# Patient Record
Sex: FEMALE | Race: OTHER | HISPANIC OR LATINO | URBAN - METROPOLITAN AREA
[De-identification: names, ages, dates, MRNs, and addresses within clinical notes are randomized per-mention and may not be internally consistent; named-entity substitution may affect disease eponyms.]

---

## 2017-09-15 PROBLEM — Z00.00 ENCOUNTER FOR PREVENTIVE HEALTH EXAMINATION: Status: ACTIVE | Noted: 2017-09-15

## 2019-06-23 ENCOUNTER — EMERGENCY (EMERGENCY)
Facility: HOSPITAL | Age: 84
LOS: 1 days | Discharge: HOME | End: 2019-06-23
Attending: STUDENT IN AN ORGANIZED HEALTH CARE EDUCATION/TRAINING PROGRAM
Payer: COMMERCIAL

## 2019-06-23 VITALS
HEIGHT: 64 IN | TEMPERATURE: 98 F | DIASTOLIC BLOOD PRESSURE: 90 MMHG | WEIGHT: 169.98 LBS | RESPIRATION RATE: 17 BRPM | SYSTOLIC BLOOD PRESSURE: 190 MMHG | OXYGEN SATURATION: 95 % | HEART RATE: 85 BPM

## 2019-06-23 LAB
ALBUMIN SERPL ELPH-MCNC: 4.3 G/DL — SIGNIFICANT CHANGE UP (ref 3.5–5)
ALP SERPL-CCNC: 87 U/L — SIGNIFICANT CHANGE UP (ref 40–120)
ALT FLD-CCNC: 23 U/L DA — SIGNIFICANT CHANGE UP (ref 10–60)
ANION GAP SERPL CALC-SCNC: 10 MMOL/L — SIGNIFICANT CHANGE UP (ref 5–17)
APPEARANCE UR: CLEAR — SIGNIFICANT CHANGE UP
AST SERPL-CCNC: 18 U/L — SIGNIFICANT CHANGE UP (ref 10–40)
BASOPHILS # BLD AUTO: 0.08 K/UL — SIGNIFICANT CHANGE UP (ref 0–0.2)
BASOPHILS NFR BLD AUTO: 0.7 % — SIGNIFICANT CHANGE UP (ref 0–2)
BILIRUB SERPL-MCNC: 0.3 MG/DL — SIGNIFICANT CHANGE UP (ref 0.2–1.2)
BILIRUB UR-MCNC: NEGATIVE — SIGNIFICANT CHANGE UP
BUN SERPL-MCNC: 17 MG/DL — SIGNIFICANT CHANGE UP (ref 7–18)
CALCIUM SERPL-MCNC: 8.9 MG/DL — SIGNIFICANT CHANGE UP (ref 8.4–10.5)
CHLORIDE SERPL-SCNC: 96 MMOL/L — SIGNIFICANT CHANGE UP (ref 96–108)
CO2 SERPL-SCNC: 26 MMOL/L — SIGNIFICANT CHANGE UP (ref 22–31)
COLOR SPEC: YELLOW — SIGNIFICANT CHANGE UP
CREAT SERPL-MCNC: 0.89 MG/DL — SIGNIFICANT CHANGE UP (ref 0.5–1.3)
DIFF PNL FLD: ABNORMAL
EOSINOPHIL # BLD AUTO: 0.06 K/UL — SIGNIFICANT CHANGE UP (ref 0–0.5)
EOSINOPHIL NFR BLD AUTO: 0.5 % — SIGNIFICANT CHANGE UP (ref 0–6)
GLUCOSE SERPL-MCNC: 200 MG/DL — HIGH (ref 70–99)
GLUCOSE UR QL: NEGATIVE — SIGNIFICANT CHANGE UP
HCT VFR BLD CALC: 40.9 % — SIGNIFICANT CHANGE UP (ref 34.5–45)
HGB BLD-MCNC: 13.1 G/DL — SIGNIFICANT CHANGE UP (ref 11.5–15.5)
IMM GRANULOCYTES NFR BLD AUTO: 0.6 % — SIGNIFICANT CHANGE UP (ref 0–1.5)
KETONES UR-MCNC: NEGATIVE — SIGNIFICANT CHANGE UP
LEUKOCYTE ESTERASE UR-ACNC: NEGATIVE — SIGNIFICANT CHANGE UP
LIDOCAIN IGE QN: 51 U/L — LOW (ref 73–393)
LYMPHOCYTES # BLD AUTO: 1.54 K/UL — SIGNIFICANT CHANGE UP (ref 1–3.3)
LYMPHOCYTES # BLD AUTO: 13.8 % — SIGNIFICANT CHANGE UP (ref 13–44)
MCHC RBC-ENTMCNC: 26.2 PG — LOW (ref 27–34)
MCHC RBC-ENTMCNC: 32 GM/DL — SIGNIFICANT CHANGE UP (ref 32–36)
MCV RBC AUTO: 81.8 FL — SIGNIFICANT CHANGE UP (ref 80–100)
MONOCYTES # BLD AUTO: 0.44 K/UL — SIGNIFICANT CHANGE UP (ref 0–0.9)
MONOCYTES NFR BLD AUTO: 3.9 % — SIGNIFICANT CHANGE UP (ref 2–14)
NEUTROPHILS # BLD AUTO: 8.99 K/UL — HIGH (ref 1.8–7.4)
NEUTROPHILS NFR BLD AUTO: 80.5 % — HIGH (ref 43–77)
NITRITE UR-MCNC: NEGATIVE — SIGNIFICANT CHANGE UP
NRBC # BLD: 0 /100 WBCS — SIGNIFICANT CHANGE UP (ref 0–0)
PH UR: 8 — SIGNIFICANT CHANGE UP (ref 5–8)
PLATELET # BLD AUTO: 277 K/UL — SIGNIFICANT CHANGE UP (ref 150–400)
POTASSIUM SERPL-MCNC: 4.3 MMOL/L — SIGNIFICANT CHANGE UP (ref 3.5–5.3)
POTASSIUM SERPL-SCNC: 4.3 MMOL/L — SIGNIFICANT CHANGE UP (ref 3.5–5.3)
PROT SERPL-MCNC: 8.1 G/DL — SIGNIFICANT CHANGE UP (ref 6–8.3)
PROT UR-MCNC: 100
RBC # BLD: 5 M/UL — SIGNIFICANT CHANGE UP (ref 3.8–5.2)
RBC # FLD: 16.7 % — HIGH (ref 10.3–14.5)
SODIUM SERPL-SCNC: 132 MMOL/L — LOW (ref 135–145)
SP GR SPEC: 1.01 — SIGNIFICANT CHANGE UP (ref 1.01–1.02)
TROPONIN I SERPL-MCNC: <0.015 NG/ML — SIGNIFICANT CHANGE UP (ref 0–0.04)
UROBILINOGEN FLD QL: NEGATIVE — SIGNIFICANT CHANGE UP
WBC # BLD: 11.18 K/UL — HIGH (ref 3.8–10.5)
WBC # FLD AUTO: 11.18 K/UL — HIGH (ref 3.8–10.5)

## 2019-06-23 PROCEDURE — 99284 EMERGENCY DEPT VISIT MOD MDM: CPT | Mod: 25

## 2019-06-23 PROCEDURE — 70450 CT HEAD/BRAIN W/O DYE: CPT | Mod: 26

## 2019-06-23 PROCEDURE — 71045 X-RAY EXAM CHEST 1 VIEW: CPT

## 2019-06-23 PROCEDURE — 84484 ASSAY OF TROPONIN QUANT: CPT

## 2019-06-23 PROCEDURE — 85027 COMPLETE CBC AUTOMATED: CPT

## 2019-06-23 PROCEDURE — 99284 EMERGENCY DEPT VISIT MOD MDM: CPT

## 2019-06-23 PROCEDURE — 80053 COMPREHEN METABOLIC PANEL: CPT

## 2019-06-23 PROCEDURE — 81001 URINALYSIS AUTO W/SCOPE: CPT

## 2019-06-23 PROCEDURE — 70450 CT HEAD/BRAIN W/O DYE: CPT

## 2019-06-23 PROCEDURE — 96365 THER/PROPH/DIAG IV INF INIT: CPT

## 2019-06-23 PROCEDURE — 36415 COLL VENOUS BLD VENIPUNCTURE: CPT

## 2019-06-23 PROCEDURE — 87186 SC STD MICRODIL/AGAR DIL: CPT

## 2019-06-23 PROCEDURE — 93005 ELECTROCARDIOGRAM TRACING: CPT

## 2019-06-23 PROCEDURE — 87086 URINE CULTURE/COLONY COUNT: CPT

## 2019-06-23 PROCEDURE — 71045 X-RAY EXAM CHEST 1 VIEW: CPT | Mod: 26

## 2019-06-23 PROCEDURE — 83690 ASSAY OF LIPASE: CPT

## 2019-06-23 RX ORDER — METOCLOPRAMIDE HCL 10 MG
10 TABLET ORAL ONCE
Refills: 0 | Status: COMPLETED | OUTPATIENT
Start: 2019-06-23 | End: 2019-06-23

## 2019-06-23 RX ADMIN — Medication 10 MILLIGRAM(S): at 19:08

## 2019-06-23 RX ADMIN — Medication 104 MILLIGRAM(S): at 18:52

## 2019-06-23 NOTE — ED PROVIDER NOTE - PROGRESS NOTE DETAILS
patient vital stable. ct show hypodensity in real, neuro intact, tolerating po. offered admission, patient states she wants to go home. ordered for mri in follow up book. given return precaution

## 2019-06-23 NOTE — ED PROVIDER NOTE - CLINICAL SUMMARY MEDICAL DECISION MAKING FREE TEXT BOX
88 y/o F presents with headache, nausea and vomiting; otherwise neurologically intact. Will obtain blood work, assess for intracranial cause, EKG, serial abdominal exam, provide Zofran, fluids and reassess.

## 2019-06-23 NOTE — ED PROVIDER NOTE - OBJECTIVE STATEMENT
89 y/o F with a significant PMHx of DM and HTN presents to the ED with nausea and vomiting associated with headache x today. Denies focal weakness, numbness, chest pain, diarrhea, dysuria, abdominal pain or any other acute complaints.

## 2019-06-27 RX ORDER — CIPROFLOXACIN LACTATE 400MG/40ML
1 VIAL (ML) INTRAVENOUS
Qty: 14 | Refills: 0
Start: 2019-06-27 | End: 2019-07-03

## 2019-06-27 NOTE — ED POST DISCHARGE NOTE - RESULT SUMMARY
+ urine culture, pt called and informed of results.  abx prescribed.  pt advised to return to ED for any worsening or concerning symptoms.

## 2019-07-13 ENCOUNTER — APPOINTMENT (OUTPATIENT)
Dept: MRI IMAGING | Facility: HOSPITAL | Age: 84
End: 2019-07-13

## 2019-07-13 ENCOUNTER — OUTPATIENT (OUTPATIENT)
Dept: OUTPATIENT SERVICES | Facility: HOSPITAL | Age: 84
LOS: 1 days | End: 2019-07-13
Payer: COMMERCIAL

## 2019-07-13 DIAGNOSIS — R93.0 ABNORMAL FINDINGS ON DIAGNOSTIC IMAGING OF SKULL AND HEAD, NOT ELSEWHERE CLASSIFIED: ICD-10-CM

## 2019-07-13 PROBLEM — I10 ESSENTIAL (PRIMARY) HYPERTENSION: Chronic | Status: ACTIVE | Noted: 2019-06-23

## 2019-07-13 PROBLEM — E11.9 TYPE 2 DIABETES MELLITUS WITHOUT COMPLICATIONS: Chronic | Status: ACTIVE | Noted: 2019-06-23

## 2019-07-13 PROCEDURE — 70553 MRI BRAIN STEM W/O & W/DYE: CPT

## 2020-11-19 PROBLEM — Z00.00 ENCOUNTER FOR PREVENTIVE HEALTH EXAMINATION: Status: ACTIVE | Noted: 2020-11-19

## 2021-09-12 ENCOUNTER — INPATIENT (INPATIENT)
Facility: HOSPITAL | Age: 86
LOS: 7 days | Discharge: HOME | DRG: 982 | End: 2021-09-20
Attending: INTERNAL MEDICINE | Admitting: INTERNAL MEDICINE
Payer: COMMERCIAL

## 2021-09-12 VITALS
RESPIRATION RATE: 16 BRPM | SYSTOLIC BLOOD PRESSURE: 143 MMHG | HEIGHT: 64 IN | HEART RATE: 67 BPM | OXYGEN SATURATION: 100 % | DIASTOLIC BLOOD PRESSURE: 79 MMHG

## 2021-09-12 DIAGNOSIS — F03.90 UNSPECIFIED DEMENTIA WITHOUT BEHAVIORAL DISTURBANCE: ICD-10-CM

## 2021-09-12 DIAGNOSIS — E16.2 HYPOGLYCEMIA, UNSPECIFIED: ICD-10-CM

## 2021-09-12 DIAGNOSIS — Z29.9 ENCOUNTER FOR PROPHYLACTIC MEASURES, UNSPECIFIED: ICD-10-CM

## 2021-09-12 DIAGNOSIS — G30.9 ALZHEIMER'S DISEASE, UNSPECIFIED: ICD-10-CM

## 2021-09-12 DIAGNOSIS — R55 SYNCOPE AND COLLAPSE: ICD-10-CM

## 2021-09-12 LAB
ALBUMIN SERPL ELPH-MCNC: 3.4 G/DL — LOW (ref 3.5–5)
ALP SERPL-CCNC: 79 U/L — SIGNIFICANT CHANGE UP (ref 40–120)
ALT FLD-CCNC: 36 U/L DA — SIGNIFICANT CHANGE UP (ref 10–60)
ANION GAP SERPL CALC-SCNC: 7 MMOL/L — SIGNIFICANT CHANGE UP (ref 5–17)
APPEARANCE UR: CLEAR — SIGNIFICANT CHANGE UP
AST SERPL-CCNC: 20 U/L — SIGNIFICANT CHANGE UP (ref 10–40)
BASOPHILS # BLD AUTO: 0.09 K/UL — SIGNIFICANT CHANGE UP (ref 0–0.2)
BASOPHILS NFR BLD AUTO: 0.8 % — SIGNIFICANT CHANGE UP (ref 0–2)
BILIRUB SERPL-MCNC: 0.3 MG/DL — SIGNIFICANT CHANGE UP (ref 0.2–1.2)
BILIRUB UR-MCNC: NEGATIVE — SIGNIFICANT CHANGE UP
BUN SERPL-MCNC: 24 MG/DL — HIGH (ref 7–18)
CALCIUM SERPL-MCNC: 8.3 MG/DL — LOW (ref 8.4–10.5)
CHLORIDE SERPL-SCNC: 100 MMOL/L — SIGNIFICANT CHANGE UP (ref 96–108)
CO2 SERPL-SCNC: 25 MMOL/L — SIGNIFICANT CHANGE UP (ref 22–31)
COLOR SPEC: YELLOW — SIGNIFICANT CHANGE UP
CREAT SERPL-MCNC: 1.24 MG/DL — SIGNIFICANT CHANGE UP (ref 0.5–1.3)
DIFF PNL FLD: NEGATIVE — SIGNIFICANT CHANGE UP
EOSINOPHIL # BLD AUTO: 0.16 K/UL — SIGNIFICANT CHANGE UP (ref 0–0.5)
EOSINOPHIL NFR BLD AUTO: 1.4 % — SIGNIFICANT CHANGE UP (ref 0–6)
GLUCOSE BLDC GLUCOMTR-MCNC: 172 MG/DL — HIGH (ref 70–99)
GLUCOSE BLDC GLUCOMTR-MCNC: 181 MG/DL — HIGH (ref 70–99)
GLUCOSE SERPL-MCNC: 190 MG/DL — HIGH (ref 70–99)
GLUCOSE UR QL: 100 MG/DL
HCT VFR BLD CALC: 35.4 % — SIGNIFICANT CHANGE UP (ref 34.5–45)
HGB BLD-MCNC: 11.8 G/DL — SIGNIFICANT CHANGE UP (ref 11.5–15.5)
IMM GRANULOCYTES NFR BLD AUTO: 0.5 % — SIGNIFICANT CHANGE UP (ref 0–1.5)
KETONES UR-MCNC: NEGATIVE — SIGNIFICANT CHANGE UP
LEUKOCYTE ESTERASE UR-ACNC: NEGATIVE — SIGNIFICANT CHANGE UP
LIDOCAIN IGE QN: 32 U/L — LOW (ref 73–393)
LYMPHOCYTES # BLD AUTO: 1.56 K/UL — SIGNIFICANT CHANGE UP (ref 1–3.3)
LYMPHOCYTES # BLD AUTO: 13.6 % — SIGNIFICANT CHANGE UP (ref 13–44)
MCHC RBC-ENTMCNC: 30.7 PG — SIGNIFICANT CHANGE UP (ref 27–34)
MCHC RBC-ENTMCNC: 33.3 GM/DL — SIGNIFICANT CHANGE UP (ref 32–36)
MCV RBC AUTO: 92.2 FL — SIGNIFICANT CHANGE UP (ref 80–100)
MONOCYTES # BLD AUTO: 1.06 K/UL — HIGH (ref 0–0.9)
MONOCYTES NFR BLD AUTO: 9.2 % — SIGNIFICANT CHANGE UP (ref 2–14)
NEUTROPHILS # BLD AUTO: 8.56 K/UL — HIGH (ref 1.8–7.4)
NEUTROPHILS NFR BLD AUTO: 74.5 % — SIGNIFICANT CHANGE UP (ref 43–77)
NITRITE UR-MCNC: NEGATIVE — SIGNIFICANT CHANGE UP
NRBC # BLD: 0 /100 WBCS — SIGNIFICANT CHANGE UP (ref 0–0)
PH UR: 6 — SIGNIFICANT CHANGE UP (ref 5–8)
PLATELET # BLD AUTO: 227 K/UL — SIGNIFICANT CHANGE UP (ref 150–400)
POTASSIUM SERPL-MCNC: 4.1 MMOL/L — SIGNIFICANT CHANGE UP (ref 3.5–5.3)
POTASSIUM SERPL-SCNC: 4.1 MMOL/L — SIGNIFICANT CHANGE UP (ref 3.5–5.3)
PROT SERPL-MCNC: 6.7 G/DL — SIGNIFICANT CHANGE UP (ref 6–8.3)
PROT UR-MCNC: 30 MG/DL
RBC # BLD: 3.84 M/UL — SIGNIFICANT CHANGE UP (ref 3.8–5.2)
RBC # FLD: 13.2 % — SIGNIFICANT CHANGE UP (ref 10.3–14.5)
SARS-COV-2 RNA SPEC QL NAA+PROBE: SIGNIFICANT CHANGE UP
SODIUM SERPL-SCNC: 132 MMOL/L — LOW (ref 135–145)
SP GR SPEC: 1.01 — SIGNIFICANT CHANGE UP (ref 1.01–1.02)
TROPONIN I SERPL-MCNC: <0.015 NG/ML — SIGNIFICANT CHANGE UP (ref 0–0.04)
UROBILINOGEN FLD QL: NEGATIVE — SIGNIFICANT CHANGE UP
WBC # BLD: 11.49 K/UL — HIGH (ref 3.8–10.5)
WBC # FLD AUTO: 11.49 K/UL — HIGH (ref 3.8–10.5)

## 2021-09-12 PROCEDURE — 71045 X-RAY EXAM CHEST 1 VIEW: CPT | Mod: 26

## 2021-09-12 PROCEDURE — 93010 ELECTROCARDIOGRAM REPORT: CPT

## 2021-09-12 PROCEDURE — 99291 CRITICAL CARE FIRST HOUR: CPT

## 2021-09-12 RX ORDER — SODIUM CHLORIDE 9 MG/ML
1000 INJECTION, SOLUTION INTRAVENOUS
Refills: 0 | Status: DISCONTINUED | OUTPATIENT
Start: 2021-09-12 | End: 2021-09-12

## 2021-09-12 RX ORDER — SERTRALINE 25 MG/1
50 TABLET, FILM COATED ORAL AT BEDTIME
Refills: 0 | Status: DISCONTINUED | OUTPATIENT
Start: 2021-09-12 | End: 2021-09-20

## 2021-09-12 RX ORDER — PANTOPRAZOLE SODIUM 20 MG/1
40 TABLET, DELAYED RELEASE ORAL
Refills: 0 | Status: DISCONTINUED | OUTPATIENT
Start: 2021-09-12 | End: 2021-09-20

## 2021-09-12 RX ORDER — FLUOXETINE HCL 10 MG
20 CAPSULE ORAL DAILY
Refills: 0 | Status: DISCONTINUED | OUTPATIENT
Start: 2021-09-12 | End: 2021-09-20

## 2021-09-12 RX ORDER — QUETIAPINE FUMARATE 200 MG/1
100 TABLET, FILM COATED ORAL AT BEDTIME
Refills: 0 | Status: DISCONTINUED | OUTPATIENT
Start: 2021-09-12 | End: 2021-09-20

## 2021-09-12 RX ORDER — DEXTROSE 50 % IN WATER 50 %
25 SYRINGE (ML) INTRAVENOUS ONCE
Refills: 0 | Status: COMPLETED | OUTPATIENT
Start: 2021-09-12 | End: 2021-09-12

## 2021-09-12 RX ORDER — ASCORBIC ACID 60 MG
500 TABLET,CHEWABLE ORAL DAILY
Refills: 0 | Status: DISCONTINUED | OUTPATIENT
Start: 2021-09-12 | End: 2021-09-20

## 2021-09-12 RX ORDER — SODIUM CHLORIDE 9 MG/ML
1000 INJECTION, SOLUTION INTRAVENOUS
Refills: 0 | Status: DISCONTINUED | OUTPATIENT
Start: 2021-09-12 | End: 2021-09-14

## 2021-09-12 RX ORDER — ENOXAPARIN SODIUM 100 MG/ML
40 INJECTION SUBCUTANEOUS DAILY
Refills: 0 | Status: DISCONTINUED | OUTPATIENT
Start: 2021-09-12 | End: 2021-09-17

## 2021-09-12 RX ORDER — LOSARTAN POTASSIUM 100 MG/1
50 TABLET, FILM COATED ORAL DAILY
Refills: 0 | Status: DISCONTINUED | OUTPATIENT
Start: 2021-09-12 | End: 2021-09-15

## 2021-09-12 RX ORDER — RISPERIDONE 4 MG/1
1 TABLET ORAL
Refills: 0 | Status: DISCONTINUED | OUTPATIENT
Start: 2021-09-12 | End: 2021-09-20

## 2021-09-12 RX ORDER — FERROUS SULFATE 325(65) MG
325 TABLET ORAL DAILY
Refills: 0 | Status: DISCONTINUED | OUTPATIENT
Start: 2021-09-12 | End: 2021-09-20

## 2021-09-12 RX ORDER — SODIUM CHLORIDE 9 MG/ML
1000 INJECTION INTRAMUSCULAR; INTRAVENOUS; SUBCUTANEOUS ONCE
Refills: 0 | Status: COMPLETED | OUTPATIENT
Start: 2021-09-12 | End: 2021-09-12

## 2021-09-12 RX ORDER — ASPIRIN/CALCIUM CARB/MAGNESIUM 324 MG
81 TABLET ORAL DAILY
Refills: 0 | Status: DISCONTINUED | OUTPATIENT
Start: 2021-09-12 | End: 2021-09-20

## 2021-09-12 RX ORDER — FOLIC ACID 0.8 MG
1 TABLET ORAL DAILY
Refills: 0 | Status: DISCONTINUED | OUTPATIENT
Start: 2021-09-12 | End: 2021-09-20

## 2021-09-12 RX ADMIN — Medication 25 GRAM(S): at 15:09

## 2021-09-12 RX ADMIN — Medication 25 GRAM(S): at 13:25

## 2021-09-12 RX ADMIN — LOSARTAN POTASSIUM 50 MILLIGRAM(S): 100 TABLET, FILM COATED ORAL at 22:27

## 2021-09-12 RX ADMIN — SODIUM CHLORIDE 1000 MILLILITER(S): 9 INJECTION INTRAMUSCULAR; INTRAVENOUS; SUBCUTANEOUS at 14:22

## 2021-09-12 RX ADMIN — SODIUM CHLORIDE 1000 MILLILITER(S): 9 INJECTION INTRAMUSCULAR; INTRAVENOUS; SUBCUTANEOUS at 14:50

## 2021-09-12 RX ADMIN — SODIUM CHLORIDE 120 MILLILITER(S): 9 INJECTION, SOLUTION INTRAVENOUS at 15:28

## 2021-09-12 RX ADMIN — QUETIAPINE FUMARATE 100 MILLIGRAM(S): 200 TABLET, FILM COATED ORAL at 22:12

## 2021-09-12 RX ADMIN — SODIUM CHLORIDE 70 MILLILITER(S): 9 INJECTION, SOLUTION INTRAVENOUS at 21:30

## 2021-09-12 RX ADMIN — SERTRALINE 50 MILLIGRAM(S): 25 TABLET, FILM COATED ORAL at 22:12

## 2021-09-12 NOTE — ED PROVIDER NOTE - IV ALTEPLASE EXCL ABS HIDDEN
Detail Level: Simple Instructions: This plan will send the code FBSE to the PM system.  DO NOT or CHANGE the price. Price (Do Not Change): 0.00 show

## 2021-09-12 NOTE — ED ADULT TRIAGE NOTE - CHIEF COMPLAINT QUOTE
ams at home , as per EMS hypoglycemic  at home, patient is at baseline now as per daughter, has h/o dementia, dextrose 50% given

## 2021-09-12 NOTE — H&P ADULT - ATTENDING COMMENTS
89yo F from home minimal ambulation with walker, AAOx 1-2 with PMH of DM (on insulin basaglar and metformin), Alzheimer's dementia, Iron Deficiency Anemia presents to the ER after a syncopal episode. Pt is a poor historian due to Alzheimer's dementia. As per daughter at bedside, Pts AM blood sugars were high at 210 and then she received her usual 34U basaglar at 11AM without taking much to eat, then after that she was sweaty. She also reported some chest pain at that time. She was unresponsive for 10 mins. EMS called and her sugar level was 41 and returned to baseline mental status after 1 amp D50. Pt had a UTI 1 week ago and was prescribed 7 days of Bactrim. Denies fever, cough, diarrhea, vomiting, and all other symptoms though pt with dementia and daughter does not know exactly.        assessment   --- syncope likely 2nd to hypoglycemia, r/o cns patho, chest pain, r/o acs, , h/o DM (on insulin basaglar and metformin), Alzheimer's dementia, Iron Deficiency Anemia    plan  --  adm to tele, aspirin, statin, hold diabetic meds, ivf d5w, lispro ss, cont preadmit home meds, gi and dvt profilaxis  cbc, bmp, mg, phos, lipid, tsh, ce q8 x3    echo  carotid duplex    cardio cons  neuro cons.  endo cons

## 2021-09-12 NOTE — H&P ADULT - ASSESSMENT
Pt is a 89yo F from home minimal ambulation with walker, AAOx 1-2 with PMH of DM (on insulin basaglar and metformin), Alzheimer's dementia, MESERET presents to the ER after a syncopal episode. Admitted for hypoglycemia.

## 2021-09-12 NOTE — ED ADULT NURSE NOTE - OBJECTIVE STATEMENT
Patient BIB EMS from home for AMS, as per daughter she received 34u basaglar at 11AM without taking much to eat, BGL in the fiel 41 mg/dl, 1amp Dextr 50% given by EMS, upon arrival Patient sleepy, breathing in room air, F/S 65mg/dl, daughter at bed side, safety and comfort maintained.

## 2021-09-12 NOTE — ED PROVIDER NOTE - CLINICAL SUMMARY MEDICAL DECISION MAKING FREE TEXT BOX
No focal deficits to suggest CVA. No arrhythmia. Trop neg and no current CP, and character low suspicion for ACS. Electrolytes grossly unremarkable other than hypoglc and mild dehydration. No fever or specific infectious symptoms, UA pending. Given 1 amp D50 on arrival, needed a 2nd 2 hrs later. NAD, well appearing. Signed off care to Dr. PRISCILLA Mercer who will f/u rpt FS's and reassess.

## 2021-09-12 NOTE — H&P ADULT - PROBLEM SELECTOR PLAN 1
Pt p/w after a syncopal episode with aura of diaphoresis and had LOC for 10 mins  At home pts BG 41  Pt has h/o T2DM and at home on basaglar insulin 34U in the morning and metformin 500mg od  Vitals stable  PE unremarkable  Was given 2 amps of D 50, BG improved to 129  started on D5 @120cc/hr, switched to D5 NS at 70cc/hr  monitor BGs Q4h Pt p/w after a syncopal episode with aura of diaphoresis and had LOC for 10 mins  At home pts BG 41  Pt has h/o T2DM and at home on basaglar insulin 34U in the morning and metformin 500mg od  Vitals stable  PE unremarkable  Was given 2 amps of D 50, BG improved to 129  started on D5 @120cc/hr, switched to D5 NS at 70cc/hr  monitor BGs Q4h  Endo Dr. Huff consulted

## 2021-09-12 NOTE — H&P ADULT - NSICDXPASTMEDICALHX_GEN_ALL_CORE_FT
PAST MEDICAL HISTORY:  Alzheimer disease     DM (diabetes mellitus)     HTN (hypertension)     MESERET (iron deficiency anemia)

## 2021-09-12 NOTE — ED PROVIDER NOTE - OBJECTIVE STATEMENT
89yoF with h/o HTN, DM on insulin basaglar and metformin alone, Alzheimer's dementia, presents with AMS. Per daughter at bedside she received 34u basaglar at 11AM without taking much to eat, then after that she was sweaty, body looked red, and she was hypertensive, also reported some CP at that time. Per EMS, FS was 41 and returned to baseline mental status after 1 amp D50. Denies fever, cough, diarrhea, vomiting, and all other symptoms though pt with dementia and daughter does not know exactly.

## 2021-09-12 NOTE — H&P ADULT - HISTORY OF PRESENT ILLNESS
Pt is a 89yo F from home minimal ambulation with walker, AAOx 1-2 with PMH of DM (on insulin basaglar and metformin), Alzheimer's dementia, MESERET presents to the ER after a syncopal episode. 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 As per daughter at bedside, Pts AM blood sugars were high at 210 and then she received her usual 34U basaglar at 11AM without taking much to eat, then after that she was sweaty. She also reported some chest pain at that time. She was unresponsive for 10 mins. EMS called and her sugar level was 41 and returned to baseline mental status after 1 amp D50. Pt had a UTI 1 week ago and was prescribed 7 days of Bactrim. Denies fever, cough, diarrhea, vomiting, and all other symptoms though pt with dementia and daughter does not know exactly.

## 2021-09-12 NOTE — H&P ADULT - NSHPPHYSICALEXAM_GEN_ALL_CORE
Vital Signs Last 24 Hrs  T(C): 36.5 (12 Sep 2021 19:29), Max: 36.7 (12 Sep 2021 15:25)  T(F): 97.7 (12 Sep 2021 19:29), Max: 98 (12 Sep 2021 15:25)  HR: 64 (12 Sep 2021 19:29) (60 - 67)  BP: 195/98 (12 Sep 2021 19:29) (123/70 - 195/98)  RR: 16 (12 Sep 2021 19:29) (16 - 16)  SpO2: 97% (12 Sep 2021 19:29) (96% - 100%)    GENERAL: NAD, lying in bed comfortably  HEAD:  Atraumatic, Normocephalic  EYES: EOMI, PERRLA, conjunctiva and sclera clear  ENT: Moist mucous membranes  NECK: Supple, No JVD  CHEST/LUNG: Clear to auscultation bilaterally; No rales, rhonchi, wheezing, or rubs. Unlabored respirations  HEART: Regular rate and rhythm; No murmurs, rubs, or gallops  ABDOMEN: Bowel sounds present; Soft, Nontender, Nondistended. No hepatomegaly  EXTREMITIES:  2+ Peripheral Pulses, brisk capillary refill. No clubbing, cyanosis, or edema  NERVOUS SYSTEM:  Alert & Oriented X3, speech clear. No deficits   MSK: FROM all 4 extremities, full and equal strength  SKIN: No rashes or lesions

## 2021-09-12 NOTE — ED PROVIDER NOTE - PHYSICAL EXAMINATION
Afebrile, hemodynamically stable, saturating well  NAD, well appearing, laying comfortably in bed  Head NCAT  EOMI grossly, anicteric  MM dry  RRR, nml S1/S2, no m/r/g  Lungs CTAB, no w/r/r  Abd soft, NT, ND, nml BS, no rebound or guarding  Alert, follows commands, CN's 3-12 intact, motor 5/5 in all extrems  YOST spontaneously, no leg cyanosis or edema  Skin warm, well perfused, no rashes or hives

## 2021-09-12 NOTE — H&P ADULT - PROBLEM SELECTOR PLAN 2
continued home meds Pt p/w after a syncopal episode with aura of diaphoresis and had LOC for 10 mins  EKG showed NSR  on telemetry  Echocardiogram and carotid dopplers ordered  Cardiology Dr. Xie consulted  Neuro Dr. Mohr consulted

## 2021-09-13 DIAGNOSIS — E87.8 OTHER DISORDERS OF ELECTROLYTE AND FLUID BALANCE, NOT ELSEWHERE CLASSIFIED: ICD-10-CM

## 2021-09-13 DIAGNOSIS — Z02.9 ENCOUNTER FOR ADMINISTRATIVE EXAMINATIONS, UNSPECIFIED: ICD-10-CM

## 2021-09-13 LAB
A1C WITH ESTIMATED AVERAGE GLUCOSE RESULT: 7.1 % — HIGH (ref 4–5.6)
ALBUMIN SERPL ELPH-MCNC: 3.7 G/DL — SIGNIFICANT CHANGE UP (ref 3.5–5)
ALP SERPL-CCNC: 81 U/L — SIGNIFICANT CHANGE UP (ref 40–120)
ALT FLD-CCNC: 33 U/L DA — SIGNIFICANT CHANGE UP (ref 10–60)
ANION GAP SERPL CALC-SCNC: 6 MMOL/L — SIGNIFICANT CHANGE UP (ref 5–17)
AST SERPL-CCNC: 24 U/L — SIGNIFICANT CHANGE UP (ref 10–40)
BASOPHILS # BLD AUTO: 0.06 K/UL — SIGNIFICANT CHANGE UP (ref 0–0.2)
BASOPHILS NFR BLD AUTO: 0.6 % — SIGNIFICANT CHANGE UP (ref 0–2)
BILIRUB SERPL-MCNC: 0.4 MG/DL — SIGNIFICANT CHANGE UP (ref 0.2–1.2)
BUN SERPL-MCNC: 19 MG/DL — HIGH (ref 7–18)
CALCIUM SERPL-MCNC: 9.1 MG/DL — SIGNIFICANT CHANGE UP (ref 8.4–10.5)
CHLORIDE SERPL-SCNC: 103 MMOL/L — SIGNIFICANT CHANGE UP (ref 96–108)
CHOLEST SERPL-MCNC: 173 MG/DL — SIGNIFICANT CHANGE UP
CO2 SERPL-SCNC: 24 MMOL/L — SIGNIFICANT CHANGE UP (ref 22–31)
COVID-19 SPIKE DOMAIN AB INTERP: POSITIVE
COVID-19 SPIKE DOMAIN ANTIBODY RESULT: >250 U/ML — HIGH
CREAT SERPL-MCNC: 1.05 MG/DL — SIGNIFICANT CHANGE UP (ref 0.5–1.3)
EOSINOPHIL # BLD AUTO: 0.11 K/UL — SIGNIFICANT CHANGE UP (ref 0–0.5)
EOSINOPHIL NFR BLD AUTO: 1.1 % — SIGNIFICANT CHANGE UP (ref 0–6)
ESTIMATED AVERAGE GLUCOSE: 157 MG/DL — HIGH (ref 68–114)
GLUCOSE BLDC GLUCOMTR-MCNC: 137 MG/DL — HIGH (ref 70–99)
GLUCOSE BLDC GLUCOMTR-MCNC: 185 MG/DL — HIGH (ref 70–99)
GLUCOSE BLDC GLUCOMTR-MCNC: 194 MG/DL — HIGH (ref 70–99)
GLUCOSE BLDC GLUCOMTR-MCNC: 217 MG/DL — HIGH (ref 70–99)
GLUCOSE SERPL-MCNC: 138 MG/DL — HIGH (ref 70–99)
HCT VFR BLD CALC: 37.2 % — SIGNIFICANT CHANGE UP (ref 34.5–45)
HDLC SERPL-MCNC: 53 MG/DL — SIGNIFICANT CHANGE UP
HGB BLD-MCNC: 12.6 G/DL — SIGNIFICANT CHANGE UP (ref 11.5–15.5)
IMM GRANULOCYTES NFR BLD AUTO: 0.3 % — SIGNIFICANT CHANGE UP (ref 0–1.5)
LIPID PNL WITH DIRECT LDL SERPL: 84 MG/DL — SIGNIFICANT CHANGE UP
LYMPHOCYTES # BLD AUTO: 2.09 K/UL — SIGNIFICANT CHANGE UP (ref 1–3.3)
LYMPHOCYTES # BLD AUTO: 21.2 % — SIGNIFICANT CHANGE UP (ref 13–44)
MAGNESIUM SERPL-MCNC: 1.7 MG/DL — SIGNIFICANT CHANGE UP (ref 1.6–2.6)
MCHC RBC-ENTMCNC: 30.1 PG — SIGNIFICANT CHANGE UP (ref 27–34)
MCHC RBC-ENTMCNC: 33.9 GM/DL — SIGNIFICANT CHANGE UP (ref 32–36)
MCV RBC AUTO: 88.8 FL — SIGNIFICANT CHANGE UP (ref 80–100)
MONOCYTES # BLD AUTO: 1 K/UL — HIGH (ref 0–0.9)
MONOCYTES NFR BLD AUTO: 10.1 % — SIGNIFICANT CHANGE UP (ref 2–14)
NEUTROPHILS # BLD AUTO: 6.59 K/UL — SIGNIFICANT CHANGE UP (ref 1.8–7.4)
NEUTROPHILS NFR BLD AUTO: 66.7 % — SIGNIFICANT CHANGE UP (ref 43–77)
NON HDL CHOLESTEROL: 120 MG/DL — SIGNIFICANT CHANGE UP
NRBC # BLD: 0 /100 WBCS — SIGNIFICANT CHANGE UP (ref 0–0)
PHOSPHATE SERPL-MCNC: 2.1 MG/DL — LOW (ref 2.5–4.5)
PLATELET # BLD AUTO: 247 K/UL — SIGNIFICANT CHANGE UP (ref 150–400)
POTASSIUM SERPL-MCNC: 4.4 MMOL/L — SIGNIFICANT CHANGE UP (ref 3.5–5.3)
POTASSIUM SERPL-SCNC: 4.4 MMOL/L — SIGNIFICANT CHANGE UP (ref 3.5–5.3)
PROT SERPL-MCNC: 7 G/DL — SIGNIFICANT CHANGE UP (ref 6–8.3)
RBC # BLD: 4.19 M/UL — SIGNIFICANT CHANGE UP (ref 3.8–5.2)
RBC # FLD: 12.9 % — SIGNIFICANT CHANGE UP (ref 10.3–14.5)
SARS-COV-2 IGG+IGM SERPL QL IA: >250 U/ML — HIGH
SARS-COV-2 IGG+IGM SERPL QL IA: POSITIVE
SODIUM SERPL-SCNC: 133 MMOL/L — LOW (ref 135–145)
TRIGL SERPL-MCNC: 182 MG/DL — HIGH
TSH SERPL-MCNC: 4.95 UU/ML — HIGH (ref 0.34–4.82)
WBC # BLD: 9.88 K/UL — SIGNIFICANT CHANGE UP (ref 3.8–10.5)
WBC # FLD AUTO: 9.88 K/UL — SIGNIFICANT CHANGE UP (ref 3.8–10.5)

## 2021-09-13 PROCEDURE — 99221 1ST HOSP IP/OBS SF/LOW 40: CPT

## 2021-09-13 PROCEDURE — 93880 EXTRACRANIAL BILAT STUDY: CPT | Mod: 26

## 2021-09-13 RX ORDER — SODIUM,POTASSIUM PHOSPHATES 278-250MG
1 POWDER IN PACKET (EA) ORAL
Refills: 0 | Status: COMPLETED | OUTPATIENT
Start: 2021-09-13 | End: 2021-09-14

## 2021-09-13 RX ORDER — PETROLATUM,WHITE
1 JELLY (GRAM) TOPICAL
Refills: 0 | Status: COMPLETED | OUTPATIENT
Start: 2021-09-13 | End: 2021-09-16

## 2021-09-13 RX ADMIN — SERTRALINE 50 MILLIGRAM(S): 25 TABLET, FILM COATED ORAL at 21:46

## 2021-09-13 RX ADMIN — Medication 1 TABLET(S): at 12:32

## 2021-09-13 RX ADMIN — QUETIAPINE FUMARATE 100 MILLIGRAM(S): 200 TABLET, FILM COATED ORAL at 21:46

## 2021-09-13 RX ADMIN — ENOXAPARIN SODIUM 40 MILLIGRAM(S): 100 INJECTION SUBCUTANEOUS at 12:34

## 2021-09-13 RX ADMIN — Medication 1 PACKET(S): at 18:16

## 2021-09-13 RX ADMIN — PANTOPRAZOLE SODIUM 40 MILLIGRAM(S): 20 TABLET, DELAYED RELEASE ORAL at 09:01

## 2021-09-13 RX ADMIN — Medication 1 APPLICATION(S): at 18:17

## 2021-09-13 RX ADMIN — RISPERIDONE 1 MILLIGRAM(S): 4 TABLET ORAL at 06:30

## 2021-09-13 RX ADMIN — Medication 81 MILLIGRAM(S): at 12:32

## 2021-09-13 RX ADMIN — Medication 1 MILLIGRAM(S): at 12:32

## 2021-09-13 RX ADMIN — Medication 20 MILLIGRAM(S): at 12:31

## 2021-09-13 RX ADMIN — Medication 325 MILLIGRAM(S): at 12:32

## 2021-09-13 RX ADMIN — LOSARTAN POTASSIUM 50 MILLIGRAM(S): 100 TABLET, FILM COATED ORAL at 10:31

## 2021-09-13 RX ADMIN — RISPERIDONE 1 MILLIGRAM(S): 4 TABLET ORAL at 18:15

## 2021-09-13 RX ADMIN — Medication 500 MILLIGRAM(S): at 12:32

## 2021-09-13 NOTE — ED ADULT NURSE REASSESSMENT NOTE - NS ED NURSE REASSESS COMMENT FT1
0730: Pt received in bed, AOX2, no s/s of any distress noted. Cardiac monitor in place. IV line in place, IV fluids infusing as per orders, no signs of infiltration noted. Bed pan provided multiple times. VS WNL. Call bell in reach, bed in lowest position. Safety maintained, hourly rounding performed. RG to Beltran Ospina in holding

## 2021-09-13 NOTE — PROGRESS NOTE ADULT - PROBLEM SELECTOR PLAN 2
Pt p/w after a syncopal episode with aura of diaphoresis and had LOC for 10 mins  rule out ACS  EKG showed NSR  on telemetry  Trop negative #1  follow up Trop #2  follow up Echocardiogram and carotid dopplers    Cardiology Dr. Xie consulted  Neuro Dr. Mohr/Reynaldo (today) consulted

## 2021-09-13 NOTE — CONSULT NOTE ADULT - ASSESSMENT
89yo F from home minimal ambulation with walker, AAOx 1-2 with PMH of DM (on insulin basaglar and metformin), Alzheimer's dementia, MESERET presents to the ER after a syncopal episode,cp,abnl ekg.  1.Tele monitoring.  2.Echocardiogram.  3.Stress test-r/o ischemia.  4.DM-Endo eval,Insulin.  5.HTN-cozaar.  6.Cont asa,statin.  7.GI and DVT prophylaxis.

## 2021-09-13 NOTE — CONSULT NOTE ADULT - SUBJECTIVE AND OBJECTIVE BOX
CHIEF COMPLAINT:Patient is a 89y old  Female who presents with a chief complaint of Hypoglycemia.      HPI:  Pt is a 89yo F from home minimal ambulation with walker, AAOx 1-2 with PMH of DM (on insulin basaglar and metformin), Alzheimer's dementia, MESERET presents to the ER after a syncopal episode. 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 As per daughter at bedside, Pts AM blood sugars were high at 210 and then she received her usual 34U basaglar at 11AM without taking much to eat, then after that she was sweaty. She also reported some chest pain at that time. She was unresponsive for 10 mins. EMS called and her sugar level was 41 and returned to baseline mental status after 1 amp D50. Pt had a UTI 1 week ago and was prescribed 7 days of Bactrim. Denies fever, cough, diarrhea, vomiting, and all other symptoms though pt with dementia and daughter does not know exactly. (12 Sep 2021 19:39)      PAST MEDICAL & SURGICAL HISTORY:  HTN (hypertension)    DM (diabetes mellitus)    Alzheimer disease    MESERET (iron deficiency anemia)            MEDICATIONS  (STANDING):  ascorbic acid 500 milliGRAM(s) Oral daily  aspirin enteric coated 81 milliGRAM(s) Oral daily  dextrose 5% + sodium chloride 0.9%. 1000 milliLiter(s) (70 mL/Hr) IV Continuous <Continuous>  enoxaparin Injectable 40 milliGRAM(s) SubCutaneous daily  ferrous    sulfate 325 milliGRAM(s) Oral daily  FLUoxetine 20 milliGRAM(s) Oral daily  folic acid 1 milliGRAM(s) Oral daily  losartan 50 milliGRAM(s) Oral daily  multivitamin 1 Tablet(s) Oral daily  pantoprazole    Tablet 40 milliGRAM(s) Oral before breakfast  petrolatum white Ointment 1 Application(s) Topical two times a day  potassium phosphate / sodium phosphate Powder (PHOS-NaK) 1 Packet(s) Oral two times a day  QUEtiapine 100 milliGRAM(s) Oral at bedtime  risperiDONE   Tablet 1 milliGRAM(s) Oral two times a day  sertraline 50 milliGRAM(s) Oral at bedtime    MEDICATIONS  (PRN):      FAMILY HISTORY:No hx of CAD      SOCIAL HISTORY:    [x ] Non-smoker    [ x] Alcohol-denies    Allergies    penicillins (Unknown)    Intolerances    	    REVIEW OF SYSTEMS:  CONSTITUTIONAL: No fever, weight loss, or fatigue  EYES: No eye pain, visual disturbances, or discharge  ENT:  No difficulty hearing, tinnitus, vertigo; No sinus or throat pain  NECK: No pain or stiffness  RESPIRATORY: No cough, wheezing, chills or hemoptysis; No Shortness of Breath  CARDIOVASCULAR: + chest pain, No palpitations,+ passing out, No dizziness, or leg swelling  GASTROINTESTINAL: No abdominal or epigastric pain. No nausea, vomiting, or hematemesis; No diarrhea or constipation. No melena or hematochezia.  GENITOURINARY: No dysuria, frequency, hematuria, or incontinence  NEUROLOGICAL: No headaches, memory loss, loss of strength, numbness, or tremors  SKIN: No itching, burning, rashes, or lesions   LYMPH Nodes: No enlarged glands  ENDOCRINE: No heat or cold intolerance; No hair loss  MUSCULOSKELETAL: No joint pain or swelling; No muscle, back, or extremity pain  PSYCHIATRIC: No depression, anxiety, mood swings, or difficulty sleeping  HEME/LYMPH: No easy bruising, or bleeding gums  ALLERGY AND IMMUNOLOGIC: No hives or eczema	        PHYSICAL EXAM:  T(C): 36.7 (09-13-21 @ 07:35), Max: 37 (09-13-21 @ 05:40)  HR: 96 (09-13-21 @ 07:35) (60 - 96)  BP: 138/80 (09-13-21 @ 07:35) (123/70 - 195/98)  RR: 18 (09-13-21 @ 07:35) (16 - 18)  SpO2: 97% (09-13-21 @ 07:35) (96% - 100%)  Wt(kg): --  I&O's Summary      Appearance: Normal	  HEENT:   Normal oral mucosa, PERRL, EOMI	  Lymphatic: No lymphadenopathy  Cardiovascular: Normal S1 S2, No JVD, No murmurs, No edema  Respiratory: Lungs clear to auscultation	  Gastrointestinal:  Soft, Non-tender, + BS	  Skin: No rashes, No ecchymoses, No cyanosis	  Neurologic: Non-focal  Extremities: Normal range of motion, No clubbing, cyanosis or edema  Vascular: Peripheral pulses palpable 2+ bilaterally        ECG: nsr,rbbb,lafb    	  LABS:	 	      CARDIAC MARKERS ( 12 Sep 2021 13:37 )  <0.015 ng/mL / x     / x     / x     / x                                  12.6   9.88  )-----------( 247      ( 13 Sep 2021 05:53 )             37.2     09-13    133<L>  |  103  |  19<H>  ----------------------------<  138<H>  4.4   |  24  |  1.05    Ca    9.1      13 Sep 2021 05:53  Phos  2.1     09-13  Mg     1.7     09-13    TPro  7.0  /  Alb  3.7  /  TBili  0.4  /  DBili  x   /  AST  24  /  ALT  33  /  AlkPhos  81  09-13      Lipid Profile: Cholesterol 173  LDL --  HDL 53      HgA1c:   TSH: Thyroid Stimulating Hormone, Serum: 4.95 uU/mL (09-13 @ 05:53)      r< from: Xray Chest 1 View-PORTABLE IMMEDIATE (09.12.21 @ 14:55) >  EXAM:  XR CHEST PORTABLE IMMED 1V                            PROCEDURE DATE:  09/12/2021          INTERPRETATION:  History: Dyspnea weakness    Chest:  one view.    Comparison: 06/23/2019    AP radiograph of the chest demonstrates subsegmental atelectasis at the lung bases with tiny pleural effusions. The cardiac silhouette is normal in size. Osseous structures show degenerative changes of the shoulders.    Impression:subsegmental atelectasis at the lung bases with tiny pleural effusions

## 2021-09-13 NOTE — CONSULT NOTE ADULT - PROBLEM SELECTOR RECOMMENDATION 9
resolved  due to basaglar and poor oral intake   check a1c level  fsg ac and hs  adjust meds prior to d/c  d/w prim team

## 2021-09-13 NOTE — PROGRESS NOTE ADULT - SUBJECTIVE AND OBJECTIVE BOX
NP Note discussed with  Primary Attending    INTERVAL HPI/OVERNIGHT EVENTS: no new complaints, used  # [id number] Machelle--Pt denies any pain or discomfort. She concerned about her son would be mad at her because she is at the hospital.     MEDICATIONS  (STANDING):  ascorbic acid 500 milliGRAM(s) Oral daily  aspirin enteric coated 81 milliGRAM(s) Oral daily  dextrose 5% + sodium chloride 0.9%. 1000 milliLiter(s) (70 mL/Hr) IV Continuous <Continuous>  enoxaparin Injectable 40 milliGRAM(s) SubCutaneous daily  ferrous    sulfate 325 milliGRAM(s) Oral daily  FLUoxetine 20 milliGRAM(s) Oral daily  folic acid 1 milliGRAM(s) Oral daily  losartan 50 milliGRAM(s) Oral daily  multivitamin 1 Tablet(s) Oral daily  pantoprazole    Tablet 40 milliGRAM(s) Oral before breakfast  potassium phosphate / sodium phosphate Powder (PHOS-NaK) 1 Packet(s) Oral two times a day  QUEtiapine 100 milliGRAM(s) Oral at bedtime  risperiDONE   Tablet 1 milliGRAM(s) Oral two times a day  sertraline 50 milliGRAM(s) Oral at bedtime    MEDICATIONS  (PRN):      __________________________________________________  REVIEW OF SYSTEMS:    CONSTITUTIONAL: No fever,   EYES: no acute visual disturbances  NECK: No pain or stiffness  RESPIRATORY: No cough; No shortness of breath  CARDIOVASCULAR: No chest pain, no palpitations  GASTROINTESTINAL: No pain. No nausea or vomiting; No diarrhea   NEUROLOGICAL: No headache or numbness, no tremors  MUSCULOSKELETAL: No joint pain, no muscle pain  GENITOURINARY: no dysuria, no frequency, no hesitancy  PSYCHIATRY: no depression , no anxiety  ALL OTHER  ROS negative        Vital Signs Last 24 Hrs  T(C): 36.7 (13 Sep 2021 07:35), Max: 37 (13 Sep 2021 05:40)  T(F): 98.1 (13 Sep 2021 07:35), Max: 98.6 (13 Sep 2021 05:40)  HR: 96 (13 Sep 2021 07:35) (60 - 96)  BP: 138/80 (13 Sep 2021 07:35) (123/70 - 195/98)  BP(mean): --  RR: 18 (13 Sep 2021 07:35) (16 - 18)  SpO2: 97% (13 Sep 2021 07:35) (96% - 100%)    ________________________________________________  PHYSICAL EXAM:  GENERAL: NAD, speaking in Panamanian  HEENT: Normocephalic;  conjunctivae and sclerae clear; moist mucous membranes;   NECK : supple  CHEST/LUNG: Clear to auscultation bilaterally with good air entry   HEART: S1 S2  regular; no murmurs, gallops or rubs  ABDOMEN: Soft, Nontender, Nondistended; Bowel sounds present  EXTREMITIES: no cyanosis; no edema; no calf tenderness  SKIN: warm and dry; no rash  NERVOUS SYSTEM:  Awake and alert; Oriented  to self, place. ; no new deficits    _________________________________________________  LABS:                        12.6   9.88  )-----------( 247      ( 13 Sep 2021 05:53 )             37.2     09-13    133<L>  |  103  |  19<H>  ----------------------------<  138<H>  4.4   |  24  |  1.05    Ca    9.1      13 Sep 2021 05:53  Phos  2.1     -  Mg     1.7     -    TPro  7.0  /  Alb  3.7  /  TBili  0.4  /  DBili  x   /  AST  24  /  ALT  33  /  AlkPhos  81  09-13      Urinalysis Basic - ( 12 Sep 2021 16:53 )    Color: Yellow / Appearance: Clear / S.015 / pH: x  Gluc: x / Ketone: Negative  / Bili: Negative / Urobili: Negative   Blood: x / Protein: 30 mg/dL / Nitrite: Negative   Leuk Esterase: Negative / RBC: 0-2 /HPF / WBC 0-2 /HPF   Sq Epi: x / Non Sq Epi: Moderate /HPF / Bacteria: Few /HPF      CAPILLARY BLOOD GLUCOSE      POCT Blood Glucose.: 137 mg/dL (13 Sep 2021 07:22)  POCT Blood Glucose.: 185 mg/dL (13 Sep 2021 02:12)  POCT Blood Glucose.: 172 mg/dL (12 Sep 2021 19:40)  POCT Blood Glucose.: 181 mg/dL (12 Sep 2021 18:02)  POCT Blood Glucose.: 129 mg/dL (12 Sep 2021 15:52)  POCT Blood Glucose.: 57 mg/dL (12 Sep 2021 14:56)  POCT Blood Glucose.: 202 mg/dL (12 Sep 2021 13:31)  POCT Blood Glucose.: 65 mg/dL (12 Sep 2021 13:11)        RADIOLOGY & ADDITIONAL TESTS:    Imaging  Reviewed:  YES  < from: Xray Chest 1 View-PORTABLE IMMEDIATE (21 @ 14:55) >    EXAM:  XR CHEST PORTABLE IMMED 1V                            PROCEDURE DATE:  2021          INTERPRETATION:  History: Dyspnea weakness    Chest:  one view.    Comparison: 2019    AP radiograph of the chest demonstrates subsegmental atelectasis at the lung bases with tiny pleural effusions. The cardiac silhouette is normal in size. Osseous structures show degenerative changes of the shoulders.    Impression:subsegmental atelectasis at the lung bases with tiny pleural effusions    ---End of Report ---            ANTHONY ARNETT MD; Attending Radiologist  This document has been electronically signed. Sep 12 2021  3:08PM    < end of copied text >    Consultant(s) Notes Reviewed:   YES      Plan of care was discussed with patient and /or primary care giver; all questions and concerns were addressed

## 2021-09-13 NOTE — CONSULT NOTE ADULT - SUBJECTIVE AND OBJECTIVE BOX
+++++++++++++++NOTE NOT COMPLETED++++++++++++++++++++++++++++ +++++++++++++++NOTE NOT COMPLETED++++++++++++++++++++++++++++    Patient is a 89y old  Female who presents with a chief complaint of Hypoglycemia (13 Sep 2021 12:51)      HPI:  Pt is a 89yo F from home minimal ambulation with walker, AAOx 1-2 with PMH of DM (on insulin basaglar and metformin), Alzheimer's dementia, MESERET presents to the ER after a syncopal episode. 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 As per daughter at bedside, Pts AM blood sugars were high at 210 and then she received her usual 34U basaglar at 11AM without taking much to eat, then after that she was sweaty. She also reported some chest pain at that time. She was unresponsive for 10 mins. EMS called and her sugar level was 41 and returned to baseline mental status after 1 amp D50. Pt had a UTI 1 week ago and was prescribed 7 days of Bactrim. Denies fever, cough, diarrhea, vomiting, and all other symptoms though pt with dementia and daughter does not know exactly. (12 Sep 2021 19:39)    Pt unable to provide history.  However, reports ambulating w/ a cane or walker one year ago and reports using a wheelchair.  Denies HA or other complaints at this time.      Neurological Review of Systems:  No difficulty with language.  No vision loss or double vision.  No dizziness, vertigo or new hearing loss.  No difficulty with speech or swallowing.  No focal weakness.  No focal sensory changes.  No numbness or tingling in the bilateral lower extremities.  (+) Difficulty with balance.  (+) Difficulty with ambulation.        MEDICATIONS  (STANDING):  ascorbic acid 500 milliGRAM(s) Oral daily  aspirin enteric coated 81 milliGRAM(s) Oral daily  dextrose 5% + sodium chloride 0.9%. 1000 milliLiter(s) (70 mL/Hr) IV Continuous <Continuous>  enoxaparin Injectable 40 milliGRAM(s) SubCutaneous daily  ferrous    sulfate 325 milliGRAM(s) Oral daily  FLUoxetine 20 milliGRAM(s) Oral daily  folic acid 1 milliGRAM(s) Oral daily  losartan 50 milliGRAM(s) Oral daily  multivitamin 1 Tablet(s) Oral daily  pantoprazole    Tablet 40 milliGRAM(s) Oral before breakfast  petrolatum white Ointment 1 Application(s) Topical two times a day  potassium phosphate / sodium phosphate Powder (PHOS-NaK) 1 Packet(s) Oral two times a day  QUEtiapine 100 milliGRAM(s) Oral at bedtime  risperiDONE   Tablet 1 milliGRAM(s) Oral two times a day  sertraline 50 milliGRAM(s) Oral at bedtime    MEDICATIONS  (PRN):    Allergies    penicillins (Unknown)    Intolerances      PAST MEDICAL & SURGICAL HISTORY:  HTN (hypertension)    DM (diabetes mellitus)    Alzheimer disease    MESERET (iron deficiency anemia)    No significant past surgical history      FAMILY HISTORY:    SOCIAL HISTORY: non smoker    Review of Systems:  Limited in Demented pt   Constitutional: No generalized weakness. No fevers or chills.                    Eyes, Ears, Mouth, Throat: No vision loss   Respiratory: No shortness of breath or cough.                                Cardiovascular: No chest pain or palpitations  Gastrointestinal: No nausea or vomiting.                                         Genitourinary: No urinary incontinence or burning on urination.  Musculoskeletal: No joint pain.                                                           Dermatologic: No rash.  Neurological: as per HPI                                                                      Psychiatric: No behavioral problems.  Endocrine: No known hypoglycemia.               Hematologic/Lymphatic: No easy bleeding.    O:  Vital Signs Last 24 Hrs  T(C): 36.8 (13 Sep 2021 11:52), Max: 37 (13 Sep 2021 05:40)  T(F): 98.2 (13 Sep 2021 11:52), Max: 98.6 (13 Sep 2021 05:40)  HR: 76 (13 Sep 2021 11:52) (60 - 96)  BP: 149/70 (13 Sep 2021 11:52) (123/70 - 195/98)  RR: 17 (13 Sep 2021 11:52) (16 - 18)  SpO2: 98% (13 Sep 2021 11:52) (96% - 98%)    General Exam:   General appearance: No acute distress                 Cardiovascular: Pedal dorsalis pulses intact bilaterally    Mental Status: Oriented to self, not date and place.  Attention intact.  No dysarthria, aphasia or neglect.  Knowledge intact.  Registration, short and long term memory impaired.      Cranial Nerves: CN I - not tested.  PERRL, EOMI, VFF, no nystagmus or diplopia.  No APD.  Fundi not visualized.  CN V1-3 intact to light touch and pinprick.  No facial asymmetry.  Hearing intact to finger rub bilaterally.  Tongue, uvula and palate midline.  Sternocleidomastoid and Trapezius intact bilaterally.    Motor:   Tone: Normal                 Strength impaired in all extremities   No pronator drift bilaterally                      No dysmetria on finger-nose-finger or heel-shin-heel  No truncal ataxia.  No resting, postural or action tremor.  No myoclonus.    Sensation: Intact to light touch    Deep Tendon Reflexes: 1+ bilateral biceps, triceps, brachioradialis, knee and ankle  Toes mute bilaterally    Gait: Deferred at this time d/t mostly wheelchair bound     Other:     LABS:                        12.6   9.88  )-----------( 247      ( 13 Sep 2021 05:53 )             37.2         133<L>  |  103  |  19<H>  ----------------------------<  138<H>  4.4   |  24  |  1.05    Ca    9.1      13 Sep 2021 05:53  Phos  2.1       Mg     1.7         TPro  7.0  /  Alb  3.7  /  TBili  0.4  /  DBili  x   /  AST  24  /  ALT  33  /  AlkPhos  81        Urinalysis Basic - ( 12 Sep 2021 16:53 )    Color: Yellow / Appearance: Clear / S.015 / pH: x  Gluc: x / Ketone: Negative  / Bili: Negative / Urobili: Negative   Blood: x / Protein: 30 mg/dL / Nitrite: Negative   Leuk Esterase: Negative / RBC: 0-2 /HPF / WBC 0-2 /HPF   Sq Epi: x / Non Sq Epi: Moderate /HPF / Bacteria: Few /HPF          RADIOLOGY & ADDITIONAL STUDIES:  No neurological imaging  Patient is a 89y old  Female who presents with a chief complaint of Hypoglycemia (13 Sep 2021 12:51)      HPI:  Pt is a 89yo F from home minimal ambulation with walker, AAOx 1-2 with PMH of DM (on insulin basaglar and metformin), Alzheimer's dementia, MESERET presents to the ER after a syncopal episode. 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 As per daughter at bedside, Pts AM blood sugars were high at 210 and then she received her usual 34U basaglar at 11AM without taking much to eat, then after that she was sweaty. She also reported some chest pain at that time. She was unresponsive for 10 mins. EMS called and her sugar level was 41 and returned to baseline mental status after 1 amp D50. Pt had a UTI 1 week ago and was prescribed 7 days of Bactrim. Denies fever, cough, diarrhea, vomiting, and all other symptoms though pt with dementia and daughter does not know exactly. (12 Sep 2021 19:39)    Pt unable to provide history.  However, reports ambulating w/ a cane or walker one year ago and reports using a wheelchair.  Denies HA or other complaints at this time.      Neurological Review of Systems:  No difficulty with language.  No vision loss or double vision.  No dizziness, vertigo or new hearing loss.  No difficulty with speech or swallowing.  No focal weakness.  No focal sensory changes.  No numbness or tingling in the bilateral lower extremities.  (+) Difficulty with balance.  (+) Difficulty with ambulation.        MEDICATIONS  (STANDING):  ascorbic acid 500 milliGRAM(s) Oral daily  aspirin enteric coated 81 milliGRAM(s) Oral daily  dextrose 5% + sodium chloride 0.9%. 1000 milliLiter(s) (70 mL/Hr) IV Continuous <Continuous>  enoxaparin Injectable 40 milliGRAM(s) SubCutaneous daily  ferrous    sulfate 325 milliGRAM(s) Oral daily  FLUoxetine 20 milliGRAM(s) Oral daily  folic acid 1 milliGRAM(s) Oral daily  losartan 50 milliGRAM(s) Oral daily  multivitamin 1 Tablet(s) Oral daily  pantoprazole    Tablet 40 milliGRAM(s) Oral before breakfast  petrolatum white Ointment 1 Application(s) Topical two times a day  potassium phosphate / sodium phosphate Powder (PHOS-NaK) 1 Packet(s) Oral two times a day  QUEtiapine 100 milliGRAM(s) Oral at bedtime  risperiDONE   Tablet 1 milliGRAM(s) Oral two times a day  sertraline 50 milliGRAM(s) Oral at bedtime    MEDICATIONS  (PRN):    Allergies    penicillins (Unknown)    Intolerances      PAST MEDICAL & SURGICAL HISTORY:  HTN (hypertension)    DM (diabetes mellitus)    Alzheimer disease    MESERET (iron deficiency anemia)    No significant past surgical history      FAMILY HISTORY:    SOCIAL HISTORY: non smoker    Review of Systems:  Limited in Demented pt   Constitutional: No generalized weakness. No fevers or chills.                    Eyes, Ears, Mouth, Throat: No vision loss   Respiratory: No shortness of breath or cough.                                Cardiovascular: No chest pain or palpitations  Gastrointestinal: No nausea or vomiting.                                         Genitourinary: No urinary incontinence or burning on urination.  Musculoskeletal: No joint pain.                                                           Dermatologic: No rash.  Neurological: as per HPI                                                                      Psychiatric: No behavioral problems.  Endocrine: No known hypoglycemia.               Hematologic/Lymphatic: No easy bleeding.    O:  Vital Signs Last 24 Hrs  T(C): 36.8 (13 Sep 2021 11:52), Max: 37 (13 Sep 2021 05:40)  T(F): 98.2 (13 Sep 2021 11:52), Max: 98.6 (13 Sep 2021 05:40)  HR: 76 (13 Sep 2021 11:52) (60 - 96)  BP: 149/70 (13 Sep 2021 11:52) (123/70 - 195/98)  RR: 17 (13 Sep 2021 11:52) (16 - 18)  SpO2: 98% (13 Sep 2021 11:52) (96% - 98%)    General Exam:   General appearance: No acute distress                 Cardiovascular: Pedal dorsalis pulses intact bilaterally    Mental Status: Oriented to self, not date and place.  Attention intact.  No dysarthria, aphasia or neglect.  Knowledge intact.  Registration, short and long term memory impaired.      Cranial Nerves: CN I - not tested.  PERRL, EOMI, VFF, no nystagmus or diplopia.  No APD.  Fundi not visualized.  CN V1-3 intact to light touch and pinprick.  No facial asymmetry.  Hearing intact to finger rub bilaterally.  Tongue, uvula and palate midline.  Sternocleidomastoid and Trapezius intact bilaterally.    Motor:   Tone: Normal                 Strength impaired in all extremities   No pronator drift bilaterally                      No dysmetria on finger-nose-finger or heel-shin-heel  No truncal ataxia.  No resting, postural or action tremor.  No myoclonus.    Sensation: Intact to light touch    Deep Tendon Reflexes: 1+ bilateral biceps, triceps, brachioradialis, knee and ankle  Toes mute bilaterally    Gait: Deferred at this time d/t mostly wheelchair bound     Other:     LABS:                        12.6   9.88  )-----------( 247      ( 13 Sep 2021 05:53 )             37.2         133<L>  |  103  |  19<H>  ----------------------------<  138<H>  4.4   |  24  |  1.05    Ca    9.1      13 Sep 2021 05:53  Phos  2.1       Mg     1.7         TPro  7.0  /  Alb  3.7  /  TBili  0.4  /  DBili  x   /  AST  24  /  ALT  33  /  AlkPhos  81        Urinalysis Basic - ( 12 Sep 2021 16:53 )    Color: Yellow / Appearance: Clear / S.015 / pH: x  Gluc: x / Ketone: Negative  / Bili: Negative / Urobili: Negative   Blood: x / Protein: 30 mg/dL / Nitrite: Negative   Leuk Esterase: Negative / RBC: 0-2 /HPF / WBC 0-2 /HPF   Sq Epi: x / Non Sq Epi: Moderate /HPF / Bacteria: Few /HPF          RADIOLOGY & ADDITIONAL STUDIES:  No neurological imaging  Patient is a 89y old  Female who presents with a chief complaint of Hypoglycemia (13 Sep 2021 12:51)      HPI:  Pt is a 89yo F from home minimal ambulation with walker, AAOx 1-2 with PMH of DM (on insulin basaglar and metformin), Alzheimer's dementia, MESERET presents to the ER after a syncopal episode. 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 As per daughter at bedside, Pts AM blood sugars were high at 210 and then she received her usual 34U basaglar at 11AM without taking much to eat, then after that she was sweaty. She also reported some chest pain at that time. She was unresponsive for 10 mins. EMS called and her sugar level was 41 and returned to baseline mental status after 1 amp D50. Pt had a UTI 1 week ago and was prescribed 7 days of Bactrim. Denies fever, cough, diarrhea, vomiting, and all other symptoms though pt with dementia and daughter does not know exactly. (12 Sep 2021 19:39)    Pt unable to provide history.  However, reports ambulating w/ a cane or walker one year ago and reports using a wheelchair.  Denies HA or other complaints at this time.  -Aakash # [id number].    Neurological Review of Systems:  No difficulty with language.  No vision loss or double vision.  No dizziness, vertigo or new hearing loss.  No difficulty with speech or swallowing.  No focal weakness.  No focal sensory changes.  No numbness or tingling in the bilateral lower extremities.  (+) Difficulty with balance.  (+) Difficulty with ambulation.        MEDICATIONS  (STANDING):  ascorbic acid 500 milliGRAM(s) Oral daily  aspirin enteric coated 81 milliGRAM(s) Oral daily  dextrose 5% + sodium chloride 0.9%. 1000 milliLiter(s) (70 mL/Hr) IV Continuous <Continuous>  enoxaparin Injectable 40 milliGRAM(s) SubCutaneous daily  ferrous    sulfate 325 milliGRAM(s) Oral daily  FLUoxetine 20 milliGRAM(s) Oral daily  folic acid 1 milliGRAM(s) Oral daily  losartan 50 milliGRAM(s) Oral daily  multivitamin 1 Tablet(s) Oral daily  pantoprazole    Tablet 40 milliGRAM(s) Oral before breakfast  petrolatum white Ointment 1 Application(s) Topical two times a day  potassium phosphate / sodium phosphate Powder (PHOS-NaK) 1 Packet(s) Oral two times a day  QUEtiapine 100 milliGRAM(s) Oral at bedtime  risperiDONE   Tablet 1 milliGRAM(s) Oral two times a day  sertraline 50 milliGRAM(s) Oral at bedtime    MEDICATIONS  (PRN):    Allergies    penicillins (Unknown)    Intolerances      PAST MEDICAL & SURGICAL HISTORY:  HTN (hypertension)    DM (diabetes mellitus)    Alzheimer disease    MESERET (iron deficiency anemia)    No significant past surgical history      FAMILY HISTORY:    SOCIAL HISTORY: non smoker    Review of Systems:  Limited in Demented pt   Constitutional: No generalized weakness. No fevers or chills.                    Eyes, Ears, Mouth, Throat: No vision loss   Respiratory: No shortness of breath or cough.                                Cardiovascular: No chest pain or palpitations  Gastrointestinal: No nausea or vomiting.                                         Genitourinary: No urinary incontinence or burning on urination.  Musculoskeletal: No joint pain.                                                           Dermatologic: No rash.  Neurological: as per HPI                                                                      Psychiatric: No behavioral problems.  Endocrine: No known hypoglycemia.               Hematologic/Lymphatic: No easy bleeding.    O:  Vital Signs Last 24 Hrs  T(C): 36.8 (13 Sep 2021 11:52), Max: 37 (13 Sep 2021 05:40)  T(F): 98.2 (13 Sep 2021 11:52), Max: 98.6 (13 Sep 2021 05:40)  HR: 76 (13 Sep 2021 11:52) (60 - 96)  BP: 149/70 (13 Sep 2021 11:52) (123/70 - 195/98)  RR: 17 (13 Sep 2021 11:52) (16 - 18)  SpO2: 98% (13 Sep 2021 11:52) (96% - 98%)    General Exam:   General appearance: No acute distress                 Cardiovascular: Pedal dorsalis pulses intact bilaterally    Mental Status: Oriented to self, not date and place.  Attention intact.  No dysarthria, aphasia or neglect.  Knowledge intact.  Registration, short and long term memory impaired.      Cranial Nerves: CN I - not tested.  PERRL, EOMI, VFF, no nystagmus or diplopia.  No APD.  Fundi not visualized.  CN V1-3 intact to light touch and pinprick.  No facial asymmetry.  Hearing intact to finger rub bilaterally.  Tongue, uvula and palate midline.  Sternocleidomastoid and Trapezius intact bilaterally.    Motor:   Tone: Normal                 Strength impaired in all extremities   No pronator drift bilaterally                      No dysmetria on finger-nose-finger or heel-shin-heel  No truncal ataxia.  No resting, postural or action tremor.  No myoclonus.    Sensation: Intact to light touch    Deep Tendon Reflexes: 1+ bilateral biceps, triceps, brachioradialis, knee and ankle  Toes mute bilaterally    Gait: Deferred at this time d/t mostly wheelchair bound     Other:     LABS:                        12.6   9.88  )-----------( 247      ( 13 Sep 2021 05:53 )             37.2     -    133<L>  |  103  |  19<H>  ----------------------------<  138<H>  4.4   |  24  |  1.05    Ca    9.1      13 Sep 2021 05:53  Phos  2.1       Mg     1.7         TPro  7.0  /  Alb  3.7  /  TBili  0.4  /  DBili  x   /  AST  24  /  ALT  33  /  AlkPhos  81        Urinalysis Basic - ( 12 Sep 2021 16:53 )    Color: Yellow / Appearance: Clear / S.015 / pH: x  Gluc: x / Ketone: Negative  / Bili: Negative / Urobili: Negative   Blood: x / Protein: 30 mg/dL / Nitrite: Negative   Leuk Esterase: Negative / RBC: 0-2 /HPF / WBC 0-2 /HPF   Sq Epi: x / Non Sq Epi: Moderate /HPF / Bacteria: Few /HPF          RADIOLOGY & ADDITIONAL STUDIES:  No neurological imaging  Patient is a 89y old  Female who presents with a chief complaint of Hypoglycemia (13 Sep 2021 12:51)      HPI:  Pt is a 89yo F from home minimal ambulation with walker, AAOx 1-2 with PMH of DM (on insulin basaglar and metformin), Alzheimer's dementia, MESERET presents to the ER after a syncopal episode. 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 As per daughter at bedside, Pts AM blood sugars were high at 210 and then she received her usual 34U basaglar at 11AM without taking much to eat, then after that she was sweaty. She also reported some chest pain at that time. She was unresponsive for 10 mins. EMS called and her sugar level was 41 and returned to baseline mental status after 1 amp D50. Pt had a UTI 1 week ago and was prescribed 7 days of Bactrim. Denies fever, cough, diarrhea, vomiting, and all other symptoms though pt with dementia and daughter does not know exactly. (12 Sep 2021 19:39)    Pt unable to provide history.  However, reports ambulating w/ a cane or walker one year ago and reports using a wheelchair.  Denies HA or other complaints at this time.  -Aakash # [id number].    Neurological Review of Systems:  No difficulty with language.  No vision loss or double vision.  No dizziness, vertigo or new hearing loss.  No difficulty with speech or swallowing.  No focal weakness.  No focal sensory changes.  No numbness or tingling in the bilateral lower extremities.  (+) Difficulty with balance.  (+) Difficulty with ambulation.        MEDICATIONS  (STANDING):  ascorbic acid 500 milliGRAM(s) Oral daily  aspirin enteric coated 81 milliGRAM(s) Oral daily  dextrose 5% + sodium chloride 0.9%. 1000 milliLiter(s) (70 mL/Hr) IV Continuous <Continuous>  enoxaparin Injectable 40 milliGRAM(s) SubCutaneous daily  ferrous    sulfate 325 milliGRAM(s) Oral daily  FLUoxetine 20 milliGRAM(s) Oral daily  folic acid 1 milliGRAM(s) Oral daily  losartan 50 milliGRAM(s) Oral daily  multivitamin 1 Tablet(s) Oral daily  pantoprazole    Tablet 40 milliGRAM(s) Oral before breakfast  petrolatum white Ointment 1 Application(s) Topical two times a day  potassium phosphate / sodium phosphate Powder (PHOS-NaK) 1 Packet(s) Oral two times a day  QUEtiapine 100 milliGRAM(s) Oral at bedtime  risperiDONE   Tablet 1 milliGRAM(s) Oral two times a day  sertraline 50 milliGRAM(s) Oral at bedtime    MEDICATIONS  (PRN):    Allergies    penicillins (Unknown)    Intolerances      PAST MEDICAL & SURGICAL HISTORY:  HTN (hypertension)    DM (diabetes mellitus)    Alzheimer disease    MESERET (iron deficiency anemia)    No significant past surgical history      FAMILY HISTORY:    SOCIAL HISTORY: non smoker    Review of Systems:  Limited in Demented pt   Constitutional: No generalized weakness. No fevers or chills.                    Eyes, Ears, Mouth, Throat: No vision loss   Respiratory: No shortness of breath or cough.                                Cardiovascular: No chest pain or palpitations  Gastrointestinal: No nausea or vomiting.                                         Genitourinary: No urinary incontinence or burning on urination.  Musculoskeletal: No joint pain.                                                           Dermatologic: No rash.  Neurological: as per HPI                                                                      Psychiatric: No behavioral problems.  Endocrine: No known hypoglycemia.               Hematologic/Lymphatic: No easy bleeding.    O:  Vital Signs Last 24 Hrs  T(C): 36.8 (13 Sep 2021 11:52), Max: 37 (13 Sep 2021 05:40)  T(F): 98.2 (13 Sep 2021 11:52), Max: 98.6 (13 Sep 2021 05:40)  HR: 76 (13 Sep 2021 11:52) (60 - 96)  BP: 149/70 (13 Sep 2021 11:52) (123/70 - 195/98)  RR: 17 (13 Sep 2021 11:52) (16 - 18)  SpO2: 98% (13 Sep 2021 11:52) (96% - 98%)    General Exam:   General appearance: No acute distress                 Cardiovascular: Pedal dorsalis pulses intact bilaterally    Mental Status: Oriented to self, not date and place.  Attention intact.  No dysarthria, aphasia or neglect.  Knowledge, registration, short and long term memory impaired.      Cranial Nerves: CN I - not tested.  PERRL, EOMI, VFF, no nystagmus or diplopia.  No APD.  Fundi not visualized.  CN V1-3 intact to light touch.  No facial asymmetry.  Hearing intact to finger rub bilaterally.  Tongue, uvula and palate midline.  Sternocleidomastoid and Trapezius intact bilaterally.    Motor:   Tone: Normal                 Strength impaired in all extremities   No pronator drift bilaterally                      No dysmetria on finger-nose-finger or heel-shin-heel  No truncal ataxia.  No resting, postural or action tremor.  No myoclonus.    Sensation: Intact to light touch    Deep Tendon Reflexes: 1+ bilateral biceps, triceps, brachioradialis, knee and ankle  Toes mute bilaterally    Gait: Deferred at this time d/t mostly wheelchair bound     Other:     LABS:                        12.6   9.88  )-----------( 247      ( 13 Sep 2021 05:53 )             37.2         133<L>  |  103  |  19<H>  ----------------------------<  138<H>  4.4   |  24  |  1.05    Ca    9.1      13 Sep 2021 05:53  Phos  2.1       Mg     1.7         TPro  7.0  /  Alb  3.7  /  TBili  0.4  /  DBili  x   /  AST  24  /  ALT  33  /  AlkPhos  81        Urinalysis Basic - ( 12 Sep 2021 16:53 )    Color: Yellow / Appearance: Clear / S.015 / pH: x  Gluc: x / Ketone: Negative  / Bili: Negative / Urobili: Negative   Blood: x / Protein: 30 mg/dL / Nitrite: Negative   Leuk Esterase: Negative / RBC: 0-2 /HPF / WBC 0-2 /HPF   Sq Epi: x / Non Sq Epi: Moderate /HPF / Bacteria: Few /HPF          RADIOLOGY & ADDITIONAL STUDIES:  No neurological imaging

## 2021-09-13 NOTE — PROGRESS NOTE ADULT - PROBLEM SELECTOR PLAN 6
pending consults, Echo, Carotid US.  Left emergency contact daughter Tresa Hutton [phone] for updates and GOC

## 2021-09-13 NOTE — PROGRESS NOTE ADULT - SUBJECTIVE AND OBJECTIVE BOX
`Patient is a 89y old  Female who presents with a chief complaint of Hypoglycemia (13 Sep 2021 09:49)    pt seen in icu [  ], reg med floor [   ], bed [  ], chair at bedside [   ], a+o x3 [  ], lethargic [  ],  nad [  ]    izquierdo [  ], ngt [  ], peg [  ], et tube [  ], cent line [  ], picc line [  ]        Allergies    penicillins (Unknown)        Vitals    T(F): 98.1 (09-13-21 @ 07:35), Max: 98.6 (09-13-21 @ 05:40)  HR: 96 (09-13-21 @ 07:35) (60 - 96)  BP: 138/80 (09-13-21 @ 07:35) (123/70 - 195/98)  RR: 18 (09-13-21 @ 07:35) (16 - 18)  SpO2: 97% (09-13-21 @ 07:35) (96% - 100%)  Wt(kg): --  CAPILLARY BLOOD GLUCOSE      POCT Blood Glucose.: 137 mg/dL (13 Sep 2021 07:22)      Labs                          12.6   9.88  )-----------( 247      ( 13 Sep 2021 05:53 )             37.2       09-13    133<L>  |  103  |  19<H>  ----------------------------<  138<H>  4.4   |  24  |  1.05    Ca    9.1      13 Sep 2021 05:53  Phos  2.1     09-13  Mg     1.7     09-13    TPro  7.0  /  Alb  3.7  /  TBili  0.4  /  DBili  x   /  AST  24  /  ALT  33  /  AlkPhos  81  09-13      CARDIAC MARKERS ( 12 Sep 2021 13:37 )  <0.015 ng/mL / x     / x     / x     / x                Radiology Results      Meds    MEDICATIONS  (STANDING):  ascorbic acid 500 milliGRAM(s) Oral daily  aspirin enteric coated 81 milliGRAM(s) Oral daily  dextrose 5% + sodium chloride 0.9%. 1000 milliLiter(s) (70 mL/Hr) IV Continuous <Continuous>  enoxaparin Injectable 40 milliGRAM(s) SubCutaneous daily  ferrous    sulfate 325 milliGRAM(s) Oral daily  FLUoxetine 20 milliGRAM(s) Oral daily  folic acid 1 milliGRAM(s) Oral daily  losartan 50 milliGRAM(s) Oral daily  multivitamin 1 Tablet(s) Oral daily  pantoprazole    Tablet 40 milliGRAM(s) Oral before breakfast  potassium phosphate / sodium phosphate Powder (PHOS-NaK) 1 Packet(s) Oral two times a day  QUEtiapine 100 milliGRAM(s) Oral at bedtime  risperiDONE   Tablet 1 milliGRAM(s) Oral two times a day  sertraline 50 milliGRAM(s) Oral at bedtime      MEDICATIONS  (PRN):      Physical Exam    Neuro :  no focal deficits  Respiratory: CTA B/L  CV: RRR, S1S2, no murmurs,   Abdominal: Soft, NT, ND +BS,  Extremities: No edema, + peripheral pulses    ASSESSMENT    Hypoglycemia    HTN (hypertension)    DM (diabetes mellitus)    Alzheimer disease    MESERET (iron deficiency anemia)    No significant past surgical history        PLAN     `Patient is a 89y old  Female who presents with a chief complaint of Hypoglycemia (13 Sep 2021 09:49)    pt seen in ed [ x ], reg med floor [   ], bed [ x ], chair at bedside [   ], awake and responsive [x  ], lethargic [  ],  nad [ x ]    izquiredo [  ], ngt [  ], peg [  ], et tube [  ], cent line [  ], picc line [  ]        Allergies    penicillins (Unknown)        Vitals    T(F): 98.1 (09-13-21 @ 07:35), Max: 98.6 (09-13-21 @ 05:40)  HR: 96 (09-13-21 @ 07:35) (60 - 96)  BP: 138/80 (09-13-21 @ 07:35) (123/70 - 195/98)  RR: 18 (09-13-21 @ 07:35) (16 - 18)  SpO2: 97% (09-13-21 @ 07:35) (96% - 100%)  Wt(kg): --  CAPILLARY BLOOD GLUCOSE      POCT Blood Glucose.: 137 mg/dL (13 Sep 2021 07:22)      Labs                          12.6   9.88  )-----------( 247      ( 13 Sep 2021 05:53 )             37.2       09-13    133<L>  |  103  |  19<H>  ----------------------------<  138<H>  4.4   |  24  |  1.05    Ca    9.1      13 Sep 2021 05:53  Phos  2.1     09-13  Mg     1.7     09-13    TPro  7.0  /  Alb  3.7  /  TBili  0.4  /  DBili  x   /  AST  24  /  ALT  33  /  AlkPhos  81  09-13      CARDIAC MARKERS ( 12 Sep 2021 13:37 )  <0.015 ng/mL / x     / x     / x     / x                Radiology Results      Meds    MEDICATIONS  (STANDING):  ascorbic acid 500 milliGRAM(s) Oral daily  aspirin enteric coated 81 milliGRAM(s) Oral daily  dextrose 5% + sodium chloride 0.9%. 1000 milliLiter(s) (70 mL/Hr) IV Continuous <Continuous>  enoxaparin Injectable 40 milliGRAM(s) SubCutaneous daily  ferrous    sulfate 325 milliGRAM(s) Oral daily  FLUoxetine 20 milliGRAM(s) Oral daily  folic acid 1 milliGRAM(s) Oral daily  losartan 50 milliGRAM(s) Oral daily  multivitamin 1 Tablet(s) Oral daily  pantoprazole    Tablet 40 milliGRAM(s) Oral before breakfast  potassium phosphate / sodium phosphate Powder (PHOS-NaK) 1 Packet(s) Oral two times a day  QUEtiapine 100 milliGRAM(s) Oral at bedtime  risperiDONE   Tablet 1 milliGRAM(s) Oral two times a day  sertraline 50 milliGRAM(s) Oral at bedtime      MEDICATIONS  (PRN):      Physical Exam    Neuro :  no focal deficits  Respiratory: CTA B/L  CV: RRR, S1S2, no murmurs,   Abdominal: Soft, NT, ND +BS,  Extremities: No edema, + peripheral pulses    ASSESSMENT      syncope likely 2nd to hypoglycemia,   r/o cns patho,   chest pain,   r/o acs, ,   h/o DM (on insulin basaglar and metformin),   Alzheimer's dementia,   Iron Deficiency Anemia  HTN (hypertension)      PLAN    cont tele,   hold diabetic meds,   ivf d5w, lispro ss,   endo cons noted   hypoglycemia due to basaglar and poor oral intake   serum glucose now stable  check a1c level  fsg ac and hs  adjust meds prior to d/c  neuro cons  cont aspirin, statin,   ce x1 neg noted above   cardio cons   cont losartan   f/u echo  cont current meds

## 2021-09-13 NOTE — CONSULT NOTE ADULT - ASSESSMENT
Pt is a 89yo F from home minimal ambulation with walker, AAOx 1-2 with PMH of DM (on insulin basaglar and metformin), Alzheimer's dementia, MESERET presents to the ER after a syncopal episode.)  Found to have hypoglycemia. Pt denies any complaints . Admits to eating well. Does not recall meds at home.

## 2021-09-13 NOTE — PROGRESS NOTE ADULT - PROBLEM SELECTOR PLAN 3
Na 133, Phos 2.1 likely due to decreased intake  replaced with PO supplement  monitor serum lytes, urine Na, lytes. osmol.

## 2021-09-13 NOTE — CONSULT NOTE ADULT - SUBJECTIVE AND OBJECTIVE BOX
Patient is a 89y old  Female who presents with a chief complaint of Hypoglycemia (13 Sep 2021 09:22)      HPI:  Pt is a 89yo F from home minimal ambulation with walker, AAOx 1-2 with PMH of DM (on insulin basaglar and metformin), Alzheimer's dementia, MESERET presents to the ER after a syncopal episode. 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 As per daughter at bedside, Pts AM blood sugars were high at 210 and then she received her usual 34U basaglar at 11AM without taking much to eat, then after that she was sweaty. She also reported some chest pain at that time. She was unresponsive for 10 mins. EMS called and her sugar level was 41 and returned to baseline mental status after 1 amp D50. Pt had a UTI 1 week ago and was prescribed 7 days of Bactrim. Denies fever, cough, diarrhea, vomiting, and all other symptoms though pt with dementia and daughter does not know exactly. (12 Sep 2021 19:39)  Found to have hypoglycemia. Pt denies any complaints . Admits to eating well. Does not recall meds at home.     PAST MEDICAL & SURGICAL HISTORY:  HTN (hypertension)    DM (diabetes mellitus)    Alzheimer disease    MESERET (iron deficiency anemia)    No significant past surgical history           MEDICATIONS  (STANDING):  ascorbic acid 500 milliGRAM(s) Oral daily  aspirin enteric coated 81 milliGRAM(s) Oral daily  dextrose 5% + sodium chloride 0.9%. 1000 milliLiter(s) (70 mL/Hr) IV Continuous <Continuous>  enoxaparin Injectable 40 milliGRAM(s) SubCutaneous daily  ferrous    sulfate 325 milliGRAM(s) Oral daily  FLUoxetine 20 milliGRAM(s) Oral daily  folic acid 1 milliGRAM(s) Oral daily  losartan 50 milliGRAM(s) Oral daily  multivitamin 1 Tablet(s) Oral daily  pantoprazole    Tablet 40 milliGRAM(s) Oral before breakfast  potassium phosphate / sodium phosphate Powder (PHOS-NaK) 1 Packet(s) Oral two times a day  QUEtiapine 100 milliGRAM(s) Oral at bedtime  risperiDONE   Tablet 1 milliGRAM(s) Oral two times a day  sertraline 50 milliGRAM(s) Oral at bedtime    MEDICATIONS  (PRN):      FAMILY HISTORY:      SOCIAL HISTORY:      REVIEW OF SYSTEMS: NA      Vital Signs Last 24 Hrs  T(C): 36.7 (13 Sep 2021 07:35), Max: 37 (13 Sep 2021 05:40)  T(F): 98.1 (13 Sep 2021 07:35), Max: 98.6 (13 Sep 2021 05:40)  HR: 96 (13 Sep 2021 07:35) (60 - 96)  BP: 138/80 (13 Sep 2021 07:35) (123/70 - 195/98)  BP(mean): --  RR: 18 (13 Sep 2021 07:35) (16 - 18)  SpO2: 97% (13 Sep 2021 07:35) (96% - 100%)      Constitutional:    HEENT: nad    Neck:  No JVD, bruits or thyromegaly    Respiratory:  Clear without rales or rhonchi    Cardiovascular:  RR without murmur, rub or gallop.    Gastrointestinal: Soft without hepatosplenomegaly.    Extremities: without cyanosis, clubbing or edema.    Neurological:  Oriented   x 1     . No gross sensory or motor defects.        LABS:                        12.6   9.88  )-----------( 247      ( 13 Sep 2021 05:53 )             37.2     09-13    133<L>  |  103  |  19<H>  ----------------------------<  138<H>  4.4   |  24  |  1.05    Ca    9.1      13 Sep 2021 05:53  Phos  2.1     09-13  Mg     1.7     09-13    TPro  7.0  /  Alb  3.7  /  TBili  0.4  /  DBili  x   /  AST  24  /  ALT  33  /  AlkPhos  81  09-13    CARDIAC MARKERS ( 12 Sep 2021 13:37 )  <0.015 ng/mL / x     / x     / x     / x            Urinalysis Basic - ( 12 Sep 2021 16:53 )    Color: Yellow / Appearance: Clear / S.015 / pH: x  Gluc: x / Ketone: Negative  / Bili: Negative / Urobili: Negative   Blood: x / Protein: 30 mg/dL / Nitrite: Negative   Leuk Esterase: Negative / RBC: 0-2 /HPF / WBC 0-2 /HPF   Sq Epi: x / Non Sq Epi: Moderate /HPF / Bacteria: Few /HPF      CAPILLARY BLOOD GLUCOSE      POCT Blood Glucose.: 137 mg/dL (13 Sep 2021 07:22)  POCT Blood Glucose.: 185 mg/dL (13 Sep 2021 02:12)  POCT Blood Glucose.: 172 mg/dL (12 Sep 2021 19:40)  POCT Blood Glucose.: 181 mg/dL (12 Sep 2021 18:02)  POCT Blood Glucose.: 129 mg/dL (12 Sep 2021 15:52)  POCT Blood Glucose.: 57 mg/dL (12 Sep 2021 14:56)  POCT Blood Glucose.: 202 mg/dL (12 Sep 2021 13:31)  POCT Blood Glucose.: 65 mg/dL (12 Sep 2021 13:11)      RADIOLOGY & ADDITIONAL STUDIES:

## 2021-09-13 NOTE — PROGRESS NOTE ADULT - PROBLEM SELECTOR PROBLEM 5
DICTATING PHYSICIAN:  Eliza Bateman MD    HOSPITAL COURSE:  This is a 31-year-old female who was admitted for induction of labor by Dr. Molina on 06/05/2021 for days suspected macrosomia.  The patient has had a spontaneous vaginal delivery.  Baby boy was delivered on 06/07/2021.  The patient had chorio towards the end of labor, was treated with antibiotics.  For 24 hours postpartum, the patient did well.  The patient's baby had a renal cyst, which was diagnosed antepartum and has been monitored by the pediatrician for which the baby had ultrasound.  The patient desires to be discharged today on 06/08/2021.  Postpartum instructions were given.  The patient is to be seen by Dr. Molina in 6 weeks.  The patient and spouse verbalized understanding.        Dictated By:  Eliza Bateman MD        D:  06/08/2021 17:33:43  T:  06/08/2021 23:11:28  AK/gia  Job:  [id number]      cc:     Prophylactic measure

## 2021-09-13 NOTE — CONSULT NOTE ADULT - ASSESSMENT
89yo female w/ most likely     Recommendations:     89yo female w/ most likely syncope d/t hypotension & hypoglycemia     Recommendations:    - Consider CV consult to r/o arrhythmia     - Continued mgmt per primary team    - Maintain fall precautions

## 2021-09-14 DIAGNOSIS — E11.9 TYPE 2 DIABETES MELLITUS WITHOUT COMPLICATIONS: ICD-10-CM

## 2021-09-14 DIAGNOSIS — I10 ESSENTIAL (PRIMARY) HYPERTENSION: ICD-10-CM

## 2021-09-14 DIAGNOSIS — R79.89 OTHER SPECIFIED ABNORMAL FINDINGS OF BLOOD CHEMISTRY: ICD-10-CM

## 2021-09-14 LAB
ALBUMIN SERPL ELPH-MCNC: 3.5 G/DL — SIGNIFICANT CHANGE UP (ref 3.5–5)
ALP SERPL-CCNC: 78 U/L — SIGNIFICANT CHANGE UP (ref 40–120)
ALT FLD-CCNC: 37 U/L DA — SIGNIFICANT CHANGE UP (ref 10–60)
ANION GAP SERPL CALC-SCNC: 6 MMOL/L — SIGNIFICANT CHANGE UP (ref 5–17)
AST SERPL-CCNC: 28 U/L — SIGNIFICANT CHANGE UP (ref 10–40)
BILIRUB SERPL-MCNC: 0.5 MG/DL — SIGNIFICANT CHANGE UP (ref 0.2–1.2)
BUN SERPL-MCNC: 18 MG/DL — SIGNIFICANT CHANGE UP (ref 7–18)
CALCIUM SERPL-MCNC: 8.9 MG/DL — SIGNIFICANT CHANGE UP (ref 8.4–10.5)
CHLORIDE SERPL-SCNC: 101 MMOL/L — SIGNIFICANT CHANGE UP (ref 96–108)
CO2 SERPL-SCNC: 25 MMOL/L — SIGNIFICANT CHANGE UP (ref 22–31)
CREAT SERPL-MCNC: 1.17 MG/DL — SIGNIFICANT CHANGE UP (ref 0.5–1.3)
GLUCOSE BLDC GLUCOMTR-MCNC: 157 MG/DL — HIGH (ref 70–99)
GLUCOSE BLDC GLUCOMTR-MCNC: 161 MG/DL — HIGH (ref 70–99)
GLUCOSE BLDC GLUCOMTR-MCNC: 162 MG/DL — HIGH (ref 70–99)
GLUCOSE BLDC GLUCOMTR-MCNC: 171 MG/DL — HIGH (ref 70–99)
GLUCOSE BLDC GLUCOMTR-MCNC: 218 MG/DL — HIGH (ref 70–99)
GLUCOSE BLDC GLUCOMTR-MCNC: 353 MG/DL — HIGH (ref 70–99)
GLUCOSE BLDC GLUCOMTR-MCNC: 416 MG/DL — HIGH (ref 70–99)
GLUCOSE SERPL-MCNC: 155 MG/DL — HIGH (ref 70–99)
HCT VFR BLD CALC: 37.3 % — SIGNIFICANT CHANGE UP (ref 34.5–45)
HGB BLD-MCNC: 12.5 G/DL — SIGNIFICANT CHANGE UP (ref 11.5–15.5)
MCHC RBC-ENTMCNC: 30.3 PG — SIGNIFICANT CHANGE UP (ref 27–34)
MCHC RBC-ENTMCNC: 33.5 GM/DL — SIGNIFICANT CHANGE UP (ref 32–36)
MCV RBC AUTO: 90.5 FL — SIGNIFICANT CHANGE UP (ref 80–100)
NRBC # BLD: 0 /100 WBCS — SIGNIFICANT CHANGE UP (ref 0–0)
PLATELET # BLD AUTO: 239 K/UL — SIGNIFICANT CHANGE UP (ref 150–400)
POTASSIUM SERPL-MCNC: 4.4 MMOL/L — SIGNIFICANT CHANGE UP (ref 3.5–5.3)
POTASSIUM SERPL-SCNC: 4.4 MMOL/L — SIGNIFICANT CHANGE UP (ref 3.5–5.3)
PROT SERPL-MCNC: 6.9 G/DL — SIGNIFICANT CHANGE UP (ref 6–8.3)
RBC # BLD: 4.12 M/UL — SIGNIFICANT CHANGE UP (ref 3.8–5.2)
RBC # FLD: 13.2 % — SIGNIFICANT CHANGE UP (ref 10.3–14.5)
SODIUM SERPL-SCNC: 132 MMOL/L — LOW (ref 135–145)
T3FREE SERPL-MCNC: 2.57 PG/ML — SIGNIFICANT CHANGE UP (ref 1.8–4.6)
T4 FREE SERPL-MCNC: 1.1 NG/DL — SIGNIFICANT CHANGE UP (ref 0.9–1.8)
TSH SERPL-MCNC: 5.6 UU/ML — HIGH (ref 0.34–4.82)
WBC # BLD: 7.35 K/UL — SIGNIFICANT CHANGE UP (ref 3.8–10.5)
WBC # FLD AUTO: 7.35 K/UL — SIGNIFICANT CHANGE UP (ref 3.8–10.5)

## 2021-09-14 RX ORDER — INSULIN LISPRO 100/ML
VIAL (ML) SUBCUTANEOUS
Refills: 0 | Status: DISCONTINUED | OUTPATIENT
Start: 2021-09-14 | End: 2021-09-16

## 2021-09-14 RX ORDER — LANOLIN ALCOHOL/MO/W.PET/CERES
3 CREAM (GRAM) TOPICAL AT BEDTIME
Refills: 0 | Status: COMPLETED | OUTPATIENT
Start: 2021-09-14 | End: 2021-09-14

## 2021-09-14 RX ADMIN — PANTOPRAZOLE SODIUM 40 MILLIGRAM(S): 20 TABLET, DELAYED RELEASE ORAL at 06:03

## 2021-09-14 RX ADMIN — LOSARTAN POTASSIUM 50 MILLIGRAM(S): 100 TABLET, FILM COATED ORAL at 06:04

## 2021-09-14 RX ADMIN — ENOXAPARIN SODIUM 40 MILLIGRAM(S): 100 INJECTION SUBCUTANEOUS at 13:21

## 2021-09-14 RX ADMIN — Medication 1 MILLIGRAM(S): at 12:24

## 2021-09-14 RX ADMIN — Medication 3 MILLIGRAM(S): at 00:26

## 2021-09-14 RX ADMIN — Medication 20 MILLIGRAM(S): at 13:21

## 2021-09-14 RX ADMIN — RISPERIDONE 1 MILLIGRAM(S): 4 TABLET ORAL at 17:35

## 2021-09-14 RX ADMIN — Medication 1: at 17:30

## 2021-09-14 RX ADMIN — Medication 500 MILLIGRAM(S): at 12:24

## 2021-09-14 RX ADMIN — Medication 325 MILLIGRAM(S): at 12:24

## 2021-09-14 RX ADMIN — SERTRALINE 50 MILLIGRAM(S): 25 TABLET, FILM COATED ORAL at 21:55

## 2021-09-14 RX ADMIN — Medication 1 APPLICATION(S): at 06:04

## 2021-09-14 RX ADMIN — Medication 1 PACKET(S): at 06:03

## 2021-09-14 RX ADMIN — RISPERIDONE 1 MILLIGRAM(S): 4 TABLET ORAL at 06:04

## 2021-09-14 RX ADMIN — Medication 1 APPLICATION(S): at 17:35

## 2021-09-14 RX ADMIN — Medication 5: at 12:23

## 2021-09-14 RX ADMIN — QUETIAPINE FUMARATE 100 MILLIGRAM(S): 200 TABLET, FILM COATED ORAL at 21:55

## 2021-09-14 RX ADMIN — Medication 1 TABLET(S): at 12:24

## 2021-09-14 RX ADMIN — Medication 81 MILLIGRAM(S): at 12:24

## 2021-09-14 RX ADMIN — Medication 3 MILLIGRAM(S): at 21:55

## 2021-09-14 NOTE — PROGRESS NOTE ADULT - SUBJECTIVE AND OBJECTIVE BOX
CHIEF COMPLAINT:Patient is a 89y old  Female who presents with a chief complaint of Hypoglycemia.Pt agitated this am..    	  REVIEW OF SYSTEMS:  unable to obtain    PHYSICAL EXAM:  T(C): 36.7 (09-14-21 @ 07:50), Max: 37 (09-14-21 @ 00:00)  HR: 71 (09-14-21 @ 07:50) (71 - 80)  BP: 137/98 (09-14-21 @ 07:50) (115/61 - 149/70)  RR: 18 (09-14-21 @ 07:50) (17 - 20)  SpO2: 96% (09-14-21 @ 07:50) (96% - 100%)  Wt(kg): --  I&O's Summary      Appearance: Normal	  HEENT:   Normal oral mucosa, PERRL, EOMI	  Lymphatic: No lymphadenopathy  Cardiovascular: Normal S1 S2, No JVD, No murmurs, No edema  Respiratory: Lungs clear to auscultation	  Gastrointestinal:  Soft, Non-tender, + BS	  Skin: No rashes, No ecchymoses, No cyanosis	  Extremities: Normal range of motion, No clubbing, cyanosis or edema  Vascular: Peripheral pulses palpable 2+ bilaterally    MEDICATIONS  (STANDING):  ascorbic acid 500 milliGRAM(s) Oral daily  aspirin enteric coated 81 milliGRAM(s) Oral daily  dextrose 5% + sodium chloride 0.9%. 1000 milliLiter(s) (70 mL/Hr) IV Continuous <Continuous>  enoxaparin Injectable 40 milliGRAM(s) SubCutaneous daily  ferrous    sulfate 325 milliGRAM(s) Oral daily  FLUoxetine 20 milliGRAM(s) Oral daily  folic acid 1 milliGRAM(s) Oral daily  losartan 50 milliGRAM(s) Oral daily  melatonin 3 milliGRAM(s) Oral at bedtime  multivitamin 1 Tablet(s) Oral daily  pantoprazole    Tablet 40 milliGRAM(s) Oral before breakfast  petrolatum white Ointment 1 Application(s) Topical two times a day  QUEtiapine 100 milliGRAM(s) Oral at bedtime  risperiDONE   Tablet 1 milliGRAM(s) Oral two times a day  sertraline 50 milliGRAM(s) Oral at bedtime  	  	  LABS:	 	    CARDIAC MARKERS ( 12 Sep 2021 13:37 )  <0.015 ng/mL / x     / x     / x     / x                              12.5   7.35  )-----------( 239      ( 14 Sep 2021 06:57 )             37.3     09-14    132<L>  |  101  |  18  ----------------------------<  155<H>  4.4   |  25  |  1.17    Ca    8.9      14 Sep 2021 06:57  Phos  2.1     09-13  Mg     1.7     09-13    TPro  6.9  /  Alb  3.5  /  TBili  0.5  /  DBili  x   /  AST  28  /  ALT  37  /  AlkPhos  78  09-14      Lipid Profile: Cholesterol 173  LDL --  HDL 53      TSH: Thyroid Stimulating Hormone, Serum: 5.60 uU/mL (09-14 @ 06:57)  Thyroid Stimulating Hormone, Serum: 4.95 uU/mL (09-13 @ 05:53)

## 2021-09-14 NOTE — PROGRESS NOTE ADULT - PROBLEM SELECTOR PLAN 2
Pt p/w after a syncopal episode with aura of diaphoresis and had LOC for 10 mins  EKG showed NSR  on telemetry  Echocardiogram and carotid dopplers ordered  Cardiology Dr. Xie consulted:  -c/w tele monitoring.  -c/w asa,statin.  -f/u Echocardiogram-when not agitated  -f/u Stress test-r/o ischemia when not agitated  Neuro Dr. Mohr consulted Pt p/w after a syncopal episode with aura of diaphoresis and had LOC for 10 mins  EKG showed NSR  on telemetry  carotid dopplers neg  Cardiology Dr. Xie consulted:  -c/w tele monitoring.  -c/w asa,statin.  -f/u Echocardiogram-when not agitated  -f/u Stress test-r/o ischemia when not agitated  Neuro Dr. Mohr consulted

## 2021-09-14 NOTE — PROGRESS NOTE ADULT - SUBJECTIVE AND OBJECTIVE BOX
Patient is a 89y old  Female who presents with a chief complaint of Hypoglycemia (13 Sep 2021 12:51)    pt seen in icu [  ], reg med floor [   ], bed [  ], chair at bedside [   ], a+o x3 [  ], lethargic [  ],  nad [  ]    izquierdo [  ], ngt [  ], peg [  ], et tube [  ], cent line [  ], picc line [  ]        Allergies    penicillins (Unknown)        Vitals    T(F): 98 (09-14-21 @ 07:50), Max: 98.6 (09-14-21 @ 00:00)  HR: 71 (09-14-21 @ 07:50) (71 - 80)  BP: 137/98 (09-14-21 @ 07:50) (115/61 - 149/70)  RR: 18 (09-14-21 @ 07:50) (17 - 20)  SpO2: 96% (09-14-21 @ 07:50) (96% - 100%)  Wt(kg): --  CAPILLARY BLOOD GLUCOSE      POCT Blood Glucose.: 161 mg/dL (14 Sep 2021 08:04)      Labs                          12.5   7.35  )-----------( 239      ( 14 Sep 2021 06:57 )             37.3       09-14    132<L>  |  101  |  18  ----------------------------<  155<H>  4.4   |  25  |  1.17    Ca    8.9      14 Sep 2021 06:57  Phos  2.1     09-13  Mg     1.7     09-13    TPro  6.9  /  Alb  3.5  /  TBili  0.5  /  DBili  x   /  AST  28  /  ALT  37  /  AlkPhos  78  09-14      CARDIAC MARKERS ( 12 Sep 2021 13:37 )  <0.015 ng/mL / x     / x     / x     / x            Clean Catch Clean Catch (Midstream)  09-13 @ 02:09   >100,000 CFU/ml Gram Negative Rods  --  --          Radiology Results      Meds    MEDICATIONS  (STANDING):  ascorbic acid 500 milliGRAM(s) Oral daily  aspirin enteric coated 81 milliGRAM(s) Oral daily  dextrose 5% + sodium chloride 0.9%. 1000 milliLiter(s) (70 mL/Hr) IV Continuous <Continuous>  enoxaparin Injectable 40 milliGRAM(s) SubCutaneous daily  ferrous    sulfate 325 milliGRAM(s) Oral daily  FLUoxetine 20 milliGRAM(s) Oral daily  folic acid 1 milliGRAM(s) Oral daily  losartan 50 milliGRAM(s) Oral daily  melatonin 3 milliGRAM(s) Oral at bedtime  multivitamin 1 Tablet(s) Oral daily  pantoprazole    Tablet 40 milliGRAM(s) Oral before breakfast  petrolatum white Ointment 1 Application(s) Topical two times a day  QUEtiapine 100 milliGRAM(s) Oral at bedtime  risperiDONE   Tablet 1 milliGRAM(s) Oral two times a day  sertraline 50 milliGRAM(s) Oral at bedtime      MEDICATIONS  (PRN):      Physical Exam    Neuro :  no focal deficits  Respiratory: CTA B/L  CV: RRR, S1S2, no murmurs,   Abdominal: Soft, NT, ND +BS,  Extremities: No edema, + peripheral pulses    ASSESSMENT    Hypoglycemia    HTN (hypertension)    DM (diabetes mellitus)    Alzheimer disease    MESERET (iron deficiency anemia)    No significant past surgical history        PLAN     Patient is a 89y old  Female who presents with a chief complaint of Hypoglycemia (13 Sep 2021 12:51)    `pt seen in tele [ x ], reg med floor [   ], bed [ x ], chair at bedside [   ], awake and responsive [x  ], lethargic [  ],  nad [ x ]    Allergies    penicillins (Unknown)        Vitals    T(F): 98 (09-14-21 @ 07:50), Max: 98.6 (09-14-21 @ 00:00)  HR: 71 (09-14-21 @ 07:50) (71 - 80)  BP: 137/98 (09-14-21 @ 07:50) (115/61 - 149/70)  RR: 18 (09-14-21 @ 07:50) (17 - 20)  SpO2: 96% (09-14-21 @ 07:50) (96% - 100%)  Wt(kg): --  CAPILLARY BLOOD GLUCOSE      POCT Blood Glucose.: 161 mg/dL (14 Sep 2021 08:04)      Labs                          12.5   7.35  )-----------( 239      ( 14 Sep 2021 06:57 )             37.3       09-14    132<L>  |  101  |  18  ----------------------------<  155<H>  4.4   |  25  |  1.17    Ca    8.9      14 Sep 2021 06:57  Phos  2.1     09-13  Mg     1.7     09-13    TPro  6.9  /  Alb  3.5  /  TBili  0.5  /  DBili  x   /  AST  28  /  ALT  37  /  AlkPhos  78  09-14    A1C with Estimated Average Glucose (09.13.21 @ 10:36)   A1C with Estimated Average Glucose Result: 7.1:    CARDIAC MARKERS ( 12 Sep 2021 13:37 )  <0.015 ng/mL / x     / x     / x     / x            Clean Catch Clean Catch (Midstream)  09-13 @ 02:09   >100,000 CFU/ml Gram Negative Rods  --  --          Radiology Results      Meds    MEDICATIONS  (STANDING):  ascorbic acid 500 milliGRAM(s) Oral daily  aspirin enteric coated 81 milliGRAM(s) Oral daily  dextrose 5% + sodium chloride 0.9%. 1000 milliLiter(s) (70 mL/Hr) IV Continuous <Continuous>  enoxaparin Injectable 40 milliGRAM(s) SubCutaneous daily  ferrous    sulfate 325 milliGRAM(s) Oral daily  FLUoxetine 20 milliGRAM(s) Oral daily  folic acid 1 milliGRAM(s) Oral daily  losartan 50 milliGRAM(s) Oral daily  melatonin 3 milliGRAM(s) Oral at bedtime  multivitamin 1 Tablet(s) Oral daily  pantoprazole    Tablet 40 milliGRAM(s) Oral before breakfast  petrolatum white Ointment 1 Application(s) Topical two times a day  QUEtiapine 100 milliGRAM(s) Oral at bedtime  risperiDONE   Tablet 1 milliGRAM(s) Oral two times a day  sertraline 50 milliGRAM(s) Oral at bedtime      MEDICATIONS  (PRN):      Physical Exam    Neuro :  no focal deficits  Respiratory: CTA B/L  CV: RRR, S1S2, no murmurs,   Abdominal: Soft, NT, ND +BS,  Extremities: No edema, + peripheral pulses    ASSESSMENT      syncope likely 2nd to hypoglycemia,   cns patho r/o ,   chest pain,   r/o acs, ,   h/o DM (on insulin basaglar and metformin),   Alzheimer's dementia,   Iron Deficiency Anemia  HTN (hypertension)      PLAN    cont tele,   hold diabetic meds,   ivf d5w,   lispro ss,   endo f/u noted   hypoglycemia due to basaglar and poor oral intake   serum glucose now stable  hgba1c 7.1 noted above   fsg ac and hs  adjust meds prior to d/c  neuro cons noted   cont aspirin, statin,   ce x1 neg noted above   cardio f/u    cont losartan   bp improved   f/u echo   f/u stress test   cont current meds   Patient is a 89y old  Female who presents with a chief complaint of Hypoglycemia (13 Sep 2021 12:51)    `pt seen in tele [ x ], reg med floor [   ], bed [ x ], chair at bedside [   ], awake and responsive [x  ], lethargic [  ],  nad [ x ]    Allergies    penicillins (Unknown)        Vitals    T(F): 98 (09-14-21 @ 07:50), Max: 98.6 (09-14-21 @ 00:00)  HR: 71 (09-14-21 @ 07:50) (71 - 80)  BP: 137/98 (09-14-21 @ 07:50) (115/61 - 149/70)  RR: 18 (09-14-21 @ 07:50) (17 - 20)  SpO2: 96% (09-14-21 @ 07:50) (96% - 100%)  Wt(kg): --  CAPILLARY BLOOD GLUCOSE      POCT Blood Glucose.: 161 mg/dL (14 Sep 2021 08:04)      Labs                          12.5   7.35  )-----------( 239      ( 14 Sep 2021 06:57 )             37.3       09-14    132<L>  |  101  |  18  ----------------------------<  155<H>  4.4   |  25  |  1.17    Ca    8.9      14 Sep 2021 06:57  Phos  2.1     09-13  Mg     1.7     09-13    TPro  6.9  /  Alb  3.5  /  TBili  0.5  /  DBili  x   /  AST  28  /  ALT  37  /  AlkPhos  78  09-14    A1C with Estimated Average Glucose (09.13.21 @ 10:36)   A1C with Estimated Average Glucose Result: 7.1:    CARDIAC MARKERS ( 12 Sep 2021 13:37 )  <0.015 ng/mL / x     / x     / x     / x            Clean Catch Clean Catch (Midstream)  09-13 @ 02:09   >100,000 CFU/ml Gram Negative Rods  --  --          Radiology Results      Meds    MEDICATIONS  (STANDING):  ascorbic acid 500 milliGRAM(s) Oral daily  aspirin enteric coated 81 milliGRAM(s) Oral daily  dextrose 5% + sodium chloride 0.9%. 1000 milliLiter(s) (70 mL/Hr) IV Continuous <Continuous>  enoxaparin Injectable 40 milliGRAM(s) SubCutaneous daily  ferrous    sulfate 325 milliGRAM(s) Oral daily  FLUoxetine 20 milliGRAM(s) Oral daily  folic acid 1 milliGRAM(s) Oral daily  losartan 50 milliGRAM(s) Oral daily  melatonin 3 milliGRAM(s) Oral at bedtime  multivitamin 1 Tablet(s) Oral daily  pantoprazole    Tablet 40 milliGRAM(s) Oral before breakfast  petrolatum white Ointment 1 Application(s) Topical two times a day  QUEtiapine 100 milliGRAM(s) Oral at bedtime  risperiDONE   Tablet 1 milliGRAM(s) Oral two times a day  sertraline 50 milliGRAM(s) Oral at bedtime      MEDICATIONS  (PRN):      Physical Exam    Neuro :  no focal deficits  Respiratory: CTA B/L  CV: RRR, S1S2, no murmurs,   Abdominal: Soft, NT, ND +BS,  Extremities: No edema, + peripheral pulses    ASSESSMENT      syncope likely 2nd to hypoglycemia,   cns patho r/o ,   chest pain,   r/o acs, ,   h/o DM (on insulin basaglar and metformin),   Alzheimer's dementia,   Iron Deficiency Anemia  HTN (hypertension)      PLAN    cont tele,   hold diabetic meds,   ivf d5w,   lispro ss,   endo f/u noted   hypoglycemia due to basaglar and poor oral intake   serum glucose now stable  hgba1c 7.1 noted above   fsg ac and hs  adjust meds prior to d/c   tsh elevated  f/u free t4 to r/o hypothyroidism  neuro cons noted   cont aspirin, statin,   ce x1 neg noted above   cardio f/u    cont losartan   bp improved   f/u echo   f/u stress test   francisca neg but ucx with gnr noted above   will hold abx as pt is asymptomatic  cont current meds

## 2021-09-14 NOTE — PROGRESS NOTE ADULT - SUBJECTIVE AND OBJECTIVE BOX
PGY-1 Progress Note discussed with attending    PAGER #: [[phone]] TILL 5:00 PM  PLEASE CONTACT ON CALL TEAM:  - On Call Team (Please refer to Morena) FROM 5:00 PM - 8:30PM  - Nightfloat Team FROM 8:30 -7:30 AM    CHIEF COMPLAINT & BRIEF HOSPITAL COURSE:  Patient is a 89yo F from home minimal ambulation with walker, AAOx 1-2 with PMH of DM (on insulin basaglar and metformin), Alzheimer's dementia, MESERET presents to the ER after a syncopal episode. found BS 41 with EMS report. Per EMS, pt was treated for UTI with Bactrim for a week. Admitted for hypoglycemia and rule out ACS. on Tele monitor. Endo/Cardiology consulted. Carotid US resulted no significant findings. UA negative. Echo ordered. Neurology dr. Jacobs consulted.     INTERVAL HPI/OVERNIGHT EVENTS:   Patient seen and examined at bedside. No overnight events.  used ([id number]). No new complaints this AM. ROS and physical wnl. Patient's diet advanced to regular       REVIEW OF SYSTEMS:  CONSTITUTIONAL: No fever, chills, weight loss, or fatigue  RESPIRATORY: No cough, wheezing, or hemoptysis; No shortness of breath  CARDIOVASCULAR: No chest pain, palpitations, dizziness, or leg swelling  GASTROINTESTINAL: No abdominal pain. No nausea, vomiting, or hematemesis; No diarrhea or constipation. No melena or hematochezia.  GENITOURINARY: No dysuria or hematuria, urinary frequency  NEUROLOGICAL: No headaches, memory loss, loss of strength, numbness, or tremors  SKIN: No itching, burning, rashes, or lesions     Vital Signs Last 24 Hrs  T(C): 36.5 (14 Sep 2021 15:17), Max: 37 (14 Sep 2021 00:00)  T(F): 97.7 (14 Sep 2021 15:17), Max: 98.6 (14 Sep 2021 00:00)  HR: 72 (14 Sep 2021 15:17) (71 - 82)  BP: 116/77 (14 Sep 2021 15:17) (115/61 - 139/72)  BP(mean): --  RR: 17 (14 Sep 2021 15:17) (17 - 20)  SpO2: 100% (14 Sep 2021 15:17) (96% - 100%)    PHYSICAL EXAMINATION:  GENERAL: NAD, lying in bed  HEAD:  Atraumatic, Normocephalic  EYES:  conjunctiva and sclera clear  NECK: Supple, No JVD, thyroid not examined   CHEST/LUNG: Clear to auscultation. No wheezing, crackles, rales or rubs  HEART: Regular rate and rhythm; Clear S1 S2, No murmurs, rubs, or gallops  ABDOMEN: Soft, Nontender, Nondistended; Bowel sounds present  NERVOUS SYSTEM:  Alert & Oriented X3, CNII-XII intact, no focal neurological deficits  EXTREMITIES:  2+ Peripheral Pulses, No clubbing, cyanosis, or edema  SKIN: warm dry                          12.5   7.35  )-----------( 239      ( 14 Sep 2021 06:57 )             37.3     09-14    132<L>  |  101  |  18  ----------------------------<  155<H>  4.4   |  25  |  1.17    Ca    8.9      14 Sep 2021 06:57  Phos  2.1     09-13  Mg     1.7     09-13    TPro  6.9  /  Alb  3.5  /  TBili  0.5  /  DBili  x   /  AST  28  /  ALT  37  /  AlkPhos  78  09-14    LIVER FUNCTIONS - ( 14 Sep 2021 06:57 )  Alb: 3.5 g/dL / Pro: 6.9 g/dL / ALK PHOS: 78 U/L / ALT: 37 U/L DA / AST: 28 U/L / GGT: x             RADIOLOGY & ADDITIONAL TESTS:

## 2021-09-14 NOTE — PROGRESS NOTE ADULT - SUBJECTIVE AND OBJECTIVE BOX
Interval Events:  pt in nad    Allergies    penicillins (Unknown)    Intolerances      Endocrine/Metabolic Medications:      Vital Signs Last 24 Hrs  T(C): 36.7 (14 Sep 2021 07:50), Max: 37 (14 Sep 2021 00:00)  T(F): 98 (14 Sep 2021 07:50), Max: 98.6 (14 Sep 2021 00:00)  HR: 71 (14 Sep 2021 07:50) (71 - 80)  BP: 137/98 (14 Sep 2021 07:50) (115/61 - 149/70)  BP(mean): 93 (13 Sep 2021 15:30) (93 - 93)  RR: 18 (14 Sep 2021 07:50) (17 - 20)  SpO2: 96% (14 Sep 2021 07:50) (96% - 100%)      PHYSICAL EXAM  All physical exam findings normal, except those marked:  General:	Alert, active, cooperative, NAD, well hydrated  .		[] Abnormal:  Neck		Normal: supple, no cervical adenopathy, no palpable thyroid  .		[] Abnormal:  Cardiovascular	Normal: regular rate, normal S1, S2, no murmurs  .		[] Abnormal:  Respiratory	Normal: no chest wall deformity, normal respiratory pattern, CTA B/L  .		[] Abnormal:  Abdominal	Normal: soft, ND, NT, bowel sounds present, no masses, no organomegaly  .		[] Abnormal:  		Normal normal genitalia, testes descended, circumcised/uncircumcised  .		Jude stage:			Breast jude:  .		Menstrual history:  .		[] Abnormal:  Extremities	Normal: FROM x4  .		[] Abnormal:  Skin		Normal: intact and not indurated, no rash, no acanthosis nigricans  .		[] Abnormal:  Neurologic	Normal: grossly intact  .		[] Abnormal:    LABS                        12.5   7.35  )-----------( 239      ( 14 Sep 2021 06:57 )             37.3                               132    |  101    |  18                  Calcium: 8.9   / iCa: x      (09-14 @ 06:57)    ----------------------------<  155       Magnesium: x                                4.4     |  25     |  1.17             Phosphorous: x        TPro  6.9    /  Alb  3.5    /  TBili  0.5    /  DBili  x      /  AST  28     /  ALT  37     /  AlkPhos  78     14 Sep 2021 06:57    CAPILLARY BLOOD GLUCOSE      POCT Blood Glucose.: 161 mg/dL (14 Sep 2021 08:04)  POCT Blood Glucose.: 162 mg/dL (14 Sep 2021 05:31)  POCT Blood Glucose.: 171 mg/dL (14 Sep 2021 00:57)  POCT Blood Glucose.: 217 mg/dL (13 Sep 2021 21:01)  POCT Blood Glucose.: 194 mg/dL (13 Sep 2021 17:18)        Assesment/plan    Pt is a 89yo F from home minimal ambulation with walker, AAOx 1-2 with PMH of DM (on insulin basaglar and metformin), Alzheimer's dementia, MESERET presents to the ER after a syncopal episode.)  Found to have hypoglycemia. Pt denies any complaints . Admits to eating well. Does not recall meds at home.          Problem/Recommendation - 1:  ·  Problem: Hypoglycemia.   ·  Recommendation: resolved  due to basaglar and poor oral intake   a1c level- 7.1  fsg ac and hs  start januvia 100mg upon d/c  d/w prim team.     Problem/Recommendation - 2:  ·  Problem: Syncope.   ·  Recommendation: likely due to hypoglycemia  f/u cardio recs for ? cardiac etiology.

## 2021-09-15 DIAGNOSIS — N39.0 URINARY TRACT INFECTION, SITE NOT SPECIFIED: ICD-10-CM

## 2021-09-15 LAB
-  AMIKACIN: SIGNIFICANT CHANGE UP
-  AMOXICILLIN/CLAVULANIC ACID: SIGNIFICANT CHANGE UP
-  AMPICILLIN/SULBACTAM: SIGNIFICANT CHANGE UP
-  AMPICILLIN: SIGNIFICANT CHANGE UP
-  AZTREONAM: SIGNIFICANT CHANGE UP
-  CEFAZOLIN: SIGNIFICANT CHANGE UP
-  CEFEPIME: SIGNIFICANT CHANGE UP
-  CEFOXITIN: SIGNIFICANT CHANGE UP
-  CEFTRIAXONE: SIGNIFICANT CHANGE UP
-  CIPROFLOXACIN: SIGNIFICANT CHANGE UP
-  ERTAPENEM: SIGNIFICANT CHANGE UP
-  GENTAMICIN: SIGNIFICANT CHANGE UP
-  IMIPENEM: SIGNIFICANT CHANGE UP
-  LEVOFLOXACIN: SIGNIFICANT CHANGE UP
-  MEROPENEM: SIGNIFICANT CHANGE UP
-  NITROFURANTOIN: SIGNIFICANT CHANGE UP
-  PIPERACILLIN/TAZOBACTAM: SIGNIFICANT CHANGE UP
-  TIGECYCLINE: SIGNIFICANT CHANGE UP
-  TOBRAMYCIN: SIGNIFICANT CHANGE UP
-  TRIMETHOPRIM/SULFAMETHOXAZOLE: SIGNIFICANT CHANGE UP
ANION GAP SERPL CALC-SCNC: 10 MMOL/L — SIGNIFICANT CHANGE UP (ref 5–17)
BUN SERPL-MCNC: 27 MG/DL — HIGH (ref 7–18)
CALCIUM SERPL-MCNC: 9.2 MG/DL — SIGNIFICANT CHANGE UP (ref 8.4–10.5)
CHLORIDE SERPL-SCNC: 101 MMOL/L — SIGNIFICANT CHANGE UP (ref 96–108)
CO2 SERPL-SCNC: 21 MMOL/L — LOW (ref 22–31)
CREAT SERPL-MCNC: 1.43 MG/DL — HIGH (ref 0.5–1.3)
GLUCOSE BLDC GLUCOMTR-MCNC: 179 MG/DL — HIGH (ref 70–99)
GLUCOSE BLDC GLUCOMTR-MCNC: 199 MG/DL — HIGH (ref 70–99)
GLUCOSE BLDC GLUCOMTR-MCNC: 246 MG/DL — HIGH (ref 70–99)
GLUCOSE BLDC GLUCOMTR-MCNC: 314 MG/DL — HIGH (ref 70–99)
GLUCOSE SERPL-MCNC: 206 MG/DL — HIGH (ref 70–99)
HCT VFR BLD CALC: 36.7 % — SIGNIFICANT CHANGE UP (ref 34.5–45)
HGB BLD-MCNC: 12.2 G/DL — SIGNIFICANT CHANGE UP (ref 11.5–15.5)
MAGNESIUM SERPL-MCNC: 2 MG/DL — SIGNIFICANT CHANGE UP (ref 1.6–2.6)
MCHC RBC-ENTMCNC: 30.2 PG — SIGNIFICANT CHANGE UP (ref 27–34)
MCHC RBC-ENTMCNC: 33.2 GM/DL — SIGNIFICANT CHANGE UP (ref 32–36)
MCV RBC AUTO: 90.8 FL — SIGNIFICANT CHANGE UP (ref 80–100)
METHOD TYPE: SIGNIFICANT CHANGE UP
NRBC # BLD: 0 /100 WBCS — SIGNIFICANT CHANGE UP (ref 0–0)
PHOSPHATE SERPL-MCNC: 4.1 MG/DL — SIGNIFICANT CHANGE UP (ref 2.5–4.5)
PLATELET # BLD AUTO: 222 K/UL — SIGNIFICANT CHANGE UP (ref 150–400)
POTASSIUM SERPL-MCNC: 4.6 MMOL/L — SIGNIFICANT CHANGE UP (ref 3.5–5.3)
POTASSIUM SERPL-SCNC: 4.6 MMOL/L — SIGNIFICANT CHANGE UP (ref 3.5–5.3)
RBC # BLD: 4.04 M/UL — SIGNIFICANT CHANGE UP (ref 3.8–5.2)
RBC # FLD: 13.3 % — SIGNIFICANT CHANGE UP (ref 10.3–14.5)
SODIUM SERPL-SCNC: 132 MMOL/L — LOW (ref 135–145)
T3FREE SERPL-MCNC: 2.4 PG/ML — SIGNIFICANT CHANGE UP (ref 1.8–4.6)
T4 FREE SERPL-MCNC: 1.1 NG/DL — SIGNIFICANT CHANGE UP (ref 0.9–1.8)
WBC # BLD: 6.46 K/UL — SIGNIFICANT CHANGE UP (ref 3.8–10.5)
WBC # FLD AUTO: 6.46 K/UL — SIGNIFICANT CHANGE UP (ref 3.8–10.5)

## 2021-09-15 RX ORDER — CEFTRIAXONE 500 MG/1
INJECTION, POWDER, FOR SOLUTION INTRAMUSCULAR; INTRAVENOUS
Refills: 0 | Status: COMPLETED | OUTPATIENT
Start: 2021-09-15 | End: 2021-09-19

## 2021-09-15 RX ORDER — SODIUM CHLORIDE 9 MG/ML
1000 INJECTION INTRAMUSCULAR; INTRAVENOUS; SUBCUTANEOUS
Refills: 0 | Status: DISCONTINUED | OUTPATIENT
Start: 2021-09-15 | End: 2021-09-20

## 2021-09-15 RX ORDER — LANOLIN ALCOHOL/MO/W.PET/CERES
5 CREAM (GRAM) TOPICAL ONCE
Refills: 0 | Status: DISCONTINUED | OUTPATIENT
Start: 2021-09-15 | End: 2021-09-15

## 2021-09-15 RX ORDER — INSULIN GLARGINE 100 [IU]/ML
10 INJECTION, SOLUTION SUBCUTANEOUS AT BEDTIME
Refills: 0 | Status: DISCONTINUED | OUTPATIENT
Start: 2021-09-15 | End: 2021-09-20

## 2021-09-15 RX ORDER — LANOLIN ALCOHOL/MO/W.PET/CERES
3 CREAM (GRAM) TOPICAL ONCE
Refills: 0 | Status: COMPLETED | OUTPATIENT
Start: 2021-09-15 | End: 2021-09-15

## 2021-09-15 RX ORDER — SODIUM CHLORIDE 9 MG/ML
1000 INJECTION, SOLUTION INTRAVENOUS
Refills: 0 | Status: DISCONTINUED | OUTPATIENT
Start: 2021-09-15 | End: 2021-09-20

## 2021-09-15 RX ORDER — CEFTRIAXONE 500 MG/1
1000 INJECTION, POWDER, FOR SOLUTION INTRAMUSCULAR; INTRAVENOUS ONCE
Refills: 0 | Status: COMPLETED | OUTPATIENT
Start: 2021-09-15 | End: 2021-09-15

## 2021-09-15 RX ORDER — GLUCAGON INJECTION, SOLUTION 0.5 MG/.1ML
1 INJECTION, SOLUTION SUBCUTANEOUS ONCE
Refills: 0 | Status: DISCONTINUED | OUTPATIENT
Start: 2021-09-15 | End: 2021-09-20

## 2021-09-15 RX ORDER — ALPRAZOLAM 0.25 MG
0.25 TABLET ORAL ONCE
Refills: 0 | Status: DISCONTINUED | OUTPATIENT
Start: 2021-09-15 | End: 2021-09-15

## 2021-09-15 RX ORDER — CEFTRIAXONE 500 MG/1
1000 INJECTION, POWDER, FOR SOLUTION INTRAMUSCULAR; INTRAVENOUS EVERY 24 HOURS
Refills: 0 | Status: COMPLETED | OUTPATIENT
Start: 2021-09-16 | End: 2021-09-18

## 2021-09-15 RX ORDER — INSULIN LISPRO 100/ML
3 VIAL (ML) SUBCUTANEOUS
Refills: 0 | Status: DISCONTINUED | OUTPATIENT
Start: 2021-09-15 | End: 2021-09-16

## 2021-09-15 RX ADMIN — RISPERIDONE 1 MILLIGRAM(S): 4 TABLET ORAL at 17:05

## 2021-09-15 RX ADMIN — SERTRALINE 50 MILLIGRAM(S): 25 TABLET, FILM COATED ORAL at 21:31

## 2021-09-15 RX ADMIN — Medication 1 TABLET(S): at 12:20

## 2021-09-15 RX ADMIN — Medication 325 MILLIGRAM(S): at 12:20

## 2021-09-15 RX ADMIN — ENOXAPARIN SODIUM 40 MILLIGRAM(S): 100 INJECTION SUBCUTANEOUS at 12:29

## 2021-09-15 RX ADMIN — Medication 20 MILLIGRAM(S): at 12:20

## 2021-09-15 RX ADMIN — Medication 1 APPLICATION(S): at 17:06

## 2021-09-15 RX ADMIN — Medication 1 MILLIGRAM(S): at 12:20

## 2021-09-15 RX ADMIN — Medication 0.25 MILLIGRAM(S): at 21:31

## 2021-09-15 RX ADMIN — Medication 81 MILLIGRAM(S): at 12:20

## 2021-09-15 RX ADMIN — Medication 1: at 12:30

## 2021-09-15 RX ADMIN — Medication 1 APPLICATION(S): at 06:17

## 2021-09-15 RX ADMIN — CEFTRIAXONE 100 MILLIGRAM(S): 500 INJECTION, POWDER, FOR SOLUTION INTRAMUSCULAR; INTRAVENOUS at 12:18

## 2021-09-15 RX ADMIN — Medication 500 MILLIGRAM(S): at 12:20

## 2021-09-15 RX ADMIN — LOSARTAN POTASSIUM 50 MILLIGRAM(S): 100 TABLET, FILM COATED ORAL at 06:16

## 2021-09-15 RX ADMIN — INSULIN GLARGINE 10 UNIT(S): 100 INJECTION, SOLUTION SUBCUTANEOUS at 21:32

## 2021-09-15 RX ADMIN — SODIUM CHLORIDE 70 MILLILITER(S): 9 INJECTION INTRAMUSCULAR; INTRAVENOUS; SUBCUTANEOUS at 16:02

## 2021-09-15 RX ADMIN — RISPERIDONE 1 MILLIGRAM(S): 4 TABLET ORAL at 06:16

## 2021-09-15 RX ADMIN — Medication 3 UNIT(S): at 16:57

## 2021-09-15 RX ADMIN — Medication 4: at 16:56

## 2021-09-15 RX ADMIN — PANTOPRAZOLE SODIUM 40 MILLIGRAM(S): 20 TABLET, DELAYED RELEASE ORAL at 06:16

## 2021-09-15 RX ADMIN — QUETIAPINE FUMARATE 100 MILLIGRAM(S): 200 TABLET, FILM COATED ORAL at 21:32

## 2021-09-15 NOTE — PROGRESS NOTE ADULT - SUBJECTIVE AND OBJECTIVE BOX
PGY-1 Progress Note discussed with attending    PAGER #: [[phone]] TILL 5:00 PM  PLEASE CONTACT ON CALL TEAM:  - On Call Team (Please refer to Morena) FROM 5:00 PM - 8:30PM  - Nightfloat Team FROM 8:30 -7:30 AM    CHIEF COMPLAINT & BRIEF HOSPITAL COURSE:  Patient is a 89yo F from home minimal ambulation with walker, AAOx 1-2 with PMH of DM (on insulin basaglar and metformin), Alzheimer's dementia, MESERET presents to the ER after a syncopal episode. found BS 41 with EMS report. Per EMS, pt was treated for UTI with Bactrim for a week. Admitted for hypoglycemia and rule out ACS. on Tele monitor. Endo/Cardiology/Neurology consulted. Carotid US resulted no significant findings. UA negative. Echo ordered.     INTERVAL HPI/OVERNIGHT EVENTS:   Patient seen and examined at bedside. Patient had missed beats on Tele. Appears to be second degree heart block ([place]). Patient resting comfortably after stress test. Spoke to daughter at bedside. No new complaints this AM. ROS and physical wnl. Patient tolerating advanced diet.  Urine cx growing Morganella+E. coli, started on Abx.     REVIEW OF SYSTEMS:  CONSTITUTIONAL: No fever, chills, weight loss, or fatigue  RESPIRATORY: No cough, wheezing, or hemoptysis; No shortness of breath  CARDIOVASCULAR: No chest pain, palpitations, dizziness, or leg swelling  GASTROINTESTINAL: No abdominal pain. No nausea, vomiting, or hematemesis; No diarrhea or constipation. No melena or hematochezia.  GENITOURINARY: No dysuria or hematuria, urinary frequency  NEUROLOGICAL: No headaches, memory loss, loss of strength, numbness, or tremors  SKIN: No itching, burning, rashes, or lesions     Vital Signs Last 24 Hrs  T(C): 36.5 (15 Sep 2021 08:03), Max: 36.9 (14 Sep 2021 19:48)  T(F): 97.7 (15 Sep 2021 08:03), Max: 98.4 (14 Sep 2021 19:48)  HR: 62 (15 Sep 2021 08:03) (62 - 71)  BP: 127/48 (15 Sep 2021 08:03) (107/51 - 127/48)  BP(mean): --  RR: 18 (15 Sep 2021 08:03) (17 - 18)  SpO2: 96% (15 Sep 2021 08:03) (96% - 100%)    PHYSICAL EXAMINATION:  GENERAL: NAD, lying in bed  HEAD:  Atraumatic, Normocephalic  EYES:  conjunctiva and sclera clear  NECK: Supple, No JVD, thyroid not examined   CHEST/LUNG: Clear to auscultation. No wheezing, crackles, rales or rubs  HEART: Regular rate and rhythm; Clear S1 S2, No murmurs, rubs, or gallops  ABDOMEN: Soft, Nontender, Nondistended; Bowel sounds present  NERVOUS SYSTEM:  Alert & Oriented X3, CNII-XII intact, no focal neurological deficits  EXTREMITIES:  2+ Peripheral Pulses, No clubbing, cyanosis, or edema  SKIN: warm dry                          12.2   6.46  )-----------( 222      ( 15 Sep 2021 07:45 )             36.7     09-15    132<L>  |  101  |  27<H>  ----------------------------<  206<H>  4.6   |  21<L>  |  1.43<H>    Ca    9.2      15 Sep 2021 07:45  Phos  4.1     09-15  Mg     2.0     09-15    TPro  6.9  /  Alb  3.5  /  TBili  0.5  /  DBili  x   /  AST  28  /  ALT  37  /  AlkPhos  78  09-14    LIVER FUNCTIONS - ( 14 Sep 2021 06:57 )  Alb: 3.5 g/dL / Pro: 6.9 g/dL / ALK PHOS: 78 U/L / ALT: 37 U/L DA / AST: 28 U/L / GGT: x                   RADIOLOGY & ADDITIONAL TESTS:

## 2021-09-15 NOTE — PROGRESS NOTE ADULT - SUBJECTIVE AND OBJECTIVE BOX
Interval Events:      Allergies    penicillins (Unknown)    Intolerances      Endocrine/Metabolic Medications:  insulin lispro (ADMELOG) corrective regimen sliding scale   SubCutaneous three times a day before meals      Vital Signs Last 24 Hrs  T(C): 36.5 (15 Sep 2021 08:03), Max: 36.9 (14 Sep 2021 19:48)  T(F): 97.7 (15 Sep 2021 08:03), Max: 98.4 (14 Sep 2021 19:48)  HR: 62 (15 Sep 2021 08:03) (62 - 82)  BP: 127/48 (15 Sep 2021 08:03) (107/51 - 131/45)  BP(mean): --  RR: 18 (15 Sep 2021 08:03) (17 - 18)  SpO2: 96% (15 Sep 2021 08:03) (96% - 100%)      PHYSICAL EXAM  All physical exam findings normal, except those marked:  General:	Alert, active, cooperative, NAD, well hydrated  .		[] Abnormal:  Neck		Normal: supple, no cervical adenopathy, no palpable thyroid  .		[] Abnormal:  Cardiovascular	Normal: regular rate, normal S1, S2, no murmurs  .		[] Abnormal:  Respiratory	Normal: no chest wall deformity, normal respiratory pattern, CTA B/L  .		[] Abnormal:  Abdominal	Normal: soft, ND, NT, bowel sounds present, no masses, no organomegaly  .		[] Abnormal:  		Normal normal genitalia, testes descended, circumcised/uncircumcised  .		Jude stage:			Breast jude:  .		Menstrual history:  .		[] Abnormal:  Extremities	Normal: FROM x4  .		[] Abnormal:  Skin		Normal: intact and not indurated, no rash, no acanthosis nigricans  .		[] Abnormal:  Neurologic	Normal: grossly intact  .		[] Abnormal:    LABS                        12.2   6.46  )-----------( 222      ( 15 Sep 2021 07:45 )             36.7                               132    |  101    |  27                  Calcium: 9.2   / iCa: x      (09-15 @ 07:45)    ----------------------------<  206       Magnesium: 2.0                              4.6     |  21     |  1.43             Phosphorous: 4.1        CAPILLARY BLOOD GLUCOSE      POCT Blood Glucose.: 246 mg/dL (15 Sep 2021 06:18)  POCT Blood Glucose.: 218 mg/dL (14 Sep 2021 21:19)  POCT Blood Glucose.: 157 mg/dL (14 Sep 2021 16:40)  POCT Blood Glucose.: 353 mg/dL (14 Sep 2021 12:01)  POCT Blood Glucose.: 416 mg/dL (14 Sep 2021 11:41)        Assesment/plan       Interval Events:  pt in nad    Allergies    penicillins (Unknown)    Intolerances      Endocrine/Metabolic Medications:  insulin lispro (ADMELOG) corrective regimen sliding scale   SubCutaneous three times a day before meals      Vital Signs Last 24 Hrs  T(C): 36.5 (15 Sep 2021 08:03), Max: 36.9 (14 Sep 2021 19:48)  T(F): 97.7 (15 Sep 2021 08:03), Max: 98.4 (14 Sep 2021 19:48)  HR: 62 (15 Sep 2021 08:03) (62 - 82)  BP: 127/48 (15 Sep 2021 08:03) (107/51 - 131/45)  BP(mean): --  RR: 18 (15 Sep 2021 08:03) (17 - 18)  SpO2: 96% (15 Sep 2021 08:03) (96% - 100%)      PHYSICAL EXAM  All physical exam findings normal, except those marked:  General:	Alert, active, cooperative, NAD, well hydrated  .		[] Abnormal:  Neck		Normal: supple, no cervical adenopathy, no palpable thyroid  .		[] Abnormal:  Cardiovascular	Normal: regular rate, normal S1, S2, no murmurs  .		[] Abnormal:  Respiratory	Normal: no chest wall deformity, normal respiratory pattern, CTA B/L  .		[] Abnormal:  Abdominal	Normal: soft, ND, NT, bowel sounds present, no masses, no organomegaly  .		[] Abnormal:  		Normal normal genitalia, testes descended, circumcised/uncircumcised  .		Jude stage:			Breast jude:  .		Menstrual history:  .		[] Abnormal:  Extremities	Normal: FROM x4  .		[] Abnormal:  Skin		Normal: intact and not indurated, no rash, no acanthosis nigricans  .		[] Abnormal:  Neurologic	Normal: grossly intact  .		[] Abnormal:    LABS                        12.2   6.46  )-----------( 222      ( 15 Sep 2021 07:45 )             36.7                               132    |  101    |  27                  Calcium: 9.2   / iCa: x      (09-15 @ 07:45)    ----------------------------<  206       Magnesium: 2.0                              4.6     |  21     |  1.43             Phosphorous: 4.1        CAPILLARY BLOOD GLUCOSE      POCT Blood Glucose.: 246 mg/dL (15 Sep 2021 06:18)  POCT Blood Glucose.: 218 mg/dL (14 Sep 2021 21:19)  POCT Blood Glucose.: 157 mg/dL (14 Sep 2021 16:40)  POCT Blood Glucose.: 353 mg/dL (14 Sep 2021 12:01)  POCT Blood Glucose.: 416 mg/dL (14 Sep 2021 11:41)        Assesment/plan    Pt is a 89yo F from home minimal ambulation with walker, AAOx 1-2 with PMH of DM (on insulin basaglar and metformin), Alzheimer's dementia, MESERET presents to the ER after a syncopal episode.)  Found to have hypoglycemia. Pt denies any complaints . Admits to eating well. Does not recall meds at home.          Problem/Recommendation - 1:  ·  Problem: Hypoglycemia.   resolved  now hyperglycemic  start lantus 12 units   admelog 3 ac tid  a1c level- 7.1  fsg ac and hs  d/w prim team.     Problem/Recommendation - 2:  ·  Problem: Syncope.   ·  Recommendation: likely due to hypoglycemia  f/u cardio recs for ? cardiac etiology.

## 2021-09-15 NOTE — PROGRESS NOTE ADULT - PROBLEM SELECTOR PLAN 2
Pt p/w after a syncopal episode with aura of diaphoresis and had LOC for 10 mins  EKG showed NSR  on telemetry  carotid dopplers neg  Cardiology Dr. Xie consulted:  -c/w tele monitoring.  -c/w asa,statin.  -f/u Echo  -f/u Stress test  Neuro Dr. Mohr consulted

## 2021-09-15 NOTE — PROGRESS NOTE ADULT - SUBJECTIVE AND OBJECTIVE BOX
`Patient is a 89y old  Female who presents with a chief complaint of Hypoglycemia (14 Sep 2021 15:08)    `pt seen in tele [ x ], reg med floor [   ], bed [ x ], chair at bedside [   ], awake and responsive [x  ], lethargic [  ],  nad [ x ]    Allergies    penicillins (Unknown)        Vitals    T(F): 97.7 (09-15-21 @ 08:03), Max: 98.4 (09-14-21 @ 19:48)  HR: 62 (09-15-21 @ 08:03) (62 - 82)  BP: 127/48 (09-15-21 @ 08:03) (107/51 - 131/45)  RR: 18 (09-15-21 @ 08:03) (17 - 18)  SpO2: 96% (09-15-21 @ 08:03) (96% - 100%)  Wt(kg): --  CAPILLARY BLOOD GLUCOSE      POCT Blood Glucose.: 246 mg/dL (15 Sep 2021 06:18)      Labs                          12.2   6.46  )-----------( 222      ( 15 Sep 2021 07:45 )             36.7       09-14    132<L>  |  101  |  18  ----------------------------<  155<H>  4.4   |  25  |  1.17    Ca    8.9      14 Sep 2021 06:57    TPro  6.9  /  Alb  3.5  /  TBili  0.5  /  DBili  x   /  AST  28  /  ALT  37  /  AlkPhos  78  09-14            Clean Catch Clean Catch (Midstream)  09-13 @ 02:09   >100,000 CFU/ml Morganella morganii  50,000 - 99,000 CFU/mL Escherichia coli  --  Morganella morganii  - Piperacillin/Tazobactam: S <=8   - Tigecycline: R <=2   - Tobramycin: S <=2   - Trimethoprim/Sulfamethoxazole: R >2/38   - Ertapenem: S <=0.5   - Gentamicin: S <=2   - Imipenem: I 2   - Levofloxacin: R 4   - Meropenem: S <=1   - Nitrofurantoin: R >64 Should not be used to treat pyelonephritis   - Amikacin: S <=16   - Amoxicillin/Clavulanic Acid: R >16/8   - Ampicillin: R >16 These ampicillin results predict results for amoxicillin   - Ampicillin/Sulbactam: I 16/8 Enterobacter, Citrobacter, and Serratia may develop resistance during prolonged therapy (3-4 days)   - Aztreonam: S <=4   - Cefazolin: R >16   - Cefepime: S <=2   - Cefoxitin: S <=8   - Ceftriaxone: S <=1 Enterobacter, Citrobacter, and Serratia may develop resistance during prolonged therapy   - Ciprofloxacin: R >2         Radiology Results      Meds    MEDICATIONS  (STANDING):  ascorbic acid 500 milliGRAM(s) Oral daily  aspirin enteric coated 81 milliGRAM(s) Oral daily  enoxaparin Injectable 40 milliGRAM(s) SubCutaneous daily  ferrous    sulfate 325 milliGRAM(s) Oral daily  FLUoxetine 20 milliGRAM(s) Oral daily  folic acid 1 milliGRAM(s) Oral daily  insulin lispro (ADMELOG) corrective regimen sliding scale   SubCutaneous three times a day before meals  losartan 50 milliGRAM(s) Oral daily  multivitamin 1 Tablet(s) Oral daily  pantoprazole    Tablet 40 milliGRAM(s) Oral before breakfast  petrolatum white Ointment 1 Application(s) Topical two times a day  QUEtiapine 100 milliGRAM(s) Oral at bedtime  risperiDONE   Tablet 1 milliGRAM(s) Oral two times a day  sertraline 50 milliGRAM(s) Oral at bedtime      MEDICATIONS  (PRN):      Physical Exam    Neuro :  no focal deficits  Respiratory: CTA B/L  CV: RRR, S1S2, no murmurs,   Abdominal: Soft, NT, ND +BS,  Extremities: No edema, + peripheral pulses    ASSESSMENT      syncope likely 2nd to hypoglycemia,   cns patho r/o ,   chest pain,   r/o acs, ,   h/o DM (on insulin basaglar and metformin),   Alzheimer's dementia,   Iron Deficiency Anemia  HTN (hypertension)      PLAN    cont tele,   lispro ss,   endo f/u   hypoglycemia due to basaglar and poor oral intake   serum glucose now stable  hgba1c 7.1 noted   fsg ac and hs  start januvia 100mg upon d/c   tsh elevated  f/u free t4 wnl  neuro cons noted   cont aspirin, statin,   ce x1 neg noted above   cardio f/u    cont losartan   bp improved   f/u echo   f/u stress test   ucx with  Escherichia coli  --  Morganella morganii    start rocephin 1g daily x 3 days  cont current meds       `Patient is a 89y old  Female who presents with a chief complaint of Hypoglycemia (14 Sep 2021 15:08)    `pt seen in tele [ x ], reg med floor [   ], bed [ x ], chair at bedside [   ], awake and responsive [x  ], lethargic [  ],  nad [ x ]    Allergies    penicillins (Unknown)        Vitals    T(F): 97.7 (09-15-21 @ 08:03), Max: 98.4 (09-14-21 @ 19:48)  HR: 62 (09-15-21 @ 08:03) (62 - 82)  BP: 127/48 (09-15-21 @ 08:03) (107/51 - 131/45)  RR: 18 (09-15-21 @ 08:03) (17 - 18)  SpO2: 96% (09-15-21 @ 08:03) (96% - 100%)  Wt(kg): --  CAPILLARY BLOOD GLUCOSE      POCT Blood Glucose.: 246 mg/dL (15 Sep 2021 06:18)      Labs                          12.2   6.46  )-----------( 222      ( 15 Sep 2021 07:45 )             36.7       09-14    132<L>  |  101  |  18  ----------------------------<  155<H>  4.4   |  25  |  1.17    Ca    8.9      14 Sep 2021 06:57    TPro  6.9  /  Alb  3.5  /  TBili  0.5  /  DBili  x   /  AST  28  /  ALT  37  /  AlkPhos  78  09-14    Free Thyroxine, Serum in AM (09.15.21 @ 05:43)   Free Thyroxine, Serum: 1.1        Clean Catch Clean Catch (Midstream)  09-13 @ 02:09   >100,000 CFU/ml Morganella morganii  50,000 - 99,000 CFU/mL Escherichia coli  --  Morganella morganii  - Piperacillin/Tazobactam: S <=8   - Tigecycline: R <=2   - Tobramycin: S <=2   - Trimethoprim/Sulfamethoxazole: R >2/38   - Ertapenem: S <=0.5   - Gentamicin: S <=2   - Imipenem: I 2   - Levofloxacin: R 4   - Meropenem: S <=1   - Nitrofurantoin: R >64 Should not be used to treat pyelonephritis   - Amikacin: S <=16   - Amoxicillin/Clavulanic Acid: R >16/8   - Ampicillin: R >16 These ampicillin results predict results for amoxicillin   - Ampicillin/Sulbactam: I 16/8 Enterobacter, Citrobacter, and Serratia may develop resistance during prolonged therapy (3-4 days)   - Aztreonam: S <=4   - Cefazolin: R >16   - Cefepime: S <=2   - Cefoxitin: S <=8   - Ceftriaxone: S <=1 Enterobacter, Citrobacter, and Serratia may develop resistance during prolonged therapy   - Ciprofloxacin: R >2         Radiology Results      Meds    MEDICATIONS  (STANDING):  ascorbic acid 500 milliGRAM(s) Oral daily  aspirin enteric coated 81 milliGRAM(s) Oral daily  enoxaparin Injectable 40 milliGRAM(s) SubCutaneous daily  ferrous    sulfate 325 milliGRAM(s) Oral daily  FLUoxetine 20 milliGRAM(s) Oral daily  folic acid 1 milliGRAM(s) Oral daily  insulin lispro (ADMELOG) corrective regimen sliding scale   SubCutaneous three times a day before meals  losartan 50 milliGRAM(s) Oral daily  multivitamin 1 Tablet(s) Oral daily  pantoprazole    Tablet 40 milliGRAM(s) Oral before breakfast  petrolatum white Ointment 1 Application(s) Topical two times a day  QUEtiapine 100 milliGRAM(s) Oral at bedtime  risperiDONE   Tablet 1 milliGRAM(s) Oral two times a day  sertraline 50 milliGRAM(s) Oral at bedtime      MEDICATIONS  (PRN):      Physical Exam    Neuro :  no focal deficits  Respiratory: CTA B/L  CV: RRR, S1S2, no murmurs,   Abdominal: Soft, NT, ND +BS,  Extremities: No edema, + peripheral pulses    ASSESSMENT      syncope likely 2nd to hypoglycemia,   cns patho r/o ,   chest pain,   r/o acs, ,   h/o DM (on insulin basaglar and metformin),   Alzheimer's dementia,   Iron Deficiency Anemia  HTN (hypertension)      PLAN    cont tele,   lispro ss,   endo f/u   hypoglycemia due to basaglar and poor oral intake   serum glucose now stable  hgba1c 7.1 noted   fsg ac and hs  start januvia 100mg upon d/c   tsh elevated  free t4 wnl  neuro cons noted   cont aspirin, statin,   ce x1 neg noted above   cardio f/u    cont losartan   bp improved   f/u echo   f/u stress test   ucx with  Escherichia coli  --  Morganella morganii    start rocephin 1g daily x 3 days  cont current meds       `Patient is a 89y old  Female who presents with a chief complaint of Hypoglycemia (14 Sep 2021 15:08)    `pt seen in tele [ x ], reg med floor [   ], bed [ x ], chair at bedside [   ], awake and responsive [x  ], lethargic [  ],  nad [ x ]    Allergies    penicillins (Unknown)        Vitals    T(F): 97.7 (09-15-21 @ 08:03), Max: 98.4 (09-14-21 @ 19:48)  HR: 62 (09-15-21 @ 08:03) (62 - 82)  BP: 127/48 (09-15-21 @ 08:03) (107/51 - 131/45)  RR: 18 (09-15-21 @ 08:03) (17 - 18)  SpO2: 96% (09-15-21 @ 08:03) (96% - 100%)  Wt(kg): --  CAPILLARY BLOOD GLUCOSE      POCT Blood Glucose.: 246 mg/dL (15 Sep 2021 06:18)      Labs                          12.2   6.46  )-----------( 222      ( 15 Sep 2021 07:45 )             36.7       09-14    132<L>  |  101  |  18  ----------------------------<  155<H>  4.4   |  25  |  1.17    Ca    8.9      14 Sep 2021 06:57    TPro  6.9  /  Alb  3.5  /  TBili  0.5  /  DBili  x   /  AST  28  /  ALT  37  /  AlkPhos  78  09-14    Free Thyroxine, Serum in AM (09.15.21 @ 05:43)   Free Thyroxine, Serum: 1.1        Clean Catch Clean Catch (Midstream)  09-13 @ 02:09   >100,000 CFU/ml Morganella morganii  50,000 - 99,000 CFU/mL Escherichia coli  --  Morganella morganii  - Piperacillin/Tazobactam: S <=8   - Tigecycline: R <=2   - Tobramycin: S <=2   - Trimethoprim/Sulfamethoxazole: R >2/38   - Ertapenem: S <=0.5   - Gentamicin: S <=2   - Imipenem: I 2   - Levofloxacin: R 4   - Meropenem: S <=1   - Nitrofurantoin: R >64 Should not be used to treat pyelonephritis   - Amikacin: S <=16   - Amoxicillin/Clavulanic Acid: R >16/8   - Ampicillin: R >16 These ampicillin results predict results for amoxicillin   - Ampicillin/Sulbactam: I 16/8 Enterobacter, Citrobacter, and Serratia may develop resistance during prolonged therapy (3-4 days)   - Aztreonam: S <=4   - Cefazolin: R >16   - Cefepime: S <=2   - Cefoxitin: S <=8   - Ceftriaxone: S <=1 Enterobacter, Citrobacter, and Serratia may develop resistance during prolonged therapy   - Ciprofloxacin: R >2         Radiology Results      Meds    MEDICATIONS  (STANDING):  ascorbic acid 500 milliGRAM(s) Oral daily  aspirin enteric coated 81 milliGRAM(s) Oral daily  enoxaparin Injectable 40 milliGRAM(s) SubCutaneous daily  ferrous    sulfate 325 milliGRAM(s) Oral daily  FLUoxetine 20 milliGRAM(s) Oral daily  folic acid 1 milliGRAM(s) Oral daily  insulin lispro (ADMELOG) corrective regimen sliding scale   SubCutaneous three times a day before meals  losartan 50 milliGRAM(s) Oral daily  multivitamin 1 Tablet(s) Oral daily  pantoprazole    Tablet 40 milliGRAM(s) Oral before breakfast  petrolatum white Ointment 1 Application(s) Topical two times a day  QUEtiapine 100 milliGRAM(s) Oral at bedtime  risperiDONE   Tablet 1 milliGRAM(s) Oral two times a day  sertraline 50 milliGRAM(s) Oral at bedtime      MEDICATIONS  (PRN):      Physical Exam    Neuro :  no focal deficits  Respiratory: CTA B/L  CV: RRR, S1S2, no murmurs,   Abdominal: Soft, NT, ND +BS,  Extremities: No edema, + peripheral pulses    ASSESSMENT      syncope likely 2nd to hypoglycemia,   cns patho r/o ,   chest pain,   r/o acs, ,   h/o DM (on insulin basaglar and metformin),   Alzheimer's dementia,   Iron Deficiency Anemia  HTN (hypertension)      PLAN    cont tele,   lispro ss,   endo f/u   hypoglycemia due to basaglar and poor oral intake   serum glucose now stable  hgba1c 7.1 noted   fsg ac and hs  start januvia 100mg upon d/c   tsh elevated  free t4 wnl  neuro cons noted   cont aspirin, statin,   ce x1 neg noted above   cardio f/u    cont losartan   bp improved   f/u echo   f/u stress test   no av yanni blocking agents 2nd to jerald cardia  r/o sss  pt with wide complex tachycardia with 2 to 1 heart block on tele monitor   ucx with  Escherichia coli  --  Morganella morganii    start rocephin 1g daily x 3 days  cont current meds

## 2021-09-15 NOTE — PROGRESS NOTE ADULT - SUBJECTIVE AND OBJECTIVE BOX
CHIEF COMPLAINT:Patient is a 89y old  Female who presents with a chief complaint of Hypoglycemia.Pt appears comfortable.    	  REVIEW OF SYSTEMS:    [x ] Unable to obtain    PHYSICAL EXAM:  T(C): 36.5 (09-15-21 @ 08:03), Max: 36.9 (09-14-21 @ 19:48)  HR: 62 (09-15-21 @ 08:03) (62 - 82)  BP: 127/48 (09-15-21 @ 08:03) (107/51 - 131/45)  RR: 18 (09-15-21 @ 08:03) (17 - 18)  SpO2: 96% (09-15-21 @ 08:03) (96% - 100%)  Wt(kg): --  I&O's Summary    14 Sep 2021 07:01  -  15 Sep 2021 07:00  --------------------------------------------------------  IN: 0 mL / OUT: 800 mL / NET: -800 mL        Appearance: Normal	  HEENT:   Normal oral mucosa, PERRL, EOMI	  Lymphatic: No lymphadenopathy  Cardiovascular: Normal S1 S2, No JVD, No murmurs, No edema  Respiratory: Lungs clear to auscultation	  Gastrointestinal:  Soft, Non-tender, + BS	  Skin: No rashes, No ecchymoses, No cyanosis	  Extremities: Normal range of motion, No clubbing, cyanosis or edema  Vascular: Peripheral pulses palpable 2+ bilaterally    MEDICATIONS  (STANDING):  ascorbic acid 500 milliGRAM(s) Oral daily  aspirin enteric coated 81 milliGRAM(s) Oral daily  cefTRIAXone   IVPB      cefTRIAXone   IVPB 1000 milliGRAM(s) IV Intermittent once  enoxaparin Injectable 40 milliGRAM(s) SubCutaneous daily  ferrous    sulfate 325 milliGRAM(s) Oral daily  FLUoxetine 20 milliGRAM(s) Oral daily  folic acid 1 milliGRAM(s) Oral daily  insulin lispro (ADMELOG) corrective regimen sliding scale   SubCutaneous three times a day before meals  multivitamin 1 Tablet(s) Oral daily  pantoprazole    Tablet 40 milliGRAM(s) Oral before breakfast  petrolatum white Ointment 1 Application(s) Topical two times a day  QUEtiapine 100 milliGRAM(s) Oral at bedtime  risperiDONE   Tablet 1 milliGRAM(s) Oral two times a day  sertraline 50 milliGRAM(s) Oral at bedtime      TELEMETRY: nsr,wct,2 to1 av block with vent escape	    	  	  LABS:	 	                     12.2   6.46  )-----------( 222      ( 15 Sep 2021 07:45 )             36.7     09-15    132<L>  |  101  |  27<H>  ----------------------------<  206<H>  4.6   |  21<L>  |  1.43<H>    Ca    9.2      15 Sep 2021 07:45  Phos  4.1     09-15  Mg     2.0     09-15    TPro  6.9  /  Alb  3.5  /  TBili  0.5  /  DBili  x   /  AST  28  /  ALT  37  /  AlkPhos  78  09-14      Lipid Profile: Cholesterol 173  LDL --  HDL 53        TSH: Thyroid Stimulating Hormone, Serum: 5.60 uU/mL (09-14 @ 06:57)  Thyroid Stimulating Hormone, Serum: 4.95 uU/mL (09-13 @ 05:53)

## 2021-09-16 LAB
-  AMIKACIN: SIGNIFICANT CHANGE UP
-  AMOXICILLIN/CLAVULANIC ACID: SIGNIFICANT CHANGE UP
-  AMPICILLIN/SULBACTAM: SIGNIFICANT CHANGE UP
-  AMPICILLIN: SIGNIFICANT CHANGE UP
-  AZTREONAM: SIGNIFICANT CHANGE UP
-  CEFAZOLIN: SIGNIFICANT CHANGE UP
-  CEFEPIME: SIGNIFICANT CHANGE UP
-  CEFOXITIN: SIGNIFICANT CHANGE UP
-  CEFTRIAXONE: SIGNIFICANT CHANGE UP
-  CIPROFLOXACIN: SIGNIFICANT CHANGE UP
-  ERTAPENEM: SIGNIFICANT CHANGE UP
-  GENTAMICIN: SIGNIFICANT CHANGE UP
-  IMIPENEM: SIGNIFICANT CHANGE UP
-  LEVOFLOXACIN: SIGNIFICANT CHANGE UP
-  MEROPENEM: SIGNIFICANT CHANGE UP
-  NITROFURANTOIN: SIGNIFICANT CHANGE UP
-  PIPERACILLIN/TAZOBACTAM: SIGNIFICANT CHANGE UP
-  TIGECYCLINE: SIGNIFICANT CHANGE UP
-  TOBRAMYCIN: SIGNIFICANT CHANGE UP
-  TRIMETHOPRIM/SULFAMETHOXAZOLE: SIGNIFICANT CHANGE UP
ANION GAP SERPL CALC-SCNC: 4 MMOL/L — LOW (ref 5–17)
BUN SERPL-MCNC: 20 MG/DL — HIGH (ref 7–18)
CALCIUM SERPL-MCNC: 8.6 MG/DL — SIGNIFICANT CHANGE UP (ref 8.4–10.5)
CHLORIDE SERPL-SCNC: 104 MMOL/L — SIGNIFICANT CHANGE UP (ref 96–108)
CO2 SERPL-SCNC: 26 MMOL/L — SIGNIFICANT CHANGE UP (ref 22–31)
CREAT SERPL-MCNC: 0.91 MG/DL — SIGNIFICANT CHANGE UP (ref 0.5–1.3)
CULTURE RESULTS: SIGNIFICANT CHANGE UP
GLUCOSE BLDC GLUCOMTR-MCNC: 141 MG/DL — HIGH (ref 70–99)
GLUCOSE BLDC GLUCOMTR-MCNC: 169 MG/DL — HIGH (ref 70–99)
GLUCOSE BLDC GLUCOMTR-MCNC: 179 MG/DL — HIGH (ref 70–99)
GLUCOSE BLDC GLUCOMTR-MCNC: 331 MG/DL — HIGH (ref 70–99)
GLUCOSE SERPL-MCNC: 166 MG/DL — HIGH (ref 70–99)
HCT VFR BLD CALC: 37.8 % — SIGNIFICANT CHANGE UP (ref 34.5–45)
HGB BLD-MCNC: 12.5 G/DL — SIGNIFICANT CHANGE UP (ref 11.5–15.5)
MAGNESIUM SERPL-MCNC: 1.9 MG/DL — SIGNIFICANT CHANGE UP (ref 1.6–2.6)
MCHC RBC-ENTMCNC: 30.2 PG — SIGNIFICANT CHANGE UP (ref 27–34)
MCHC RBC-ENTMCNC: 33.1 GM/DL — SIGNIFICANT CHANGE UP (ref 32–36)
MCV RBC AUTO: 91.3 FL — SIGNIFICANT CHANGE UP (ref 80–100)
METHOD TYPE: SIGNIFICANT CHANGE UP
NRBC # BLD: 0 /100 WBCS — SIGNIFICANT CHANGE UP (ref 0–0)
ORGANISM # SPEC MICROSCOPIC CNT: SIGNIFICANT CHANGE UP
PHOSPHATE SERPL-MCNC: 2.9 MG/DL — SIGNIFICANT CHANGE UP (ref 2.5–4.5)
PLATELET # BLD AUTO: 228 K/UL — SIGNIFICANT CHANGE UP (ref 150–400)
POTASSIUM SERPL-MCNC: 4.6 MMOL/L — SIGNIFICANT CHANGE UP (ref 3.5–5.3)
POTASSIUM SERPL-SCNC: 4.6 MMOL/L — SIGNIFICANT CHANGE UP (ref 3.5–5.3)
RBC # BLD: 4.14 M/UL — SIGNIFICANT CHANGE UP (ref 3.8–5.2)
RBC # FLD: 13.1 % — SIGNIFICANT CHANGE UP (ref 10.3–14.5)
SODIUM SERPL-SCNC: 134 MMOL/L — LOW (ref 135–145)
SPECIMEN SOURCE: SIGNIFICANT CHANGE UP
WBC # BLD: 6.81 K/UL — SIGNIFICANT CHANGE UP (ref 3.8–10.5)
WBC # FLD AUTO: 6.81 K/UL — SIGNIFICANT CHANGE UP (ref 3.8–10.5)

## 2021-09-16 RX ORDER — ONDANSETRON 8 MG/1
4 TABLET, FILM COATED ORAL ONCE
Refills: 0 | Status: COMPLETED | OUTPATIENT
Start: 2021-09-16 | End: 2021-09-16

## 2021-09-16 RX ORDER — REPAGLINIDE 1 MG/1
1 TABLET ORAL
Refills: 0 | Status: DISCONTINUED | OUTPATIENT
Start: 2021-09-16 | End: 2021-09-20

## 2021-09-16 RX ORDER — ALPRAZOLAM 0.25 MG
0.25 TABLET ORAL EVERY 8 HOURS
Refills: 0 | Status: DISCONTINUED | OUTPATIENT
Start: 2021-09-16 | End: 2021-09-19

## 2021-09-16 RX ORDER — MEMANTINE HYDROCHLORIDE 10 MG/1
10 TABLET ORAL DAILY
Refills: 0 | Status: DISCONTINUED | OUTPATIENT
Start: 2021-09-16 | End: 2021-09-20

## 2021-09-16 RX ORDER — INSULIN LISPRO 100/ML
VIAL (ML) SUBCUTANEOUS
Refills: 0 | Status: DISCONTINUED | OUTPATIENT
Start: 2021-09-16 | End: 2021-09-20

## 2021-09-16 RX ORDER — ALPRAZOLAM 0.25 MG
0.25 TABLET ORAL
Refills: 0 | Status: DISCONTINUED | OUTPATIENT
Start: 2021-09-16 | End: 2021-09-19

## 2021-09-16 RX ADMIN — Medication 0.25 MILLIGRAM(S): at 12:05

## 2021-09-16 RX ADMIN — Medication 3 UNIT(S): at 07:56

## 2021-09-16 RX ADMIN — Medication 20 MILLIGRAM(S): at 12:00

## 2021-09-16 RX ADMIN — Medication 1 MILLIGRAM(S): at 12:01

## 2021-09-16 RX ADMIN — QUETIAPINE FUMARATE 100 MILLIGRAM(S): 200 TABLET, FILM COATED ORAL at 21:43

## 2021-09-16 RX ADMIN — PANTOPRAZOLE SODIUM 40 MILLIGRAM(S): 20 TABLET, DELAYED RELEASE ORAL at 06:05

## 2021-09-16 RX ADMIN — ONDANSETRON 4 MILLIGRAM(S): 8 TABLET, FILM COATED ORAL at 18:15

## 2021-09-16 RX ADMIN — Medication 0.25 MILLIGRAM(S): at 17:24

## 2021-09-16 RX ADMIN — REPAGLINIDE 1 MILLIGRAM(S): 1 TABLET ORAL at 12:01

## 2021-09-16 RX ADMIN — Medication 1 APPLICATION(S): at 06:05

## 2021-09-16 RX ADMIN — ENOXAPARIN SODIUM 40 MILLIGRAM(S): 100 INJECTION SUBCUTANEOUS at 12:02

## 2021-09-16 RX ADMIN — REPAGLINIDE 1 MILLIGRAM(S): 1 TABLET ORAL at 17:23

## 2021-09-16 RX ADMIN — MEMANTINE HYDROCHLORIDE 10 MILLIGRAM(S): 10 TABLET ORAL at 13:57

## 2021-09-16 RX ADMIN — CEFTRIAXONE 100 MILLIGRAM(S): 500 INJECTION, POWDER, FOR SOLUTION INTRAMUSCULAR; INTRAVENOUS at 08:08

## 2021-09-16 RX ADMIN — Medication 3: at 12:01

## 2021-09-16 RX ADMIN — Medication 81 MILLIGRAM(S): at 12:00

## 2021-09-16 RX ADMIN — SERTRALINE 50 MILLIGRAM(S): 25 TABLET, FILM COATED ORAL at 21:43

## 2021-09-16 RX ADMIN — RISPERIDONE 1 MILLIGRAM(S): 4 TABLET ORAL at 17:23

## 2021-09-16 RX ADMIN — INSULIN GLARGINE 10 UNIT(S): 100 INJECTION, SOLUTION SUBCUTANEOUS at 21:44

## 2021-09-16 RX ADMIN — Medication 1 TABLET(S): at 12:00

## 2021-09-16 RX ADMIN — RISPERIDONE 1 MILLIGRAM(S): 4 TABLET ORAL at 06:05

## 2021-09-16 RX ADMIN — Medication 325 MILLIGRAM(S): at 12:01

## 2021-09-16 RX ADMIN — Medication 500 MILLIGRAM(S): at 12:01

## 2021-09-16 NOTE — PROGRESS NOTE ADULT - SUBJECTIVE AND OBJECTIVE BOX
Patient is a 89y old  Female who presents with a chief complaint of Hypoglycemia (15 Sep 2021 10:23)    pt seen in icu [  ], reg med floor [   ], bed [  ], chair at bedside [   ], a+o x3 [  ], lethargic [  ],  nad [  ]    izquierdo [  ], ngt [  ], peg [  ], et tube [  ], cent line [  ], picc line [  ]        Allergies    penicillins (Unknown)        Vitals    T(F): 98.4 (09-16-21 @ 04:35), Max: 98.4 (09-16-21 @ 04:35)  HR: 75 (09-16-21 @ 04:35) (62 - 76)  BP: 106/52 (09-16-21 @ 04:35) (106/52 - 127/59)  RR: 20 (09-16-21 @ 04:35) (17 - 20)  SpO2: 98% (09-16-21 @ 04:35) (96% - 100%)  Wt(kg): --  CAPILLARY BLOOD GLUCOSE      POCT Blood Glucose.: 141 mg/dL (16 Sep 2021 07:30)      Labs                          12.2   6.46  )-----------( 222      ( 15 Sep 2021 07:45 )             36.7       09-15    132<L>  |  101  |  27<H>  ----------------------------<  206<H>  4.6   |  21<L>  |  1.43<H>    Ca    9.2      15 Sep 2021 07:45  Phos  4.1     09-15  Mg     2.0     09-15              Clean Catch Clean Catch (Midstream)  09-13 @ 02:09   >100,000 CFU/ml Morganella morganii  50,000 - 99,000 CFU/mL Escherichia coli  --  Morganella morganii  Escherichia coli          Radiology Results      Meds    MEDICATIONS  (STANDING):  ascorbic acid 500 milliGRAM(s) Oral daily  aspirin enteric coated 81 milliGRAM(s) Oral daily  cefTRIAXone   IVPB      cefTRIAXone   IVPB 1000 milliGRAM(s) IV Intermittent every 24 hours  dextrose 5%. 1000 milliLiter(s) (100 mL/Hr) IV Continuous <Continuous>  enoxaparin Injectable 40 milliGRAM(s) SubCutaneous daily  ferrous    sulfate 325 milliGRAM(s) Oral daily  FLUoxetine 20 milliGRAM(s) Oral daily  folic acid 1 milliGRAM(s) Oral daily  glucagon  Injectable 1 milliGRAM(s) IntraMuscular once  insulin glargine Injectable (LANTUS) 10 Unit(s) SubCutaneous at bedtime  insulin lispro (ADMELOG) corrective regimen sliding scale   SubCutaneous three times a day before meals  insulin lispro Injectable (ADMELOG) 3 Unit(s) SubCutaneous three times a day before meals  multivitamin 1 Tablet(s) Oral daily  pantoprazole    Tablet 40 milliGRAM(s) Oral before breakfast  QUEtiapine 100 milliGRAM(s) Oral at bedtime  risperiDONE   Tablet 1 milliGRAM(s) Oral two times a day  sertraline 50 milliGRAM(s) Oral at bedtime  sodium chloride 0.9%. 1000 milliLiter(s) (70 mL/Hr) IV Continuous <Continuous>      MEDICATIONS  (PRN):      Physical Exam    Neuro :  no focal deficits  Respiratory: CTA B/L  CV: RRR, S1S2, no murmurs,   Abdominal: Soft, NT, ND +BS,  Extremities: No edema, + peripheral pulses    ASSESSMENT    Hypoglycemia    HTN (hypertension)    DM (diabetes mellitus)    Alzheimer disease    MESERET (iron deficiency anemia)    No significant past surgical history        PLAN     Patient is a 89y old  Female who presents with a chief complaint of Hypoglycemia (15 Sep 2021 10:23)    pt seen in tele [ x ], reg med floor [   ], bed [ x ], chair at bedside [   ], awake and responsive [x  ], lethargic [  ],  nad [ x ]    Allergies    penicillins (Unknown)        Vitals    T(F): 98.4 (09-16-21 @ 04:35), Max: 98.4 (09-16-21 @ 04:35)  HR: 75 (09-16-21 @ 04:35) (62 - 76)  BP: 106/52 (09-16-21 @ 04:35) (106/52 - 127/59)  RR: 20 (09-16-21 @ 04:35) (17 - 20)  SpO2: 98% (09-16-21 @ 04:35) (96% - 100%)  Wt(kg): --  CAPILLARY BLOOD GLUCOSE      POCT Blood Glucose.: 141 mg/dL (16 Sep 2021 07:30)      Labs                          12.2   6.46  )-----------( 222      ( 15 Sep 2021 07:45 )             36.7       09-15    132<L>  |  101  |  27<H>  ----------------------------<  206<H>  4.6   |  21<L>  |  1.43<H>    Ca    9.2      15 Sep 2021 07:45  Phos  4.1     09-15  Mg     2.0     09-15              Clean Catch Clean Catch (Midstream)  09-13 @ 02:09   >100,000 CFU/ml Morganella morganii  50,000 - 99,000 CFU/mL Escherichia coli  --  Morganella morganii  Escherichia coli  - Nitrofurantoin: S <=32 Should not be used to treat pyelonephritis   - Piperacillin/Tazobactam: S <=8   - Piperacillin/Tazobactam: S <=8   - Tigecycline: R <=2   - Tigecycline: S <=2   - Tobramycin: R >8   - Tobramycin: S <=2   - Trimethoprim/Sulfamethoxazole: R >2/38   - Trimethoprim/Sulfamethoxazole: R >2/38   - Ampicillin/Sulbactam: I 16/8 Enterobacter, Citrobacter, and Serratia may develop resistance during prolonged therapy (3-4 days)   - Ampicillin/Sulbactam: S 8/4 Enterobacter, Citrobacter, and Serratia may develop resistance during prolonged therapy (3-4 days)   - Aztreonam: S <=4   - Aztreonam: S <=4   - Cefazolin: R >16   - Cefazolin: S <=2 ([id number]) For uncomplicated UTI with K. pneumoniae, E. coli, or P. mirablis: PRISCA <=16 is sensitive and PRISCA >=32 is resistant. This also predicts results for oral agents cefaclor, cefdinir, cefpodoxime, cefprozil, cefuroxime axetil, cephalexin and locarbef for uncomplicated UTI. Note that some isolates may be susceptible to these agents while testing resistant to cefazolin.   - Cefepime: S <=2   - Cefepime: S <=2   - Cefoxitin: S <=8   - Cefoxitin: S <=8   - Ceftriaxone: S <=1 Enterobacter, Citrobacter, and Serratia may develop resistance during prolonged therapy   - Ceftriaxone: S <=1 Enterobacter, Citrobacter, and Serratia may develop resistance during prolonged therapy   - Ciprofloxacin: R >2   - Ciprofloxacin: R >2   - Ertapenem: S <=0.5   - Ertapenem: S <=0.5   - Gentamicin: S <=2   - Gentamicin: S <=2   - Imipenem: S <=1   - Imipenem: I 2   - Levofloxacin: R 4   - Amikacin: S <=16   - Amikacin: S <=16   - Amoxicillin/Clavulanic Acid: R >16/8   - Amoxicillin/Clavulanic Acid: I 16/8   - Ampicillin: R >16 These ampicillin results predict results for amoxicillin   - Ampicillin: R >16 These ampicillin results predict results for amoxicillin   - Levofloxacin: R >4   - Meropenem: S <=1   - Meropenem: S <=1   - Nitrofurantoin: R >64 Should not be used to treat pyelonephritis     < from: Transthoracic Echocardiogram (09.15.21 @ 08:53) >  CONCLUSIONS:  TECHNICALLY VERY DIFFICULT STUDY, POOR ACOUSTIC WINDOWS    1. Mitral annular calcification. Mild mitral regurgitation.    2. Normal left ventricular internal dimensions and wall  thicknesses.  3. Endocardium not well visualized; grossly normal left  ventricular systolic function, based on limited views.  Segmental wall motion could not be assessed.  4. Grade I diastolic dysfunction (Impaired relaxation,  mild).  5. Right ventricle not well visualized.  Ejection Fraction Visual Estimate: >55 %  < end of copied text >      < from: Nuclear Stress Test-Pharmacologic (09.15.21 @ 04:55) >  IMPRESSIONS:Normal Study  * Negative ECG evidence of ischemia after IV of Lexiscan.  * Review of raw data shows: The study is of good technical  quality.  * The left ventricle was normal in size. Normal myocardial  perfusion scan, with no evidence of infarction or  inducible ischemia.  * Post-stress resting myocardial perfusion gated SPECT  imaging was performed (LVEF > 70%)    < end of copied text >        Radiology Results      Meds    MEDICATIONS  (STANDING):  ascorbic acid 500 milliGRAM(s) Oral daily  aspirin enteric coated 81 milliGRAM(s) Oral daily  cefTRIAXone   IVPB      cefTRIAXone   IVPB 1000 milliGRAM(s) IV Intermittent every 24 hours  dextrose 5%. 1000 milliLiter(s) (100 mL/Hr) IV Continuous <Continuous>  enoxaparin Injectable 40 milliGRAM(s) SubCutaneous daily  ferrous    sulfate 325 milliGRAM(s) Oral daily  FLUoxetine 20 milliGRAM(s) Oral daily  folic acid 1 milliGRAM(s) Oral daily  glucagon  Injectable 1 milliGRAM(s) IntraMuscular once  insulin glargine Injectable (LANTUS) 10 Unit(s) SubCutaneous at bedtime  insulin lispro (ADMELOG) corrective regimen sliding scale   SubCutaneous three times a day before meals  insulin lispro Injectable (ADMELOG) 3 Unit(s) SubCutaneous three times a day before meals  multivitamin 1 Tablet(s) Oral daily  pantoprazole    Tablet 40 milliGRAM(s) Oral before breakfast  QUEtiapine 100 milliGRAM(s) Oral at bedtime  risperiDONE   Tablet 1 milliGRAM(s) Oral two times a day  sertraline 50 milliGRAM(s) Oral at bedtime  sodium chloride 0.9%. 1000 milliLiter(s) (70 mL/Hr) IV Continuous <Continuous>      MEDICATIONS  (PRN):      Physical Exam    Neuro :  no focal deficits  Respiratory: CTA B/L  CV: RRR, S1S2, no murmurs,   Abdominal: Soft, NT, ND +BS,  Extremities: No edema, + peripheral pulses    ASSESSMENT      syncope likely 2nd to hypoglycemia,   cns patho r/o ,   chest pain,   r/o acs, ,   h/o DM (on insulin basaglar and metformin),   Alzheimer's dementia,   Iron Deficiency Anemia  HTN (hypertension)      PLAN    cont tele,   lispro ss,   endo f/u   started lantus 12 units   admelog 3 ac tid  a1c level- 7.1  fsg ac and hs  tsh elevated  free t4 wnl  neuro cons noted   cont aspirin, statin,   ce x1 neg noted above   cardio f/u    cont losartan   bp improved   echo with Ejection Fraction Visual Estimate: >55 % , Grade I diastolic dysfunction noted above  stress test neg for ischemia noted above  no av yanni blocking agents 2nd to jerald cardia  r/o sss  pt with wide complex tachycardia with 2 to 1 heart block on tele monitor   poss shuttle to [facility] for ppm placement in am  ucx and sens with  Escherichia coli  --  Morganella morganii    cont rocephin 1g daily x 3 days  cont current meds

## 2021-09-16 NOTE — PROGRESS NOTE ADULT - SUBJECTIVE AND OBJECTIVE BOX
Interval Events:  pt in nad  eating well    Allergies    penicillins (Unknown)    Intolerances      Endocrine/Metabolic Medications:  glucagon  Injectable 1 milliGRAM(s) IntraMuscular once  insulin glargine Injectable (LANTUS) 10 Unit(s) SubCutaneous at bedtime  insulin lispro (ADMELOG) corrective regimen sliding scale   SubCutaneous three times a day before meals  repaglinide 1 milliGRAM(s) Oral three times a day before meals      Vital Signs Last 24 Hrs  T(C): 36.6 (16 Sep 2021 07:55), Max: 36.9 (16 Sep 2021 04:35)  T(F): 97.8 (16 Sep 2021 07:55), Max: 98.4 (16 Sep 2021 04:35)  HR: 66 (16 Sep 2021 07:55) (66 - 76)  BP: 144/54 (16 Sep 2021 07:55) (106/52 - 144/54)  BP(mean): --  RR: 18 (16 Sep 2021 07:55) (17 - 20)  SpO2: 100% (16 Sep 2021 07:55) (96% - 100%)      PHYSICAL EXAM  All physical exam findings normal, except those marked:  General:	Alert, active, cooperative, NAD, well hydrated  .		[] Abnormal:  Neck		Normal: supple, no cervical adenopathy, no palpable thyroid  .		[] Abnormal:  Cardiovascular	Normal: regular rate, normal S1, S2, no murmurs  .		[] Abnormal:  Respiratory	Normal: no chest wall deformity, normal respiratory pattern, CTA B/L  .		[] Abnormal:  Abdominal	Normal: soft, ND, NT, bowel sounds present, no masses, no organomegaly  .		[] Abnormal:  		Normal normal genitalia, testes descended, circumcised/uncircumcised  .		Jude stage:			Breast jude:  .		Menstrual history:  .		[] Abnormal:  Extremities	Normal: FROM x4  .		[] Abnormal:  Skin		Normal: intact and not indurated, no rash, no acanthosis nigricans  .		[] Abnormal:  Neurologic	Normal: grossly intact  .		[] Abnormal:    LABS                        12.5   6.81  )-----------( 228      ( 16 Sep 2021 07:37 )             37.8                               134    |  104    |  20                  Calcium: 8.6   / iCa: x      (09-16 @ 07:37)    ----------------------------<  166       Magnesium: 1.9                              4.6     |  26     |  0.91             Phosphorous: 2.9        CAPILLARY BLOOD GLUCOSE      POCT Blood Glucose.: 141 mg/dL (16 Sep 2021 07:30)  POCT Blood Glucose.: 179 mg/dL (15 Sep 2021 21:10)  POCT Blood Glucose.: 314 mg/dL (15 Sep 2021 16:49)  POCT Blood Glucose.: 199 mg/dL (15 Sep 2021 12:24)        Assesment/plan    Pt is a 89yo F from home minimal ambulation with walker, AAOx 1-2 with PMH of DM (on insulin basaglar and metformin), Alzheimer's dementia, MESERET presents to the ER after a syncopal episode.)  Found to have hypoglycemia. Pt denies any complaints . Admits to eating well. Does not recall meds at home.          Problem/Recommendation - 1:  ·  Problem: Hypoglycemia.   resolved  now hyperglycemic  cont lantus 12 units   hold admelog 3 ac tid  trial of low dose prandin 1mg ac tid today  a1c level- 7.1  fsg ac and hs  d/w prim team.     Problem/Recommendation - 2:  ·  Problem: Syncope.   ·  Recommendation: likely due to hypoglycemia  f/u cardio recs for ? cardiac etiology.

## 2021-09-16 NOTE — PROGRESS NOTE ADULT - SUBJECTIVE AND OBJECTIVE BOX
PGY-1 Progress Note discussed with attending    PAGER #: [phone]] TILL 5:00 PM  PLEASE CONTACT ON CALL TEAM:  - On Call Team (Please refer to Morena) FROM 5:00 PM - 8:30PM  - Nightfloat Team FROM 8:30 -7:30 AM    INTERVAL HPI  - Patient is a 89yo F from home minimal ambulation with walker, AAOx 1-2 with PMH of DM (on insulin basaglar and metformin), Alzheimer's dementia, MESERET presents to the ER after a syncopal episode. found BS 41 with EMS report. Per EMS, pt was treated for UTI with Bactrim for a week. Admitted for hypoglycemia and rule out ACS. on Tele monitor. Endo/Cardiology/Neurology consulted. Carotid US resulted no significant findings. UA negative. Echo ordered.     OVERNIGHT EVENTS:   -patient evaluated at bedside, no new complaints. Patient now hyperglycemic, Will c/w lantus 12 units and hold admelog 3 ac tid. Started on trial of low dose prandin 1mg ac tid today     REVIEW OF SYSTEMS:  CONSTITUTIONAL: No fever, weight loss, or fatigue  RESPIRATORY: No cough, wheezing, chills or hemoptysis; No shortness of breath  CARDIOVASCULAR: No chest pain, palpitations, dizziness, or leg swelling  GASTROINTESTINAL: No abdominal pain. No nausea, vomiting, or hematemesis; No diarrhea or constipation. No melena or hematochezia.  GENITOURINARY: No dysuria or hematuria, urinary frequency  NEUROLOGICAL: No headaches, memory loss, loss of strength, numbness, or tremors  SKIN: No itching, burning, rashes, or lesions     MEDICATIONS  (STANDING):  ascorbic acid 500 milliGRAM(s) Oral daily  aspirin enteric coated 81 milliGRAM(s) Oral daily  cefTRIAXone   IVPB      cefTRIAXone   IVPB 1000 milliGRAM(s) IV Intermittent every 24 hours  dextrose 5%. 1000 milliLiter(s) (100 mL/Hr) IV Continuous <Continuous>  enoxaparin Injectable 40 milliGRAM(s) SubCutaneous daily  ferrous    sulfate 325 milliGRAM(s) Oral daily  FLUoxetine 20 milliGRAM(s) Oral daily  folic acid 1 milliGRAM(s) Oral daily  glucagon  Injectable 1 milliGRAM(s) IntraMuscular once  insulin glargine Injectable (LANTUS) 10 Unit(s) SubCutaneous at bedtime  insulin lispro (ADMELOG) corrective regimen sliding scale   SubCutaneous three times a day before meals  multivitamin 1 Tablet(s) Oral daily  pantoprazole    Tablet 40 milliGRAM(s) Oral before breakfast  QUEtiapine 100 milliGRAM(s) Oral at bedtime  repaglinide 1 milliGRAM(s) Oral three times a day before meals  risperiDONE   Tablet 1 milliGRAM(s) Oral two times a day  sertraline 50 milliGRAM(s) Oral at bedtime  sodium chloride 0.9%. 1000 milliLiter(s) (70 mL/Hr) IV Continuous <Continuous>    MEDICATIONS  (PRN):      Vital Signs Last 24 Hrs  T(C): 36.6 (16 Sep 2021 07:55), Max: 36.9 (16 Sep 2021 04:35)  T(F): 97.8 (16 Sep 2021 07:55), Max: 98.4 (16 Sep 2021 04:35)  HR: 66 (16 Sep 2021 07:55) (66 - 76)  BP: 144/54 (16 Sep 2021 07:55) (106/52 - 144/54)  BP(mean): --  RR: 18 (16 Sep 2021 07:55) (17 - 20)  SpO2: 100% (16 Sep 2021 07:55) (96% - 100%)    PHYSICAL EXAMINATION:  GENERAL: NAD, mental status unchanged.   HEAD: AT/NC  EYES: conjunctiva and sclera clear  NECK: supple, No JVD noted, Normal thyroid  CHEST/LUNG: CTABL; no rales, rhonchi, wheezing, or rubs  HEART: regular rate and rhythm; no murmurs, rubs, or gallops  ABDOMEN: soft, nontender, nondistended; Bowel sounds present  EXTREMITIES:  2+ Peripheral Pulses, No clubbing, cyanosis, or edema  SKIN: warm dry                          12.5   6.81  )-----------( 228      ( 16 Sep 2021 07:37 )             37.8     09-16    134<L>  |  104  |  20<H>  ----------------------------<  166<H>  4.6   |  26  |  0.91    Ca    8.6      16 Sep 2021 07:37  Phos  2.9     09-16  Mg     1.9     09-16              COVID-19 PCR: NotDetec (12 Sep 2021 13:37)      CAPILLARY BLOOD GLUCOSE      POCT Blood Glucose.: 141 mg/dL (16 Sep 2021 07:30)  POCT Blood Glucose.: 179 mg/dL (15 Sep 2021 21:10)  POCT Blood Glucose.: 314 mg/dL (15 Sep 2021 16:49)  POCT Blood Glucose.: 199 mg/dL (15 Sep 2021 12:24)      RADIOLOGY & ADDITIONAL TESTS:

## 2021-09-17 ENCOUNTER — OUTPATIENT (OUTPATIENT)
Dept: INPATIENT UNIT | Facility: HOSPITAL | Age: 86
LOS: 1 days | Discharge: ANOTHER HOSPITAL | End: 2021-09-17

## 2021-09-17 DIAGNOSIS — I45.5 OTHER SPECIFIED HEART BLOCK: ICD-10-CM

## 2021-09-17 DIAGNOSIS — I45.9 CONDUCTION DISORDER, UNSPECIFIED: ICD-10-CM

## 2021-09-17 PROBLEM — G30.9 ALZHEIMER'S DISEASE, UNSPECIFIED: Chronic | Status: ACTIVE | Noted: 2021-09-12

## 2021-09-17 PROBLEM — D50.9 IRON DEFICIENCY ANEMIA, UNSPECIFIED: Chronic | Status: ACTIVE | Noted: 2021-09-12

## 2021-09-17 LAB
ALBUMIN SERPL ELPH-MCNC: 2.9 G/DL — LOW (ref 3.5–5)
ALP SERPL-CCNC: 72 U/L — SIGNIFICANT CHANGE UP (ref 40–120)
ALT FLD-CCNC: 43 U/L DA — SIGNIFICANT CHANGE UP (ref 10–60)
ANION GAP SERPL CALC-SCNC: 11 MMOL/L — SIGNIFICANT CHANGE UP (ref 5–17)
AST SERPL-CCNC: 32 U/L — SIGNIFICANT CHANGE UP (ref 10–40)
BILIRUB SERPL-MCNC: 0.4 MG/DL — SIGNIFICANT CHANGE UP (ref 0.2–1.2)
BLD GP AB SCN SERPL QL: POSITIVE — SIGNIFICANT CHANGE UP
BUN SERPL-MCNC: 19 MG/DL — HIGH (ref 7–18)
CALCIUM SERPL-MCNC: 9 MG/DL — SIGNIFICANT CHANGE UP (ref 8.4–10.5)
CHLORIDE SERPL-SCNC: 101 MMOL/L — SIGNIFICANT CHANGE UP (ref 96–108)
CO2 SERPL-SCNC: 22 MMOL/L — SIGNIFICANT CHANGE UP (ref 22–31)
CREAT SERPL-MCNC: 0.91 MG/DL — SIGNIFICANT CHANGE UP (ref 0.5–1.3)
GLUCOSE BLDC GLUCOMTR-MCNC: 137 MG/DL — HIGH (ref 70–99)
GLUCOSE BLDC GLUCOMTR-MCNC: 170 MG/DL — HIGH (ref 70–99)
GLUCOSE BLDC GLUCOMTR-MCNC: 187 MG/DL — HIGH (ref 70–99)
GLUCOSE SERPL-MCNC: 183 MG/DL — HIGH (ref 70–99)
HCT VFR BLD CALC: 36.1 % — SIGNIFICANT CHANGE UP (ref 34.5–45)
HGB BLD-MCNC: 12.1 G/DL — SIGNIFICANT CHANGE UP (ref 11.5–15.5)
MAGNESIUM SERPL-MCNC: 1.8 MG/DL — SIGNIFICANT CHANGE UP (ref 1.6–2.6)
MCHC RBC-ENTMCNC: 30.6 PG — SIGNIFICANT CHANGE UP (ref 27–34)
MCHC RBC-ENTMCNC: 33.5 GM/DL — SIGNIFICANT CHANGE UP (ref 32–36)
MCV RBC AUTO: 91.4 FL — SIGNIFICANT CHANGE UP (ref 80–100)
NRBC # BLD: 0 /100 WBCS — SIGNIFICANT CHANGE UP (ref 0–0)
PHOSPHATE SERPL-MCNC: 3.5 MG/DL — SIGNIFICANT CHANGE UP (ref 2.5–4.5)
PLATELET # BLD AUTO: 213 K/UL — SIGNIFICANT CHANGE UP (ref 150–400)
POTASSIUM SERPL-MCNC: 4.4 MMOL/L — SIGNIFICANT CHANGE UP (ref 3.5–5.3)
POTASSIUM SERPL-SCNC: 4.4 MMOL/L — SIGNIFICANT CHANGE UP (ref 3.5–5.3)
PROT SERPL-MCNC: 6.5 G/DL — SIGNIFICANT CHANGE UP (ref 6–8.3)
RBC # BLD: 3.95 M/UL — SIGNIFICANT CHANGE UP (ref 3.8–5.2)
RBC # FLD: 13.2 % — SIGNIFICANT CHANGE UP (ref 10.3–14.5)
RH IG SCN BLD-IMP: POSITIVE — SIGNIFICANT CHANGE UP
RH IG SCN BLD-IMP: POSITIVE — SIGNIFICANT CHANGE UP
SODIUM SERPL-SCNC: 134 MMOL/L — LOW (ref 135–145)
WBC # BLD: 9.16 K/UL — SIGNIFICANT CHANGE UP (ref 3.8–10.5)
WBC # FLD AUTO: 9.16 K/UL — SIGNIFICANT CHANGE UP (ref 3.8–10.5)

## 2021-09-17 RX ADMIN — PANTOPRAZOLE SODIUM 40 MILLIGRAM(S): 20 TABLET, DELAYED RELEASE ORAL at 06:40

## 2021-09-17 RX ADMIN — INSULIN GLARGINE 10 UNIT(S): 100 INJECTION, SOLUTION SUBCUTANEOUS at 22:00

## 2021-09-17 RX ADMIN — SERTRALINE 50 MILLIGRAM(S): 25 TABLET, FILM COATED ORAL at 21:58

## 2021-09-17 RX ADMIN — RISPERIDONE 1 MILLIGRAM(S): 4 TABLET ORAL at 20:43

## 2021-09-17 RX ADMIN — CEFTRIAXONE 100 MILLIGRAM(S): 500 INJECTION, POWDER, FOR SOLUTION INTRAMUSCULAR; INTRAVENOUS at 11:36

## 2021-09-17 RX ADMIN — Medication 0.25 MILLIGRAM(S): at 06:36

## 2021-09-17 RX ADMIN — Medication 0.25 MILLIGRAM(S): at 20:43

## 2021-09-17 RX ADMIN — QUETIAPINE FUMARATE 100 MILLIGRAM(S): 200 TABLET, FILM COATED ORAL at 21:58

## 2021-09-17 RX ADMIN — RISPERIDONE 1 MILLIGRAM(S): 4 TABLET ORAL at 06:36

## 2021-09-17 RX ADMIN — REPAGLINIDE 1 MILLIGRAM(S): 1 TABLET ORAL at 06:36

## 2021-09-17 NOTE — PROGRESS NOTE ADULT - PROBLEM SELECTOR PLAN 5
on SSI  Endo Dr. Huff consulted: start on Januvia 100 on dc  -now hyperglycemic, cont lantus 12 units  and hold admelog 3 ac tid  -trial of low dose prandin 1mg ac tid today

## 2021-09-17 NOTE — PROGRESS NOTE ADULT - PROBLEM SELECTOR PLAN 3
on tele   missed bets with pr lengthening  2nd degree AV block  -shuttle to [facility] for PPM (Micra) placement today

## 2021-09-17 NOTE — PROGRESS NOTE ADULT - SUBJECTIVE AND OBJECTIVE BOX
Patient is a 89y old  Female who presents with a chief complaint of Hypoglycemia (16 Sep 2021 10:55)    pt seen in icu [  ], reg med floor [   ], bed [  ], chair at bedside [   ], a+o x3 [  ], lethargic [  ],  nad [  ]    izquierdo [  ], ngt [  ], peg [  ], et tube [  ], cent line [  ], picc line [  ]        Allergies    penicillins (Unknown)        Vitals    T(F): 98.4 (09-17-21 @ 07:08), Max: 98.4 (09-17-21 @ 07:08)  HR: 68 (09-17-21 @ 07:08) (68 - 96)  BP: 11/53 (09-17-21 @ 07:08) (11/53 - 161/69)  RR: 18 (09-17-21 @ 07:08) (18 - 19)  SpO2: 97% (09-17-21 @ 07:08) (96% - 100%)  Wt(kg): --  CAPILLARY BLOOD GLUCOSE      POCT Blood Glucose.: 179 mg/dL (16 Sep 2021 21:42)      Labs                          12.1   9.16  )-----------( 213      ( 17 Sep 2021 07:02 )             36.1       09-17    134<L>  |  101  |  19<H>  ----------------------------<  183<H>  4.4   |  22  |  0.91    Ca    9.0      17 Sep 2021 07:02  Phos  3.5     09-17  Mg     1.8     09-17    TPro  6.5  /  Alb  2.9<L>  /  TBili  0.4  /  DBili  x   /  AST  32  /  ALT  43  /  AlkPhos  72  09-17            Clean Catch Clean Catch (Midstream)  09-13 @ 02:09   >100,000 CFU/ml Morganella morganii  50,000 - 99,000 CFU/mL Escherichia coli  --  Morganella morganii  Escherichia coli          Radiology Results      Meds    MEDICATIONS  (STANDING):  ALPRAZolam 0.25 milliGRAM(s) Oral two times a day  ascorbic acid 500 milliGRAM(s) Oral daily  aspirin enteric coated 81 milliGRAM(s) Oral daily  cefTRIAXone   IVPB      cefTRIAXone   IVPB 1000 milliGRAM(s) IV Intermittent every 24 hours  dextrose 5%. 1000 milliLiter(s) (100 mL/Hr) IV Continuous <Continuous>  enoxaparin Injectable 40 milliGRAM(s) SubCutaneous daily  ferrous    sulfate 325 milliGRAM(s) Oral daily  FLUoxetine 20 milliGRAM(s) Oral daily  folic acid 1 milliGRAM(s) Oral daily  glucagon  Injectable 1 milliGRAM(s) IntraMuscular once  insulin glargine Injectable (LANTUS) 10 Unit(s) SubCutaneous at bedtime  insulin lispro (ADMELOG) corrective regimen sliding scale   SubCutaneous three times a day before meals  memantine 10 milliGRAM(s) Oral daily  multivitamin 1 Tablet(s) Oral daily  pantoprazole    Tablet 40 milliGRAM(s) Oral before breakfast  QUEtiapine 100 milliGRAM(s) Oral at bedtime  repaglinide 1 milliGRAM(s) Oral three times a day before meals  risperiDONE   Tablet 1 milliGRAM(s) Oral two times a day  sertraline 50 milliGRAM(s) Oral at bedtime  sodium chloride 0.9%. 1000 milliLiter(s) (70 mL/Hr) IV Continuous <Continuous>      MEDICATIONS  (PRN):  ALPRAZolam 0.25 milliGRAM(s) Oral every 8 hours PRN anxiety      Physical Exam    Neuro :  no focal deficits  Respiratory: CTA B/L  CV: RRR, S1S2, no murmurs,   Abdominal: Soft, NT, ND +BS,  Extremities: No edema, + peripheral pulses    ASSESSMENT    Hypoglycemia    HTN (hypertension)    DM (diabetes mellitus)    Alzheimer disease    MESERET (iron deficiency anemia)    No significant past surgical history        PLAN     Patient is a 89y old  Female who presents with a chief complaint of Hypoglycemia (16 Sep 2021 10:55)    pt seen in tele [ x ], reg med floor [   ], bed [ x ], chair at bedside [   ], awake and responsive [x  ], lethargic [  ],  nad [ x ]      Allergies    penicillins (Unknown)        Vitals    T(F): 98.4 (09-17-21 @ 07:08), Max: 98.4 (09-17-21 @ 07:08)  HR: 68 (09-17-21 @ 07:08) (68 - 96)  BP: 11/53 (09-17-21 @ 07:08) (11/53 - 161/69)  RR: 18 (09-17-21 @ 07:08) (18 - 19)  SpO2: 97% (09-17-21 @ 07:08) (96% - 100%)  Wt(kg): --  CAPILLARY BLOOD GLUCOSE      POCT Blood Glucose.: 179 mg/dL (16 Sep 2021 21:42)      Labs                          12.1   9.16  )-----------( 213      ( 17 Sep 2021 07:02 )             36.1       09-17    134<L>  |  101  |  19<H>  ----------------------------<  183<H>  4.4   |  22  |  0.91    Ca    9.0      17 Sep 2021 07:02  Phos  3.5     09-17  Mg     1.8     09-17    TPro  6.5  /  Alb  2.9<L>  /  TBili  0.4  /  DBili  x   /  AST  32  /  ALT  43  /  AlkPhos  72  09-17            Clean Catch Clean Catch (Midstream)  09-13 @ 02:09   >100,000 CFU/ml Morganella morganii  50,000 - 99,000 CFU/mL Escherichia coli  --  Morganella morganii  Escherichia coli          Radiology Results      Meds    MEDICATIONS  (STANDING):  ALPRAZolam 0.25 milliGRAM(s) Oral two times a day  ascorbic acid 500 milliGRAM(s) Oral daily  aspirin enteric coated 81 milliGRAM(s) Oral daily  cefTRIAXone   IVPB      cefTRIAXone   IVPB 1000 milliGRAM(s) IV Intermittent every 24 hours  dextrose 5%. 1000 milliLiter(s) (100 mL/Hr) IV Continuous <Continuous>  enoxaparin Injectable 40 milliGRAM(s) SubCutaneous daily  ferrous    sulfate 325 milliGRAM(s) Oral daily  FLUoxetine 20 milliGRAM(s) Oral daily  folic acid 1 milliGRAM(s) Oral daily  glucagon  Injectable 1 milliGRAM(s) IntraMuscular once  insulin glargine Injectable (LANTUS) 10 Unit(s) SubCutaneous at bedtime  insulin lispro (ADMELOG) corrective regimen sliding scale   SubCutaneous three times a day before meals  memantine 10 milliGRAM(s) Oral daily  multivitamin 1 Tablet(s) Oral daily  pantoprazole    Tablet 40 milliGRAM(s) Oral before breakfast  QUEtiapine 100 milliGRAM(s) Oral at bedtime  repaglinide 1 milliGRAM(s) Oral three times a day before meals  risperiDONE   Tablet 1 milliGRAM(s) Oral two times a day  sertraline 50 milliGRAM(s) Oral at bedtime  sodium chloride 0.9%. 1000 milliLiter(s) (70 mL/Hr) IV Continuous <Continuous>      MEDICATIONS  (PRN):  ALPRAZolam 0.25 milliGRAM(s) Oral every 8 hours PRN anxiety      Physical Exam    Neuro :  no focal deficits  Respiratory: CTA B/L  CV: RRR, S1S2, no murmurs,   Abdominal: Soft, NT, ND +BS,  Extremities: No edema, + peripheral pulses    ASSESSMENT      syncope likely 2nd to hypoglycemia,   cns patho r/o ,   chest pain,    acs r/o,   sick sinus synd,   h/o DM (on insulin basaglar and metformin),   Alzheimer's dementia,   Iron Deficiency Anemia  HTN (hypertension)      PLAN    cont tele,   lispro ss,   endo f/u   started lantus 12 units   admelog 3 ac tid  a1c level- 7.1  fsg ac and hs  tsh elevated  free t4 wnl  neuro cons noted   cont aspirin, statin,   ce x1 neg noted above   cardio f/u    cont losartan   bp improved   echo with Ejection Fraction Visual Estimate: >55 % , Grade I diastolic dysfunction noted above  stress test neg for ischemia noted above  no av yanni blocking agents 2nd to jerald cardia  r/o sss  pt with wide complex tachycardia with 2 to 1 heart block on tele monitor   pt for shuttle to [facility] for ppm placement today  ucx and sens with  Escherichia coli  --  Morganella morganii    cont rocephin 1g daily x 3 days  cont current meds

## 2021-09-17 NOTE — CHART NOTE - NSCHARTNOTEFT_GEN_A_CORE
Type of Procedure: Micra PPM implant  Licensed independent practitioner: Elma Murillo MD  Assistant: None  Description of procedure: Written informed consent was obtained from the patient after a full explanation of the risks and benefits of  the procedure. The risks of bleeding / infection / cardiac perforation / pneumothorax / vascular injury / stroke  and death were among those discussed. Alternatives were reviewed, including the option of no therapy. The  patient was brought to the lab in the fasting state. Continuous electrocardiographic and hemodynamic  monitoring was initiated. The right groin and implantation site were meticulously prepared with surgical  scrub and allowed to dry with no pooling. Sterile draping was applied to cover the operation site. The image  intensifier was draped with a sterile bag and positioned over the patient's chest. Local anesthesia was  infiltrated subcutaneously at the access site. General anesthesia was used during the case.  Cefazolin 3 gm IV was administered prophylactically. All sheaths were flushed with saline.  Local anesthetic was delivered to the right groin, and the right femoral vein was cannulated using vascular  US to verify wire position in the femoral vein. An Amplatz Extra stiff guide wire was advanced to the SVC  under fluoroscopy guidance. Next a figure of eight venous hemostasis loose suture was placed at the right  femoral venous access site. The femoral venous access site was gradually dilated using dilator sheaths upto  27 Fr successively. Finally, over the Amplatz wire the MICRA 27Fr introducer sheath was advance to the  IVC-RA junction under fluoroscopic guidance.  Once the sheath was in place, the dilator was removed and the sheath was flushed again. Next the MICRA  PPM and its delivery system was advanced through the sheath and into the RV apical area under fluoroscopy  guidance. Contrast was injected through the sheath tp verify position in the RV apex. The MICRA PPM was   then deployed in the RV apical area. On GOOD and [place] fluoroscopic examination- it appeared that two of the  four splines was firmly attached to the RV trabecula. Ventricular sensing and pacing thresholds were checked  and were good. The device retrieval cord was cut and pulled out. The MICRA PPM Delivery system was  retrieved out of the MICRA introducer sheath, leaving the MICRA PPM in place at the RV apical septum.  After removal of the MICRA delivery sheath, the figure of eight venous hemostasis suture in place was  tightened and manual pressure was applied until hemostasis achieved.  During the procedure, a MDT rep was present to manage a complex .    Findings of procedure: As above  Estimated blood loss: N/A  Specimen removed: N/A  Preoperative Dx: Vasovagal cardioinhibitory syncope  Postoperative Dx: Vasovagal cardioinhibitory syncope  Complications: None  Anesthesia type: Conscious.

## 2021-09-17 NOTE — PROGRESS NOTE ADULT - SUBJECTIVE AND OBJECTIVE BOX
CHIEF COMPLAINT:Patient is a 89y old  Female who presents with a chief complaint of Hypoglycemia .Pt appears comfortable.    	  REVIEW OF SYSTEMS:  CONSTITUTIONAL: No fever, weight loss, or fatigue  EYES: No eye pain, visual disturbances, or discharge  ENT:  No difficulty hearing, tinnitus, vertigo; No sinus or throat pain  NECK: No pain or stiffness  RESPIRATORY: No cough, wheezing, chills or hemoptysis; No Shortness of Breath  CARDIOVASCULAR: No chest pain, palpitations, passing out, dizziness, or leg swelling  GASTROINTESTINAL: No abdominal or epigastric pain. No nausea, vomiting, or hematemesis; No diarrhea or constipation. No melena or hematochezia.  GENITOURINARY: No dysuria, frequency, hematuria, or incontinence  NEUROLOGICAL: No headaches, memory loss, loss of strength, numbness, or tremors  SKIN: No itching, burning, rashes, or lesions   LYMPH Nodes: No enlarged glands  ENDOCRINE: No heat or cold intolerance; No hair loss  MUSCULOSKELETAL: No joint pain or swelling; No muscle, back, or extremity pain  PSYCHIATRIC: No depression, anxiety, mood swings, or difficulty sleeping  HEME/LYMPH: No easy bruising, or bleeding gums  ALLERGY AND IMMUNOLOGIC: No hives or eczema	        PHYSICAL EXAM:  T(C): 36.9 (09-17-21 @ 07:08), Max: 36.9 (09-17-21 @ 07:08)  HR: 68 (09-17-21 @ 07:08) (68 - 96)  BP: 111/53 (09-17-21 @ 07:08) (11/53 - 161/69)  RR: 18 (09-17-21 @ 07:08) (18 - 19)  SpO2: 97% (09-17-21 @ 07:08) (96% - 100%)  Wt(kg): --  I&O's Summary    16 Sep 2021 07:01  -  17 Sep 2021 07:00  --------------------------------------------------------  IN: 50 mL / OUT: 0 mL / NET: 50 mL        Appearance: Normal	  HEENT:   Normal oral mucosa, PERRL, EOMI	  Lymphatic: No lymphadenopathy  Cardiovascular: Normal S1 S2, No JVD, No murmurs, No edema  Respiratory: Lungs clear to auscultation	  Psychiatry: A & O x 3, Mood & affect appropriate  Gastrointestinal:  Soft, Non-tender, + BS	  Skin: No rashes, No ecchymoses, No cyanosis	  Neurologic: Non-focal  Extremities: Normal range of motion, No clubbing, cyanosis or edema  Vascular: Peripheral pulses palpable 2+ bilaterally    MEDICATIONS  (STANDING):  ALPRAZolam 0.25 milliGRAM(s) Oral two times a day  ascorbic acid 500 milliGRAM(s) Oral daily  aspirin enteric coated 81 milliGRAM(s) Oral daily  cefTRIAXone   IVPB      cefTRIAXone   IVPB 1000 milliGRAM(s) IV Intermittent every 24 hours  dextrose 5%. 1000 milliLiter(s) (100 mL/Hr) IV Continuous <Continuous>  ferrous    sulfate 325 milliGRAM(s) Oral daily  FLUoxetine 20 milliGRAM(s) Oral daily  folic acid 1 milliGRAM(s) Oral daily  glucagon  Injectable 1 milliGRAM(s) IntraMuscular once  insulin glargine Injectable (LANTUS) 10 Unit(s) SubCutaneous at bedtime  insulin lispro (ADMELOG) corrective regimen sliding scale   SubCutaneous three times a day before meals  memantine 10 milliGRAM(s) Oral daily  multivitamin 1 Tablet(s) Oral daily  pantoprazole    Tablet 40 milliGRAM(s) Oral before breakfast  QUEtiapine 100 milliGRAM(s) Oral at bedtime  repaglinide 1 milliGRAM(s) Oral three times a day before meals  risperiDONE   Tablet 1 milliGRAM(s) Oral two times a day  sertraline 50 milliGRAM(s) Oral at bedtime  sodium chloride 0.9%. 1000 milliLiter(s) (70 mL/Hr) IV Continuous <Continuous>      TELEMETRY: 's, 2 to 1 av block	    	  	  LABS:	 	                        12.1   9.16  )-----------( 213      ( 17 Sep 2021 07:02 )             36.1     09-17    134<L>  |  101  |  19<H>  ----------------------------<  183<H>  4.4   |  22  |  0.91    Ca    9.0      17 Sep 2021 07:02  Phos  3.5     09-17  Mg     1.8     09-17    TPro  6.5  /  Alb  2.9<L>  /  TBili  0.4  /  DBili  x   /  AST  32  /  ALT  43  /  AlkPhos  72  09-17      Lipid Profile: Cholesterol 173  HDL 53        TSH: Thyroid Stimulating Hormone, Serum: 5.60 uU/mL (09-14 @ 06:57)  Thyroid Stimulating Hormone, Serum: 4.95 uU/mL (09-13 @ 05:53)      	      urieCulture - Urine (09.13.21 @ 02:09)   - Amikacin: S <=16   - Amikacin: S <=16   - Amoxicillin/Clavulanic Acid: R >16/8   - Amoxicillin/Clavulanic Acid: I 16/8   - Ampicillin: R >16 These ampicillin results predict results for amoxicillin   - Ampicillin: R >16 These ampicillin results predict results for amoxicillin   - Ampicillin/Sulbactam: I 16/8 Enterobacter, Citrobacter, and Serratia may develop resistance during prolonged therapy (3-4 days)   - Ampicillin/Sulbactam: S 8/4 Enterobacter, Citrobacter, and Serratia may develop resistance during prolonged therapy (3-4 days)   - Aztreonam: S <=4   - Aztreonam: S <=4   - Cefazolin: R >16   - Cefazolin: S <=2 (MIC_CL_[id number]) For uncomplicated UTI with K. pneumoniae, E. coli, or P. mirablis: PRISCA <=16 is sensitive and PRISCA >=32 is resistant. This also predicts results for oral agents cefaclor, cefdinir, cefpodoxime, cefprozil, cefuroxime axetil, cephalexin and locarbef for uncomplicated UTI. Note that some isolates may be susceptible to these agents while testing resistant to cefazolin.   - Cefepime: S <=2   - Cefepime: S <=2   - Cefoxitin: S <=8   - Cefoxitin: S <=8   - Ceftriaxone: S <=1 Enterobacter, Citrobacter, and Serratia may develop resistance during prolonged therapy   - Ceftriaxone: S <=1 Enterobacter, Citrobacter, and Serratia may develop resistance during prolonged therapy   - Ciprofloxacin: R >2   - Ciprofloxacin: R >2   - Ertapenem: S <=0.5   - Ertapenem: S <=0.5   - Gentamicin: S <=2   - Gentamicin: S <=2   - Imipenem: S <=1   - Imipenem: I 2   - Levofloxacin: R 4   - Levofloxacin: R >4   - Meropenem: S <=1   - Meropenem: S <=1   - Nitrofurantoin: R >64 Should not be used to treat pyelonephritis   - Nitrofurantoin: S <=32 Should not be used to treat pyelonephritis   - Piperacillin/Tazobactam: S <=8   - Piperacillin/Tazobactam: S <=8   - Tigecycline: R <=2   - Tigecycline: S <=2   - Tobramycin: R >8   - Tobramycin: S <=2   - Trimethoprim/Sulfamethoxazole: R >2/38   - Trimethoprim/Sulfamethoxazole: R >2/38   Specimen Source: Clean Catch Clean Catch (Midstream)   Culture Results:   >100,000 CFU/ml Morganella morganii     ess< from: Nuclear Stress Test-Pharmacologic (09.15.21 @ 04:55) >  IMPRESSIONS:Normal Study  * Negative ECG evidence of ischemia after IV of Lexiscan.  * Review of raw data shows: The study is of good technical  quality.  * The left ventricle was normal in size. Normal myocardial  perfusion scan, with no evidence of infarction or  inducible ischemia.  * Post-stress resting myocardial perfusion gated SPECT  imaging was performed (LVEF > 70%)    < end of copied text >  < from: Transthoracic Echocardiogram (09.15.21 @ 08:53) >  OBSERVATIONS:  Mitral Valve: Mitral annular calcification. Mild mitral  regurgitation.  Aortic Root: Normal aortic root.  Aortic Valve: Aortic valve not well visualized.  Left Ventricle: Endocardium not well visualized; grossly  normal left ventricular systolic function, based on limited  views.  Segmental wall motion could not be assessed. Normal  left ventricular internal dimensions and wall thicknesses.  Grade I diastolic dysfunction (Impaired relaxation, mild).  Right Heart: Right ventricle not well visualized. There is  mild tricuspid regurgitation. Pulmonic valve not well seen.  Pericardium/PleuraNormal pericardium with no pericardial  effusion.  Hemodynamic: RA Pressure is 10 mm Hg. RV systolic pressure  is 35 mm Hg.    < end of copied text >

## 2021-09-17 NOTE — CHART NOTE - NSCHARTNOTEFT_GEN_A_CORE
s/p Micra PPM implantation today.  Post implant teaching with instructions provided to patient's family daughter and her son.  They stated understanding.  Follow up on  10/4 at 3pm.  R groin site suture to be removed tomorrow at [facility].  Stable for transfer back to [facility]. s/p Micra PPM (Medtronic) implantation w/o complications today. Post implant teaching with instructions provided to patient's family daughter and her son.  They stated understanding.  Bedrest 6 hours bedrest post implant.  Follow up on  10/4 at 3pm.  R site wound site dry intact.  Need to monitor R groin site for bleeding or hematoma closely tonight.  R groin site suture to be removed tomorrow at [facility].  Stable for transfer back to [facility].

## 2021-09-17 NOTE — PROGRESS NOTE ADULT - PROBLEM SELECTOR PLAN 2
Pt p/w after a syncopal episode with aura of diaphoresis and had LOC for 10 mins  EKG showed NSR  on telemetry  carotid dopplers neg  Cardiology Dr. Xie consulted:  Echo: . Mitral annular calcification. Mild mitral regurgitation. grade 1 DD, EF >55%  NST: neg   -c/w tele monitoring.  -c/w asa,statin  Neuro Dr. Mohr consulted

## 2021-09-17 NOTE — PROGRESS NOTE ADULT - SUBJECTIVE AND OBJECTIVE BOX
PGY-1 Progress Note discussed with attending    PAGER #: [phone]] TILL 5:00 PM  PLEASE CONTACT ON CALL TEAM:  - On Call Team (Please refer to Morena) FROM 5:00 PM - 8:30PM  - Nightfloat Team FROM 8:30 -7:30 AM    CHIEF COMPLAINT & BRIEF HOSPITAL COURSE:  Patient is a 89yo F from home minimal ambulation with walker, AAOx 1-2 with PMH of DM (on insulin basaglar and metformin), Alzheimer's dementia, MESERET presents to the ER after a syncopal episode. found BS 41 with EMS report. Per EMS, pt was treated for UTI with Bactrim for a week. Admitted for hypoglycemia and rule out ACS. on Tele monitor. Endo/Cardiology consulted. Carotid US resulted no significant findings. UA negative. Echo ordered. Neurology dr. Jacobs consulted.     INTERVAL HPI/OVERNIGHT EVENTS:   Patient seen and examined at bedside. No overnight events. No new complaints this AM. Intern spoke to daughter at bedside. Patient to transfer to [facility] for PPM placement.     REVIEW OF SYSTEMS:  CONSTITUTIONAL: No fever, chills, weight loss, or fatigue  RESPIRATORY: No cough, wheezing, or hemoptysis; No shortness of breath  CARDIOVASCULAR: No chest pain, palpitations, dizziness, or leg swelling  GASTROINTESTINAL: No abdominal pain. No nausea, vomiting, or hematemesis; No diarrhea or constipation. No melena or hematochezia.  GENITOURINARY: No dysuria or hematuria, urinary frequency  NEUROLOGICAL: No headaches, memory loss, loss of strength, numbness, or tremors  SKIN: No itching, burning, rashes, or lesions     Vital Signs Last 24 Hrs  T(C): 36.7 (17 Sep 2021 10:48), Max: 36.9 (17 Sep 2021 07:08)  T(F): 98 (17 Sep 2021 10:48), Max: 98.4 (17 Sep 2021 07:08)  HR: 78 (17 Sep 2021 10:48) (68 - 96)  BP: 123/67 (17 Sep 2021 10:48) (111/53 - 161/69)  BP(mean): --  RR: 18 (17 Sep 2021 10:48) (18 - 18)  SpO2: 98% (17 Sep 2021 10:48) (96% - 100%)    PHYSICAL EXAMINATION:  GENERAL: NAD, lying in bed  HEAD:  Atraumatic, Normocephalic  EYES:  conjunctiva and sclera clear  NECK: Supple, No JVD, thyroid not examined   CHEST/LUNG: Clear to auscultation. No wheezing, crackles, rales or rubs  HEART: Regular rate and rhythm; Clear S1 S2, No murmurs, rubs, or gallops  ABDOMEN: Soft, Nontender, Nondistended; Bowel sounds present  NERVOUS SYSTEM:  Alert & Oriented X3, CNII-XII intact, no focal neurological deficits  EXTREMITIES:  2+ Peripheral Pulses, No clubbing, cyanosis, or edema  SKIN: warm dry                        12.1   9.16  )-----------( 213      ( 17 Sep 2021 07:02 )             36.1     09-17    134<L>  |  101  |  19<H>  ----------------------------<  183<H>  4.4   |  22  |  0.91    Ca    9.0      17 Sep 2021 07:02  Phos  3.5     09-17  Mg     1.8     09-17    TPro  6.5  /  Alb  2.9<L>  /  TBili  0.4  /  DBili  x   /  AST  32  /  ALT  43  /  AlkPhos  72  09-17    LIVER FUNCTIONS - ( 17 Sep 2021 07:02 )  Alb: 2.9 g/dL / Pro: 6.5 g/dL / ALK PHOS: 72 U/L / ALT: 43 U/L DA / AST: 32 U/L / GGT: x                   RADIOLOGY & ADDITIONAL TESTS:

## 2021-09-18 LAB
ALBUMIN SERPL ELPH-MCNC: 3 G/DL — LOW (ref 3.5–5)
ALP SERPL-CCNC: 73 U/L — SIGNIFICANT CHANGE UP (ref 40–120)
ALT FLD-CCNC: 38 U/L DA — SIGNIFICANT CHANGE UP (ref 10–60)
ANION GAP SERPL CALC-SCNC: 7 MMOL/L — SIGNIFICANT CHANGE UP (ref 5–17)
AST SERPL-CCNC: 26 U/L — SIGNIFICANT CHANGE UP (ref 10–40)
BILIRUB SERPL-MCNC: 0.3 MG/DL — SIGNIFICANT CHANGE UP (ref 0.2–1.2)
BLD GP AB SCN SERPL QL: SIGNIFICANT CHANGE UP
BUN SERPL-MCNC: 16 MG/DL — SIGNIFICANT CHANGE UP (ref 7–18)
CALCIUM SERPL-MCNC: 9.1 MG/DL — SIGNIFICANT CHANGE UP (ref 8.4–10.5)
CHLORIDE SERPL-SCNC: 99 MMOL/L — SIGNIFICANT CHANGE UP (ref 96–108)
CO2 SERPL-SCNC: 26 MMOL/L — SIGNIFICANT CHANGE UP (ref 22–31)
CREAT SERPL-MCNC: 0.77 MG/DL — SIGNIFICANT CHANGE UP (ref 0.5–1.3)
GLUCOSE BLDC GLUCOMTR-MCNC: 162 MG/DL — HIGH (ref 70–99)
GLUCOSE BLDC GLUCOMTR-MCNC: 198 MG/DL — HIGH (ref 70–99)
GLUCOSE BLDC GLUCOMTR-MCNC: 232 MG/DL — HIGH (ref 70–99)
GLUCOSE BLDC GLUCOMTR-MCNC: 283 MG/DL — HIGH (ref 70–99)
GLUCOSE SERPL-MCNC: 159 MG/DL — HIGH (ref 70–99)
HCT VFR BLD CALC: 35.2 % — SIGNIFICANT CHANGE UP (ref 34.5–45)
HGB BLD-MCNC: 11.7 G/DL — SIGNIFICANT CHANGE UP (ref 11.5–15.5)
MAGNESIUM SERPL-MCNC: 1.9 MG/DL — SIGNIFICANT CHANGE UP (ref 1.6–2.6)
MCHC RBC-ENTMCNC: 30.9 PG — SIGNIFICANT CHANGE UP (ref 27–34)
MCHC RBC-ENTMCNC: 33.2 GM/DL — SIGNIFICANT CHANGE UP (ref 32–36)
MCV RBC AUTO: 92.9 FL — SIGNIFICANT CHANGE UP (ref 80–100)
NRBC # BLD: 0 /100 WBCS — SIGNIFICANT CHANGE UP (ref 0–0)
PHOSPHATE SERPL-MCNC: 3.4 MG/DL — SIGNIFICANT CHANGE UP (ref 2.5–4.5)
PLATELET # BLD AUTO: 198 K/UL — SIGNIFICANT CHANGE UP (ref 150–400)
POTASSIUM SERPL-MCNC: 4.5 MMOL/L — SIGNIFICANT CHANGE UP (ref 3.5–5.3)
POTASSIUM SERPL-SCNC: 4.5 MMOL/L — SIGNIFICANT CHANGE UP (ref 3.5–5.3)
PROT SERPL-MCNC: 6.7 G/DL — SIGNIFICANT CHANGE UP (ref 6–8.3)
RBC # BLD: 3.79 M/UL — LOW (ref 3.8–5.2)
RBC # FLD: 13.2 % — SIGNIFICANT CHANGE UP (ref 10.3–14.5)
SODIUM SERPL-SCNC: 132 MMOL/L — LOW (ref 135–145)
WBC # BLD: 8.85 K/UL — SIGNIFICANT CHANGE UP (ref 3.8–10.5)
WBC # FLD AUTO: 8.85 K/UL — SIGNIFICANT CHANGE UP (ref 3.8–10.5)

## 2021-09-18 RX ORDER — METOPROLOL TARTRATE 50 MG
12.5 TABLET ORAL
Refills: 0 | Status: DISCONTINUED | OUTPATIENT
Start: 2021-09-18 | End: 2021-09-20

## 2021-09-18 RX ORDER — LANOLIN ALCOHOL/MO/W.PET/CERES
3 CREAM (GRAM) TOPICAL AT BEDTIME
Refills: 0 | Status: DISCONTINUED | OUTPATIENT
Start: 2021-09-18 | End: 2021-09-20

## 2021-09-18 RX ADMIN — Medication 1 TABLET(S): at 11:41

## 2021-09-18 RX ADMIN — Medication 0.25 MILLIGRAM(S): at 06:10

## 2021-09-18 RX ADMIN — Medication 0.25 MILLIGRAM(S): at 02:01

## 2021-09-18 RX ADMIN — RISPERIDONE 1 MILLIGRAM(S): 4 TABLET ORAL at 06:12

## 2021-09-18 RX ADMIN — REPAGLINIDE 1 MILLIGRAM(S): 1 TABLET ORAL at 08:23

## 2021-09-18 RX ADMIN — Medication 1 MILLIGRAM(S): at 11:41

## 2021-09-18 RX ADMIN — INSULIN GLARGINE 10 UNIT(S): 100 INJECTION, SOLUTION SUBCUTANEOUS at 21:56

## 2021-09-18 RX ADMIN — CEFTRIAXONE 100 MILLIGRAM(S): 500 INJECTION, POWDER, FOR SOLUTION INTRAMUSCULAR; INTRAVENOUS at 08:24

## 2021-09-18 RX ADMIN — Medication 81 MILLIGRAM(S): at 11:41

## 2021-09-18 RX ADMIN — Medication 0.25 MILLIGRAM(S): at 17:28

## 2021-09-18 RX ADMIN — REPAGLINIDE 1 MILLIGRAM(S): 1 TABLET ORAL at 11:41

## 2021-09-18 RX ADMIN — PANTOPRAZOLE SODIUM 40 MILLIGRAM(S): 20 TABLET, DELAYED RELEASE ORAL at 06:12

## 2021-09-18 RX ADMIN — RISPERIDONE 1 MILLIGRAM(S): 4 TABLET ORAL at 17:28

## 2021-09-18 RX ADMIN — Medication 2: at 12:08

## 2021-09-18 RX ADMIN — Medication 325 MILLIGRAM(S): at 11:41

## 2021-09-18 RX ADMIN — Medication 20 MILLIGRAM(S): at 11:41

## 2021-09-18 RX ADMIN — Medication 12.5 MILLIGRAM(S): at 17:28

## 2021-09-18 RX ADMIN — REPAGLINIDE 1 MILLIGRAM(S): 1 TABLET ORAL at 17:28

## 2021-09-18 RX ADMIN — SERTRALINE 50 MILLIGRAM(S): 25 TABLET, FILM COATED ORAL at 21:56

## 2021-09-18 RX ADMIN — Medication 500 MILLIGRAM(S): at 11:41

## 2021-09-18 RX ADMIN — QUETIAPINE FUMARATE 100 MILLIGRAM(S): 200 TABLET, FILM COATED ORAL at 21:56

## 2021-09-18 RX ADMIN — MEMANTINE HYDROCHLORIDE 10 MILLIGRAM(S): 10 TABLET ORAL at 11:41

## 2021-09-18 NOTE — PROGRESS NOTE ADULT - SUBJECTIVE AND OBJECTIVE BOX
CHIEF COMPLAINT:Patient is a 89y old  Female who presents with a chief complaint of Hypoglycemia.Pt appears comfortable.    	  REVIEW OF SYSTEMS:  CONSTITUTIONAL: No fever, weight loss, or fatigue  EYES: No eye pain, visual disturbances, or discharge  ENT:  No difficulty hearing, tinnitus, vertigo; No sinus or throat pain  NECK: No pain or stiffness  RESPIRATORY: No cough, wheezing, chills or hemoptysis; No Shortness of Breath  CARDIOVASCULAR: No chest pain, palpitations, passing out, dizziness, or leg swelling  GASTROINTESTINAL: No abdominal or epigastric pain. No nausea, vomiting, or hematemesis; No diarrhea or constipation. No melena or hematochezia.  GENITOURINARY: No dysuria, frequency, hematuria, or incontinence  NEUROLOGICAL: No headaches, memory loss, loss of strength, numbness, or tremors  SKIN: No itching, burning, rashes, or lesions   LYMPH Nodes: No enlarged glands  ENDOCRINE: No heat or cold intolerance; No hair loss  MUSCULOSKELETAL: No joint pain or swelling; No muscle, back, or extremity pain  PSYCHIATRIC: No depression, anxiety, mood swings, or difficulty sleeping  HEME/LYMPH: No easy bruising, or bleeding gums  ALLERGY AND IMMUNOLOGIC: No hives or eczema	        PHYSICAL EXAM:  T(C): 36.6 (09-18-21 @ 08:22), Max: 36.7 (09-17-21 @ 23:30)  HR: 70 (09-18-21 @ 08:22) (69 - 70)  BP: 123/44 (09-18-21 @ 08:22) (123/44 - 154/67)  RR: 18 (09-18-21 @ 08:22) (17 - 18)  SpO2: 100% (09-18-21 @ 08:22) (99% - 100%)  Wt(kg): --  I&O's Summary      Appearance: Normal	  HEENT:   Normal oral mucosa, PERRL, EOMI	  Lymphatic: No lymphadenopathy  Cardiovascular: Normal S1 S2, No JVD, No murmurs, No edema  Respiratory: Lungs clear to auscultation	  Psychiatry: A & O x 3, Mood & affect appropriate  Gastrointestinal:  Soft, Non-tender, + BS	  Skin: No rashes, No ecchymoses, No cyanosis	  Neurologic: Non-focal  Extremities: Normal range of motion, No clubbing, cyanosis or edema  Vascular: Peripheral pulses palpable 2+ bilaterally    MEDICATIONS  (STANDING):  ALPRAZolam 0.25 milliGRAM(s) Oral two times a day  ascorbic acid 500 milliGRAM(s) Oral daily  aspirin enteric coated 81 milliGRAM(s) Oral daily  dextrose 5%. 1000 milliLiter(s) (100 mL/Hr) IV Continuous <Continuous>  ferrous    sulfate 325 milliGRAM(s) Oral daily  FLUoxetine 20 milliGRAM(s) Oral daily  folic acid 1 milliGRAM(s) Oral daily  glucagon  Injectable 1 milliGRAM(s) IntraMuscular once  insulin glargine Injectable (LANTUS) 10 Unit(s) SubCutaneous at bedtime  insulin lispro (ADMELOG) corrective regimen sliding scale   SubCutaneous three times a day before meals  memantine 10 milliGRAM(s) Oral daily  metoprolol tartrate 12.5 milliGRAM(s) Oral two times a day  multivitamin 1 Tablet(s) Oral daily  pantoprazole    Tablet 40 milliGRAM(s) Oral before breakfast  QUEtiapine 100 milliGRAM(s) Oral at bedtime  repaglinide 1 milliGRAM(s) Oral three times a day before meals  risperiDONE   Tablet 1 milliGRAM(s) Oral two times a day  sertraline 50 milliGRAM(s) Oral at bedtime  sodium chloride 0.9%. 1000 milliLiter(s) (70 mL/Hr) IV Continuous <Continuous>      TELEMETRY: nsr,intermittent v paced	    	  	  LABS:	 	                    11.7   8.85  )-----------( 198      ( 18 Sep 2021 06:28 )             35.2     09-18    132<L>  |  99  |  16  ----------------------------<  159<H>  4.5   |  26  |  0.77    Ca    9.1      18 Sep 2021 06:28  Phos  3.4     09-18  Mg     1.9     09-18    TPro  6.7  /  Alb  3.0<L>  /  TBili  0.3  /  DBili  x   /  AST  26  /  ALT  38  /  AlkPhos  73  09-18    Lipid Profile: Cholesterol 173  LDL --  HDL 53        TSH: Thyroid Stimulating Hormone, Serum: 5.60 uU/mL (09-14 @ 06:57)  Thyroid Stimulating Hormone, Serum: 4.95 uU/mL (09-13 @ 05:53)

## 2021-09-18 NOTE — DIETITIAN INITIAL EVALUATION ADULT. - PROBLEM SELECTOR PLAN 1
Pt p/w after a syncopal episode with aura of diaphoresis and had LOC for 10 mins  At home pts BG 41  Pt has h/o T2DM and at home on basaglar insulin 34U in the morning and metformin 500mg od  Vitals stable  PE unremarkable  Was given 2 amps of D 50, BG improved to 129  started on D5 @120cc/hr, switched to D5 NS at 70cc/hr  monitor BGs Q4h  Endo Dr. Huff consulted

## 2021-09-18 NOTE — DIETITIAN INITIAL EVALUATION ADULT. - PROBLEM SELECTOR PLAN 2
Pt p/w after a syncopal episode with aura of diaphoresis and had LOC for 10 mins  EKG showed NSR  on telemetry  Echocardiogram and carotid dopplers ordered  Cardiology Dr. Xie consulted  Neuro Dr. Mohr consulted

## 2021-09-18 NOTE — DIETITIAN INITIAL EVALUATION ADULT. - PERTINENT LABORATORY DATA
09-18 Na132 mmol/L<L> Glu 159 mg/dL<H> K+ 4.5 mmol/L Cr  0.77 mg/dL BUN 16 mg/dL   09-18 Phos 3.4 mg/dL   09-18 Alb 3.0 g/dL<L>       09-13 Chol 173 mg/dL LDL --    HDL 53 mg/dL Trig 182 mg/dL<H>  09-13-21 @ 10:36 HgbA1C 7.1 [4.0 - 5.6]

## 2021-09-18 NOTE — PROGRESS NOTE ADULT - SUBJECTIVE AND OBJECTIVE BOX
Patient is a 89y old  Female who presents with a chief complaint of Hypoglycemia (17 Sep 2021 13:01)    pt seen in tele [ x ], reg med floor [   ], bed [ x ], chair at bedside [   ], awake and responsive [x  ], lethargic [  ],   nad [ x ]      Allergies    penicillins (Unknown)        Vitals    T(F): 98 (09-17-21 @ 23:30), Max: 98.4 (09-17-21 @ 07:08)  HR: 70 (09-17-21 @ 23:30) (68 - 78)  BP: 134/67 (09-17-21 @ 23:30) (111/53 - 154/67)  RR: 18 (09-17-21 @ 23:30) (17 - 18)  SpO2: 99% (09-17-21 @ 23:30) (97% - 100%)  Wt(kg): --  CAPILLARY BLOOD GLUCOSE      POCT Blood Glucose.: 137 mg/dL (17 Sep 2021 21:57)      Labs                          12.1   9.16  )-----------( 213      ( 17 Sep 2021 07:02 )             36.1       09-17    134<L>  |  101  |  19<H>  ----------------------------<  183<H>  4.4   |  22  |  0.91    Ca    9.0      17 Sep 2021 07:02  Phos  3.5     09-17  Mg     1.8     09-17    TPro  6.5  /  Alb  2.9<L>  /  TBili  0.4  /  DBili  x   /  AST  32  /  ALT  43  /  AlkPhos  72  09-17            Clean Catch Clean Catch (Midstream)  09-13 @ 02:09   >100,000 CFU/ml Morganella morganii  50,000 - 99,000 CFU/mL Escherichia coli  --  Morganella morganii  Escherichia coli          Radiology Results      Meds    MEDICATIONS  (STANDING):  ALPRAZolam 0.25 milliGRAM(s) Oral two times a day  ascorbic acid 500 milliGRAM(s) Oral daily  aspirin enteric coated 81 milliGRAM(s) Oral daily  cefTRIAXone   IVPB      cefTRIAXone   IVPB 1000 milliGRAM(s) IV Intermittent every 24 hours  dextrose 5%. 1000 milliLiter(s) (100 mL/Hr) IV Continuous <Continuous>  ferrous    sulfate 325 milliGRAM(s) Oral daily  FLUoxetine 20 milliGRAM(s) Oral daily  folic acid 1 milliGRAM(s) Oral daily  glucagon  Injectable 1 milliGRAM(s) IntraMuscular once  insulin glargine Injectable (LANTUS) 10 Unit(s) SubCutaneous at bedtime  insulin lispro (ADMELOG) corrective regimen sliding scale   SubCutaneous three times a day before meals  memantine 10 milliGRAM(s) Oral daily  multivitamin 1 Tablet(s) Oral daily  pantoprazole    Tablet 40 milliGRAM(s) Oral before breakfast  QUEtiapine 100 milliGRAM(s) Oral at bedtime  repaglinide 1 milliGRAM(s) Oral three times a day before meals  risperiDONE   Tablet 1 milliGRAM(s) Oral two times a day  sertraline 50 milliGRAM(s) Oral at bedtime  sodium chloride 0.9%. 1000 milliLiter(s) (70 mL/Hr) IV Continuous <Continuous>      MEDICATIONS  (PRN):  ALPRAZolam 0.25 milliGRAM(s) Oral every 8 hours PRN anxiety      Physical Exam    Neuro :  no focal deficits  Respiratory: CTA B/L  CV: RRR, S1S2, no murmurs,   Abdominal: Soft, NT, ND +BS,  Extremities: No edema, + peripheral pulses    ASSESSMENT      syncope likely 2nd to hypoglycemia,   cns patho r/o ,   chest pain,    acs r/o,   sick sinus synd,   h/o DM (on insulin basaglar and metformin),   Alzheimer's dementia,   Iron Deficiency Anemia  HTN (hypertension)      PLAN    cont tele,   lispro ss,   endo f/u   started lantus 12 units   admelog 3 ac tid  a1c level- 7.1  fsg ac and hs  tsh elevated  free t4 wnl  neuro cons noted   cont aspirin, statin,   ce x1 neg noted above   cardio f/u    cont losartan   bp improved   echo with Ejection Fraction Visual Estimate: >55 % , Grade I diastolic dysfunction noted above  stress test neg for ischemia noted above  no av yanni blocking agents 2nd to jerald cardia  r/o sss  pt with wide complex tachycardia with 2 to 1 heart block on tele monitor   pt for shuttle to [facility] for ppm placement today  ucx and sens with  Escherichia coli  --  Morganella morganii    cont rocephin 1g daily x 3 days  cont current meds         Patient is a 89y old  Female who presents with a chief complaint of Hypoglycemia (17 Sep 2021 13:01)    pt seen in tele [ x ], reg med floor [   ], bed [ x ], chair at bedside [   ], awake and responsive [x  ], lethargic [  ],   nad [ x ]      Allergies    penicillins (Unknown)        Vitals    T(F): 98 (09-17-21 @ 23:30), Max: 98.4 (09-17-21 @ 07:08)  HR: 70 (09-17-21 @ 23:30) (68 - 78)  BP: 134/67 (09-17-21 @ 23:30) (111/53 - 154/67)  RR: 18 (09-17-21 @ 23:30) (17 - 18)  SpO2: 99% (09-17-21 @ 23:30) (97% - 100%)  Wt(kg): --  CAPILLARY BLOOD GLUCOSE      POCT Blood Glucose.: 137 mg/dL (17 Sep 2021 21:57)      Labs                          12.1   9.16  )-----------( 213      ( 17 Sep 2021 07:02 )             36.1       09-17    134<L>  |  101  |  19<H>  ----------------------------<  183<H>  4.4   |  22  |  0.91    Ca    9.0      17 Sep 2021 07:02  Phos  3.5     09-17  Mg     1.8     09-17    TPro  6.5  /  Alb  2.9<L>  /  TBili  0.4  /  DBili  x   /  AST  32  /  ALT  43  /  AlkPhos  72  09-17            Clean Catch Clean Catch (Midstream)  09-13 @ 02:09   >100,000 CFU/ml Morganella morganii  50,000 - 99,000 CFU/mL Escherichia coli  --  Morganella morganii  Escherichia coli          Radiology Results      Meds    MEDICATIONS  (STANDING):  ALPRAZolam 0.25 milliGRAM(s) Oral two times a day  ascorbic acid 500 milliGRAM(s) Oral daily  aspirin enteric coated 81 milliGRAM(s) Oral daily  cefTRIAXone   IVPB      cefTRIAXone   IVPB 1000 milliGRAM(s) IV Intermittent every 24 hours  dextrose 5%. 1000 milliLiter(s) (100 mL/Hr) IV Continuous <Continuous>  ferrous    sulfate 325 milliGRAM(s) Oral daily  FLUoxetine 20 milliGRAM(s) Oral daily  folic acid 1 milliGRAM(s) Oral daily  glucagon  Injectable 1 milliGRAM(s) IntraMuscular once  insulin glargine Injectable (LANTUS) 10 Unit(s) SubCutaneous at bedtime  insulin lispro (ADMELOG) corrective regimen sliding scale   SubCutaneous three times a day before meals  memantine 10 milliGRAM(s) Oral daily  multivitamin 1 Tablet(s) Oral daily  pantoprazole    Tablet 40 milliGRAM(s) Oral before breakfast  QUEtiapine 100 milliGRAM(s) Oral at bedtime  repaglinide 1 milliGRAM(s) Oral three times a day before meals  risperiDONE   Tablet 1 milliGRAM(s) Oral two times a day  sertraline 50 milliGRAM(s) Oral at bedtime  sodium chloride 0.9%. 1000 milliLiter(s) (70 mL/Hr) IV Continuous <Continuous>      MEDICATIONS  (PRN):  ALPRAZolam 0.25 milliGRAM(s) Oral every 8 hours PRN anxiety      Physical Exam    Neuro :  no focal deficits  Respiratory: CTA B/L  CV: RRR, S1S2, no murmurs,   Abdominal: Soft, NT, ND +BS,  Extremities: No edema, + peripheral pulses    ASSESSMENT      syncope likely 2nd to hypoglycemia,   cns patho r/o ,   chest pain,    acs r/o,   sick sinus synd,   h/o DM (on insulin basaglar and metformin),   Alzheimer's dementia,   Iron Deficiency Anemia  HTN (hypertension)      PLAN    cont tele,   lispro ss,   endo f/u   hold admelog 3 ac tid  trial of low dose prandin 1mg ac tid     a1c level- 7.1  fsg ac and hs  tsh elevated  free t4 wnl  neuro cons noted   cont aspirin, statin,   ce x1 neg noted above   cardio f/u    cont losartan   bp improved   echo with Ejection Fraction Visual Estimate: >55 % , Grade I diastolic dysfunction noted    stress test neg for ischemia noted    pt with wide complex tachycardia with 2 to 1 heart block on tele monitor   s/p shuttle to [facility] for ppm placement 9/17/21  ucx and sens with  Escherichia coli  --  Morganella morganii    completed rocephin 1g daily x 3 days  cont current meds

## 2021-09-18 NOTE — PROGRESS NOTE ADULT - SUBJECTIVE AND OBJECTIVE BOX
Interval Events:  pt in nad    Allergies    penicillins (Unknown)    Intolerances      Endocrine/Metabolic Medications:  glucagon  Injectable 1 milliGRAM(s) IntraMuscular once  insulin glargine Injectable (LANTUS) 10 Unit(s) SubCutaneous at bedtime  insulin lispro (ADMELOG) corrective regimen sliding scale   SubCutaneous three times a day before meals  repaglinide 1 milliGRAM(s) Oral three times a day before meals      Vital Signs Last 24 Hrs  T(C): 36.6 (18 Sep 2021 08:22), Max: 36.7 (17 Sep 2021 10:48)  T(F): 97.9 (18 Sep 2021 08:22), Max: 98 (17 Sep 2021 10:48)  HR: 70 (18 Sep 2021 08:22) (69 - 78)  BP: 123/44 (18 Sep 2021 08:22) (123/44 - 154/67)  BP(mean): --  RR: 18 (18 Sep 2021 08:22) (17 - 18)  SpO2: 100% (18 Sep 2021 08:22) (98% - 100%)      PHYSICAL EXAM  All physical exam findings normal, except those marked:  General:	Alert, active, cooperative, NAD, well hydrated  .		[] Abnormal:  Neck		Normal: supple, no cervical adenopathy, no palpable thyroid  .		[] Abnormal:  Cardiovascular	Normal: regular rate, normal S1, S2, no murmurs  .		[] Abnormal:  Respiratory	Normal: no chest wall deformity, normal respiratory pattern, CTA B/L  .		[] Abnormal:  Abdominal	Normal: soft, ND, NT, bowel sounds present, no masses, no organomegaly  .		[] Abnormal:  		Normal normal genitalia, testes descended, circumcised/uncircumcised  .		Jude stage:			Breast jude:  .		Menstrual history:  .		[] Abnormal:  Extremities	Normal: FROM x4  .		[] Abnormal:  Skin		Normal: intact and not indurated, no rash, no acanthosis nigricans  .		[] Abnormal:  Neurologic	Normal: grossly intact  .		[] Abnormal:    LABS                        11.7   8.85  )-----------( 198      ( 18 Sep 2021 06:28 )             35.2                               132    |  99     |  16                  Calcium: 9.1   / iCa: x      (09-18 @ 06:28)    ----------------------------<  159       Magnesium: 1.9                              4.5     |  26     |  0.77             Phosphorous: 3.4      TPro  6.7    /  Alb  3.0    /  TBili  0.3    /  DBili  x      /  AST  26     /  ALT  38     /  AlkPhos  73     18 Sep 2021 06:28    CAPILLARY BLOOD GLUCOSE      POCT Blood Glucose.: 162 mg/dL (18 Sep 2021 07:32)  POCT Blood Glucose.: 137 mg/dL (17 Sep 2021 21:57)  POCT Blood Glucose.: 170 mg/dL (17 Sep 2021 11:49)        Assesment/plan    Pt is a 89yo F from home minimal ambulation with walker, AAOx 1-2 with PMH of DM (on insulin basaglar and metformin), Alzheimer's dementia, MESERET presents to the ER after a syncopal episode.)  Found to have hypoglycemia. Pt denies any complaints . Admits to eating well. Does not recall meds at home.          Problem/Recommendation - 1:  ·  Problem: Hypoglycemia.   resolved  now hyperglycemic  cont lantus 12 units   off of admelog 3 ac tid  on prandin 1mg ac tid- good control  a1c level- 7.1  fsg ac and hs  d/w prim team.     Problem/Recommendation - 2:  ·  Problem: Syncope.   ·  Recommendation: likely due to hypoglycemia f/u cardio recs  s/p PPM yesterday       no

## 2021-09-18 NOTE — DIETITIAN INITIAL EVALUATION ADULT. - OTHER INFO
Pt visited. Pt asleep. D/W Daughter at bedside. Per Daughter Pt eating well. Po tolerated. NKFA. H/O DM x many years.  Weight stable. Food choices Obtained. F & N dept Notified. Labs Noted . S/P Hypoglycemia, S/P PPM. Will suggest add Diabetic Diet since Hypoglycemia Resolved. Endo Consult Noted. Bed scale 149 lb, HT 5 feet  1 inches

## 2021-09-18 NOTE — DIETITIAN INITIAL EVALUATION ADULT. - PERTINENT MEDS FT
MEDICATIONS:  ALPRAZolam 0.25 two times a day  ALPRAZolam 0.25 every 8 hours PRN  ascorbic acid 500 daily  aspirin enteric coated 81 daily  dextrose 5%. 1000 <Continuous>  ferrous    sulfate 325 daily  FLUoxetine 20 daily  folic acid 1 daily  glucagon  Injectable 1 once  insulin glargine Injectable (LANTUS) 10 at bedtime  insulin lispro (ADMELOG) corrective regimen sliding scale  three times a day before meals  memantine 10 daily  metoprolol tartrate 12.5 two times a day  multivitamin 1 daily  pantoprazole    Tablet 40 before breakfast  QUEtiapine 100 at bedtime  repaglinide 1 three times a day before meals  risperiDONE   Tablet 1 two times a day  sertraline 50 at bedtime  sodium chloride 0.9%. 1000 <Continuous>

## 2021-09-19 LAB
ALBUMIN SERPL ELPH-MCNC: 2.9 G/DL — LOW (ref 3.5–5)
ALP SERPL-CCNC: 70 U/L — SIGNIFICANT CHANGE UP (ref 40–120)
ALT FLD-CCNC: 40 U/L DA — SIGNIFICANT CHANGE UP (ref 10–60)
ANION GAP SERPL CALC-SCNC: 6 MMOL/L — SIGNIFICANT CHANGE UP (ref 5–17)
AST SERPL-CCNC: 43 U/L — HIGH (ref 10–40)
BILIRUB SERPL-MCNC: 0.3 MG/DL — SIGNIFICANT CHANGE UP (ref 0.2–1.2)
BUN SERPL-MCNC: 15 MG/DL — SIGNIFICANT CHANGE UP (ref 7–18)
CALCIUM SERPL-MCNC: 8.8 MG/DL — SIGNIFICANT CHANGE UP (ref 8.4–10.5)
CHLORIDE SERPL-SCNC: 102 MMOL/L — SIGNIFICANT CHANGE UP (ref 96–108)
CO2 SERPL-SCNC: 26 MMOL/L — SIGNIFICANT CHANGE UP (ref 22–31)
CREAT SERPL-MCNC: 0.78 MG/DL — SIGNIFICANT CHANGE UP (ref 0.5–1.3)
GLUCOSE BLDC GLUCOMTR-MCNC: 152 MG/DL — HIGH (ref 70–99)
GLUCOSE BLDC GLUCOMTR-MCNC: 177 MG/DL — HIGH (ref 70–99)
GLUCOSE BLDC GLUCOMTR-MCNC: 254 MG/DL — HIGH (ref 70–99)
GLUCOSE BLDC GLUCOMTR-MCNC: 286 MG/DL — HIGH (ref 70–99)
GLUCOSE SERPL-MCNC: 151 MG/DL — HIGH (ref 70–99)
HCT VFR BLD CALC: 36 % — SIGNIFICANT CHANGE UP (ref 34.5–45)
HGB BLD-MCNC: 11.9 G/DL — SIGNIFICANT CHANGE UP (ref 11.5–15.5)
MAGNESIUM SERPL-MCNC: 1.8 MG/DL — SIGNIFICANT CHANGE UP (ref 1.6–2.6)
MCHC RBC-ENTMCNC: 30 PG — SIGNIFICANT CHANGE UP (ref 27–34)
MCHC RBC-ENTMCNC: 33.1 GM/DL — SIGNIFICANT CHANGE UP (ref 32–36)
MCV RBC AUTO: 90.7 FL — SIGNIFICANT CHANGE UP (ref 80–100)
NRBC # BLD: 0 /100 WBCS — SIGNIFICANT CHANGE UP (ref 0–0)
PHOSPHATE SERPL-MCNC: 2.9 MG/DL — SIGNIFICANT CHANGE UP (ref 2.5–4.5)
PLATELET # BLD AUTO: 209 K/UL — SIGNIFICANT CHANGE UP (ref 150–400)
POTASSIUM SERPL-MCNC: 4.8 MMOL/L — SIGNIFICANT CHANGE UP (ref 3.5–5.3)
POTASSIUM SERPL-SCNC: 4.8 MMOL/L — SIGNIFICANT CHANGE UP (ref 3.5–5.3)
PROT SERPL-MCNC: 6.8 G/DL — SIGNIFICANT CHANGE UP (ref 6–8.3)
RBC # BLD: 3.97 M/UL — SIGNIFICANT CHANGE UP (ref 3.8–5.2)
RBC # FLD: 12.6 % — SIGNIFICANT CHANGE UP (ref 10.3–14.5)
SARS-COV-2 RNA SPEC QL NAA+PROBE: SIGNIFICANT CHANGE UP
SODIUM SERPL-SCNC: 134 MMOL/L — LOW (ref 135–145)
WBC # BLD: 6.26 K/UL — SIGNIFICANT CHANGE UP (ref 3.8–10.5)
WBC # FLD AUTO: 6.26 K/UL — SIGNIFICANT CHANGE UP (ref 3.8–10.5)

## 2021-09-19 RX ORDER — ACETAMINOPHEN 500 MG
650 TABLET ORAL EVERY 6 HOURS
Refills: 0 | Status: DISCONTINUED | OUTPATIENT
Start: 2021-09-19 | End: 2021-09-20

## 2021-09-19 RX ORDER — ALPRAZOLAM 0.25 MG
0.25 TABLET ORAL AT BEDTIME
Refills: 0 | Status: DISCONTINUED | OUTPATIENT
Start: 2021-09-19 | End: 2021-09-20

## 2021-09-19 RX ORDER — HALOPERIDOL DECANOATE 100 MG/ML
1 INJECTION INTRAMUSCULAR ONCE
Refills: 0 | Status: COMPLETED | OUTPATIENT
Start: 2021-09-19 | End: 2021-09-19

## 2021-09-19 RX ADMIN — Medication 1 TABLET(S): at 11:32

## 2021-09-19 RX ADMIN — RISPERIDONE 1 MILLIGRAM(S): 4 TABLET ORAL at 06:23

## 2021-09-19 RX ADMIN — SERTRALINE 50 MILLIGRAM(S): 25 TABLET, FILM COATED ORAL at 21:35

## 2021-09-19 RX ADMIN — RISPERIDONE 1 MILLIGRAM(S): 4 TABLET ORAL at 17:18

## 2021-09-19 RX ADMIN — Medication 12.5 MILLIGRAM(S): at 06:23

## 2021-09-19 RX ADMIN — Medication 0.25 MILLIGRAM(S): at 21:36

## 2021-09-19 RX ADMIN — QUETIAPINE FUMARATE 100 MILLIGRAM(S): 200 TABLET, FILM COATED ORAL at 21:35

## 2021-09-19 RX ADMIN — MEMANTINE HYDROCHLORIDE 10 MILLIGRAM(S): 10 TABLET ORAL at 11:32

## 2021-09-19 RX ADMIN — Medication 3 MILLIGRAM(S): at 21:35

## 2021-09-19 RX ADMIN — Medication 500 MILLIGRAM(S): at 11:32

## 2021-09-19 RX ADMIN — Medication 325 MILLIGRAM(S): at 11:32

## 2021-09-19 RX ADMIN — PANTOPRAZOLE SODIUM 40 MILLIGRAM(S): 20 TABLET, DELAYED RELEASE ORAL at 06:23

## 2021-09-19 RX ADMIN — Medication 2: at 17:15

## 2021-09-19 RX ADMIN — Medication 81 MILLIGRAM(S): at 11:32

## 2021-09-19 RX ADMIN — REPAGLINIDE 1 MILLIGRAM(S): 1 TABLET ORAL at 08:10

## 2021-09-19 RX ADMIN — INSULIN GLARGINE 10 UNIT(S): 100 INJECTION, SOLUTION SUBCUTANEOUS at 21:36

## 2021-09-19 RX ADMIN — HALOPERIDOL DECANOATE 1 MILLIGRAM(S): 100 INJECTION INTRAMUSCULAR at 10:56

## 2021-09-19 RX ADMIN — Medication 20 MILLIGRAM(S): at 11:32

## 2021-09-19 RX ADMIN — Medication 12.5 MILLIGRAM(S): at 17:18

## 2021-09-19 RX ADMIN — REPAGLINIDE 1 MILLIGRAM(S): 1 TABLET ORAL at 17:18

## 2021-09-19 RX ADMIN — REPAGLINIDE 1 MILLIGRAM(S): 1 TABLET ORAL at 11:32

## 2021-09-19 RX ADMIN — Medication 1 MILLIGRAM(S): at 11:32

## 2021-09-19 NOTE — PROGRESS NOTE ADULT - PROBLEM SELECTOR PLAN 3
on tele   missed bets with pr lengthening  2nd degree AV block  -shuttle to [facility] for PPM (Micra) placement 9/17  -tolerated procedure well, transferred back to [facility]

## 2021-09-19 NOTE — PROGRESS NOTE ADULT - SUBJECTIVE AND OBJECTIVE BOX
Interval Events:  pt in nad    Allergies    penicillins (Unknown)    Intolerances      Endocrine/Metabolic Medications:  glucagon  Injectable 1 milliGRAM(s) IntraMuscular once  insulin glargine Injectable (LANTUS) 10 Unit(s) SubCutaneous at bedtime  insulin lispro (ADMELOG) corrective regimen sliding scale   SubCutaneous three times a day before meals  repaglinide 1 milliGRAM(s) Oral three times a day before meals      Vital Signs Last 24 Hrs  T(C): 37.2 (19 Sep 2021 11:08), Max: 37.2 (19 Sep 2021 11:08)  T(F): 98.9 (19 Sep 2021 11:08), Max: 98.9 (19 Sep 2021 11:08)  HR: 67 (19 Sep 2021 11:08) (59 - 72)  BP: 130/49 (19 Sep 2021 11:08) (109/45 - 148/53)  BP(mean): --  RR: 19 (19 Sep 2021 11:08) (18 - 19)  SpO2: 100% (19 Sep 2021 11:08) (98% - 100%)      PHYSICAL EXAM  All physical exam findings normal, except those marked:  General:	Alert, active, cooperative, NAD, well hydrated  .		[] Abnormal:  Neck		Normal: supple, no cervical adenopathy, no palpable thyroid  .		[] Abnormal:  Cardiovascular	Normal: regular rate, normal S1, S2, no murmurs  .		[] Abnormal:  Respiratory	Normal: no chest wall deformity, normal respiratory pattern, CTA B/L  .		[] Abnormal:  Abdominal	Normal: soft, ND, NT, bowel sounds present, no masses, no organomegaly  .		[] Abnormal:  		Normal normal genitalia, testes descended, circumcised/uncircumcised  .		Jude stage:			Breast jude:  .		Menstrual history:  .		[] Abnormal:  Extremities	Normal: FROM x4  .		[] Abnormal:  Skin		Normal: intact and not indurated, no rash, no acanthosis nigricans  .		[] Abnormal:  Neurologic	Normal: grossly intact  .		[] Abnormal:    LABS                        11.9   6.26  )-----------( 209      ( 19 Sep 2021 06:46 )             36.0                               134    |  102    |  15                  Calcium: 8.8   / iCa: x      (09-19 @ 06:46)    ----------------------------<  151       Magnesium: 1.8                              4.8     |  26     |  0.78             Phosphorous: 2.9      TPro  6.8    /  Alb  2.9    /  TBili  0.3    /  DBili  x      /  AST  43     /  ALT  40     /  AlkPhos  70     19 Sep 2021 06:46    CAPILLARY BLOOD GLUCOSE      POCT Blood Glucose.: 152 mg/dL (19 Sep 2021 07:51)  POCT Blood Glucose.: 232 mg/dL (18 Sep 2021 21:25)  POCT Blood Glucose.: 198 mg/dL (18 Sep 2021 16:49)  POCT Blood Glucose.: 283 mg/dL (18 Sep 2021 11:41)        Assesment/plan    Pt is a 89yo F from home minimal ambulation with walker, AAOx 1-2 with PMH of DM (on insulin basaglar and metformin), Alzheimer's dementia, MESERET presents to the ER after a syncopal episode.)  Found to have hypoglycemia. Pt denies any complaints . Admits to eating well. Does not recall meds at home.          Problem/Recommendation - 1:  ·  Problem: Hypoglycemia.   resolved  now hyperglycemic  cont lantus 12 units   off of admelog 3 ac tid  on prandin 1mg ac tid- mild post meal hyperglycemia  a1c level- 7.1  fsg ac and hs  aim fsg 150-<200  d/w prim team.     Problem/Recommendation - 2:  ·  Problem: Syncope.   ·  Recommendation: likely due to hypoglycemia f/u cardio recs  s/p PPM

## 2021-09-19 NOTE — PROGRESS NOTE ADULT - SUBJECTIVE AND OBJECTIVE BOX
PGY-1 Progress Note discussed with attending    PAGER #: [[phone]] TILL 5:00 PM  PLEASE CONTACT ON CALL TEAM:  - On Call Team (Please refer to Morena) FROM 5:00 PM - 8:30PM  - Nightfloat Team FROM 8:30 -7:30 AM    CHIEF COMPLAINT & BRIEF HOSPITAL COURSE:  Patient is a 89yo F from home minimal ambulation with walker, AAOx 1-2 with PMH of DM (on insulin basaglar and metformin), Alzheimer's dementia, MESERET presents to the ER after a syncopal episode. found BS 41 with EMS report. Per EMS, pt was treated for UTI with Bactrim for a week. Admitted for hypoglycemia and rule out ACS. on Tele monitor. Endo/Cardiology consulted. Carotid US resulted no significant findings. UA negative. Echo ordered. Neurology dr. Jacobs consulted.     INTERVAL HPI/OVERNIGHT EVENTS:   Patient seen and examined this Am with daughter at bedside. Patient received evening xanax in morning yesterday and slept all day, accordingly, she was up all night and got agitated with nurses and was hallucinating. No new complaints this AM. Patient tolerated PPM procedure at [facility] well. For suture removal and possible DC tomorrow per attending.    REVIEW OF SYSTEMS:  CONSTITUTIONAL: No fever, chills, weight loss, or fatigue  RESPIRATORY: No cough, wheezing, or hemoptysis; No shortness of breath  CARDIOVASCULAR: No chest pain, palpitations, dizziness, or leg swelling  GASTROINTESTINAL: No abdominal pain. No nausea, vomiting, or hematemesis; No diarrhea or constipation. No melena or hematochezia.  GENITOURINARY: No dysuria or hematuria, urinary frequency  NEUROLOGICAL: No headaches, memory loss, loss of strength, numbness, or tremors  SKIN: No itching, burning, rashes, or lesions     Vital Signs Last 24 Hrs  T(C): 36.8 (19 Sep 2021 08:01), Max: 36.8 (19 Sep 2021 08:01)  T(F): 98.3 (19 Sep 2021 08:01), Max: 98.3 (19 Sep 2021 08:01)  HR: 59 (19 Sep 2021 08:01) (59 - 72)  BP: 134/50 (19 Sep 2021 08:01) (109/45 - 148/53)  BP(mean): --  RR: 19 (19 Sep 2021 08:01) (18 - 19)  SpO2: 100% (19 Sep 2021 08:01) (98% - 100%)    PHYSICAL EXAMINATION:  GENERAL: NAD, lying in bed  HEAD:  Atraumatic, Normocephalic  EYES:  conjunctiva and sclera clear  NECK: Supple, No JVD, thyroid not examined   CHEST/LUNG: Clear to auscultation. No wheezing, crackles, rales or rubs  HEART: Regular rate and rhythm; Clear S1 S2, No murmurs, rubs, or gallops  ABDOMEN: Soft, Nontender, Nondistended; Bowel sounds present  NERVOUS SYSTEM:  Alert & Oriented X3, CNII-XII intact, no focal neurological deficits  EXTREMITIES:  2+ Peripheral Pulses, No clubbing, cyanosis, or edema  SKIN: warm dry                        11.9   6.26  )-----------( 209      ( 19 Sep 2021 06:46 )             36.0     09-19    134<L>  |  102  |  15  ----------------------------<  151<H>  4.8   |  26  |  0.78    Ca    8.8      19 Sep 2021 06:46  Phos  2.9     09-19  Mg     1.8     09-19    TPro  6.8  /  Alb  2.9<L>  /  TBili  0.3  /  DBili  x   /  AST  43<H>  /  ALT  40  /  AlkPhos  70  09-19    LIVER FUNCTIONS - ( 19 Sep 2021 06:46 )  Alb: 2.9 g/dL / Pro: 6.8 g/dL / ALK PHOS: 70 U/L / ALT: 40 U/L DA / AST: 43 U/L / GGT: x                   RADIOLOGY & ADDITIONAL TESTS:

## 2021-09-19 NOTE — PROGRESS NOTE ADULT - SUBJECTIVE AND OBJECTIVE BOX
CHIEF COMPLAINT:Patient is a 89y old  Female who presents with a chief complaint of Hypoglycemia.Pt appears comfortable.    	  REVIEW OF SYSTEMS:  CONSTITUTIONAL: No fever, weight loss, or fatigue  EYES: No eye pain, visual disturbances, or discharge  ENT:  No difficulty hearing, tinnitus, vertigo; No sinus or throat pain  NECK: No pain or stiffness  RESPIRATORY: No cough, wheezing, chills or hemoptysis; No Shortness of Breath  CARDIOVASCULAR: No chest pain, palpitations, passing out, dizziness, or leg swelling  GASTROINTESTINAL: No abdominal or epigastric pain. No nausea, vomiting, or hematemesis; No diarrhea or constipation. No melena or hematochezia.  GENITOURINARY: No dysuria, frequency, hematuria, or incontinence  NEUROLOGICAL: No headaches, memory loss, loss of strength, numbness, or tremors  SKIN: No itching, burning, rashes, or lesions   LYMPH Nodes: No enlarged glands  ENDOCRINE: No heat or cold intolerance; No hair loss  MUSCULOSKELETAL: No joint pain or swelling; No muscle, back, or extremity pain  PSYCHIATRIC: No depression, anxiety, mood swings, or difficulty sleeping  HEME/LYMPH: No easy bruising, or bleeding gums  ALLERGY AND IMMUNOLOGIC: No hives or eczema	        PHYSICAL EXAM:  T(C): 36.8 (09-19-21 @ 08:01), Max: 36.8 (09-19-21 @ 08:01)  HR: 59 (09-19-21 @ 08:01) (59 - 72)  BP: 134/50 (09-19-21 @ 08:01) (109/45 - 148/53)  RR: 19 (09-19-21 @ 08:01) (18 - 19)  SpO2: 100% (09-19-21 @ 08:01) (98% - 100%)  Wt(kg): --  I&O's Summary    18 Sep 2021 07:01  -  19 Sep 2021 07:00  --------------------------------------------------------  IN: 0 mL / OUT: 1200 mL / NET: -1200 mL        Appearance: Normal	  HEENT:   Normal oral mucosa, PERRL, EOMI	  Lymphatic: No lymphadenopathy  Cardiovascular: Normal S1 S2, No JVD, No murmurs, No edema  Respiratory: Lungs clear to auscultation	  Psychiatry: A & O x 3, Mood & affect appropriate  Gastrointestinal:  Soft, Non-tender, + BS	  Skin: No rashes, No ecchymoses, No cyanosis	  Neurologic: Non-focal  Extremities: Normal range of motion, No clubbing, cyanosis or edema  Vascular: Peripheral pulses palpable 2+ bilaterally    MEDICATIONS  (STANDING):  ALPRAZolam 0.25 milliGRAM(s) Oral two times a day  ascorbic acid 500 milliGRAM(s) Oral daily  aspirin enteric coated 81 milliGRAM(s) Oral daily  dextrose 5%. 1000 milliLiter(s) (100 mL/Hr) IV Continuous <Continuous>  ferrous    sulfate 325 milliGRAM(s) Oral daily  FLUoxetine 20 milliGRAM(s) Oral daily  folic acid 1 milliGRAM(s) Oral daily  glucagon  Injectable 1 milliGRAM(s) IntraMuscular once  haloperidol    Injectable 1 milliGRAM(s) IV Push once  insulin glargine Injectable (LANTUS) 10 Unit(s) SubCutaneous at bedtime  insulin lispro (ADMELOG) corrective regimen sliding scale   SubCutaneous three times a day before meals  memantine 10 milliGRAM(s) Oral daily  metoprolol tartrate 12.5 milliGRAM(s) Oral two times a day  multivitamin 1 Tablet(s) Oral daily  pantoprazole    Tablet 40 milliGRAM(s) Oral before breakfast  QUEtiapine 100 milliGRAM(s) Oral at bedtime  repaglinide 1 milliGRAM(s) Oral three times a day before meals  risperiDONE   Tablet 1 milliGRAM(s) Oral two times a day  sertraline 50 milliGRAM(s) Oral at bedtime  sodium chloride 0.9%. 1000 milliLiter(s) (70 mL/Hr) IV Continuous <Continuous>      	  	  LABS:	 	                       11.9   6.26  )-----------( 209      ( 19 Sep 2021 06:46 )             36.0     09-19    134<L>  |  102  |  15  ----------------------------<  151<H>  4.8   |  26  |  0.78    Ca    8.8      19 Sep 2021 06:46  Phos  2.9     09-19  Mg     1.8     09-19    TPro  6.8  /  Alb  2.9<L>  /  TBili  0.3  /  DBili  x   /  AST  43<H>  /  ALT  40  /  AlkPhos  70  09-19    proBNP:   Lipid Profile: Cholesterol 173  LDL --  HDL 53      HgA1c:   TSH: Thyroid Stimulating Hormone, Serum: 5.60 uU/mL (09-14 @ 06:57)  Thyroid Stimulating Hormone, Serum: 4.95 uU/mL (09-13 @ 05:53)

## 2021-09-19 NOTE — PROGRESS NOTE ADULT - SUBJECTIVE AND OBJECTIVE BOX
Patient is a 89y old  Female who presents with a chief complaint of Hypoglycemia (18 Sep 2021 11:37)    pt seen in tele [ x ], reg med floor [   ], bed [ x ], chair at bedside [   ], awake and responsive [x  ], lethargic [  ],   nad [ x ]        Allergies    penicillins (Unknown)        Vitals    T(F): 97.4 (09-18-21 @ 19:48), Max: 97.9 (09-18-21 @ 08:22)  HR: 67 (09-18-21 @ 19:48) (67 - 72)  BP: 136/50 (09-18-21 @ 19:48) (109/45 - 147/60)  RR: 18 (09-18-21 @ 19:48) (18 - 19)  SpO2: 100% (09-18-21 @ 19:48) (98% - 100%)  Wt(kg): --  CAPILLARY BLOOD GLUCOSE      POCT Blood Glucose.: 232 mg/dL (18 Sep 2021 21:25)      Labs                          11.7   8.85  )-----------( 198      ( 18 Sep 2021 06:28 )             35.2       09-18    132<L>  |  99  |  16  ----------------------------<  159<H>  4.5   |  26  |  0.77    Ca    9.1      18 Sep 2021 06:28  Phos  3.4     09-18  Mg     1.9     09-18    TPro  6.7  /  Alb  3.0<L>  /  TBili  0.3  /  DBili  x   /  AST  26  /  ALT  38  /  AlkPhos  73  09-18            Clean Catch Clean Catch (Midstream)  09-13 @ 02:09   >100,000 CFU/ml Morganella morganii  50,000 - 99,000 CFU/mL Escherichia coli  --  Morganella morganii  Escherichia coli          Radiology Results      Meds    MEDICATIONS  (STANDING):  ALPRAZolam 0.25 milliGRAM(s) Oral two times a day  ascorbic acid 500 milliGRAM(s) Oral daily  aspirin enteric coated 81 milliGRAM(s) Oral daily  dextrose 5%. 1000 milliLiter(s) (100 mL/Hr) IV Continuous <Continuous>  ferrous    sulfate 325 milliGRAM(s) Oral daily  FLUoxetine 20 milliGRAM(s) Oral daily  folic acid 1 milliGRAM(s) Oral daily  glucagon  Injectable 1 milliGRAM(s) IntraMuscular once  insulin glargine Injectable (LANTUS) 10 Unit(s) SubCutaneous at bedtime  insulin lispro (ADMELOG) corrective regimen sliding scale   SubCutaneous three times a day before meals  memantine 10 milliGRAM(s) Oral daily  metoprolol tartrate 12.5 milliGRAM(s) Oral two times a day  multivitamin 1 Tablet(s) Oral daily  pantoprazole    Tablet 40 milliGRAM(s) Oral before breakfast  QUEtiapine 100 milliGRAM(s) Oral at bedtime  repaglinide 1 milliGRAM(s) Oral three times a day before meals  risperiDONE   Tablet 1 milliGRAM(s) Oral two times a day  sertraline 50 milliGRAM(s) Oral at bedtime  sodium chloride 0.9%. 1000 milliLiter(s) (70 mL/Hr) IV Continuous <Continuous>      MEDICATIONS  (PRN):  ALPRAZolam 0.25 milliGRAM(s) Oral every 8 hours PRN anxiety  melatonin 3 milliGRAM(s) Oral at bedtime PRN Sleep      Physical Exam    Neuro :  no focal deficits  Respiratory: CTA B/L  CV: RRR, S1S2, no murmurs,   Abdominal: Soft, NT, ND +BS,  Extremities: No edema, + peripheral pulses    ASSESSMENT      syncope likely 2nd to hypoglycemia,   cns patho r/o ,   chest pain,    acs r/o,   sick sinus synd,   h/o DM (on insulin basaglar and metformin),   Alzheimer's dementia,   Iron Deficiency Anemia  HTN (hypertension)      PLAN    cont tele,   lispro ss,   endo f/u   hold admelog 3 ac tid  trial of low dose prandin 1mg ac tid     a1c level- 7.1  fsg ac and hs  tsh elevated  free t4 wnl  neuro cons noted   cont aspirin, statin,   ce x1 neg noted above   cardio f/u    cont losartan   bp improved   echo with Ejection Fraction Visual Estimate: >55 % , Grade I diastolic dysfunction noted    stress test neg for ischemia noted    pt with wide complex tachycardia with 2 to 1 heart block on tele monitor   s/p shuttle to [facility] for ppm placement 9/17/21  ucx and sens with  Escherichia coli  --  Morganella morganii    completed rocephin 1g daily x 3 days  cont current meds           Patient is a 89y old  Female who presents with a chief complaint of Hypoglycemia (18 Sep 2021 11:37)    pt seen in tele [ x ], reg med floor [   ], bed [ x ], chair at bedside [   ], awake and responsive [x  ], lethargic [  ],   nad [ x ]        Allergies    penicillins (Unknown)        Vitals    T(F): 97.4 (09-18-21 @ 19:48), Max: 97.9 (09-18-21 @ 08:22)  HR: 67 (09-18-21 @ 19:48) (67 - 72)  BP: 136/50 (09-18-21 @ 19:48) (109/45 - 147/60)  RR: 18 (09-18-21 @ 19:48) (18 - 19)  SpO2: 100% (09-18-21 @ 19:48) (98% - 100%)  Wt(kg): --  CAPILLARY BLOOD GLUCOSE      POCT Blood Glucose.: 232 mg/dL (18 Sep 2021 21:25)      Labs                          11.7   8.85  )-----------( 198      ( 18 Sep 2021 06:28 )             35.2       09-18    132<L>  |  99  |  16  ----------------------------<  159<H>  4.5   |  26  |  0.77    Ca    9.1      18 Sep 2021 06:28  Phos  3.4     09-18  Mg     1.9     09-18    TPro  6.7  /  Alb  3.0<L>  /  TBili  0.3  /  DBili  x   /  AST  26  /  ALT  38  /  AlkPhos  73  09-18            Clean Catch Clean Catch (Midstream)  09-13 @ 02:09   >100,000 CFU/ml Morganella morganii  50,000 - 99,000 CFU/mL Escherichia coli  --  Morganella morganii  Escherichia coli          Radiology Results      Meds    MEDICATIONS  (STANDING):  ALPRAZolam 0.25 milliGRAM(s) Oral two times a day  ascorbic acid 500 milliGRAM(s) Oral daily  aspirin enteric coated 81 milliGRAM(s) Oral daily  dextrose 5%. 1000 milliLiter(s) (100 mL/Hr) IV Continuous <Continuous>  ferrous    sulfate 325 milliGRAM(s) Oral daily  FLUoxetine 20 milliGRAM(s) Oral daily  folic acid 1 milliGRAM(s) Oral daily  glucagon  Injectable 1 milliGRAM(s) IntraMuscular once  insulin glargine Injectable (LANTUS) 10 Unit(s) SubCutaneous at bedtime  insulin lispro (ADMELOG) corrective regimen sliding scale   SubCutaneous three times a day before meals  memantine 10 milliGRAM(s) Oral daily  metoprolol tartrate 12.5 milliGRAM(s) Oral two times a day  multivitamin 1 Tablet(s) Oral daily  pantoprazole    Tablet 40 milliGRAM(s) Oral before breakfast  QUEtiapine 100 milliGRAM(s) Oral at bedtime  repaglinide 1 milliGRAM(s) Oral three times a day before meals  risperiDONE   Tablet 1 milliGRAM(s) Oral two times a day  sertraline 50 milliGRAM(s) Oral at bedtime  sodium chloride 0.9%. 1000 milliLiter(s) (70 mL/Hr) IV Continuous <Continuous>      MEDICATIONS  (PRN):  ALPRAZolam 0.25 milliGRAM(s) Oral every 8 hours PRN anxiety  melatonin 3 milliGRAM(s) Oral at bedtime PRN Sleep      Physical Exam    Neuro :  no focal deficits  Respiratory: CTA B/L  CV: RRR, S1S2, no murmurs,   Abdominal: Soft, NT, ND +BS,  Extremities: No edema, + peripheral pulses    ASSESSMENT      syncope likely 2nd to hypoglycemia,   cns patho r/o ,   chest pain,    acs r/o,   sick sinus synd,   h/o DM (on insulin basaglar and metformin),   Alzheimer's dementia,   Iron Deficiency Anemia  HTN (hypertension)      PLAN    cont tele,   lispro ss,   endo f/u   cont lantus 12 units   off of admelog 3 ac tid  on prandin 1mg ac tid- good control   a1c level- 7.1  fsg ac and hs  tsh elevated  free t4 wnl  neuro cons noted   cont aspirin, statin,   ce x1 neg noted    cardio f/u    echo with Ejection Fraction Visual Estimate: >55 % , Grade I diastolic dysfunction noted    stress test neg for ischemia noted    pt with wide complex tachycardia with 2 to 1 heart block on tele monitor   s/p shuttle to [facility] for micra ppm placement 9/17/21  lopressor 12.5 mg bid added for wct  ucx and sens with  Escherichia coli  --  Morganella morganii    completed rocephin 1g daily x 3 days  cont current meds

## 2021-09-19 NOTE — CHART NOTE - NSCHARTNOTEFT_GEN_A_CORE
Patient's family would like the patient to be given xanax only at bedtime not in morning and at 6pm. Wants primary team to be notified.
The call team was paged by RN that patient daughter is asking for alprazolam to be given at bed time as patient has it as home medication , on Review of outpatient meds, alprazolam wasnot found .Pharmacy was closed overnight . went through outpatient prescribed meds and confirmed that the patient is taking it, resumed it but not sure about the dose, primary team to confirm from pharmacy in AM

## 2021-09-20 ENCOUNTER — TRANSCRIPTION ENCOUNTER (OUTPATIENT)
Age: 86
End: 2021-09-20

## 2021-09-20 VITALS
RESPIRATION RATE: 189 BRPM | DIASTOLIC BLOOD PRESSURE: 73 MMHG | TEMPERATURE: 98 F | SYSTOLIC BLOOD PRESSURE: 125 MMHG | HEART RATE: 59 BPM

## 2021-09-20 LAB
ALBUMIN SERPL ELPH-MCNC: 3.2 G/DL — LOW (ref 3.5–5)
ALP SERPL-CCNC: 76 U/L — SIGNIFICANT CHANGE UP (ref 40–120)
ALT FLD-CCNC: 41 U/L DA — SIGNIFICANT CHANGE UP (ref 10–60)
ANION GAP SERPL CALC-SCNC: 9 MMOL/L — SIGNIFICANT CHANGE UP (ref 5–17)
AST SERPL-CCNC: 28 U/L — SIGNIFICANT CHANGE UP (ref 10–40)
BILIRUB SERPL-MCNC: 0.2 MG/DL — SIGNIFICANT CHANGE UP (ref 0.2–1.2)
BUN SERPL-MCNC: 19 MG/DL — HIGH (ref 7–18)
CALCIUM SERPL-MCNC: 8.9 MG/DL — SIGNIFICANT CHANGE UP (ref 8.4–10.5)
CHLORIDE SERPL-SCNC: 102 MMOL/L — SIGNIFICANT CHANGE UP (ref 96–108)
CO2 SERPL-SCNC: 23 MMOL/L — SIGNIFICANT CHANGE UP (ref 22–31)
CREAT SERPL-MCNC: 0.91 MG/DL — SIGNIFICANT CHANGE UP (ref 0.5–1.3)
GLUCOSE BLDC GLUCOMTR-MCNC: 151 MG/DL — HIGH (ref 70–99)
GLUCOSE BLDC GLUCOMTR-MCNC: 192 MG/DL — HIGH (ref 70–99)
GLUCOSE SERPL-MCNC: 190 MG/DL — HIGH (ref 70–99)
HCT VFR BLD CALC: 36.3 % — SIGNIFICANT CHANGE UP (ref 34.5–45)
HGB BLD-MCNC: 12.4 G/DL — SIGNIFICANT CHANGE UP (ref 11.5–15.5)
MAGNESIUM SERPL-MCNC: 1.8 MG/DL — SIGNIFICANT CHANGE UP (ref 1.6–2.6)
MCHC RBC-ENTMCNC: 30.9 PG — SIGNIFICANT CHANGE UP (ref 27–34)
MCHC RBC-ENTMCNC: 34.2 GM/DL — SIGNIFICANT CHANGE UP (ref 32–36)
MCV RBC AUTO: 90.5 FL — SIGNIFICANT CHANGE UP (ref 80–100)
NRBC # BLD: 0 /100 WBCS — SIGNIFICANT CHANGE UP (ref 0–0)
PHOSPHATE SERPL-MCNC: 3.7 MG/DL — SIGNIFICANT CHANGE UP (ref 2.5–4.5)
PLATELET # BLD AUTO: 230 K/UL — SIGNIFICANT CHANGE UP (ref 150–400)
POTASSIUM SERPL-MCNC: 4.1 MMOL/L — SIGNIFICANT CHANGE UP (ref 3.5–5.3)
POTASSIUM SERPL-SCNC: 4.1 MMOL/L — SIGNIFICANT CHANGE UP (ref 3.5–5.3)
PROT SERPL-MCNC: 7 G/DL — SIGNIFICANT CHANGE UP (ref 6–8.3)
RBC # BLD: 4.01 M/UL — SIGNIFICANT CHANGE UP (ref 3.8–5.2)
RBC # FLD: 12.7 % — SIGNIFICANT CHANGE UP (ref 10.3–14.5)
SODIUM SERPL-SCNC: 134 MMOL/L — LOW (ref 135–145)
WBC # BLD: 5.95 K/UL — SIGNIFICANT CHANGE UP (ref 3.8–10.5)
WBC # FLD AUTO: 5.95 K/UL — SIGNIFICANT CHANGE UP (ref 3.8–10.5)

## 2021-09-20 RX ORDER — LOSARTAN POTASSIUM 100 MG/1
1 TABLET, FILM COATED ORAL
Qty: 0 | Refills: 0 | DISCHARGE

## 2021-09-20 RX ORDER — METOPROLOL TARTRATE 50 MG
1 TABLET ORAL
Qty: 30 | Refills: 0
Start: 2021-09-20 | End: 2021-10-19

## 2021-09-20 RX ORDER — REPAGLINIDE 1 MG/1
1 TABLET ORAL
Qty: 90 | Refills: 0
Start: 2021-09-20 | End: 2021-10-19

## 2021-09-20 RX ORDER — METOPROLOL TARTRATE 50 MG
1 TABLET ORAL
Qty: 0 | Refills: 0 | DISCHARGE
Start: 2021-09-20 | End: 2021-10-19

## 2021-09-20 RX ORDER — METFORMIN HYDROCHLORIDE 850 MG/1
1 TABLET ORAL
Qty: 0 | Refills: 0 | DISCHARGE

## 2021-09-20 RX ORDER — INSULIN GLARGINE 100 [IU]/ML
34 INJECTION, SOLUTION SUBCUTANEOUS
Qty: 0 | Refills: 0 | DISCHARGE
Start: 2021-09-20 | End: 2021-10-19

## 2021-09-20 RX ORDER — INSULIN GLARGINE 100 [IU]/ML
12 INJECTION, SOLUTION SUBCUTANEOUS
Qty: 0 | Refills: 0 | DISCHARGE
Start: 2021-09-20

## 2021-09-20 RX ORDER — INSULIN GLARGINE 100 [IU]/ML
12 INJECTION, SOLUTION SUBCUTANEOUS
Qty: 1 | Refills: 0
Start: 2021-09-20 | End: 2021-10-19

## 2021-09-20 RX ADMIN — Medication 1 TABLET(S): at 11:50

## 2021-09-20 RX ADMIN — REPAGLINIDE 1 MILLIGRAM(S): 1 TABLET ORAL at 11:51

## 2021-09-20 RX ADMIN — Medication 325 MILLIGRAM(S): at 11:51

## 2021-09-20 RX ADMIN — Medication 12.5 MILLIGRAM(S): at 06:20

## 2021-09-20 RX ADMIN — Medication 20 MILLIGRAM(S): at 11:51

## 2021-09-20 RX ADMIN — Medication 1 MILLIGRAM(S): at 11:50

## 2021-09-20 RX ADMIN — Medication 81 MILLIGRAM(S): at 11:50

## 2021-09-20 RX ADMIN — PANTOPRAZOLE SODIUM 40 MILLIGRAM(S): 20 TABLET, DELAYED RELEASE ORAL at 06:20

## 2021-09-20 RX ADMIN — REPAGLINIDE 1 MILLIGRAM(S): 1 TABLET ORAL at 08:05

## 2021-09-20 RX ADMIN — RISPERIDONE 1 MILLIGRAM(S): 4 TABLET ORAL at 06:20

## 2021-09-20 RX ADMIN — Medication 500 MILLIGRAM(S): at 11:51

## 2021-09-20 RX ADMIN — MEMANTINE HYDROCHLORIDE 10 MILLIGRAM(S): 10 TABLET ORAL at 11:51

## 2021-09-20 NOTE — PROGRESS NOTE ADULT - ASSESSMENT
89 yo F from home minimal ambulation with walker, AAOx 1-2 with PMH of DM (on insulin basaglar and metformin), Alzheimer's dementia, MESERET presents to the ER after a syncopal episode,cp,abnl ekg,WCT with 2 to 1 av block.  1.Tele monitoring,tachy-jerald.  2.Echocardiogram.  3.Stress test-r/o ischemia.  4.DM-Endo f/u,Insulin.  5.HTN-d/c cozaar elevated cr.  6.Cont asa,statin.  7.GI and DVT prophylaxis.
89 yo F from home minimal ambulation with walker, AAOx 1-2 with PMH of DM (on insulin basaglar and metformin), Alzheimer's dementia, MESERET presents to the ER after a syncopal episode,cp,abnl ekg.  1.Tele monitoring.  2.Echocardiogram-when not agitated.  3.Stress test-r/o ischemia when not agitated.  4.DM-Endo eval,Insulin.  5.HTN-cozaar.  6.Cont asa,statin.  7.GI and DVT prophylaxis.
89 yo F from home minimal ambulation with walker, AAOx 1-2 with PMH of DM (on insulin basaglar and metformin), Alzheimer's dementia, MESERET presents to the ER after a syncopal episode,cp,abnl ekg,WCT with 2 to 1 av block,UTI.  1.Tele monitoring,tachy-jerald,D/W pt's daughter agree for Micra PPM.  2.UTI-ABX.  3.Case D/Wr.Ismail will shuttle to [facility] for Micra.  4.DM-Endo f/u,Insulin.  5.HTN-b blcoker after PPM placed.  6.Cont asa,statin.  7.GI and DVT prophylaxis.
89 yo F from home minimal ambulation with walker, AAOx 1-2 with PMH of DM (on insulin basaglar and metformin), Alzheimer's dementia, MESERET presents to the ER after a syncopal episode,cp,abnl ekg,WCT with 2 to 1 av block,UTI.  1.Tele monitoring,tachy-jerald,s/p Micra PPM.  2.UTI-ABX.  3.WCT-add low dose b blocker.  4.DM-Endo f/u,Insulin.  5.HTN-b blocker.  6.Cont asa,statin.  7.GI and DVT prophylaxis.
89 yo F from home minimal ambulation with walker, AAOx 1-2 with PMH of DM (on insulin basaglar and metformin), Alzheimer's dementia, MESERET presents to the ER after a syncopal episode,cp,abnl ekg,WCT with 2 to 1 av block,UTI.  1.D/C Tele monitoring,tachy-jerald,s/p Micra PPM.  2.UTI-ABX.  3.WCT- low dose b blocker.  4.DM-Endo f/u,Insulin.  5.HTN-b blocker.  6.Cont asa,statin.  7.GI and DVT prophylaxis.  8.F/U as outpatient in 2 weeks.
89 yo F from home minimal ambulation with walker, AAOx 1-2 with PMH of DM (on insulin basaglar and metformin), Alzheimer's dementia, MESERET presents to the ER after a syncopal episode,cp,abnl ekg,WCT with 2 to 1 av block,UTI.  1.Tele monitoring,tachy-jerald,s/p Micra PPM.  2.UTI-ABX.  3.WCT- low dose b blocker.  4.DM-Endo f/u,Insulin.  5.HTN-b blocker.  6.Cont asa,statin.  7.GI and DVT prophylaxis.
Patient is a 89yo F from home minimal ambulation with walker, AAOx 1-2 with PMH of DM (on insulin basaglar and metformin), Alzheimer's dementia, MESERET presents to the ER after a syncopal episode. found BS 41 with EMS report. Per EMS, pt was treated for UTI with Bactrim. Admitted for hypoglycemia and rule out ACS. on Tele monitor. Endo/Cardiology consulted. Echo, Carotid US ordered. UA negative.         
Pt is a 89yo F from home minimal ambulation with walker, AAOx 1-2 with PMH of DM (on insulin basaglar and metformin), Alzheimer's dementia, MESERET presents to the ER after a syncopal episode. Admitted for hypoglycemia.
Pt is a 89yo F from home minimal ambulation with walker, AAOx 1-2 with PMH of DM (on insulin basaglar and metformin), Alzheimer's dementia, MESERET presents to the ER after a syncopal episode. Admitted for hypoglycemia.
Pt is a 89yo F from home minimal ambulation with walker, AAOx 1-2 with PMH of DM (on insulin basaglar and metformin), Alzheimer's dementia, MESERET presents to the ER after a syncopal episode. Admitted for hypoglycemia.
Pt is a 89yo F from home minimal ambulation with walker, AAOx 1-2 with PMH of DM (on insulin basaglar and metformin), Alzheimer's dementia, MESERET presents to the ER after a syncopal episode. Admitted for hypoglycemia.

## 2021-09-20 NOTE — PROGRESS NOTE ADULT - PROBLEM SELECTOR PLAN 7
Free T4, T3 wnl  no further w/u req
Free T4, T3 wnl  no further w/u req
continued home meds
continued home meds
Free T4, T3 wnl  no further w/u req
lovenox for DVT ppx

## 2021-09-20 NOTE — PROGRESS NOTE ADULT - PROBLEM SELECTOR PLAN 4
UA neg  U Cx growing morganella morgani + E. coli   pt asx  finished 3 day course of ceftriaxone
c/w cozaar w/ parameters
on SSI  started basal insulin lantus 10 and admelog 3units premeal  DC on Januvia per [facility] (Dr. Huff)
on SSI  Endo Dr. Huff consulted: start on Januvia 100 on dc  -now hyperglycemic, cont lantus 12 units  and hold admelog 3 ac tid  -trial of low dose prandin 1mg ac tid today
continued home meds
UA neg  U Cx growing morganella morgani + E. coli   pt asx  c/w ceftriaxone, day 3.
UA neg  U Cx growing morganella morgani + E. coli   pt asx  finished 3 day course of ceftriaxone

## 2021-09-20 NOTE — DISCHARGE NOTE PROVIDER - NSDCCPCAREPLAN_GEN_ALL_CORE_FT
PRINCIPAL DISCHARGE DIAGNOSIS  Diagnosis: Hypoglycemia  Assessment and Plan of Treatment: You were admitted due to episodes of Hypoglycemia. You had syncopal episode with diaphoresis and loss of consciousness. At home she was on Basaglar insulin at 34u and Metformin 500 mg once daily. The hypoglycemia was likely due to the basaglar and poor oral intake. She was given 2 ampules of D50 and the blood glucose improved to 129. She was initially started on IVF: D5 at 150cc/hr then switched to D50 at 70 cc/hr. IVF was eventually discontinued when she became hyperglycemic (350). Endocrinology (Dr. Huff) was consulted. Continued on Lantus 12 units but Admelog 3u was held. Started on low-dose Prandin 1mg TID. You were discharged stable and improved. Follow-up with Dr. Xie in 2 weeks.      SECONDARY DISCHARGE DIAGNOSES  Diagnosis: Syncope  Assessment and Plan of Treatment: You were admitted due to syncopal episodes. She was admitted on Telemetry monitoring. Carotid doppler was done and it was negative. Echocardiogram showed Grade I Diastolic Dysfunction (EF>55%). Cardiology (Dr. Xie) was consulted. Nuclear stress test was negative for any ischemia nor infarct.    Diagnosis: HTN (hypertension)  Assessment and Plan of Treatment: You have hypertension. Blood pressure has ranged between 109 and 144 systolic. Home medications include Metoprolol tartrate 12.5mg PO BID. Patient's current BP is 109/39.Her heart rate has ranged from 56 to 73 bpm. Plan is to continue with Cozaar with parameters.    Diagnosis: DM (diabetes mellitus)  Assessment and Plan of Treatment: You have diabetes mellitus. Home medications include Basaglar 34u and Metformin 500 mg once daily. Started on Lantus 12 units at night and Admelog 3 units TID. She was started on a trial of low dose Prandin 1mg TID. Continue Metformin and Prandin at home. Follow-up with your PCP for glucose monitoring.    Diagnosis: UTI (urinary tract infection)  Assessment and Plan of Treatment: You have Urinary Tract Infection on admission. Urinalysis was negative but Urine Culture showed growth in Morganella morgagni, E. coli. Started on Ceftriaxone was given IVPB. Patient remained to be asymptomatic.    Diagnosis: Alzheimer's disease  Assessment and Plan of Treatment: You also have Alzheimer's disease. Home medications include memantine 10mg daily. Home medications were continued.    Diagnosis: Elevated TSH  Assessment and Plan of Treatment: You presented with elevated TSH. Free T4 and T3 drawn. Blood work was negative. No further work-up required.    Diagnosis: Prophylactic measure  Assessment and Plan of Treatment: You were started on Lovenox SQ for deep venous thrombosis prophylaxis     PRINCIPAL DISCHARGE DIAGNOSIS  Diagnosis: Hypoglycemia  Assessment and Plan of Treatment: You were admitted due to episodes of Hypoglycemia. You had syncopal episode with diaphoresis and loss of consciousness. At home you were on Basaglar insulin at 34u and Metformin 500 mg once daily. The hypoglycemia was likely due to the basaglar and poor oral intake. you were given 2 ampules of D50 and the blood glucose improved to 129. You were started on IV fluids. IVF was eventually discontinued when you became hyperglycemic (350). Endocrinology (Dr. Huff) was consulted. Continued on Lantus 12 units but Admelog 3u was held. Started on low-dose Prandin 1mg TID. You were discharged stable and improved. You are suggested to follow up with Dr Huff after discharge.      SECONDARY DISCHARGE DIAGNOSES  Diagnosis: Syncope  Assessment and Plan of Treatment: You were admitted due to syncopal episodes. You were admitted on Telemetry monitoring. Carotid doppler was done and it was negative. Echocardiogram showed Grade I Diastolic Dysfunction (EF>55%). Cardiology (Dr. Xie) was consulted. Nuclear stress test was negative for any ischemia nor infarct.    Diagnosis: UTI (urinary tract infection)  Assessment and Plan of Treatment: You have Urinary Tract Infection on admission. Urinalysis was negative but Urine Culture showed growth in Morganella morgagni, E. coli. You were given IV antibiotic and course is now complete.    Diagnosis: HTN (hypertension)  Assessment and Plan of Treatment: You have hypertension. Blood pressure has ranged between 109 and 144 systolic. Home medications include Metoprolol tartrate 12.5mg PO BID. Your current BP is 109/39.Her heart rate has ranged from 56 to 73 bpm. Plan is to continue with Cozaar with parameters.    Diagnosis: DM (diabetes mellitus)  Assessment and Plan of Treatment: You have diabetes mellitus. Home medications include Basaglar 34u and Metformin 500 mg once daily. Started on Lantus 12 units at night and Admelog 3 units TID. You were started on a trial of low dose Prandin 1mg TID. Continue Metformin and Prandin at home. Follow-up with your PCP for glucose monitoring.    Diagnosis: Alzheimer's disease  Assessment and Plan of Treatment: You also have Alzheimer's disease. Home medications include memantine 10mg daily. Home medications were continued.    Diagnosis: Elevated TSH  Assessment and Plan of Treatment: You presented with elevated TSH. Free T4 and T3 drawn. Blood work was negative. No further work-up required.

## 2021-09-20 NOTE — PROGRESS NOTE ADULT - PROBLEM SELECTOR PLAN 2
Pt p/w after a syncopal episode with aura of diaphoresis and had LOC for 10 mins  EKG showed NSR  on telemetry  carotid dopplers neg  Cardiology Dr. Xie consulted:  Echo: . Mitral annular calcification. Mild mitral regurgitation. grade 1 DD, EF >55%  NST: neg   -c/w tele monitoring.  -c/w asa,statin  Neuro Dr. Mohr consulted Pt p/w after a syncopal episode with aura of diaphoresis and had LOC for 10 mins  EKG showed NSR  on telemetry  carotid dopplers neg  Cardiology Dr. Xie consulted:  Echo: . Mitral annular calcification. Mild mitral regurgitation. grade 1 DD, EF >55%  NST: neg   -d/c tele monitoring.  -c/w asa,statin  Neuro Dr. Mohr consulted

## 2021-09-20 NOTE — PROGRESS NOTE ADULT - PROBLEM SELECTOR PROBLEM 4
Alzheimer's disease
UTI (urinary tract infection)
DM (diabetes mellitus)
UTI (urinary tract infection)
HTN (hypertension)
DM (diabetes mellitus)
UTI (urinary tract infection)

## 2021-09-20 NOTE — PROGRESS NOTE ADULT - SUBJECTIVE AND OBJECTIVE BOX
CHIEF COMPLAINT:Patient is a 89y old  Female who presents with a chief complaint of Hypoglycemia.Pt appears comfortable.    	  REVIEW OF SYSTEMS:  CONSTITUTIONAL: No fever, weight loss, or fatigue  EYES: No eye pain, visual disturbances, or discharge  ENT:  No difficulty hearing, tinnitus, vertigo; No sinus or throat pain  NECK: No pain or stiffness  RESPIRATORY: No cough, wheezing, chills or hemoptysis; No Shortness of Breath  CARDIOVASCULAR: No chest pain, palpitations, passing out, dizziness, or leg swelling  GASTROINTESTINAL: No abdominal or epigastric pain. No nausea, vomiting, or hematemesis; No diarrhea or constipation. No melena or hematochezia.  GENITOURINARY: No dysuria, frequency, hematuria, or incontinence  NEUROLOGICAL: No headaches, memory loss, loss of strength, numbness, or tremors  SKIN: No itching, burning, rashes, or lesions   LYMPH Nodes: No enlarged glands  ENDOCRINE: No heat or cold intolerance; No hair loss  MUSCULOSKELETAL: No joint pain or swelling; No muscle, back, or extremity pain  PSYCHIATRIC: No depression, anxiety, mood swings, or difficulty sleeping  HEME/LYMPH: No easy bruising, or bleeding gums  ALLERGY AND IMMUNOLOGIC: No hives or eczema	      PHYSICAL EXAM:  T(C): 36.3 (09-20-21 @ 07:57), Max: 37.2 (09-19-21 @ 11:08)  HR: 55 (09-20-21 @ 07:57) (55 - 73)  BP: 137/41 (09-20-21 @ 07:57) (109/39 - 144/98)  RR: 18 (09-20-21 @ 07:57) (18 - 19)  SpO2: 97% (09-20-21 @ 07:57) (95% - 100%)  Wt(kg): --  I&O's Summary    19 Sep 2021 07:01  -  20 Sep 2021 07:00  --------------------------------------------------------  IN: 0 mL / OUT: 950 mL / NET: -950 mL        Appearance: Normal	  HEENT:   Normal oral mucosa, PERRL, EOMI	  Lymphatic: No lymphadenopathy  Cardiovascular: Normal S1 S2, No JVD, No murmurs, No edema  Respiratory: Lungs clear to auscultation	  Psychiatry: A & O x 3, Mood & affect appropriate  Gastrointestinal:  Soft, Non-tender, + BS	  Skin: No rashes, No ecchymoses, No cyanosis	  Neurologic: Non-focal  Extremities: Normal range of motion, No clubbing, cyanosis or edema  Vascular: Peripheral pulses palpable 2+ bilaterally    MEDICATIONS  (STANDING):  ALPRAZolam 0.25 milliGRAM(s) Oral at bedtime  ascorbic acid 500 milliGRAM(s) Oral daily  aspirin enteric coated 81 milliGRAM(s) Oral daily  dextrose 5%. 1000 milliLiter(s) (100 mL/Hr) IV Continuous <Continuous>  ferrous    sulfate 325 milliGRAM(s) Oral daily  FLUoxetine 20 milliGRAM(s) Oral daily  folic acid 1 milliGRAM(s) Oral daily  glucagon  Injectable 1 milliGRAM(s) IntraMuscular once  insulin glargine Injectable (LANTUS) 10 Unit(s) SubCutaneous at bedtime  insulin lispro (ADMELOG) corrective regimen sliding scale   SubCutaneous three times a day before meals  memantine 10 milliGRAM(s) Oral daily  metoprolol tartrate 12.5 milliGRAM(s) Oral two times a day  multivitamin 1 Tablet(s) Oral daily  pantoprazole    Tablet 40 milliGRAM(s) Oral before breakfast  QUEtiapine 100 milliGRAM(s) Oral at bedtime  repaglinide 1 milliGRAM(s) Oral three times a day before meals  risperiDONE   Tablet 1 milliGRAM(s) Oral two times a day  sertraline 50 milliGRAM(s) Oral at bedtime  sodium chloride 0.9%. 1000 milliLiter(s) (70 mL/Hr) IV Continuous <Continuous>        	  LABS:	 	                       12.4   5.95  )-----------( 230      ( 20 Sep 2021 06:29 )             36.3     09-20    134<L>  |  102  |  19<H>  ----------------------------<  190<H>  4.1   |  23  |  0.91    Ca    8.9      20 Sep 2021 06:29  Phos  3.7     09-20  Mg     1.8     09-20    TPro  7.0  /  Alb  3.2<L>  /  TBili  0.2  /  DBili  x   /  AST  28  /  ALT  41  /  AlkPhos  76  09-20      Lipid Profile: Cholesterol 173  HDL 53        TSH: Thyroid Stimulating Hormone, Serum: 5.60 uU/mL (09-14 @ 06:57)  Thyroid Stimulating Hormone, Serum: 4.95 uU/mL (09-13 @ 05:53)

## 2021-09-20 NOTE — PROGRESS NOTE ADULT - PROBLEM SELECTOR PROBLEM 8
Alzheimer's disease
Prophylactic measure
Alzheimer's disease
Prophylactic measure
Alzheimer's disease

## 2021-09-20 NOTE — PROGRESS NOTE ADULT - SUBJECTIVE AND OBJECTIVE BOX
Patient is a 89y old  Female who presents with a chief complaint of Hypoglycemia (19 Sep 2021 11:31)    pt seen in icu [  ], reg med floor [   ], bed [  ], chair at bedside [   ], a+o x3 [  ], lethargic [  ],  nad [  ]    izquierdo [  ], ngt [  ], peg [  ], et tube [  ], cent line [  ], picc line [  ]        Allergies    penicillins (Unknown)        Vitals    T(F): 97.4 (09-20-21 @ 07:57), Max: 98.9 (09-19-21 @ 11:08)  HR: 55 (09-20-21 @ 07:57) (55 - 73)  BP: 137/41 (09-20-21 @ 07:57) (109/39 - 144/98)  RR: 18 (09-20-21 @ 07:57) (18 - 19)  SpO2: 97% (09-20-21 @ 07:57) (95% - 100%)  Wt(kg): --  CAPILLARY BLOOD GLUCOSE      POCT Blood Glucose.: 192 mg/dL (20 Sep 2021 07:36)      Labs                          12.4   5.95  )-----------( 230      ( 20 Sep 2021 06:29 )             36.3       09-20    134<L>  |  102  |  19<H>  ----------------------------<  190<H>  4.1   |  23  |  0.91    Ca    8.9      20 Sep 2021 06:29  Phos  3.7     09-20  Mg     1.8     09-20    TPro  7.0  /  Alb  3.2<L>  /  TBili  0.2  /  DBili  x   /  AST  28  /  ALT  41  /  AlkPhos  76  09-20            Clean Catch Clean Catch (Midstream)  09-13 @ 02:09   >100,000 CFU/ml Morganella morganii  50,000 - 99,000 CFU/mL Escherichia coli  --  Morganella morganii  Escherichia coli          Radiology Results      Meds    MEDICATIONS  (STANDING):  ALPRAZolam 0.25 milliGRAM(s) Oral at bedtime  ascorbic acid 500 milliGRAM(s) Oral daily  aspirin enteric coated 81 milliGRAM(s) Oral daily  dextrose 5%. 1000 milliLiter(s) (100 mL/Hr) IV Continuous <Continuous>  ferrous    sulfate 325 milliGRAM(s) Oral daily  FLUoxetine 20 milliGRAM(s) Oral daily  folic acid 1 milliGRAM(s) Oral daily  glucagon  Injectable 1 milliGRAM(s) IntraMuscular once  insulin glargine Injectable (LANTUS) 10 Unit(s) SubCutaneous at bedtime  insulin lispro (ADMELOG) corrective regimen sliding scale   SubCutaneous three times a day before meals  memantine 10 milliGRAM(s) Oral daily  metoprolol tartrate 12.5 milliGRAM(s) Oral two times a day  multivitamin 1 Tablet(s) Oral daily  pantoprazole    Tablet 40 milliGRAM(s) Oral before breakfast  QUEtiapine 100 milliGRAM(s) Oral at bedtime  repaglinide 1 milliGRAM(s) Oral three times a day before meals  risperiDONE   Tablet 1 milliGRAM(s) Oral two times a day  sertraline 50 milliGRAM(s) Oral at bedtime  sodium chloride 0.9%. 1000 milliLiter(s) (70 mL/Hr) IV Continuous <Continuous>      MEDICATIONS  (PRN):  acetaminophen   Tablet .. 650 milliGRAM(s) Oral every 6 hours PRN Temp greater or equal to 38C (100.4F), Mild Pain (1 - 3)  melatonin 3 milliGRAM(s) Oral at bedtime PRN Sleep      Physical Exam    Neuro :  no focal deficits  Respiratory: CTA B/L  CV: RRR, S1S2, no murmurs,   Abdominal: Soft, NT, ND +BS,  Extremities: No edema, + peripheral pulses    ASSESSMENT    Hypoglycemia    HTN (hypertension)    DM (diabetes mellitus)    Alzheimer disease    MESERET (iron deficiency anemia)    No significant past surgical history        PLAN     Patient is a 89y old  Female who presents with a chief complaint of Hypoglycemia (19 Sep 2021 11:31)    pt seen in tele [ x ], reg med floor [   ], bed [ x ], chair at bedside [   ], awake and responsive [x  ], lethargic [  ],   nad [ x ]      Allergies    penicillins (Unknown)        Vitals    T(F): 97.4 (09-20-21 @ 07:57), Max: 98.9 (09-19-21 @ 11:08)  HR: 55 (09-20-21 @ 07:57) (55 - 73)  BP: 137/41 (09-20-21 @ 07:57) (109/39 - 144/98)  RR: 18 (09-20-21 @ 07:57) (18 - 19)  SpO2: 97% (09-20-21 @ 07:57) (95% - 100%)  Wt(kg): --  CAPILLARY BLOOD GLUCOSE      POCT Blood Glucose.: 192 mg/dL (20 Sep 2021 07:36)      Labs                          12.4   5.95  )-----------( 230      ( 20 Sep 2021 06:29 )             36.3       09-20    134<L>  |  102  |  19<H>  ----------------------------<  190<H>  4.1   |  23  |  0.91    Ca    8.9      20 Sep 2021 06:29  Phos  3.7     09-20  Mg     1.8     09-20    TPro  7.0  /  Alb  3.2<L>  /  TBili  0.2  /  DBili  x   /  AST  28  /  ALT  41  /  AlkPhos  76  09-20            Clean Catch Clean Catch (Midstream)  09-13 @ 02:09   >100,000 CFU/ml Morganella morganii  50,000 - 99,000 CFU/mL Escherichia coli  --  Morganella morganii  Escherichia coli          Radiology Results      Meds    MEDICATIONS  (STANDING):  ALPRAZolam 0.25 milliGRAM(s) Oral at bedtime  ascorbic acid 500 milliGRAM(s) Oral daily  aspirin enteric coated 81 milliGRAM(s) Oral daily  dextrose 5%. 1000 milliLiter(s) (100 mL/Hr) IV Continuous <Continuous>  ferrous    sulfate 325 milliGRAM(s) Oral daily  FLUoxetine 20 milliGRAM(s) Oral daily  folic acid 1 milliGRAM(s) Oral daily  glucagon  Injectable 1 milliGRAM(s) IntraMuscular once  insulin glargine Injectable (LANTUS) 10 Unit(s) SubCutaneous at bedtime  insulin lispro (ADMELOG) corrective regimen sliding scale   SubCutaneous three times a day before meals  memantine 10 milliGRAM(s) Oral daily  metoprolol tartrate 12.5 milliGRAM(s) Oral two times a day  multivitamin 1 Tablet(s) Oral daily  pantoprazole    Tablet 40 milliGRAM(s) Oral before breakfast  QUEtiapine 100 milliGRAM(s) Oral at bedtime  repaglinide 1 milliGRAM(s) Oral three times a day before meals  risperiDONE   Tablet 1 milliGRAM(s) Oral two times a day  sertraline 50 milliGRAM(s) Oral at bedtime  sodium chloride 0.9%. 1000 milliLiter(s) (70 mL/Hr) IV Continuous <Continuous>      MEDICATIONS  (PRN):  acetaminophen   Tablet .. 650 milliGRAM(s) Oral every 6 hours PRN Temp greater or equal to 38C (100.4F), Mild Pain (1 - 3)  melatonin 3 milliGRAM(s) Oral at bedtime PRN Sleep      Physical Exam    Neuro :  no focal deficits  Respiratory: CTA B/L  CV: RRR, S1S2, no murmurs,   Abdominal: Soft, NT, ND +BS,  Extremities: No edema, + peripheral pulses    ASSESSMENT      syncope likely 2nd to hypoglycemia,   cns patho r/o ,   chest pain,    acs r/o,   sick sinus synd,   h/o DM (on insulin basaglar and metformin),   Alzheimer's dementia,   Iron Deficiency Anemia  HTN (hypertension)      PLAN    cont tele,   lispro ss,   endo f/u   cont lantus 12 units   off of admelog 3 ac tid  on prandin 1mg ac tid- good control   a1c level- 7.1  fsg ac and hs  tsh elevated  free t4 wnl  neuro cons noted   cont aspirin, statin,   ce x1 neg noted    cardio f/u    echo with Ejection Fraction Visual Estimate: >55 % , Grade I diastolic dysfunction noted    stress test neg for ischemia noted    pt with wide complex tachycardia with 2 to 1 heart block on tele monitor   s/p shuttle to [facility] for micra ppm placement 9/17/21  cont lopressor 12.5 mg bid    d/c right groin suture   ucx and sens with  Escherichia coli  --  Morganella morganii    completed rocephin 1g daily x 3 days  cont current meds   pt stable for d/c

## 2021-09-20 NOTE — DISCHARGE NOTE NURSING/CASE MANAGEMENT/SOCIAL WORK - NSSCNAMETXT_GEN_ALL_CORE
[facility] (formerly [facility]) [phone]

## 2021-09-20 NOTE — PROGRESS NOTE ADULT - SUBJECTIVE AND OBJECTIVE BOX
PGY-1 Progress Note discussed with attending    PAGER #: [[phone]] TILL 5:00 PM  PLEASE CONTACT ON CALL TEAM:  - On Call Team (Please refer to Morena) FROM 5:00 PM - 8:30PM  - Nightfloat Team FROM 8:30 -7:30 AM    CHIEF COMPLAINT & BRIEF HOSPITAL COURSE:    Patient is a 89yo F from home minimal ambulation with walker, AAOx 1-2 with PMH of DM (on insulin basaglar and metformin), Alzheimer's dementia, MESERET presents to the ER after a syncopal episode. found BS 41 with EMS report. Per EMS, pt was treated for UTI with Bactrim for a week. Admitted for hypoglycemia and rule out ACS. on Tele monitor. Endo/Cardiology consulted. Carotid US resulted no significant findings. UA negative. Echo ordered. Neurology dr. Jacobs consulted.     INTERVAL HPI/OVERNIGHT EVENTS:     Patient examined at bedside. AAOx3, stable, NAD. Denies any headache, dizziness, chest paIn, SOB, palpitation. S/p PPM placement (Micra) last 9/17/21. No significant events overnight.    REVIEW OF SYSTEMS:  CONSTITUTIONAL: No fever, weight loss, or fatigue  RESPIRATORY: No cough, wheezing, chills or hemoptysis; No shortness of breath  CARDIOVASCULAR: No chest pain, palpitations, dizziness, or leg swelling  GASTROINTESTINAL: No abdominal pain. No nausea, vomiting, or hematemesis; No diarrhea or constipation. No melena or hematochezia.  GENITOURINARY: No dysuria or hematuria, urinary frequency  NEUROLOGICAL: No headaches, memory loss, loss of strength, numbness, or tremors  SKIN: No itching, burning, rashes, or lesions     MEDICATIONS  (STANDING):  ALPRAZolam 0.25 milliGRAM(s) Oral at bedtime  ascorbic acid 500 milliGRAM(s) Oral daily  aspirin enteric coated 81 milliGRAM(s) Oral daily  dextrose 5%. 1000 milliLiter(s) (100 mL/Hr) IV Continuous <Continuous>  ferrous    sulfate 325 milliGRAM(s) Oral daily  FLUoxetine 20 milliGRAM(s) Oral daily  folic acid 1 milliGRAM(s) Oral daily  glucagon  Injectable 1 milliGRAM(s) IntraMuscular once  insulin glargine Injectable (LANTUS) 10 Unit(s) SubCutaneous at bedtime  insulin lispro (ADMELOG) corrective regimen sliding scale   SubCutaneous three times a day before meals  memantine 10 milliGRAM(s) Oral daily  metoprolol tartrate 12.5 milliGRAM(s) Oral two times a day  multivitamin 1 Tablet(s) Oral daily  pantoprazole    Tablet 40 milliGRAM(s) Oral before breakfast  QUEtiapine 100 milliGRAM(s) Oral at bedtime  repaglinide 1 milliGRAM(s) Oral three times a day before meals  risperiDONE   Tablet 1 milliGRAM(s) Oral two times a day  sertraline 50 milliGRAM(s) Oral at bedtime  sodium chloride 0.9%. 1000 milliLiter(s) (70 mL/Hr) IV Continuous <Continuous>    MEDICATIONS  (PRN):  acetaminophen   Tablet .. 650 milliGRAM(s) Oral every 6 hours PRN Temp greater or equal to 38C (100.4F), Mild Pain (1 - 3)  melatonin 3 milliGRAM(s) Oral at bedtime PRN Sleep      Vital Signs Last 24 Hrs  T(C): 36.3 (20 Sep 2021 07:57), Max: 37.2 (19 Sep 2021 11:08)  T(F): 97.4 (20 Sep 2021 07:57), Max: 98.9 (19 Sep 2021 11:08)  HR: 55 (20 Sep 2021 07:57) (55 - 73)  BP: 137/41 (20 Sep 2021 07:57) (109/39 - 144/98)  BP(mean): --  RR: 18 (20 Sep 2021 07:57) (18 - 19)  SpO2: 97% (20 Sep 2021 07:57) (95% - 100%)    PHYSICAL EXAMINATION:  GENERAL: NAD, well built  HEAD:  Atraumatic, Normocephalic  EYES:  conjunctiva and sclera clear  NECK: Supple, No JVD, Normal thyroid  CHEST/LUNG: Clear to auscultation. Clear to percussion bilaterally; No rales, rhonchi, wheezing, or rubs  HEART: Regular rate and rhythm; No murmurs, rubs, or gallops  ABDOMEN: Soft, Nontender, Nondistended; Bowel sounds present, no pain or masses on palpation  NERVOUS SYSTEM:  Alert & Oriented X3  : voiding well  EXTREMITIES:  2+ Peripheral Pulses, No clubbing, cyanosis, or edema  SKIN: warm dry                          12.4   5.95  )-----------( 230      ( 20 Sep 2021 06:29 )             36.3     09-20    134<L>  |  102  |  19<H>  ----------------------------<  190<H>  4.1   |  23  |  0.91    Ca    8.9      20 Sep 2021 06:29  Phos  3.7     09-20  Mg     1.8     09-20    TPro  7.0  /  Alb  3.2<L>  /  TBili  0.2  /  DBili  x   /  AST  28  /  ALT  41  /  AlkPhos  76  09-20    LIVER FUNCTIONS - ( 20 Sep 2021 06:29 )  Alb: 3.2 g/dL / Pro: 7.0 g/dL / ALK PHOS: 76 U/L / ALT: 41 U/L DA / AST: 28 U/L / GGT: x                   I&O's Summary    19 Sep 2021 07:01  -  20 Sep 2021 07:00  --------------------------------------------------------  IN: 0 mL / OUT: 950 mL / NET: -950 mL            CAPILLARY BLOOD GLUCOSE      RADIOLOGY & ADDITIONAL TESTS:                   PGY-1 Progress Note discussed with attending    PAGER #: [[phone]] TILL 5:00 PM  PLEASE CONTACT ON CALL TEAM:  - On Call Team (Please refer to Morena) FROM 5:00 PM - 8:30PM  - Nightfloat Team FROM 8:30 -7:30 AM    CHIEF COMPLAINT & BRIEF HOSPITAL COURSE:    Patient is a 89yo F from home minimal ambulation with walker, AAOx 1-2 with PMH of DM (on insulin basaglar and metformin), Alzheimer's dementia, MESERET presents to the ER after a syncopal episode. found BS 41 with EMS report. Per EMS, pt was treated for UTI with Bactrim for a week. Admitted for hypoglycemia and rule out ACS. on Tele monitor. Endo/Cardiology consulted. Carotid US resulted no significant findings. UA negative. Echo ordered. Neurology dr. Jacobs consulted.     INTERVAL HPI/OVERNIGHT EVENTS:     Patient examined at bedside. AAOx3, stable, NAD. Denies any headache, dizziness, chest paIn, SOB, palpitation. S/p PPM placement (Micra) last 9/17/21. No significant events overnight.    REVIEW OF SYSTEMS:  CONSTITUTIONAL: No fever, weight loss, or fatigue  RESPIRATORY: No cough, wheezing, chills or hemoptysis; No shortness of breath  CARDIOVASCULAR: No chest pain, palpitations, dizziness, or leg swelling  GASTROINTESTINAL: No abdominal pain. No nausea, vomiting, or hematemesis; No diarrhea or constipation. No melena or hematochezia.  GENITOURINARY: No dysuria or hematuria, urinary frequency  NEUROLOGICAL: No headaches, memory loss, loss of strength, numbness, or tremors  SKIN: No itching, burning, rashes, or lesions     MEDICATIONS  (STANDING):  ALPRAZolam 0.25 milliGRAM(s) Oral at bedtime  ascorbic acid 500 milliGRAM(s) Oral daily  aspirin enteric coated 81 milliGRAM(s) Oral daily  dextrose 5%. 1000 milliLiter(s) (100 mL/Hr) IV Continuous <Continuous>  ferrous    sulfate 325 milliGRAM(s) Oral daily  FLUoxetine 20 milliGRAM(s) Oral daily  folic acid 1 milliGRAM(s) Oral daily  glucagon  Injectable 1 milliGRAM(s) IntraMuscular once  insulin glargine Injectable (LANTUS) 10 Unit(s) SubCutaneous at bedtime  insulin lispro (ADMELOG) corrective regimen sliding scale   SubCutaneous three times a day before meals  memantine 10 milliGRAM(s) Oral daily  metoprolol tartrate 12.5 milliGRAM(s) Oral two times a day  multivitamin 1 Tablet(s) Oral daily  pantoprazole    Tablet 40 milliGRAM(s) Oral before breakfast  QUEtiapine 100 milliGRAM(s) Oral at bedtime  repaglinide 1 milliGRAM(s) Oral three times a day before meals  risperiDONE   Tablet 1 milliGRAM(s) Oral two times a day  sertraline 50 milliGRAM(s) Oral at bedtime  sodium chloride 0.9%. 1000 milliLiter(s) (70 mL/Hr) IV Continuous <Continuous>    MEDICATIONS  (PRN):  acetaminophen   Tablet .. 650 milliGRAM(s) Oral every 6 hours PRN Temp greater or equal to 38C (100.4F), Mild Pain (1 - 3)  melatonin 3 milliGRAM(s) Oral at bedtime PRN Sleep      Vital Signs Last 24 Hrs  T(C): 36.3 (20 Sep 2021 07:57), Max: 37.2 (19 Sep 2021 11:08)  T(F): 97.4 (20 Sep 2021 07:57), Max: 98.9 (19 Sep 2021 11:08)  HR: 55 (20 Sep 2021 07:57) (55 - 73)  BP: 137/41 (20 Sep 2021 07:57) (109/39 - 144/98)  BP(mean): --  RR: 18 (20 Sep 2021 07:57) (18 - 19)  SpO2: 97% (20 Sep 2021 07:57) (95% - 100%)    PHYSICAL EXAMINATION:  GENERAL: NAD, well built  HEAD:  Atraumatic, Normocephalic  EYES:  conjunctiva and sclera clear  NECK: Supple, No JVD, Normal thyroid  CHEST/LUNG: Clear to auscultation. Clear to percussion bilaterally; No rales, rhonchi, wheezing, or rubs  HEART: Regular rate and rhythm; No murmurs, rubs, or gallops  ABDOMEN: Soft, Nontender, Nondistended; Bowel sounds present, no pain or masses on palpation  NERVOUS SYSTEM:  Alert & Oriented X3  : voiding well  EXTREMITIES:  2+ Peripheral Pulses, No clubbing, cyanosis, or edema  SKIN: warm dry; (+) dressing at the R groin (s/p PM placement)                          12.4   5.95  )-----------( 230      ( 20 Sep 2021 06:29 )             36.3     09-20    134<L>  |  102  |  19<H>  ----------------------------<  190<H>  4.1   |  23  |  0.91    Ca    8.9      20 Sep 2021 06:29  Phos  3.7     09-20  Mg     1.8     09-20    TPro  7.0  /  Alb  3.2<L>  /  TBili  0.2  /  DBili  x   /  AST  28  /  ALT  41  /  AlkPhos  76  09-20    LIVER FUNCTIONS - ( 20 Sep 2021 06:29 )  Alb: 3.2 g/dL / Pro: 7.0 g/dL / ALK PHOS: 76 U/L / ALT: 41 U/L DA / AST: 28 U/L / GGT: x                   I&O's Summary    19 Sep 2021 07:01  -  20 Sep 2021 07:00  --------------------------------------------------------  IN: 0 mL / OUT: 950 mL / NET: -950 mL            CAPILLARY BLOOD GLUCOSE      RADIOLOGY & ADDITIONAL TESTS:

## 2021-09-20 NOTE — PROGRESS NOTE ADULT - PROBLEM SELECTOR PROBLEM 3
UTI (urinary tract infection)
Electrolyte imbalance
Heart block
UTI (urinary tract infection)
Heart block
DM (diabetes mellitus)
Heart block

## 2021-09-20 NOTE — DISCHARGE NOTE PROVIDER - NSDCMRMEDTOKEN_GEN_ALL_CORE_FT
Aspirin Enteric Coated 81 mg oral delayed release tablet: 1 tab(s) orally once a day  biotin 10 mg oral tablet: 1 tab(s) orally once a day  esomeprazole 40 mg oral delayed release capsule: 1 cap(s) orally once a day  ferrous sulfate 325 mg (65 mg elemental iron) oral tablet: 1 tab(s) orally once a day  FLUoxetine 20 mg oral tablet: 1 tab(s) orally once a day  folic acid 0.8 mg oral tablet: 1 tab(s) orally once a day  insulin glargine: 12 unit(s) subcutaneous once a day (at bedtime)  losartan 50 mg oral tablet: 1 tab(s) orally once a day  metFORMIN 500 mg oral tablet: 1 tab(s) orally once a day  Multiple Vitamins oral tablet: 1 tab(s) orally once a day  QUEtiapine 100 mg oral tablet: 1 tab(s) orally once a day (at bedtime)  repaglinide 1 mg oral tablet: 1 tab(s) orally 3 times a day (before meals)  risperiDONE 1 mg oral tablet: 1 tab(s) orally 2 times a day  sertraline 50 mg oral tablet: 1 tab(s) orally once a day  Toprol-XL 25 mg oral tablet, extended release: 1 tab(s) orally once a day   Vitamin C 500 mg oral tablet: 1 tab(s) orally once a day  Vitamin D3 1250 mcg (50,000 intl units) oral capsule: 1 cap(s) orally once a week   Aspirin Enteric Coated 81 mg oral delayed release tablet: 1 tab(s) orally once a day  Basaglar KwikPen 100 units/mL subcutaneous solution: 12 unit(s) subcutaneous once a day (at bedtime)   biotin 10 mg oral tablet: 1 tab(s) orally once a day  esomeprazole 40 mg oral delayed release capsule: 1 cap(s) orally once a day  ferrous sulfate 325 mg (65 mg elemental iron) oral tablet: 1 tab(s) orally once a day  FLUoxetine 20 mg oral tablet: 1 tab(s) orally once a day  folic acid 0.8 mg oral tablet: 1 tab(s) orally once a day  Multiple Vitamins oral tablet: 1 tab(s) orally once a day  QUEtiapine 100 mg oral tablet: 1 tab(s) orally once a day (at bedtime)  repaglinide 1 mg oral tablet: 1 tab(s) orally 3 times a day (before meals)  risperiDONE 1 mg oral tablet: 1 tab(s) orally 2 times a day  sertraline 50 mg oral tablet: 1 tab(s) orally once a day  Toprol-XL 25 mg oral tablet, extended release: 1 tab(s) orally once a day   Vitamin C 500 mg oral tablet: 1 tab(s) orally once a day  Vitamin D3 1250 mcg (50,000 intl units) oral capsule: 1 cap(s) orally once a week

## 2021-09-20 NOTE — PROGRESS NOTE ADULT - PROBLEM SELECTOR PLAN 9
lovenox for DVT ppx RISK                                                          Points  [] Previous VTE                                           3  [] Thrombophilia                                        2  [] Lower limb paralysis                              2   [] Current Cancer                                       2   [x] Immobilization > 24 hrs                        1  [] ICU/CCU stay > 24 hours                       1  [x] Age > 60                                                   1    lovenox for DVT ppx

## 2021-09-20 NOTE — DISCHARGE NOTE PROVIDER - CARE PROVIDER_API CALL
[facility]  INTERNAL MEDICINE  [address] [place] [address]  Phone: [phone]  Fax: [phone]  Follow Up Time:

## 2021-09-20 NOTE — DISCHARGE NOTE NURSING/CASE MANAGEMENT/SOCIAL WORK - PATIENT PORTAL LINK FT
You can access the FollowMyHealth Patient Portal offered by [facility] by registering at the following website: [email or web address]. By joining [facility] [facility]’s FollowMyHealth portal, you will also be able to view your health information using other applications (apps) compatible with our system.

## 2021-09-20 NOTE — PROGRESS NOTE ADULT - SUBJECTIVE AND OBJECTIVE BOX
Interval Events:  pt in nad    Allergies    penicillins (Unknown)    Intolerances      Endocrine/Metabolic Medications:  glucagon  Injectable 1 milliGRAM(s) IntraMuscular once  insulin glargine Injectable (LANTUS) 10 Unit(s) SubCutaneous at bedtime  insulin lispro (ADMELOG) corrective regimen sliding scale   SubCutaneous three times a day before meals  repaglinide 1 milliGRAM(s) Oral three times a day before meals      Vital Signs Last 24 Hrs  T(C): 36.3 (20 Sep 2021 07:57), Max: 37.2 (19 Sep 2021 11:08)  T(F): 97.4 (20 Sep 2021 07:57), Max: 98.9 (19 Sep 2021 11:08)  HR: 55 (20 Sep 2021 07:57) (55 - 73)  BP: 137/41 (20 Sep 2021 07:57) (109/39 - 144/98)  BP(mean): --  RR: 18 (20 Sep 2021 07:57) (18 - 19)  SpO2: 97% (20 Sep 2021 07:57) (95% - 100%)      PHYSICAL EXAM  All physical exam findings normal, except those marked:  General:	Alert, active, cooperative, NAD, well hydrated  .		[] Abnormal:  Neck		Normal: supple, no cervical adenopathy, no palpable thyroid  .		[] Abnormal:  Cardiovascular	Normal: regular rate, normal S1, S2, no murmurs  .		[] Abnormal:  Respiratory	Normal: no chest wall deformity, normal respiratory pattern, CTA B/L  .		[] Abnormal:  Abdominal	Normal: soft, ND, NT, bowel sounds present, no masses, no organomegaly  .		[] Abnormal:  		Normal normal genitalia, testes descended, circumcised/uncircumcised  .		Jude stage:			Breast jude:  .		Menstrual history:  .		[] Abnormal:  Extremities	Normal: FROM x4  .		[] Abnormal:  Skin		Normal: intact and not indurated, no rash, no acanthosis nigricans  .		[] Abnormal:  Neurologic	Normal: grossly intact  .		[] Abnormal:    LABS                        12.4   5.95  )-----------( 230      ( 20 Sep 2021 06:29 )             36.3                               134    |  102    |  19                  Calcium: 8.9   / iCa: x      (09-20 @ 06:29)    ----------------------------<  190       Magnesium: 1.8                              4.1     |  23     |  0.91             Phosphorous: 3.7      TPro  7.0    /  Alb  3.2    /  TBili  0.2    /  DBili  x      /  AST  28     /  ALT  41     /  AlkPhos  76     20 Sep 2021 06:29    CAPILLARY BLOOD GLUCOSE      POCT Blood Glucose.: 192 mg/dL (20 Sep 2021 07:36)  POCT Blood Glucose.: 254 mg/dL (19 Sep 2021 21:20)  POCT Blood Glucose.: 286 mg/dL (19 Sep 2021 16:57)  POCT Blood Glucose.: 177 mg/dL (19 Sep 2021 11:26)        Assesment/plan    Pt is a 89yo F from home minimal ambulation with walker, AAOx 1-2 with PMH of DM (on insulin basaglar and metformin), Alzheimer's dementia, MESERET presents to the ER after a syncopal episode.)  Found to have hypoglycemia. Pt denies any complaints . Admits to eating well. Does not recall meds at home.          Problem/Recommendation - 1:  ·  Problem: Hypoglycemia.   resolved  now hyperglycemic  cont lantus 12 units   off of admelog 3 ac tid  on prandin 1mg ac tid- mild post meal hyperglycemia  a1c level- 7.1  fsg ac and hs  aim fsg 150-<200  d/w prim team.     Problem/Recommendation - 2:  ·  Problem: Syncope.   ·  Recommendation: likely due to hypoglycemia f/u cardio recs  s/p PPM

## 2021-09-20 NOTE — PROGRESS NOTE ADULT - PROVIDER SPECIALTY LIST ADULT
Cardiology
Endocrinology
Endocrinology
Internal Medicine
Cardiology
Endocrinology
Internal Medicine
Cardiology
Cardiology
Endocrinology
Internal Medicine

## 2021-09-20 NOTE — DISCHARGE NOTE PROVIDER - HOSPITAL COURSE
Patient is a 89yo F from home minimal ambulation with walker, AAOx 1-2 with PMH of DM (on insulin basaglar and metformin), Alzheimer's dementia, MESERET presents to the ER after a syncopal episode. found BS 41 with EMS report. Per EMS, pt was treated for UTI with Bactrim for a week. Admitted for hypoglycemia and rule out ACS. on Tele monitor. Endo/Cardiology consulted. Carotid US resulted no significant findings. UA negative. Echo ordered. Neurology was consulted.     She was admitted due to episodes of Hypoglycemia. She had syncopal episode with diaphoresis and loss of consciousness. At home she was on Basaglar insulin at 34u and Metformin 500 mg once daily. The hypoglycemia was likely due to the basaglar and poor oral intake. She was given 2 ampules of D50 and the blood glucose improved to 129. She was initially started on IVF: D5 at 150cc/hr then switched to D50 at 70 cc/hr. IVF was eventually discontinued when she became hyperglycemic (350). Endocrinology (Dr. Huff) was consulted. Continued on Lantus 12 units but Admelog 3u was held. Started on low-dose Prandin 1mg TID.    She was also admitted due to syncopal episodes. She was admitted on Telemetry monitoring. Carotid doppler was done and it was negative. Echocardiogram showed Grade I Diastolic Dysfunction (EF>55%). Cardiology (Dr. Xie) was consulted. Nuclear stress test was negative for any ischemia nor infarct.    She has Urinary Tract Infection on admission. Urinalysis was negative but Urine Culture showed growth in Morganella morgagni, E. coli. Started on Ceftriaxone was given IVPB. Patient remained to be asymptomatic.    she also has diabetes mellitus. Home medications include Basaglar 34u and Metformin 500 mg once daily. Started on Lantus 12 units at night and Admelog 3 units TID. She was started on a trial of low dose Prandin 1mg ac TID today.    She also has hypertension. Blood pressure has ranged between 109 and 144 systolic. Home medications include Metoprolol tartrate 12.5mg PO BID. Patient's current BP is 109/39.Her heart rate has ranged from 56 to 73 bpm. Plan is to continue with Cozaar with parameters.    Patient presented with elevated TSH. Free T4 and T3 drawn. Blood work was negative. No further work-up required.     She also has Alzheimer's disease. Home medications include memantine 10mg daily. Home medications were continued.    Patient is stable for discharge. For follow-up with Cardiology (Dr. Xie) in 2 weeks.

## 2021-09-20 NOTE — PROGRESS NOTE ADULT - PROBLEM SELECTOR PLAN 1
Pt p/w after a syncopal episode with aura of diaphoresis and had LOC for 10 mins  At home pts BG 41  Pt has h/o T2DM and at home on basaglar insulin 34U in the morning and metformin 500mg od  Vitals stable  PE unremarkable  hypoglycemia likely due to basaglar and poor oral intake  -Was given 2 amps of D 50, BG improved to 129  -started on D5 @120cc/hr, switched to D5 NS at 70cc/hr  -fluids Dced d/t hyperglycemia (350s)  -monitor BGs Q4h  Endo Dr. Huff consulted: start on Januvia 100 on dc  -now hyperglycemic, cont lantus 12 units  and hold admelog 3 ac tid  -trial of low dose prandin 1mg ac tid yesterday
Pt p/w after a syncopal episode with aura of diaphoresis and had LOC for 10 mins  At home pts BG 41  Pt has h/o T2DM and at home on basaglar insulin 34U in the morning and metformin 500mg od  Vitals stable  PE unremarkable  hypoglycemia likely due to basaglar and poor oral intake  -Was given 2 amps of D 50, BG improved to 129  -started on D5 @120cc/hr, switched to D5 NS at 70cc/hr  -fluids Dced d/t hyperglycemia (350s)  -monitor BGs Q4h  Endo Dr. Huff consulted: start on Januvia 100 on dc
Pt p/w after a syncopal episode with aura of diaphoresis and had LOC for 10 mins  At home pts BG 41  Pt has h/o T2DM and at home on basaglar insulin 34U in the morning and metformin 500mg od  Vitals stable  PE unremarkable  Was given 2 amps of D 50, BG improved to 129  started on D5 @120cc/hr, switched to D5 NS at 70cc/hr  monitor BGs Q4h  f/u Hgb A1C   Endo Dr. Huff consulted
Pt p/w after a syncopal episode with aura of diaphoresis and had LOC for 10 mins  At home pts BG 41  Pt has h/o T2DM and at home on basaglar insulin 34U in the morning and metformin 500mg od  Vitals stable  PE unremarkable  hypoglycemia likely due to basaglar and poor oral intake  -Was given 2 amps of D 50, BG improved to 129  -started on D5 @120cc/hr, switched to D5 NS at 70cc/hr  -fluids Dced d/t hyperglycemia (350s)  -monitor BGs Q4h  Endo Dr. Huff consulted: start on Januvia 100 on dc
Pt p/w after a syncopal episode with aura of diaphoresis and had LOC for 10 mins  At home pts BG 41  Pt has h/o T2DM and at home on basaglar insulin 34U in the morning and metformin 500mg od  Vitals stable  PE unremarkable  hypoglycemia likely due to basaglar and poor oral intake  -Was given 2 amps of D 50, BG improved to 129  -started on D5 @120cc/hr, switched to D5 NS at 70cc/hr  -fluids Dced d/t hyperglycemia (350s)  -monitor BGs Q4h  Endo Dr. Huff consulted: start on Januvia 100 on dc  -now hyperglycemic, cont lantus 12 units  and hold admelog 3 ac tid  -trial of low dose prandin 1mg ac tid today

## 2021-09-20 NOTE — PROGRESS NOTE ADULT - PROBLEM SELECTOR PLAN 3
on tele   missed bets with pr lengthening  2nd degree AV block  -shuttle to [facility] for PPM (Micra) placement 9/17  -tolerated procedure well, [facility]  missed bets with pr lengthening  2nd degree AV block  -shuttle to [facility] for PPM (Micra) placement 9/17  -tolerated procedure well, transferred back to [facility]  telemetry post-procedure was unremarkable  d/c telemetry monitoring

## 2021-09-22 LAB
ALLERGY+IMMUNOLOGY DIAG STUDY NOTE: SIGNIFICANT CHANGE UP
ANTIBODY INTERPRETATION 2: SIGNIFICANT CHANGE UP

## 2021-09-22 PROCEDURE — 86769 SARS-COV-2 COVID-19 ANTIBODY: CPT

## 2021-09-22 PROCEDURE — 84443 ASSAY THYROID STIM HORMONE: CPT

## 2021-09-22 PROCEDURE — 99285 EMERGENCY DEPT VISIT HI MDM: CPT

## 2021-09-22 PROCEDURE — 85025 COMPLETE CBC W/AUTO DIFF WBC: CPT

## 2021-09-22 PROCEDURE — 84439 ASSAY OF FREE THYROXINE: CPT

## 2021-09-22 PROCEDURE — 83036 HEMOGLOBIN GLYCOSYLATED A1C: CPT

## 2021-09-22 PROCEDURE — 80048 BASIC METABOLIC PNL TOTAL CA: CPT

## 2021-09-22 PROCEDURE — 86901 BLOOD TYPING SEROLOGIC RH(D): CPT

## 2021-09-22 PROCEDURE — 83735 ASSAY OF MAGNESIUM: CPT

## 2021-09-22 PROCEDURE — 86880 COOMBS TEST DIRECT: CPT

## 2021-09-22 PROCEDURE — 85027 COMPLETE CBC AUTOMATED: CPT

## 2021-09-22 PROCEDURE — 93005 ELECTROCARDIOGRAM TRACING: CPT

## 2021-09-22 PROCEDURE — 86870 RBC ANTIBODY IDENTIFICATION: CPT

## 2021-09-22 PROCEDURE — 84100 ASSAY OF PHOSPHORUS: CPT

## 2021-09-22 PROCEDURE — 86850 RBC ANTIBODY SCREEN: CPT

## 2021-09-22 PROCEDURE — 82962 GLUCOSE BLOOD TEST: CPT

## 2021-09-22 PROCEDURE — 84481 FREE ASSAY (FT-3): CPT

## 2021-09-22 PROCEDURE — 93017 CV STRESS TEST TRACING ONLY: CPT

## 2021-09-22 PROCEDURE — 78452 HT MUSCLE IMAGE SPECT MULT: CPT

## 2021-09-22 PROCEDURE — 87186 SC STD MICRODIL/AGAR DIL: CPT

## 2021-09-22 PROCEDURE — 80061 LIPID PANEL: CPT

## 2021-09-22 PROCEDURE — A9502: CPT

## 2021-09-22 PROCEDURE — 71045 X-RAY EXAM CHEST 1 VIEW: CPT

## 2021-09-22 PROCEDURE — 93880 EXTRACRANIAL BILAT STUDY: CPT

## 2021-09-22 PROCEDURE — 87635 SARS-COV-2 COVID-19 AMP PRB: CPT

## 2021-09-22 PROCEDURE — 86900 BLOOD TYPING SEROLOGIC ABO: CPT

## 2021-09-22 PROCEDURE — 80053 COMPREHEN METABOLIC PANEL: CPT

## 2021-09-22 PROCEDURE — 87086 URINE CULTURE/COLONY COUNT: CPT

## 2021-09-22 PROCEDURE — 93306 TTE W/DOPPLER COMPLETE: CPT

## 2021-09-22 PROCEDURE — 36415 COLL VENOUS BLD VENIPUNCTURE: CPT

## 2021-09-22 PROCEDURE — 87077 CULTURE AEROBIC IDENTIFY: CPT

## 2021-09-23 ENCOUNTER — EMERGENCY (EMERGENCY)
Facility: HOSPITAL | Age: 86
LOS: 1 days | Discharge: HOME | End: 2021-09-23
Attending: STUDENT IN AN ORGANIZED HEALTH CARE EDUCATION/TRAINING PROGRAM
Payer: COMMERCIAL

## 2021-09-23 VITALS
OXYGEN SATURATION: 99 % | SYSTOLIC BLOOD PRESSURE: 179 MMHG | TEMPERATURE: 98 F | HEART RATE: 66 BPM | DIASTOLIC BLOOD PRESSURE: 82 MMHG | RESPIRATION RATE: 18 BRPM

## 2021-09-23 VITALS
OXYGEN SATURATION: 99 % | SYSTOLIC BLOOD PRESSURE: 169 MMHG | HEIGHT: 64 IN | RESPIRATION RATE: 20 BRPM | WEIGHT: 147.93 LBS | DIASTOLIC BLOOD PRESSURE: 81 MMHG | TEMPERATURE: 98 F | HEART RATE: 67 BPM

## 2021-09-23 PROCEDURE — 99283 EMERGENCY DEPT VISIT LOW MDM: CPT

## 2021-09-23 RX ORDER — BACITRACIN ZINC 500 UNIT/G
1 OINTMENT IN PACKET (EA) TOPICAL ONCE
Refills: 0 | Status: COMPLETED | OUTPATIENT
Start: 2021-09-23 | End: 2021-09-23

## 2021-09-23 RX ADMIN — Medication 1 APPLICATION(S): at 23:55

## 2021-09-23 RX ADMIN — Medication 450 MILLIGRAM(S): at 23:55

## 2021-09-23 NOTE — ED PROVIDER NOTE - PATIENT PORTAL LINK FT
You can access the FollowMyHealth Patient Portal offered by [facility] by registering at the following website: [email or web address]. By joining [employer]’s FollowMyHealth portal, you will also be able to view your health information using other applications (apps) compatible with our system.

## 2021-09-23 NOTE — ED PROVIDER NOTE - CLINICAL SUMMARY MEDICAL DECISION MAKING FREE TEXT BOX
Pt p/w very mild erythema around R fem cath site with no other s/s of infection. Given limitations of hx 2/2 dementia and high risk MRSA infection will treat with clinda and rec PMD f/u within 3 days. Rx sent and first dose in the ED. Probiotics rec as well. Most likely mild cellulitis vs skin irritation- the details of the case, history, and exam make more emergent diagnoses much less likely. Discussed with pt my clinical impression and results, patient given strict return precautions if persistent or worsening of symptoms occurs, and need for close follow up. Pt expressed understanding and agrees with plan. Pt is well appearing with a reassuring exam. Discharge home with PMD or Specialist f/u within 3 days.

## 2021-09-23 NOTE — ED PROVIDER NOTE - NSFOLLOWUPINSTRUCTIONS_ED_ALL_ED_FT
You were seen in the emergency room today for redness around the femoral catheter site. Please  your prescription and take as directed. Immediately seek medical attention or return to the ER if you have signs or symptoms of an allergic reaction after taking the medication (including- rash or swelling, wheezing or shortness or breath, vomiting or belly pain).      Please call your primary doctor to inform them of this ER visit and obtain the next available appointment within the next 3 days. As we discussed, return to the ER if you have any worsening symptoms.    We no longer feel that you need further emergency care or admission to the hospital at this time.    While we have determined that you are currently stable for discharge, we know that things can change. Please seek immediate medical attention or return to the ER if you experience any of the following:  Any worsening or persistent symptoms  Severe Pain  Chest Pain  Difficulty Breathing  Bleeding  Passing Out  Severe Rash  Inability to Eat or Drink  Persistent Fever    Please see a primary care doctor or specialist within 5 days to ensure that you are improving.    Please call the [employer] phone numbers on this document if you have any problems obtaining a follow up appointment.    I wish you well! -Dr Coon

## 2021-09-23 NOTE — ED ADULT TRIAGE NOTE - CHIEF COMPLAINT QUOTE
As per daughter last Friday mother had cardiac cath via right groin and now groin redness, no fever or pain

## 2021-09-23 NOTE — ED PROVIDER NOTE - IV ALTEPLASE EXCL REL HIDDEN
Called patient to discuss results of recent stress test. Patient reports feeling well. Denies CP or SOB. He is able to mow his lawn and preform other daily activities without any issues. He states he is comfortable with keeping his appointment on Friday. However, if patient develops any symptoms, he will contact our office sooner. We can double book him in clinic if necessary, but at this time he remains asymptomatic.    show

## 2021-09-23 NOTE — ED PROVIDER NOTE - OBJECTIVE STATEMENT
89 y/o female with history of DM, HTN, dementia, pacemaker placement 6 days ago with right femoral cath site, presenting with concern of infection at right femoral cath site. Patient denies any complaints however daughter states that today she noticed a small amount of redness around cath site that was not present 2 days ago and there has been no pain, fever, or pus drainage. Patient states she feels well. Patient is not currently on antibiotics. Patient denies any recent nausea, vomiting, diarrhea, chest pain, shortness of breath, syncope, or any other recent illnesses and hospitalizations.

## 2021-09-23 NOTE — ED PROVIDER NOTE - PHYSICAL EXAMINATION
Vital Signs Reviewed  GEN: Comfortable, NAD, AAOx3  HEENT: NCAT, MMM, Neck Supple  RESP: CTAB, No rales/rhonchi/wheezing  CV: RRR, S1S2, No murmurs  ABD: No TTP, Soft, ND, No masses, No CVA Tenderness  Extrem/Skin: Right femoral cath site with approx .5cm circumferential erythema without induration or pus drainage or fluctuance. Equal pulses bilat, No cyanosis/edema/rashes  Neuro: No focal deficits

## 2021-10-04 ENCOUNTER — APPOINTMENT (OUTPATIENT)
Dept: ELECTROPHYSIOLOGY | Facility: CLINIC | Age: 86
End: 2021-10-04
Payer: MEDICARE

## 2021-10-04 DIAGNOSIS — I10 ESSENTIAL (PRIMARY) HYPERTENSION: ICD-10-CM

## 2021-10-04 DIAGNOSIS — D50.9 IRON DEFICIENCY ANEMIA, UNSPECIFIED: ICD-10-CM

## 2021-10-04 DIAGNOSIS — F02.80 ALZHEIMER'S DISEASE, UNSPECIFIED: ICD-10-CM

## 2021-10-04 DIAGNOSIS — E11.9 TYPE 2 DIABETES MELLITUS W/OUT COMPLICATIONS: ICD-10-CM

## 2021-10-04 DIAGNOSIS — G30.9 ALZHEIMER'S DISEASE, UNSPECIFIED: ICD-10-CM

## 2021-10-04 DIAGNOSIS — I44.2 ATRIOVENTRICULAR BLOCK, COMPLETE: ICD-10-CM

## 2021-10-04 PROCEDURE — 99024 POSTOP FOLLOW-UP VISIT: CPT

## 2021-10-04 RX ORDER — TIMOLOL MALEATE 5 MG/ML
0.5 SOLUTION OPHTHALMIC
Refills: 0 | Status: ACTIVE | COMMUNITY

## 2021-10-04 RX ORDER — ESOMEPRAZOLE MAGNESIUM 40 MG/1
40 CAPSULE, DELAYED RELEASE ORAL
Refills: 0 | Status: ACTIVE | COMMUNITY

## 2021-10-04 RX ORDER — INSULIN GLARGINE 100 [IU]/ML
100 INJECTION, SOLUTION SUBCUTANEOUS
Refills: 0 | Status: ACTIVE | COMMUNITY

## 2021-10-04 RX ORDER — CHROMIUM 200 MCG
TABLET ORAL
Refills: 0 | Status: ACTIVE | COMMUNITY

## 2021-10-04 RX ORDER — BIOTIN 10 MG
TABLET ORAL
Refills: 0 | Status: ACTIVE | COMMUNITY

## 2021-10-04 RX ORDER — METOPROLOL SUCCINATE 25 MG/1
25 TABLET, EXTENDED RELEASE ORAL
Refills: 0 | Status: ACTIVE | COMMUNITY

## 2021-10-04 RX ORDER — SERTRALINE HYDROCHLORIDE 50 MG/1
50 TABLET, FILM COATED ORAL DAILY
Refills: 0 | Status: ACTIVE | COMMUNITY

## 2021-10-04 RX ORDER — ASPIRIN 81 MG/1
81 TABLET ORAL
Refills: 0 | Status: ACTIVE | COMMUNITY

## 2021-10-04 RX ORDER — ACETAMINOPHEN 325 MG
5000 TABLET ORAL
Refills: 0 | Status: ACTIVE | COMMUNITY

## 2021-10-04 RX ORDER — QUETIAPINE 50 MG/1
50 TABLET, FILM COATED ORAL
Refills: 0 | Status: ACTIVE | COMMUNITY

## 2021-10-04 RX ORDER — ALPRAZOLAM 0.25 MG/1
0.25 TABLET ORAL
Refills: 0 | Status: ACTIVE | COMMUNITY

## 2021-10-04 RX ORDER — COLD-HOT PACK
EACH MISCELLANEOUS
Refills: 0 | Status: ACTIVE | COMMUNITY

## 2021-10-04 RX ORDER — ASCORBIC ACID 500 MG
500 TABLET ORAL
Refills: 0 | Status: ACTIVE | COMMUNITY

## 2021-10-04 RX ORDER — REPAGLINIDE 1 MG/1
1 TABLET ORAL 3 TIMES DAILY
Refills: 0 | Status: ACTIVE | COMMUNITY

## 2021-10-04 RX ORDER — CHLORHEXIDINE GLUCONATE 4 %
325 (65 FE) LIQUID (ML) TOPICAL
Refills: 0 | Status: ACTIVE | COMMUNITY

## 2021-12-29 ENCOUNTER — EMERGENCY (EMERGENCY)
Facility: HOSPITAL | Age: 86
LOS: 1 days | Discharge: HOME | End: 2021-12-29
Attending: EMERGENCY MEDICINE
Payer: COMMERCIAL

## 2021-12-29 VITALS
TEMPERATURE: 98 F | WEIGHT: 154.98 LBS | HEIGHT: 64 IN | SYSTOLIC BLOOD PRESSURE: 90 MMHG | DIASTOLIC BLOOD PRESSURE: 52 MMHG | HEART RATE: 90 BPM | OXYGEN SATURATION: 97 % | RESPIRATION RATE: 19 BRPM

## 2021-12-29 VITALS
RESPIRATION RATE: 18 BRPM | HEART RATE: 87 BPM | OXYGEN SATURATION: 98 % | SYSTOLIC BLOOD PRESSURE: 102 MMHG | DIASTOLIC BLOOD PRESSURE: 60 MMHG

## 2021-12-29 PROCEDURE — 99284 EMERGENCY DEPT VISIT MOD MDM: CPT | Mod: 25

## 2021-12-29 PROCEDURE — 73030 X-RAY EXAM OF SHOULDER: CPT

## 2021-12-29 PROCEDURE — 99284 EMERGENCY DEPT VISIT MOD MDM: CPT

## 2021-12-29 PROCEDURE — 73030 X-RAY EXAM OF SHOULDER: CPT | Mod: 26,LT,RT

## 2021-12-29 RX ORDER — ACETAMINOPHEN 500 MG
650 TABLET ORAL ONCE
Refills: 0 | Status: DISCONTINUED | OUTPATIENT
Start: 2021-12-29 | End: 2022-01-01

## 2021-12-29 NOTE — ED PROVIDER NOTE - OBJECTIVE STATEMENT
89 y/o woman, h/o dementia, DM, HTN, iron deficiency anemia, BIB daughter from home for evaluation of b/l upper arm injuries just prior to arrival, as the patient was apparently slipping out of bed and the daughter tried to catch her by her arms and says that the patient is "frail" and she is worried she injured her accidentally.  No head injury or other trauma.  Pt not on anticoagulation.  No change from her baseline mental status.

## 2021-12-29 NOTE — ED PROVIDER NOTE - PATIENT PORTAL LINK FT
You can access the FollowMyHealth Patient Portal offered by [facility] by registering at the following website: [email or web address]. By joining [facility]’s FollowMyHealth portal, you will also be able to view your health information using other applications (apps) compatible with our system.

## 2021-12-29 NOTE — ED PROVIDER NOTE - CONSTITUTIONAL, MLM
normal... Well appearing, awake, alert, disoriented to person, place, time/situation and in no apparent distress.

## 2021-12-29 NOTE — ED ADULT NURSE NOTE - OBJECTIVE STATEMENT
pt accomapnied with daughter alert oriented x3 presented s/p fall with posible shoulder injury  . daughter and pt denies any head involvement . pt refused pain meds

## 2021-12-29 NOTE — ED ADULT TRIAGE NOTE - CHIEF COMPLAINT QUOTE
c/o bilateral upper arms pain as the daughter grabbed her arm preventing the pt from falling out in her bed

## 2021-12-29 NOTE — ED PROVIDER NOTE - CARE PLAN
1 Principal Discharge DX:	Contusion of right upper arm  Secondary Diagnosis:	Contusion of left upper arm

## 2021-12-29 NOTE — ED PROVIDER NOTE - MUSCULOSKELETAL, MLM
b/l arms with normal ROM at all joints, no swelling/tenderness/deformity, but mild ecchymosis to b/l upper arms; Spine appears normal, range of motion is not limited, no muscle or joint tenderness

## 2021-12-29 NOTE — ED PROVIDER NOTE - CLINICAL SUMMARY MEDICAL DECISION MAKING FREE TEXT BOX
89 y/o woman, h/o dementia, DM, HTN, iron deficiency anemia, BIB daughter from home for evaluation of b/l upper arm injuries just prior to arrival, as the patient was apparently slipping out of bed and the daughter tried to catch her by her arms and says that the patient is "frail" and she is worried she injured her accidentally--X-rays show no fracture/dislocation; Pt and her daughter decline analgesia.   Advised strict return precautions and PMD f/u.

## 2021-12-29 NOTE — ED PROVIDER NOTE - NSFOLLOWUPINSTRUCTIONS_ED_ALL_ED_FT
Contusión    Contusion      Francisca contusión es un hematoma profundo. Las contusiones son el resultado de un traumatismo cerrado en los tejidos y las fibras musculares que están debajo de la piel. La lesión causa francisca hemorragia debajo de la piel. La piel sobre la contusión puede ponerse de color peter, memo o amarillo. Las lesiones menores le causarán francisca contusión indolora; sin embargo, las lesiones más graves causan contusiones en las que el dolor y la hinchazón pueden prolongarse adam algunas semanas.      Siga estas indicaciones en [facility] casa:    Esté atento a cualquier cambio en los síntomas. Informe a [facility] médico acerca de aster síntomas. [place] estas medidas para aliviar el dolor.      Control del dolor, el entumecimiento y la hinchazón    •Use reposo, aplicación de hielo y aplicación de presión (compresión), y poner en alto (elevar) la socorro lesionada. Generalmente, esto se conoce shalom la estrategia de RHCE.  •Mantenga la socorro de la lesión en reposo. Retome aster actividades normales según lo indicado por el médico. Pregúntele al médico qué actividades son seguras para usted.    •Si se lo indican, aplique hielo sobre la socorro de la lesión:  •Ponga el hielo en francisca bolsa plástica.      •Coloque francisca toalla entre la piel y la bolsa.      •Coloque el hielo adam 20 minutos, 2 a 3 veces al día.        •Si se lo indican, ejerza francisca compresión suave en la socorro de la lesión con francisca venda elástica. Asegúrese de que la venda no esté muy ajustada. Quítese y vuelva a colocarse la venda shalom se lo haya indicado el médico.      •Si es posible, cuando esté sentado o acostado, levante (eleve) la socorro lesionada por encima del nivel del corazón.        Indicaciones generales     •[place] los medicamentos de venta levy y los recetados solamente shalom se lo haya indicado el médico.      •Concurra a todas las visitas de control shalom se lo haya indicado el médico. Orovada es importante.        Comuníquese con un médico si:    •Los síntomas no mejoran después de varios días de tratamiento.      •Aster síntomas empeoran.      •Tiene dificultad para  la socorro lesionada.        Solicite ayuda inmediatamente si:    •Siente dolor intenso.      •Siente adormecimiento en francisca mano o un pie.      •La mano o el pie están pálidos o fríos.        Resumen    •Francisca contusión es un hematoma profundo.      •Las contusiones son el resultado de un traumatismo cerrado en los tejidos y las fibras musculares que están debajo de la piel.      •El tratamiento es hacer reposo, aplicarse hielo, compresión y elevar la socorro afectada. Es posible que le den analgésicos de venta levy.      •Comuníquese con un médico si los síntomas no mejoran o si empeoran.       •Solicite ayuda de inmediato si tiene dolor intenso, entumecimiento o si la socorro se torna pálida o fría.      Esta información no tiene shalom fin reemplazar el consejo del médico. Asegúrese de hacerle al médico cualquier pregunta que tenga.      Document Revised: 09/17/2019 Document Reviewed: 09/17/2019    Elsevier Patient Education © 2021 Else[employer] Inc[employer]

## 2022-01-04 ENCOUNTER — INPATIENT (INPATIENT)
Facility: HOSPITAL | Age: 87
LOS: 8 days | Discharge: HOME | DRG: 177 | End: 2022-01-13
Attending: INTERNAL MEDICINE | Admitting: INTERNAL MEDICINE
Payer: COMMERCIAL

## 2022-01-04 VITALS
SYSTOLIC BLOOD PRESSURE: 106 MMHG | HEIGHT: 64 IN | HEART RATE: 72 BPM | WEIGHT: 149.91 LBS | TEMPERATURE: 99 F | RESPIRATION RATE: 19 BRPM | DIASTOLIC BLOOD PRESSURE: 66 MMHG | OXYGEN SATURATION: 95 %

## 2022-01-04 LAB
ALBUMIN SERPL ELPH-MCNC: 3.1 G/DL — LOW (ref 3.5–5)
ALP SERPL-CCNC: 62 U/L — SIGNIFICANT CHANGE UP (ref 40–120)
ALT FLD-CCNC: 44 U/L DA — SIGNIFICANT CHANGE UP (ref 10–60)
ANION GAP SERPL CALC-SCNC: 9 MMOL/L — SIGNIFICANT CHANGE UP (ref 5–17)
APTT BLD: 28.7 SEC — SIGNIFICANT CHANGE UP (ref 27.5–35.5)
AST SERPL-CCNC: 42 U/L — HIGH (ref 10–40)
BASOPHILS # BLD AUTO: 0.04 K/UL — SIGNIFICANT CHANGE UP (ref 0–0.2)
BASOPHILS NFR BLD AUTO: 0.6 % — SIGNIFICANT CHANGE UP (ref 0–2)
BILIRUB SERPL-MCNC: 0.3 MG/DL — SIGNIFICANT CHANGE UP (ref 0.2–1.2)
BUN SERPL-MCNC: 33 MG/DL — HIGH (ref 7–18)
CALCIUM SERPL-MCNC: 9.4 MG/DL — SIGNIFICANT CHANGE UP (ref 8.4–10.5)
CHLORIDE SERPL-SCNC: 102 MMOL/L — SIGNIFICANT CHANGE UP (ref 96–108)
CK SERPL-CCNC: 501 U/L — HIGH (ref 21–215)
CO2 SERPL-SCNC: 28 MMOL/L — SIGNIFICANT CHANGE UP (ref 22–31)
CREAT SERPL-MCNC: 1.43 MG/DL — HIGH (ref 0.5–1.3)
EOSINOPHIL # BLD AUTO: 0.07 K/UL — SIGNIFICANT CHANGE UP (ref 0–0.5)
EOSINOPHIL NFR BLD AUTO: 1 % — SIGNIFICANT CHANGE UP (ref 0–6)
GLUCOSE SERPL-MCNC: 114 MG/DL — HIGH (ref 70–99)
HCT VFR BLD CALC: 33.5 % — LOW (ref 34.5–45)
HGB BLD-MCNC: 11.1 G/DL — LOW (ref 11.5–15.5)
IMM GRANULOCYTES NFR BLD AUTO: 0.3 % — SIGNIFICANT CHANGE UP (ref 0–1.5)
INR BLD: 1.04 RATIO — SIGNIFICANT CHANGE UP (ref 0.88–1.16)
LYMPHOCYTES # BLD AUTO: 1.09 K/UL — SIGNIFICANT CHANGE UP (ref 1–3.3)
LYMPHOCYTES # BLD AUTO: 16 % — SIGNIFICANT CHANGE UP (ref 13–44)
MAGNESIUM SERPL-MCNC: 2 MG/DL — SIGNIFICANT CHANGE UP (ref 1.6–2.6)
MCHC RBC-ENTMCNC: 30.8 PG — SIGNIFICANT CHANGE UP (ref 27–34)
MCHC RBC-ENTMCNC: 33.1 GM/DL — SIGNIFICANT CHANGE UP (ref 32–36)
MCV RBC AUTO: 93.1 FL — SIGNIFICANT CHANGE UP (ref 80–100)
MONOCYTES # BLD AUTO: 1.11 K/UL — HIGH (ref 0–0.9)
MONOCYTES NFR BLD AUTO: 16.3 % — HIGH (ref 2–14)
NEUTROPHILS # BLD AUTO: 4.48 K/UL — SIGNIFICANT CHANGE UP (ref 1.8–7.4)
NEUTROPHILS NFR BLD AUTO: 65.8 % — SIGNIFICANT CHANGE UP (ref 43–77)
NRBC # BLD: 0 /100 WBCS — SIGNIFICANT CHANGE UP (ref 0–0)
NT-PROBNP SERPL-SCNC: 820 PG/ML — HIGH (ref 0–450)
PLATELET # BLD AUTO: 235 K/UL — SIGNIFICANT CHANGE UP (ref 150–400)
POTASSIUM SERPL-MCNC: 3.6 MMOL/L — SIGNIFICANT CHANGE UP (ref 3.5–5.3)
POTASSIUM SERPL-SCNC: 3.6 MMOL/L — SIGNIFICANT CHANGE UP (ref 3.5–5.3)
PROT SERPL-MCNC: 6.6 G/DL — SIGNIFICANT CHANGE UP (ref 6–8.3)
PROTHROM AB SERPL-ACNC: 12.4 SEC — SIGNIFICANT CHANGE UP (ref 10.6–13.6)
RBC # BLD: 3.6 M/UL — LOW (ref 3.8–5.2)
RBC # FLD: 14.2 % — SIGNIFICANT CHANGE UP (ref 10.3–14.5)
SODIUM SERPL-SCNC: 139 MMOL/L — SIGNIFICANT CHANGE UP (ref 135–145)
TROPONIN I, HIGH SENSITIVITY RESULT: 10 NG/L — SIGNIFICANT CHANGE UP
WBC # BLD: 6.81 K/UL — SIGNIFICANT CHANGE UP (ref 3.8–10.5)
WBC # FLD AUTO: 6.81 K/UL — SIGNIFICANT CHANGE UP (ref 3.8–10.5)

## 2022-01-04 PROCEDURE — 99285 EMERGENCY DEPT VISIT HI MDM: CPT

## 2022-01-04 PROCEDURE — 71045 X-RAY EXAM CHEST 1 VIEW: CPT | Mod: 26

## 2022-01-04 PROCEDURE — 72170 X-RAY EXAM OF PELVIS: CPT | Mod: 26

## 2022-01-05 DIAGNOSIS — R53.1 WEAKNESS: ICD-10-CM

## 2022-01-05 DIAGNOSIS — D50.9 IRON DEFICIENCY ANEMIA, UNSPECIFIED: ICD-10-CM

## 2022-01-05 DIAGNOSIS — U07.1 COVID-19: ICD-10-CM

## 2022-01-05 DIAGNOSIS — G30.9 ALZHEIMER'S DISEASE, UNSPECIFIED: ICD-10-CM

## 2022-01-05 DIAGNOSIS — Z02.9 ENCOUNTER FOR ADMINISTRATIVE EXAMINATIONS, UNSPECIFIED: ICD-10-CM

## 2022-01-05 DIAGNOSIS — I10 ESSENTIAL (PRIMARY) HYPERTENSION: ICD-10-CM

## 2022-01-05 DIAGNOSIS — Z29.9 ENCOUNTER FOR PROPHYLACTIC MEASURES, UNSPECIFIED: ICD-10-CM
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
## 2022-01-05 DIAGNOSIS — E11.9 TYPE 2 DIABETES MELLITUS WITHOUT COMPLICATIONS: ICD-10-CM

## 2022-01-05 DIAGNOSIS — N17.9 ACUTE KIDNEY FAILURE, UNSPECIFIED: ICD-10-CM

## 2022-01-05 LAB
A1C WITH ESTIMATED AVERAGE GLUCOSE RESULT: 7.4 % — HIGH (ref 4–5.6)
ANION GAP SERPL CALC-SCNC: 7 MMOL/L — SIGNIFICANT CHANGE UP (ref 5–17)
APPEARANCE UR: CLEAR — SIGNIFICANT CHANGE UP
BACTERIA # UR AUTO: ABNORMAL /HPF
BILIRUB UR-MCNC: NEGATIVE — SIGNIFICANT CHANGE UP
BUN SERPL-MCNC: 25 MG/DL — HIGH (ref 7–18)
CALCIUM SERPL-MCNC: 8.8 MG/DL — SIGNIFICANT CHANGE UP (ref 8.4–10.5)
CHLORIDE SERPL-SCNC: 106 MMOL/L — SIGNIFICANT CHANGE UP (ref 96–108)
CHOLEST SERPL-MCNC: 155 MG/DL — SIGNIFICANT CHANGE UP
CO2 SERPL-SCNC: 28 MMOL/L — SIGNIFICANT CHANGE UP (ref 22–31)
COLOR SPEC: YELLOW — SIGNIFICANT CHANGE UP
CREAT SERPL-MCNC: 1.19 MG/DL — SIGNIFICANT CHANGE UP (ref 0.5–1.3)
CRP SERPL-MCNC: 16 MG/L — HIGH
D DIMER BLD IA.RAPID-MCNC: 686 NG/ML DDU — HIGH
DIFF PNL FLD: ABNORMAL
EPI CELLS # UR: ABNORMAL /HPF
ESTIMATED AVERAGE GLUCOSE: 166 MG/DL — HIGH (ref 68–114)
FERRITIN SERPL-MCNC: 210 NG/ML — HIGH (ref 15–150)
GLUCOSE BLDC GLUCOMTR-MCNC: 103 MG/DL — HIGH (ref 70–99)
GLUCOSE BLDC GLUCOMTR-MCNC: 177 MG/DL — HIGH (ref 70–99)
GLUCOSE BLDC GLUCOMTR-MCNC: 586 MG/DL — CRITICAL HIGH (ref 70–99)
GLUCOSE BLDC GLUCOMTR-MCNC: 61 MG/DL — LOW (ref 70–99)
GLUCOSE BLDC GLUCOMTR-MCNC: 69 MG/DL — LOW (ref 70–99)
GLUCOSE BLDC GLUCOMTR-MCNC: 74 MG/DL — SIGNIFICANT CHANGE UP (ref 70–99)
GLUCOSE SERPL-MCNC: 77 MG/DL — SIGNIFICANT CHANGE UP (ref 70–99)
GLUCOSE UR QL: NEGATIVE — SIGNIFICANT CHANGE UP
HCT VFR BLD CALC: 36.1 % — SIGNIFICANT CHANGE UP (ref 34.5–45)
HDLC SERPL-MCNC: 45 MG/DL — LOW
HGB BLD-MCNC: 12 G/DL — SIGNIFICANT CHANGE UP (ref 11.5–15.5)
HYALINE CASTS # UR AUTO: ABNORMAL /LPF
KETONES UR-MCNC: NEGATIVE — SIGNIFICANT CHANGE UP
LDH SERPL L TO P-CCNC: 391 U/L — HIGH (ref 120–225)
LEUKOCYTE ESTERASE UR-ACNC: ABNORMAL
LIPID PNL WITH DIRECT LDL SERPL: 87 MG/DL — SIGNIFICANT CHANGE UP
MAGNESIUM SERPL-MCNC: 2.1 MG/DL — SIGNIFICANT CHANGE UP (ref 1.6–2.6)
MCHC RBC-ENTMCNC: 30.6 PG — SIGNIFICANT CHANGE UP (ref 27–34)
MCHC RBC-ENTMCNC: 33.2 GM/DL — SIGNIFICANT CHANGE UP (ref 32–36)
MCV RBC AUTO: 92.1 FL — SIGNIFICANT CHANGE UP (ref 80–100)
NITRITE UR-MCNC: POSITIVE
NON HDL CHOLESTEROL: 110 MG/DL — SIGNIFICANT CHANGE UP
NRBC # BLD: 0 /100 WBCS — SIGNIFICANT CHANGE UP (ref 0–0)
PH UR: 7 — SIGNIFICANT CHANGE UP (ref 5–8)
PHOSPHATE SERPL-MCNC: 2.7 MG/DL — SIGNIFICANT CHANGE UP (ref 2.5–4.5)
PLATELET # BLD AUTO: 226 K/UL — SIGNIFICANT CHANGE UP (ref 150–400)
POTASSIUM SERPL-MCNC: 3.7 MMOL/L — SIGNIFICANT CHANGE UP (ref 3.5–5.3)
POTASSIUM SERPL-SCNC: 3.7 MMOL/L — SIGNIFICANT CHANGE UP (ref 3.5–5.3)
PROCALCITONIN SERPL-MCNC: 0.05 NG/ML — SIGNIFICANT CHANGE UP (ref 0.02–0.1)
PROT UR-MCNC: 15
RBC # BLD: 3.92 M/UL — SIGNIFICANT CHANGE UP (ref 3.8–5.2)
RBC # FLD: 14.6 % — HIGH (ref 10.3–14.5)
RBC CASTS # UR COMP ASSIST: ABNORMAL /HPF (ref 0–2)
SARS-COV-2 RNA SPEC QL NAA+PROBE: DETECTED
SODIUM SERPL-SCNC: 141 MMOL/L — SIGNIFICANT CHANGE UP (ref 135–145)
SP GR SPEC: 1.01 — SIGNIFICANT CHANGE UP (ref 1.01–1.02)
TRIGL SERPL-MCNC: 115 MG/DL — SIGNIFICANT CHANGE UP
TSH SERPL-MCNC: 6.67 UU/ML — HIGH (ref 0.34–4.82)
UROBILINOGEN FLD QL: NEGATIVE — SIGNIFICANT CHANGE UP
WBC # BLD: 6.02 K/UL — SIGNIFICANT CHANGE UP (ref 3.8–10.5)
WBC # FLD AUTO: 6.02 K/UL — SIGNIFICANT CHANGE UP (ref 3.8–10.5)
WBC UR QL: SIGNIFICANT CHANGE UP /HPF (ref 0–5)

## 2022-01-05 PROCEDURE — 72125 CT NECK SPINE W/O DYE: CPT | Mod: 26,MA

## 2022-01-05 PROCEDURE — 70450 CT HEAD/BRAIN W/O DYE: CPT | Mod: 26,MA

## 2022-01-05 RX ORDER — ENOXAPARIN SODIUM 100 MG/ML
30 INJECTION SUBCUTANEOUS DAILY
Refills: 0 | Status: DISCONTINUED | OUTPATIENT
Start: 2022-01-05 | End: 2022-01-05

## 2022-01-05 RX ORDER — ALBUTEROL 90 UG/1
2 AEROSOL, METERED ORAL EVERY 6 HOURS
Refills: 0 | Status: DISCONTINUED | OUTPATIENT
Start: 2022-01-05 | End: 2022-01-13

## 2022-01-05 RX ORDER — INSULIN LISPRO 100/ML
VIAL (ML) SUBCUTANEOUS
Refills: 0 | Status: DISCONTINUED | OUTPATIENT
Start: 2022-01-05 | End: 2022-01-13

## 2022-01-05 RX ORDER — INSULIN LISPRO 100/ML
VIAL (ML) SUBCUTANEOUS AT BEDTIME
Refills: 0 | Status: DISCONTINUED | OUTPATIENT
Start: 2022-01-05 | End: 2022-01-05

## 2022-01-05 RX ORDER — CHOLECALCIFEROL (VITAMIN D3) 125 MCG
50000 CAPSULE ORAL
Refills: 0 | Status: DISCONTINUED | OUTPATIENT
Start: 2022-01-05 | End: 2022-01-05

## 2022-01-05 RX ORDER — SODIUM CHLORIDE 9 MG/ML
1000 INJECTION INTRAMUSCULAR; INTRAVENOUS; SUBCUTANEOUS ONCE
Refills: 0 | Status: COMPLETED | OUTPATIENT
Start: 2022-01-05 | End: 2022-01-05

## 2022-01-05 RX ORDER — DEXAMETHASONE 0.5 MG/5ML
6 ELIXIR ORAL DAILY
Refills: 0 | Status: DISCONTINUED | OUTPATIENT
Start: 2022-01-05 | End: 2022-01-13

## 2022-01-05 RX ORDER — ACETAMINOPHEN 500 MG
650 TABLET ORAL EVERY 6 HOURS
Refills: 0 | Status: DISCONTINUED | OUTPATIENT
Start: 2022-01-05 | End: 2022-01-13

## 2022-01-05 RX ORDER — ENOXAPARIN SODIUM 100 MG/ML
40 INJECTION SUBCUTANEOUS DAILY
Refills: 0 | Status: DISCONTINUED | OUTPATIENT
Start: 2022-01-05 | End: 2022-01-13

## 2022-01-05 RX ORDER — PANTOPRAZOLE SODIUM 20 MG/1
40 TABLET, DELAYED RELEASE ORAL
Refills: 0 | Status: DISCONTINUED | OUTPATIENT
Start: 2022-01-05 | End: 2022-01-13

## 2022-01-05 RX ORDER — DEXTROSE 50 % IN WATER 50 %
25 SYRINGE (ML) INTRAVENOUS ONCE
Refills: 0 | Status: COMPLETED | OUTPATIENT
Start: 2022-01-05 | End: 2022-01-05

## 2022-01-05 RX ORDER — QUETIAPINE FUMARATE 200 MG/1
100 TABLET, FILM COATED ORAL AT BEDTIME
Refills: 0 | Status: DISCONTINUED | OUTPATIENT
Start: 2022-01-05 | End: 2022-01-07

## 2022-01-05 RX ORDER — FOLIC ACID 0.8 MG
1 TABLET ORAL DAILY
Refills: 0 | Status: DISCONTINUED | OUTPATIENT
Start: 2022-01-05 | End: 2022-01-13

## 2022-01-05 RX ORDER — METOPROLOL TARTRATE 50 MG
25 TABLET ORAL DAILY
Refills: 0 | Status: DISCONTINUED | OUTPATIENT
Start: 2022-01-05 | End: 2022-01-13

## 2022-01-05 RX ORDER — ZINC SULFATE TAB 220 MG (50 MG ZINC EQUIVALENT) 220 (50 ZN) MG
220 TAB ORAL DAILY
Refills: 0 | Status: DISCONTINUED | OUTPATIENT
Start: 2022-01-05 | End: 2022-01-13

## 2022-01-05 RX ORDER — RISPERIDONE 4 MG/1
1 TABLET ORAL
Refills: 0 | Status: DISCONTINUED | OUTPATIENT
Start: 2022-01-05 | End: 2022-01-07

## 2022-01-05 RX ORDER — SODIUM CHLORIDE 9 MG/ML
1000 INJECTION INTRAMUSCULAR; INTRAVENOUS; SUBCUTANEOUS
Refills: 0 | Status: DISCONTINUED | OUTPATIENT
Start: 2022-01-05 | End: 2022-01-05

## 2022-01-05 RX ORDER — INSULIN LISPRO 100/ML
VIAL (ML) SUBCUTANEOUS
Refills: 0 | Status: DISCONTINUED | OUTPATIENT
Start: 2022-01-05 | End: 2022-01-05

## 2022-01-05 RX ORDER — INSULIN GLARGINE 100 [IU]/ML
12 INJECTION, SOLUTION SUBCUTANEOUS AT BEDTIME
Refills: 0 | Status: DISCONTINUED | OUTPATIENT
Start: 2022-01-05 | End: 2022-01-05

## 2022-01-05 RX ORDER — ASCORBIC ACID 60 MG
500 TABLET,CHEWABLE ORAL DAILY
Refills: 0 | Status: DISCONTINUED | OUTPATIENT
Start: 2022-01-05 | End: 2022-01-13

## 2022-01-05 RX ORDER — FERROUS SULFATE 325(65) MG
325 TABLET ORAL DAILY
Refills: 0 | Status: DISCONTINUED | OUTPATIENT
Start: 2022-01-05 | End: 2022-01-13

## 2022-01-05 RX ORDER — FLUOXETINE HCL 10 MG
20 CAPSULE ORAL DAILY
Refills: 0 | Status: DISCONTINUED | OUTPATIENT
Start: 2022-01-05 | End: 2022-01-10

## 2022-01-05 RX ORDER — SERTRALINE 25 MG/1
50 TABLET, FILM COATED ORAL DAILY
Refills: 0 | Status: DISCONTINUED | OUTPATIENT
Start: 2022-01-05 | End: 2022-01-13

## 2022-01-05 RX ORDER — LANOLIN ALCOHOL/MO/W.PET/CERES
3 CREAM (GRAM) TOPICAL AT BEDTIME
Refills: 0 | Status: DISCONTINUED | OUTPATIENT
Start: 2022-01-05 | End: 2022-01-13

## 2022-01-05 RX ORDER — ASPIRIN/CALCIUM CARB/MAGNESIUM 324 MG
81 TABLET ORAL DAILY
Refills: 0 | Status: DISCONTINUED | OUTPATIENT
Start: 2022-01-05 | End: 2022-01-13

## 2022-01-05 RX ORDER — SODIUM CHLORIDE 9 MG/ML
1000 INJECTION, SOLUTION INTRAVENOUS
Refills: 0 | Status: DISCONTINUED | OUTPATIENT
Start: 2022-01-05 | End: 2022-01-10

## 2022-01-05 RX ADMIN — SODIUM CHLORIDE 60 MILLILITER(S): 9 INJECTION INTRAMUSCULAR; INTRAVENOUS; SUBCUTANEOUS at 08:33

## 2022-01-05 RX ADMIN — Medication 0.5 MILLIGRAM(S): at 01:59

## 2022-01-05 RX ADMIN — SODIUM CHLORIDE 60 MILLILITER(S): 9 INJECTION, SOLUTION INTRAVENOUS at 14:29

## 2022-01-05 RX ADMIN — ENOXAPARIN SODIUM 40 MILLIGRAM(S): 100 INJECTION SUBCUTANEOUS at 17:52

## 2022-01-05 RX ADMIN — SODIUM CHLORIDE 1000 MILLILITER(S): 9 INJECTION INTRAMUSCULAR; INTRAVENOUS; SUBCUTANEOUS at 04:17

## 2022-01-05 RX ADMIN — QUETIAPINE FUMARATE 100 MILLIGRAM(S): 200 TABLET, FILM COATED ORAL at 21:42

## 2022-01-05 RX ADMIN — Medication 1 MILLIGRAM(S): at 00:11

## 2022-01-05 RX ADMIN — Medication 25 GRAM(S): at 15:00

## 2022-01-05 RX ADMIN — Medication 6: at 17:46

## 2022-01-05 NOTE — H&P ADULT - ASSESSMENT
Patient is 89 y.o. F w/ PMHx of Alzheimer's dementia, HTN, DM, and anemia presents to the ED from home with complaints of generalized weakness and unwitnessed fall. Admitted for generalized weakness and fall (COVID+).

## 2022-01-05 NOTE — H&P ADULT - PROBLEM SELECTOR PLAN 1
- Frequent falls, partially due to weakness from COVID  - CT Head: Moderately limited by motion. No acute intracranial hemorrhage or calvarial fracture. Short-term repeat. CT evaluation may be obtained as clinically warranted.  - CT cervical spine: No acute cervical spine fracture or evidence of traumatic malalignment.  - Fall precautions - Frequent falls, partially due to weakness from COVID  - Most recently, unwitnessed fall  - CT Head: Moderately limited by motion. No acute intracranial hemorrhage or calvarial fracture. Short-term repeat. CT evaluation may be obtained as clinically warranted.  - CT cervical spine: No acute cervical spine fracture or evidence of traumatic malalignment.  - Fall precautions  - PT consulted

## 2022-01-05 NOTE — CONSULT NOTE ADULT - PROBLEM SELECTOR RECOMMENDATION 9
isolation precautions  oxygen supp  monitor oxygen sat  check LDH, LFTs, CRP, Ferritin, D-Dimer and procalcitonin  vit C, d and zinc supp  montelukast 10 mgs po Qhs  Pepcid 40 mgs po Daily  follow up Covid PCR  Bronchodilators

## 2022-01-05 NOTE — H&P ADULT - NSHPPHYSICALEXAM_GEN_ALL_CORE
GENERAL: NAD, well-groomed, well-developed  HEAD:  Atraumatic, Normocephalic  EYES: EOMI, PERRLA, conjunctiva and sclera clear  ENMT: No tonsillar erythema, exudates, or enlargement; Moist mucous membranes, Good dentition, No lesions  NECK: Supple, normal appearance, No JVD; Normal thyroid; Trachea midline  NERVOUS SYSTEM:  Alert & Oriented X3,  Motor Strength 5/5 B/L upper and lower extremities, sensation intact  CHEST/LUNG: Lungs clear to auscultation bilaterally, No rales, rhonchi, wheezing   HEART: Regular rate and rhythm; No murmurs, rubs, or gallops  ABDOMEN: Soft, Nontender, Nondistended; Bowel sounds present  EXTREMITIES:  2+ Peripheral Pulses, No clubbing, cyanosis, or edema  LYMPH: No lymphadenopathy noted  SKIN: No rashes or lesions;  Good capillary refill GENERAL: NAD, well-groomed, well-developed, in deep sleep  HEAD: Atraumatic, Normocephalic  EYES: PERRLA, conjunctiva and sclera clear  NECK: Supple, normal appearance, No JVD; Normal thyroid; Trachea midline  NERVOUS SYSTEM: Sedated, unable to assess  CHEST/LUNG: Lungs clear to auscultation bilaterally, No rales, rhonchi, wheezing   HEART: Regular rate and rhythm; No murmurs, rubs, or gallops  ABDOMEN: Soft, Nondistended; Bowel sounds present  EXTREMITIES:  2+ Peripheral Pulses, No clubbing, cyanosis, or edema  LYMPH: No lymphadenopathy noted  SKIN: No rashes or lesions;  Good capillary refill

## 2022-01-05 NOTE — H&P ADULT - PROBLEM SELECTOR PLAN 8
- Heparin SQ (due to SHELIA)  - Continue home PPI as pantoprazole - History of iron deficiency anemia, but normocytic now.  - C/w home supplements  - P/w Hgb 11.1 (baseline ~12)  - No sign of bleeding  - Monitor CBC - C/w home meds with parameters

## 2022-01-05 NOTE — H&P ADULT - ATTENDING COMMENTS
89 y.o. F w/ PMHx of Alzheimer's dementia, HTN, DM, and anemia presents to the ED from home with complaints of generalized weakness and unwitnessed fall. Unable to obtain history from patient because she was sedated with Ativan.    As per ED: "In ED patient agitated, attempting to get out of stretcher, initially restraints placed with no improvement, given ativan 1mg IV, redosed 0.5mg IV after agitation recurred."    ED Note: "Per EMS, she is unsure of whether she experienced loss of consciousness. Per daughter patient has had generalized weakness for at least 3 weeks, now worsening, frequent falls despite assistance from daughter, last night fell, daughter unable to pick her up. Daughter reports patient tries to get up on her own in the middle of the night for the last few months. denies recent fever, cough, V/D."      assessment  --  pneumonia 2nd to covid 19, s/p fall, h/o Alzheimer's dementia, HTN, DM, anemia    plan  --  admit to med, decadron, hold remdesivir 2nd to lauro, vit c, vitamin d, zinc, pepcid, singulair, contact and airborne isolation, cont albuterol inhaler, cont supportive care with tylenol prn, robitussin prn and O2 via nasal canula as needed, ivf     covid-19 antibody test, procalcitonin, D-dimer, crp, ldh, ferritin, lactate, cbc, bmp, mg, phos, lipids, tsh, bld cx, ua, ucx      pulm cons   phys tx   case mgmt , soc wk for placement

## 2022-01-05 NOTE — H&P ADULT - NSHPREVIEWOFSYSTEMS_GEN_ALL_CORE
CONSTITUTIONAL: No fever, weight loss, or fatigue  EYES: No eye pain, visual disturbances, or discharge  ENT:  No difficulty hearing, tinnitus, vertigo; No sinus or throat pain  NECK: No pain or stiffness  RESPIRATORY: No cough, wheezing, chills or hemoptysis; No Shortness of Breath  CARDIOVASCULAR: No chest pain, palpitations, passing out, dizziness, or leg swelling  GASTROINTESTINAL: No abdominal or epigastric pain. No nausea, vomiting, or hematemesis; No diarrhea or constipation. No melena or hematochezia.  GENITOURINARY: No dysuria, frequency, hematuria, or incontinence  NEUROLOGICAL: No headaches, memory loss, loss of strength, numbness, or tremors  SKIN: No itching, burning, rashes, or lesions   LYMPH Nodes: No enlarged glands  ENDOCRINE: No heat or cold intolerance; No hair loss  MUSCULOSKELETAL: No joint pain or swelling; No muscle, back, No extremity pain  PSYCHIATRIC: No depression, anxiety, mood swings, or difficulty sleeping  HEME/LYMPH: No easy bruising, or bleeding gums  ALLERGY AND IMMUNOLOGIC: No hives or eczema N/A

## 2022-01-05 NOTE — ED PROVIDER NOTE - CLINICAL SUMMARY MEDICAL DECISION MAKING FREE TEXT BOX
89 year old female with past medical history of Alzheimer's, hypertension, diabetes mellitus, and anemia presents to the ED from home with complaints of generalized weakness and unwitnessed fall. Will obtain labs, EKG, chest x-ray, and CT head and reassess. 89 year old female with past medical history of Alzheimer's, hypertension, diabetes mellitus, and anemia presents to the ED from home with complaints of generalized weakness and unwitnessed fall. Will obtain labs, EKG, chest x-ray, and CT head and reassess.  ekg nonischemic, trop wnl, , CXR/pelvis XR unremarkable, covid positive  CThead/cspine shows no traumatic injury.  Discussed above with daughter over phone who agrees with plan for admission. 89 year old female with past medical history of Alzheimer's, hypertension, diabetes mellitus, and anemia presents to the ED from home with complaints of generalized weakness and unwitnessed fall. Will obtain labs, EKG, chest x-ray, and CT head and reassess. In ED patient agitated, attempting to get out of stretcher, initially restraints placed with no improvement, given ativan 1mg IV, redosed 0.5mg IV after agitation recurred.  ekg nonischemic, trop wnl, , CXR/pelvis XR unremarkable, covid positive  CThead/cspine shows no traumatic injury.  Discussed above with daughter over phone who agrees with plan for admission.

## 2022-01-05 NOTE — H&P ADULT - PROBLEM SELECTOR PLAN 6
- C/w home meds with parameters - Takes meds at home  - Continue as sliding scale  - FS achs  - F/u A1c - Daughter having difficulty taking care of patient at home recently  - SHAKEEL+CM consulted

## 2022-01-05 NOTE — H&P ADULT - PROBLEM SELECTOR PLAN 3
- On many psych meds at home: Sertraline, Quetiapine, Risperidone, Fluoxetine  - Consult psych in AM to optimize medication

## 2022-01-05 NOTE — H&P ADULT - PROBLEM SELECTOR PLAN 7
- Takes meds at home  - Continue as sliding scale  - FS achs  - F/u A1c - C/w home meds with parameters

## 2022-01-05 NOTE — H&P ADULT - PROBLEM SELECTOR PLAN 4
- Positive for COVID19  - CXR clear  - F/u COVID markers q72hrs  - Currently on room air  - Albuterol MDI Q2H PRN  - Tylenol PRN for fever  - O2 support as needed  - Vit C, D, Zinc  - Start Remdesivir and Decadron if supplemental O2 needed  - Additional supportive therapy as needed. - Positive for COVID19  - CXR clear  - F/u COVID markers q72hrs  - Currently on room air  - Albuterol MDI Q2H PRN  - Tylenol PRN for fever  - O2 support as needed  - Vit C, D, Zinc  - Started Decadron  - Will hold Remdesivir due to SHELIA  - Additional supportive therapy as needed.

## 2022-01-05 NOTE — ED PROVIDER NOTE - OBJECTIVE STATEMENT
89 year old female with past medical history of Alzheimer's, hypertension, diabetes mellitus, and anemia presents to the ED from home with complaints of generalized weakness and unwitnessed fall. Per EMS, she is unsure of whether she experienced loss of consciousness.   Allergies: Penicillins- unknown reaction 89 year old female with past medical history of Alzheimer's, hypertension, diabetes mellitus, and anemia presents to the ED from home with complaints of generalized weakness and unwitnessed fall. Per EMS, she is unsure of whether she experienced loss of consciousness. Per daughter patient has had generalized weakness for at least 3 weeks, now worsening, frequent falls despite assistance from daughter, last night fell, daughter unable to pick her up. Daughter reports patient tries to get up on her own in the middle of the night for the last few months. denies recent fever, cough, V/D.  Allergies: Penicillins- unknown reaction

## 2022-01-05 NOTE — H&P ADULT - HISTORY OF PRESENT ILLNESS
Patient is 89 y.o. F w/ PMHx of Alzheimer's dementia, HTN, DM, and anemia presents to the ED from home with complaints of generalized weakness and unwitnessed fall. Per EMS, she is unsure of whether she experienced loss of consciousness. Per daughter patient has had generalized weakness for at least 3 weeks, now worsening, frequent falls despite assistance from daughter, last night fell, daughter unable to pick her up. Daughter reports patient tries to get up on her own in the middle of the night for the last few months. denies recent fever, cough, V/D.     Patient is 89 y.o. F w/ PMHx of Alzheimer's dementia, HTN, DM, and anemia presents to the ED from home with complaints of generalized weakness and unwitnessed fall. Unable to obtain history from patient because she was sedated with Ativan.    As per ED: "In ED patient agitated, attempting to get out of stretcher, initially restraints placed with no improvement, given ativan 1mg IV, redosed 0.5mg IV after agitation recurred."    ED Note: "Per EMS, she is unsure of whether she experienced loss of consciousness. Per daughter patient has had generalized weakness for at least 3 weeks, now worsening, frequent falls despite assistance from daughter, last night fell, daughter unable to pick her up. Daughter reports patient tries to get up on her own in the middle of the night for the last few months. denies recent fever, cough, V/D."

## 2022-01-05 NOTE — H&P ADULT - PROBLEM SELECTOR PLAN 9
- Heparin SQ (due to SHELIA)  - Continue home PPI as pantoprazole - History of iron deficiency anemia, but normocytic now.  - C/w home supplements  - P/w Hgb 11.1 (baseline ~12)  - No sign of bleeding  - Monitor CBC

## 2022-01-06 LAB
A1C WITH ESTIMATED AVERAGE GLUCOSE RESULT: 7.4 % — HIGH (ref 4–5.6)
ALBUMIN SERPL ELPH-MCNC: 3 G/DL — LOW (ref 3.5–5)
ALP SERPL-CCNC: 59 U/L — SIGNIFICANT CHANGE UP (ref 40–120)
ALT FLD-CCNC: 47 U/L DA — SIGNIFICANT CHANGE UP (ref 10–60)
ANION GAP SERPL CALC-SCNC: 9 MMOL/L — SIGNIFICANT CHANGE UP (ref 5–17)
APTT BLD: 33.2 SEC — SIGNIFICANT CHANGE UP (ref 27.5–35.5)
AST SERPL-CCNC: 62 U/L — HIGH (ref 10–40)
BASOPHILS # BLD AUTO: 0.04 K/UL — SIGNIFICANT CHANGE UP (ref 0–0.2)
BASOPHILS NFR BLD AUTO: 0.9 % — SIGNIFICANT CHANGE UP (ref 0–2)
BILIRUB SERPL-MCNC: 0.4 MG/DL — SIGNIFICANT CHANGE UP (ref 0.2–1.2)
BUN SERPL-MCNC: 19 MG/DL — HIGH (ref 7–18)
CALCIUM SERPL-MCNC: 8.1 MG/DL — LOW (ref 8.4–10.5)
CHLORIDE SERPL-SCNC: 107 MMOL/L — SIGNIFICANT CHANGE UP (ref 96–108)
CO2 SERPL-SCNC: 22 MMOL/L — SIGNIFICANT CHANGE UP (ref 22–31)
CREAT SERPL-MCNC: 0.95 MG/DL — SIGNIFICANT CHANGE UP (ref 0.5–1.3)
CRP SERPL-MCNC: 16 MG/L — HIGH
D DIMER BLD IA.RAPID-MCNC: 414 NG/ML DDU — HIGH
EOSINOPHIL # BLD AUTO: 0.06 K/UL — SIGNIFICANT CHANGE UP (ref 0–0.5)
EOSINOPHIL NFR BLD AUTO: 1.3 % — SIGNIFICANT CHANGE UP (ref 0–6)
ERYTHROCYTE [SEDIMENTATION RATE] IN BLOOD: 15 MM/HR — SIGNIFICANT CHANGE UP (ref 0–20)
ESTIMATED AVERAGE GLUCOSE: 166 MG/DL — HIGH (ref 68–114)
FERRITIN SERPL-MCNC: 217 NG/ML — HIGH (ref 15–150)
GLUCOSE BLDC GLUCOMTR-MCNC: 103 MG/DL — HIGH (ref 70–99)
GLUCOSE BLDC GLUCOMTR-MCNC: 157 MG/DL — HIGH (ref 70–99)
GLUCOSE BLDC GLUCOMTR-MCNC: 237 MG/DL — HIGH (ref 70–99)
GLUCOSE BLDC GLUCOMTR-MCNC: 268 MG/DL — HIGH (ref 70–99)
GLUCOSE BLDC GLUCOMTR-MCNC: 317 MG/DL — HIGH (ref 70–99)
GLUCOSE SERPL-MCNC: 239 MG/DL — HIGH (ref 70–99)
HCT VFR BLD CALC: 37.1 % — SIGNIFICANT CHANGE UP (ref 34.5–45)
HGB BLD-MCNC: 11.9 G/DL — SIGNIFICANT CHANGE UP (ref 11.5–15.5)
IMM GRANULOCYTES NFR BLD AUTO: 0.4 % — SIGNIFICANT CHANGE UP (ref 0–1.5)
INR BLD: 1.07 RATIO — SIGNIFICANT CHANGE UP (ref 0.88–1.16)
LDH SERPL L TO P-CCNC: 363 U/L — HIGH (ref 120–225)
LYMPHOCYTES # BLD AUTO: 1.35 K/UL — SIGNIFICANT CHANGE UP (ref 1–3.3)
LYMPHOCYTES # BLD AUTO: 29 % — SIGNIFICANT CHANGE UP (ref 13–44)
MAGNESIUM SERPL-MCNC: 2.1 MG/DL — SIGNIFICANT CHANGE UP (ref 1.6–2.6)
MCHC RBC-ENTMCNC: 30.4 PG — SIGNIFICANT CHANGE UP (ref 27–34)
MCHC RBC-ENTMCNC: 32.1 GM/DL — SIGNIFICANT CHANGE UP (ref 32–36)
MCV RBC AUTO: 94.9 FL — SIGNIFICANT CHANGE UP (ref 80–100)
MONOCYTES # BLD AUTO: 0.52 K/UL — SIGNIFICANT CHANGE UP (ref 0–0.9)
MONOCYTES NFR BLD AUTO: 11.2 % — SIGNIFICANT CHANGE UP (ref 2–14)
NEUTROPHILS # BLD AUTO: 2.67 K/UL — SIGNIFICANT CHANGE UP (ref 1.8–7.4)
NEUTROPHILS NFR BLD AUTO: 57.2 % — SIGNIFICANT CHANGE UP (ref 43–77)
NRBC # BLD: 0 /100 WBCS — SIGNIFICANT CHANGE UP (ref 0–0)
PHOSPHATE SERPL-MCNC: 2.8 MG/DL — SIGNIFICANT CHANGE UP (ref 2.5–4.5)
PLATELET # BLD AUTO: 227 K/UL — SIGNIFICANT CHANGE UP (ref 150–400)
POTASSIUM SERPL-MCNC: 3.8 MMOL/L — SIGNIFICANT CHANGE UP (ref 3.5–5.3)
POTASSIUM SERPL-SCNC: 3.8 MMOL/L — SIGNIFICANT CHANGE UP (ref 3.5–5.3)
PROT SERPL-MCNC: 6.4 G/DL — SIGNIFICANT CHANGE UP (ref 6–8.3)
PROTHROM AB SERPL-ACNC: 12.7 SEC — SIGNIFICANT CHANGE UP (ref 10.6–13.6)
RBC # BLD: 3.91 M/UL — SIGNIFICANT CHANGE UP (ref 3.8–5.2)
RBC # FLD: 14.6 % — HIGH (ref 10.3–14.5)
SODIUM SERPL-SCNC: 138 MMOL/L — SIGNIFICANT CHANGE UP (ref 135–145)
T4 FREE SERPL-MCNC: 1.1 NG/DL — SIGNIFICANT CHANGE UP (ref 0.9–1.8)
WBC # BLD: 4.66 K/UL — SIGNIFICANT CHANGE UP (ref 3.8–10.5)
WBC # FLD AUTO: 4.66 K/UL — SIGNIFICANT CHANGE UP (ref 3.8–10.5)

## 2022-01-06 RX ORDER — RISPERIDONE 4 MG/1
1 TABLET ORAL
Qty: 0 | Refills: 0 | DISCHARGE

## 2022-01-06 RX ORDER — FERROUS SULFATE 325(65) MG
1 TABLET ORAL
Qty: 0 | Refills: 0 | DISCHARGE

## 2022-01-06 RX ORDER — FOLIC ACID 0.8 MG
1 TABLET ORAL
Qty: 0 | Refills: 0 | DISCHARGE

## 2022-01-06 RX ORDER — HALOPERIDOL DECANOATE 100 MG/ML
1 INJECTION INTRAMUSCULAR ONCE
Refills: 0 | Status: COMPLETED | OUTPATIENT
Start: 2022-01-06 | End: 2022-01-06

## 2022-01-06 RX ORDER — HALOPERIDOL DECANOATE 100 MG/ML
1 INJECTION INTRAMUSCULAR ONCE
Refills: 0 | Status: DISCONTINUED | OUTPATIENT
Start: 2022-01-06 | End: 2022-01-06

## 2022-01-06 RX ADMIN — Medication 81 MILLIGRAM(S): at 15:26

## 2022-01-06 RX ADMIN — RISPERIDONE 1 MILLIGRAM(S): 4 TABLET ORAL at 17:23

## 2022-01-06 RX ADMIN — Medication 4: at 11:33

## 2022-01-06 RX ADMIN — Medication 20 MILLIGRAM(S): at 15:22

## 2022-01-06 RX ADMIN — Medication 1 TABLET(S): at 15:24

## 2022-01-06 RX ADMIN — Medication 500 MILLIGRAM(S): at 15:20

## 2022-01-06 RX ADMIN — ENOXAPARIN SODIUM 40 MILLIGRAM(S): 100 INJECTION SUBCUTANEOUS at 15:21

## 2022-01-06 RX ADMIN — SERTRALINE 50 MILLIGRAM(S): 25 TABLET, FILM COATED ORAL at 15:20

## 2022-01-06 RX ADMIN — ZINC SULFATE TAB 220 MG (50 MG ZINC EQUIVALENT) 220 MILLIGRAM(S): 220 (50 ZN) TAB at 15:23

## 2022-01-06 RX ADMIN — Medication 6 MILLIGRAM(S): at 05:13

## 2022-01-06 RX ADMIN — Medication 2: at 08:29

## 2022-01-06 RX ADMIN — Medication 6: at 17:22

## 2022-01-06 RX ADMIN — Medication 325 MILLIGRAM(S): at 15:22

## 2022-01-06 RX ADMIN — HALOPERIDOL DECANOATE 1 MILLIGRAM(S): 100 INJECTION INTRAMUSCULAR at 10:21

## 2022-01-06 RX ADMIN — QUETIAPINE FUMARATE 100 MILLIGRAM(S): 200 TABLET, FILM COATED ORAL at 21:13

## 2022-01-06 RX ADMIN — Medication 1 MILLIGRAM(S): at 15:22

## 2022-01-06 NOTE — PROGRESS NOTE ADULT - SUBJECTIVE AND OBJECTIVE BOX
PGY-1 Progress Note discussed with attending    PAGER #: [[phone]] TILL 5:00 PM  PLEASE CONTACT ON CALL TEAM:  - On Call Team (Please refer to Morena) FROM 5:00 PM - 8:30PM  - Nightfloat Team FROM 8:30 -7:30 AM    CHIEF COMPLAINT & BRIEF HOSPITAL COURSE:      INTERVAL HPI/OVERNIGHT EVENTS:       REVIEW OF SYSTEMS:  CONSTITUTIONAL: No fever, weight loss, or fatigue  RESPIRATORY: No cough, wheezing, chills or hemoptysis; No shortness of breath  CARDIOVASCULAR: No chest pain, palpitations, dizziness, or leg swelling  GASTROINTESTINAL: No abdominal pain. No nausea, vomiting, or hematemesis; No diarrhea or constipation. No melena or hematochezia.  GENITOURINARY: No dysuria or hematuria, urinary frequency  NEUROLOGICAL: No headaches, memory loss, loss of strength, numbness, or tremors  SKIN: No itching, burning, rashes, or lesions     MEDICATIONS  (STANDING):  ascorbic acid 500 milliGRAM(s) Oral daily  aspirin enteric coated 81 milliGRAM(s) Oral daily  dexAMETHasone  Injectable 6 milliGRAM(s) IV Push daily  dextrose 5% + sodium chloride 0.9%. 1000 milliLiter(s) (60 mL/Hr) IV Continuous <Continuous>  enoxaparin Injectable 40 milliGRAM(s) SubCutaneous daily  ferrous    sulfate 325 milliGRAM(s) Oral daily  FLUoxetine 20 milliGRAM(s) Oral daily  folic acid 1 milliGRAM(s) Oral daily  haloperidol    Injectable 1 milliGRAM(s) IV Push once  insulin lispro (ADMELOG) corrective regimen sliding scale   SubCutaneous three times a day before meals  metoprolol succinate ER 25 milliGRAM(s) Oral daily  multivitamin 1 Tablet(s) Oral daily  pantoprazole    Tablet 40 milliGRAM(s) Oral before breakfast  QUEtiapine 100 milliGRAM(s) Oral at bedtime  risperiDONE   Tablet 1 milliGRAM(s) Oral two times a day  sertraline 50 milliGRAM(s) Oral daily  zinc sulfate 220 milliGRAM(s) Oral daily    MEDICATIONS  (PRN):  acetaminophen     Tablet .. 650 milliGRAM(s) Oral every 6 hours PRN Temp greater or equal to 38C (100.4F), Mild Pain (1 - 3)  ALBUTerol    90 MICROgram(s) HFA Inhaler 2 Puff(s) Inhalation every 6 hours PRN Shortness of Breath and/or Wheezing  melatonin 3 milliGRAM(s) Oral at bedtime PRN Insomnia      Vital Signs Last 24 Hrs  T(C): 36.6 (06 Jan 2022 05:27), Max: 36.9 (05 Jan 2022 14:30)  T(F): 97.8 (06 Jan 2022 05:27), Max: 98.5 (05 Jan 2022 14:30)  HR: 69 (06 Jan 2022 05:27) (66 - 81)  BP: 106/52 (06 Jan 2022 05:27) (106/52 - 147/54)  BP(mean): --  RR: 18 (06 Jan 2022 05:27) (17 - 18)  SpO2: 100% (06 Jan 2022 05:27) (94% - 100%)    PHYSICAL EXAMINATION:  GENERAL: NAD, well built  HEAD:  Atraumatic, Normocephalic  EYES:  conjunctiva and sclera clear  NECK: Supple, No JVD, Normal thyroid  CHEST/LUNG: Clear to auscultation. Clear to percussion bilaterally; No rales, rhonchi, wheezing, or rubs  HEART: Regular rate and rhythm; No murmurs, rubs, or gallops  ABDOMEN: Soft, Nontender, Nondistended; Bowel sounds present, no pain or masses on palpation  NERVOUS SYSTEM:  Alert & Oriented X3  : voiding well  EXTREMITIES:  2+ Peripheral Pulses, No clubbing, cyanosis, or edema  SKIN: warm dry                          11.9   4.66  )-----------( 227      ( 06 Jan 2022 09:14 )             37.1     01-05    141  |  106  |  25<H>  ----------------------------<  77  3.7   |  28  |  1.19    Ca    8.8      05 Jan 2022 10:42  Phos  2.7     01-05  Mg     2.1     01-05    TPro  6.6  /  Alb  3.1<L>  /  TBili  0.3  /  DBili  x   /  AST  42<H>  /  ALT  44  /  AlkPhos  62  01-04    LIVER FUNCTIONS - ( 04 Jan 2022 22:46 )  Alb: 3.1 g/dL / Pro: 6.6 g/dL / ALK PHOS: 62 U/L / ALT: 44 U/L DA / AST: 42 U/L / GGT: x           CARDIAC MARKERS ( 04 Jan 2022 22:46 )  x     / x     / 501 U/L / x     / x          PT/INR - ( 04 Jan 2022 22:46 )   PT: 12.4 sec;   INR: 1.04 ratio         PTT - ( 04 Jan 2022 22:46 )  PTT:28.7 sec    I&O's Summary          CAPILLARY BLOOD GLUCOSE      RADIOLOGY & ADDITIONAL TESTS:                   PGY-1 Progress Note discussed with attending    PAGER #: [[phone]] TILL 5:00 PM  PLEASE CONTACT ON CALL TEAM:  - On Call Team (Please refer to Morena) FROM 5:00 PM - 8:30PM  - Nightfloat Team FROM 8:30 -7:30 AM    CHIEF COMPLAINT & BRIEF HOSPITAL COURSE:    Patient is 89 y.o. F w/ PMHx of Alzheimer's dementia, HTN, DM, and anemia presents to the ED from home with complaints of generalized weakness and unwitnessed fall. Unable to obtain history from patient because she was sedated with Ativan.    As per ED: "In ED patient agitated, attempting to get out of stretcher, initially restraints placed with no improvement, given ativan 1mg IV, redosed 0.5mg IV after agitation recurred."    ED Note: "Per EMS, she is unsure of whether she experienced loss of consciousness. Per daughter patient has had generalized weakness for at least 3 weeks, now worsening, frequent falls despite assistance from daughter, last night fell, daughter unable to pick her up. Daughter reports patient tries to get up on her own in the middle of the night for the last few months. denies recent fever, cough, V/D."    INTERVAL HPI/OVERNIGHT EVENTS:     Patient was examined at bedside, cannot fully assess as she was asleep. She has episodes of confusion and agitation. She was attempting to take off her clothes and get out of her bed. We gave Haloperidol 1 mg IV.     REVIEW OF SYSTEMS:  CONSTITUTIONAL: No fever, weight loss, or fatigue  RESPIRATORY: No cough, wheezing, chills or hemoptysis; No shortness of breath  CARDIOVASCULAR: No chest pain, palpitations, dizziness, or leg swelling  GASTROINTESTINAL: No abdominal pain. No nausea, vomiting, or hematemesis; No diarrhea or constipation. No melena or hematochezia.  GENITOURINARY: No dysuria or hematuria, urinary frequency  NEUROLOGICAL: No headaches, memory loss, loss of strength, numbness, or tremors  SKIN: No itching, burning, rashes, or lesions     MEDICATIONS  (STANDING):  ascorbic acid 500 milliGRAM(s) Oral daily  aspirin enteric coated 81 milliGRAM(s) Oral daily  dexAMETHasone  Injectable 6 milliGRAM(s) IV Push daily  dextrose 5% + sodium chloride 0.9%. 1000 milliLiter(s) (60 mL/Hr) IV Continuous <Continuous>  enoxaparin Injectable 40 milliGRAM(s) SubCutaneous daily  ferrous    sulfate 325 milliGRAM(s) Oral daily  FLUoxetine 20 milliGRAM(s) Oral daily  folic acid 1 milliGRAM(s) Oral daily  haloperidol    Injectable 1 milliGRAM(s) IV Push once  insulin lispro (ADMELOG) corrective regimen sliding scale   SubCutaneous three times a day before meals  metoprolol succinate ER 25 milliGRAM(s) Oral daily  multivitamin 1 Tablet(s) Oral daily  pantoprazole    Tablet 40 milliGRAM(s) Oral before breakfast  QUEtiapine 100 milliGRAM(s) Oral at bedtime  risperiDONE   Tablet 1 milliGRAM(s) Oral two times a day  sertraline 50 milliGRAM(s) Oral daily  zinc sulfate 220 milliGRAM(s) Oral daily    MEDICATIONS  (PRN):  acetaminophen     Tablet .. 650 milliGRAM(s) Oral every 6 hours PRN Temp greater or equal to 38C (100.4F), Mild Pain (1 - 3)  ALBUTerol    90 MICROgram(s) HFA Inhaler 2 Puff(s) Inhalation every 6 hours PRN Shortness of Breath and/or Wheezing  melatonin 3 milliGRAM(s) Oral at bedtime PRN Insomnia      Vital Signs Last 24 Hrs  T(C): 36.6 (06 Jan 2022 05:27), Max: 36.9 (05 Jan 2022 14:30)  T(F): 97.8 (06 Jan 2022 05:27), Max: 98.5 (05 Jan 2022 14:30)  HR: 69 (06 Jan 2022 05:27) (66 - 81)  BP: 106/52 (06 Jan 2022 05:27) (106/52 - 147/54)  BP(mean): --  RR: 18 (06 Jan 2022 05:27) (17 - 18)  SpO2: 100% (06 Jan 2022 05:27) (94% - 100%)    PHYSICAL EXAMINATION:  GENERAL: NAD, well built  HEAD:  Atraumatic, Normocephalic  EYES:  conjunctiva and sclera clear  NECK: Supple, No JVD, Normal thyroid  CHEST/LUNG: Clear to auscultation. Clear to percussion bilaterally; No rales, rhonchi, wheezing, or rubs  HEART: Regular rate and rhythm; No murmurs, rubs, or gallops  ABDOMEN: Soft, Nontender, Nondistended; Bowel sounds present, no pain or masses on palpation  NERVOUS SYSTEM:  Alert & Oriented X3  : voiding well  EXTREMITIES:  2+ Peripheral Pulses, No clubbing, cyanosis, or edema  SKIN: warm dry                          11.9   4.66  )-----------( 227      ( 06 Jan 2022 09:14 )             37.1     01-05    141  |  106  |  25<H>  ----------------------------<  77  3.7   |  28  |  1.19    Ca    8.8      05 Jan 2022 10:42  Phos  2.7     01-05  Mg     2.1     01-05    TPro  6.6  /  Alb  3.1<L>  /  TBili  0.3  /  DBili  x   /  AST  42<H>  /  ALT  44  /  AlkPhos  62  01-04    LIVER FUNCTIONS - ( 04 Jan 2022 22:46 )  Alb: 3.1 g/dL / Pro: 6.6 g/dL / ALK PHOS: 62 U/L / ALT: 44 U/L DA / AST: 42 U/L / GGT: x           CARDIAC MARKERS ( 04 Jan 2022 22:46 )  x     / x     / 501 U/L / x     / x          PT/INR - ( 04 Jan 2022 22:46 )   PT: 12.4 sec;   INR: 1.04 ratio         PTT - ( 04 Jan 2022 22:46 )  PTT:28.7 sec    I&O's Summary          CAPILLARY BLOOD GLUCOSE      RADIOLOGY & ADDITIONAL TESTS:                   PGY-1 Progress Note discussed with attending    PAGER #: [[phone]] TILL 5:00 PM  PLEASE CONTACT ON CALL TEAM:  - On Call Team (Please refer to Morena) FROM 5:00 PM - 8:30PM  - Nightfloat Team FROM 8:30 -7:30 AM    CHIEF COMPLAINT & BRIEF HOSPITAL COURSE:    Patient is 89 y.o. F w/ PMHx of Alzheimer's dementia, HTN, DM, and anemia presents to the ED from home with complaints of generalized weakness and unwitnessed fall. Unable to obtain history from patient because she was sedated with Ativan.    As per ED: "In ED patient agitated, attempting to get out of stretcher, initially restraints placed with no improvement, given ativan 1mg IV, redosed 0.5mg IV after agitation recurred."    ED Note: "Per EMS, she is unsure of whether she experienced loss of consciousness. Per daughter patient has had generalized weakness for at least 3 weeks, now worsening, frequent falls despite assistance from daughter, last night fell, daughter unable to pick her up. Daughter reports patient tries to get up on her own in the middle of the night for the last few months. denies recent fever, cough, V/D."    INTERVAL HPI/OVERNIGHT EVENTS:     Patient was examined at bedside, cannot fully assess as she was asleep. She has episodes of confusion and agitation. She was attempting to take off her clothes and get out of her bed. We gave Haloperidol 1 mg IV STAT and prn q8. No other significant events overnight.    REVIEW OF SYSTEMS: cannot fully assess as patient is confused  CONSTITUTIONAL: No fever, weight loss, or fatigue  RESPIRATORY: No cough, wheezing, chills or hemoptysis; No shortness of breath  CARDIOVASCULAR: No chest pain, palpitations, dizziness, or leg swelling  GASTROINTESTINAL: No abdominal pain. No nausea, vomiting, or hematemesis; No diarrhea or constipation. No melena or hematochezia.  GENITOURINARY: No dysuria or hematuria, urinary frequency  NEUROLOGICAL: No headaches, memory loss, loss of strength, numbness, or tremors  SKIN: No itching, burning, rashes, or lesions     MEDICATIONS  (STANDING):  ascorbic acid 500 milliGRAM(s) Oral daily  aspirin enteric coated 81 milliGRAM(s) Oral daily  dexAMETHasone  Injectable 6 milliGRAM(s) IV Push daily  dextrose 5% + sodium chloride 0.9%. 1000 milliLiter(s) (60 mL/Hr) IV Continuous <Continuous>  enoxaparin Injectable 40 milliGRAM(s) SubCutaneous daily  ferrous    sulfate 325 milliGRAM(s) Oral daily  FLUoxetine 20 milliGRAM(s) Oral daily  folic acid 1 milliGRAM(s) Oral daily  haloperidol    Injectable 1 milliGRAM(s) IV Push once  insulin lispro (ADMELOG) corrective regimen sliding scale   SubCutaneous three times a day before meals  metoprolol succinate ER 25 milliGRAM(s) Oral daily  multivitamin 1 Tablet(s) Oral daily  pantoprazole    Tablet 40 milliGRAM(s) Oral before breakfast  QUEtiapine 100 milliGRAM(s) Oral at bedtime  risperiDONE   Tablet 1 milliGRAM(s) Oral two times a day  sertraline 50 milliGRAM(s) Oral daily  zinc sulfate 220 milliGRAM(s) Oral daily    MEDICATIONS  (PRN):  acetaminophen     Tablet .. 650 milliGRAM(s) Oral every 6 hours PRN Temp greater or equal to 38C (100.4F), Mild Pain (1 - 3)  ALBUTerol    90 MICROgram(s) HFA Inhaler 2 Puff(s) Inhalation every 6 hours PRN Shortness of Breath and/or Wheezing  melatonin 3 milliGRAM(s) Oral at bedtime PRN Insomnia      Vital Signs Last 24 Hrs  T(C): 36.6 (06 Jan 2022 05:27), Max: 36.9 (05 Jan 2022 14:30)  T(F): 97.8 (06 Jan 2022 05:27), Max: 98.5 (05 Jan 2022 14:30)  HR: 69 (06 Jan 2022 05:27) (66 - 81)  BP: 106/52 (06 Jan 2022 05:27) (106/52 - 147/54)  BP(mean): --  RR: 18 (06 Jan 2022 05:27) (17 - 18)  SpO2: 100% (06 Jan 2022 05:27) (94% - 100%)    PHYSICAL EXAMINATION:  GENERAL: NAD, well built  HEAD:  Atraumatic, Normocephalic  EYES:  conjunctiva and sclera clear  NECK: Supple, No JVD, Normal thyroid  CHEST/LUNG: Clear to auscultation. Clear to percussion bilaterally; No rales, rhonchi, wheezing, or rubs  HEART: Regular rate and rhythm; No murmurs, rubs, or gallops  ABDOMEN: Soft, Nontender, Nondistended; Bowel sounds present, no pain or masses on palpation  NERVOUS SYSTEM:  Alert & Oriented X3  : voiding well  EXTREMITIES:  2+ Peripheral Pulses, No clubbing, cyanosis, or edema  SKIN: warm dry                          11.9   4.66  )-----------( 227      ( 06 Jan 2022 09:14 )             37.1     01-05    141  |  106  |  25<H>  ----------------------------<  77  3.7   |  28  |  1.19    Ca    8.8      05 Jan 2022 10:42  Phos  2.7     01-05  Mg     2.1     01-05    TPro  6.6  /  Alb  3.1<L>  /  TBili  0.3  /  DBili  x   /  AST  42<H>  /  ALT  44  /  AlkPhos  62  01-04    LIVER FUNCTIONS - ( 04 Jan 2022 22:46 )  Alb: 3.1 g/dL / Pro: 6.6 g/dL / ALK PHOS: 62 U/L / ALT: 44 U/L DA / AST: 42 U/L / GGT: x           CARDIAC MARKERS ( 04 Jan 2022 22:46 )  x     / x     / 501 U/L / x     / x          PT/INR - ( 04 Jan 2022 22:46 )   PT: 12.4 sec;   INR: 1.04 ratio         PTT - ( 04 Jan 2022 22:46 )  PTT:28.7 sec    I&O's Summary          CAPILLARY BLOOD GLUCOSE      RADIOLOGY & ADDITIONAL TESTS:

## 2022-01-06 NOTE — ADVANCED PRACTICE NURSE CONSULT - ASSESSMENT
This is a 89yr old female patient admitted for RENETTA-COV-2, presenting with the following:  -There is a Stage 1 Pressure Injury to the Bilateral Heels, as evident by non-blanchable erythema  -There is a Stage 2 Pressure Injury to the L. Ischium with pink tissue and scant drainage

## 2022-01-06 NOTE — PROGRESS NOTE ADULT - PROBLEM SELECTOR PLAN 4
- Positive for COVID19  - CXR clear  - F/u COVID markers q72hrs  - Currently on room air  - Albuterol MDI Q2H PRN  - Tylenol PRN for fever  - O2 support as needed  - Vit C, D, Zinc  - Started Decadron  - Will hold Remdesivir due to SHELIA  - Additional supportive therapy as needed. 20 - P/w Cr 1.43 (baseline ~0.9)  - Likely due to poor oral intake(dehydration)  - S/p 1L bolus in ED  - Gentle IVF 60cc/hr for 12 hrs  - Can obtain urine lytes if no improvement with IVF (and no use of obtaining urine lytes now when pt is on IVF)

## 2022-01-06 NOTE — PROGRESS NOTE ADULT - PROBLEM SELECTOR PLAN 6
- Daughter having difficulty taking care of patient at home recently  - SHAKEEL+CM consulted - C/w home meds with parameters

## 2022-01-06 NOTE — PROGRESS NOTE ADULT - PROBLEM SELECTOR PLAN 7
- Takes meds at home  - Continue as sliding scale  - FS achs  - F/u A1c - History of iron deficiency anemia, but normocytic now.  - C/w home supplements  - P/w Hgb 11.1 (baseline ~12)  - No sign of bleeding  - Monitor CBC

## 2022-01-06 NOTE — ADVANCED PRACTICE NURSE CONSULT - RECOMMEDATIONS
-Clean all wounds with normal saline and apply skin prep to the surrounding skin  -Apply a Foam dressing to the L. Ischial wound Q 72hrs PRN  -Elevate/float the patients heels using heel protectors and reposition the patient Q 2hrs using wedges or pillows

## 2022-01-06 NOTE — PROGRESS NOTE ADULT - PROBLEM SELECTOR PLAN 1
- Frequent falls, partially due to weakness from COVID  - Most recently, unwitnessed fall  - CT Head: Moderately limited by motion. No acute intracranial hemorrhage or calvarial fracture. Short-term repeat. CT evaluation may be obtained as clinically warranted.  - CT cervical spine: No acute cervical spine fracture or evidence of traumatic malalignment.  - Fall precautions  - PT consulted - On many psych meds at home: Sertraline, Quetiapine, Risperidone, Fluoxetine  - f/u Psych consult (Dr. Voss)

## 2022-01-06 NOTE — PROGRESS NOTE ADULT - SUBJECTIVE AND OBJECTIVE BOX
Patient is a 89y old  Female who presents with a chief complaint of Weakness and Fall (05 Jan 2022 11:54)    pt seen in icu [  ], reg med floor [   ], bed [  ], chair at bedside [   ], a+o x3 [  ], lethargic [  ],  nad [  ]    izquierdo [  ], ngt [  ], peg [  ], et tube [  ], cent line [  ], picc line [  ]        Allergies    penicillins (Unknown)        Vitals    T(F): 97.8 (01-06-22 @ 05:27), Max: 98.5 (01-05-22 @ 14:30)  HR: 69 (01-06-22 @ 05:27) (66 - 81)  BP: 106/52 (01-06-22 @ 05:27) (106/52 - 147/54)  RR: 18 (01-06-22 @ 05:27) (17 - 18)  SpO2: 100% (01-06-22 @ 05:27) (94% - 100%)  Wt(kg): --  CAPILLARY BLOOD GLUCOSE      POCT Blood Glucose.: 103 mg/dL (05 Jan 2022 22:23)      Labs                          12.0   6.02  )-----------( 226      ( 05 Jan 2022 10:42 )             36.1       01-05    141  |  106  |  25<H>  ----------------------------<  77  3.7   |  28  |  1.19    Ca    8.8      05 Jan 2022 10:42  Phos  2.7     01-05  Mg     2.1     01-05    TPro  6.6  /  Alb  3.1<L>  /  TBili  0.3  /  DBili  x   /  AST  42<H>  /  ALT  44  /  AlkPhos  62  01-04      CARDIAC MARKERS ( 04 Jan 2022 22:46 )  x     / x     / 501 U/L / x     / x          Troponin I, High Sensitivity Result: 10.0 ng/L (01-04-22 @ 22:46)        Radiology Results      Meds    MEDICATIONS  (STANDING):  ascorbic acid 500 milliGRAM(s) Oral daily  aspirin enteric coated 81 milliGRAM(s) Oral daily  dexAMETHasone  Injectable 6 milliGRAM(s) IV Push daily  dextrose 5% + sodium chloride 0.9%. 1000 milliLiter(s) (60 mL/Hr) IV Continuous <Continuous>  enoxaparin Injectable 40 milliGRAM(s) SubCutaneous daily  ferrous    sulfate 325 milliGRAM(s) Oral daily  FLUoxetine 20 milliGRAM(s) Oral daily  folic acid 1 milliGRAM(s) Oral daily  insulin lispro (ADMELOG) corrective regimen sliding scale   SubCutaneous three times a day before meals  metoprolol succinate ER 25 milliGRAM(s) Oral daily  multivitamin 1 Tablet(s) Oral daily  pantoprazole    Tablet 40 milliGRAM(s) Oral before breakfast  QUEtiapine 100 milliGRAM(s) Oral at bedtime  risperiDONE   Tablet 1 milliGRAM(s) Oral two times a day  sertraline 50 milliGRAM(s) Oral daily  zinc sulfate 220 milliGRAM(s) Oral daily      MEDICATIONS  (PRN):  acetaminophen     Tablet .. 650 milliGRAM(s) Oral every 6 hours PRN Temp greater or equal to 38C (100.4F), Mild Pain (1 - 3)  ALBUTerol    90 MICROgram(s) HFA Inhaler 2 Puff(s) Inhalation every 6 hours PRN Shortness of Breath and/or Wheezing  melatonin 3 milliGRAM(s) Oral at bedtime PRN Insomnia      Physical Exam    Neuro :  no focal deficits  Respiratory: CTA B/L  CV: RRR, S1S2, no murmurs,   Abdominal: Soft, NT, ND +BS,  Extremities: No edema, + peripheral pulses    ASSESSMENT    Infection due to severe acute respiratory syndrome coronavirus 2 (SARS-CoV-2)    HTN (hypertension)    DM (diabetes mellitus)    Alzheimer disease    MESERET (iron deficiency anemia)    No significant past surgical history        PLAN     Patient is a 89y old  Female who presents with a chief complaint of Weakness and Fall (05 Jan 2022 11:54)    pt seen in icu [  ], reg med floor [ x  ], bed [x  ], chair at bedside [   ], awake and responsive [ x ], lethargic [  ],  nad [ x ]        Allergies    penicillins (Unknown)        Vitals    T(F): 97.8 (01-06-22 @ 05:27), Max: 98.5 (01-05-22 @ 14:30)  HR: 69 (01-06-22 @ 05:27) (66 - 81)  BP: 106/52 (01-06-22 @ 05:27) (106/52 - 147/54)  RR: 18 (01-06-22 @ 05:27) (17 - 18)  SpO2: 100% (01-06-22 @ 05:27) (94% - 100%)  Wt(kg): --  CAPILLARY BLOOD GLUCOSE      POCT Blood Glucose.: 103 mg/dL (05 Jan 2022 22:23)      Labs                          12.0   6.02  )-----------( 226      ( 05 Jan 2022 10:42 )             36.1       01-05    141  |  106  |  25<H>  ----------------------------<  77  3.7   |  28  |  1.19    Ca    8.8      05 Jan 2022 10:42  Phos  2.7     01-05  Mg     2.1     01-05    TPro  6.6  /  Alb  3.1<L>  /  TBili  0.3  /  DBili  x   /  AST  42<H>  /  ALT  44  /  AlkPhos  62  01-04      CARDIAC MARKERS ( 04 Jan 2022 22:46 )  x     / x     / 501 U/L / x     / x          Troponin I, High Sensitivity Result: 10.0 ng/L (01-04-22 @ 22:46)        Radiology Results      Meds    MEDICATIONS  (STANDING):  ascorbic acid 500 milliGRAM(s) Oral daily  aspirin enteric coated 81 milliGRAM(s) Oral daily  dexAMETHasone  Injectable 6 milliGRAM(s) IV Push daily  dextrose 5% + sodium chloride 0.9%. 1000 milliLiter(s) (60 mL/Hr) IV Continuous <Continuous>  enoxaparin Injectable 40 milliGRAM(s) SubCutaneous daily  ferrous    sulfate 325 milliGRAM(s) Oral daily  FLUoxetine 20 milliGRAM(s) Oral daily  folic acid 1 milliGRAM(s) Oral daily  insulin lispro (ADMELOG) corrective regimen sliding scale   SubCutaneous three times a day before meals  metoprolol succinate ER 25 milliGRAM(s) Oral daily  multivitamin 1 Tablet(s) Oral daily  pantoprazole    Tablet 40 milliGRAM(s) Oral before breakfast  QUEtiapine 100 milliGRAM(s) Oral at bedtime  risperiDONE   Tablet 1 milliGRAM(s) Oral two times a day  sertraline 50 milliGRAM(s) Oral daily  zinc sulfate 220 milliGRAM(s) Oral daily      MEDICATIONS  (PRN):  acetaminophen     Tablet .. 650 milliGRAM(s) Oral every 6 hours PRN Temp greater or equal to 38C (100.4F), Mild Pain (1 - 3)  ALBUTerol    90 MICROgram(s) HFA Inhaler 2 Puff(s) Inhalation every 6 hours PRN Shortness of Breath and/or Wheezing  melatonin 3 milliGRAM(s) Oral at bedtime PRN Insomnia      Physical Exam    Neuro :  no focal deficits  Respiratory: CTA B/L  CV: RRR, S1S2, no murmurs,   Abdominal: Soft, NT, ND +BS,  Extremities: No edema, + peripheral pulses        ASSESSMENT    pneumonia 2nd to covid 19,   s/p fall,   h/o Alzheimer's dementia,   HTN,   DM,   2 to 1 heart block s/p micra ppm placement 9/17/21   MESERET (iron deficiency anemia)        PLAN    cont decadron,   start remdesivir   cont vit c, vitamin d, zinc, pepcid, singulair, flonase   contact and airborne isolation,   pulm f/u   cont albuterol inhaler,   afebrile   tylenol prn,   robitussin prn   O2 sat 100% (94% - 100%) on ra  O2 via nasal canula as needed,   serum creat wnl   cardio cons  check ppm  cont cardiac meds  phys tx eval noted and rec phys tx 2-3x/week x 2WKS home w/ home PT; Pending progress and participation with JOB  case mgmt ,   soc wk for placement    cont current meds   Patient is a 89y old  Female who presents with a chief complaint of Weakness and Fall (05 Jan 2022 11:54)    pt seen in icu [  ], reg med floor [ x  ], bed [x  ], chair at bedside [   ], awake and responsive [ x ], lethargic [  ],  nad [ x ]        Allergies    penicillins (Unknown)        Vitals    T(F): 97.8 (01-06-22 @ 05:27), Max: 98.5 (01-05-22 @ 14:30)  HR: 69 (01-06-22 @ 05:27) (66 - 81)  BP: 106/52 (01-06-22 @ 05:27) (106/52 - 147/54)  RR: 18 (01-06-22 @ 05:27) (17 - 18)  SpO2: 100% (01-06-22 @ 05:27) (94% - 100%)  Wt(kg): --  CAPILLARY BLOOD GLUCOSE      POCT Blood Glucose.: 103 mg/dL (05 Jan 2022 22:23)      Labs                          12.0   6.02  )-----------( 226      ( 05 Jan 2022 10:42 )             36.1       01-05    141  |  106  |  25<H>  ----------------------------<  77  3.7   |  28  |  1.19    Ca    8.8      05 Jan 2022 10:42  Phos  2.7     01-05  Mg     2.1     01-05    TPro  6.6  /  Alb  3.1<L>  /  TBili  0.3  /  DBili  x   /  AST  42<H>  /  ALT  44  /  AlkPhos  62  01-04      CARDIAC MARKERS ( 04 Jan 2022 22:46 )  x     / x     / 501 U/L / x     / x          Troponin I, High Sensitivity Result: 10.0 ng/L (01-04-22 @ 22:46)        Radiology Results      Meds    MEDICATIONS  (STANDING):  ascorbic acid 500 milliGRAM(s) Oral daily  aspirin enteric coated 81 milliGRAM(s) Oral daily  dexAMETHasone  Injectable 6 milliGRAM(s) IV Push daily  dextrose 5% + sodium chloride 0.9%. 1000 milliLiter(s) (60 mL/Hr) IV Continuous <Continuous>  enoxaparin Injectable 40 milliGRAM(s) SubCutaneous daily  ferrous    sulfate 325 milliGRAM(s) Oral daily  FLUoxetine 20 milliGRAM(s) Oral daily  folic acid 1 milliGRAM(s) Oral daily  insulin lispro (ADMELOG) corrective regimen sliding scale   SubCutaneous three times a day before meals  metoprolol succinate ER 25 milliGRAM(s) Oral daily  multivitamin 1 Tablet(s) Oral daily  pantoprazole    Tablet 40 milliGRAM(s) Oral before breakfast  QUEtiapine 100 milliGRAM(s) Oral at bedtime  risperiDONE   Tablet 1 milliGRAM(s) Oral two times a day  sertraline 50 milliGRAM(s) Oral daily  zinc sulfate 220 milliGRAM(s) Oral daily      MEDICATIONS  (PRN):  acetaminophen     Tablet .. 650 milliGRAM(s) Oral every 6 hours PRN Temp greater or equal to 38C (100.4F), Mild Pain (1 - 3)  ALBUTerol    90 MICROgram(s) HFA Inhaler 2 Puff(s) Inhalation every 6 hours PRN Shortness of Breath and/or Wheezing  melatonin 3 milliGRAM(s) Oral at bedtime PRN Insomnia      Physical Exam    Neuro :  no focal deficits  Respiratory: CTA B/L  CV: RRR, S1S2, no murmurs,   Abdominal: Soft, NT, ND +BS,  Extremities: No edema, + peripheral pulses        ASSESSMENT    pneumonia 2nd to covid 19,   s/p fall,   h/o Alzheimer's dementia,   HTN,   DM,   2 to 1 heart block s/p micra ppm placement 9/17/21   MESERET (iron deficiency anemia)        PLAN    cont decadron,   start remdesivir   cont vit c, vitamin d, zinc, pepcid, singulair, flonase   contact and airborne isolation,   pulm f/u   cont albuterol inhaler,   afebrile   tylenol prn,   robitussin prn   O2 sat 100% (94% - 100%) on ra  O2 via nasal canula as needed,   serum creat wnl   cardio cons  check ppm  cont cardiac meds   endo cons  cont lispro ss  lantus d/c'd 2nd to hypoglycemic episode   f/u free t4 given elevated tsh   phys tx eval noted and rec phys tx 2-3x/week x 2WKS home w/ home PT; Pending progress and participation with JOB  case mgmt ,   soc wk for placement    cont current meds

## 2022-01-06 NOTE — PROGRESS NOTE ADULT - SUBJECTIVE AND OBJECTIVE BOX
Patient is a 89y old  Female who presents with a chief complaint of Weakness and Fall (2022 10:16)  Awake, alert, laying in bed in NAD    INTERVAL HPI/OVERNIGHT EVENTS:      VITAL SIGNS:  T(F): 97.8 (22 @ 05:27)  HR: 69 (22 @ 05:27)  BP: 106/52 (22 @ 05:27)  RR: 18 (22 @ 05:27)  SpO2: 100% (22 @ 05:27)  Wt(kg): --  I&O's Detail          REVIEW OF SYSTEMS:    CONSTITUTIONAL:  No fevers, chills, sweats    HEENT:  Eyes:  No diplopia or blurred vision. ENT:  No earache, sore throat or runny nose.    CARDIOVASCULAR:  No pressure, squeezing, tightness, or heaviness about the chest; no palpitations.    RESPIRATORY:  Per HPI    GASTROINTESTINAL:  No abdominal pain, nausea, vomiting or diarrhea.    GENITOURINARY:  No dysuria, frequency or urgency.    NEUROLOGIC:  No paresthesias, fasciculations, seizures or weakness.    PSYCHIATRIC:  No disorder of thought or mood.      PHYSICAL EXAM:    Constitutional: Well developed and nourished  Eyes:Perrla  ENMT: normal  Neck:supple  Respiratory: good air entry  Cardiovascular: S1 S2 regular  Gastrointestinal: Soft, Non tender  Extremities: No edema  Vascular:normal  Neurological:Awake, alert,Ox3  Musculoskeletal:Normal      MEDICATIONS  (STANDING):  ascorbic acid 500 milliGRAM(s) Oral daily  aspirin enteric coated 81 milliGRAM(s) Oral daily  dexAMETHasone  Injectable 6 milliGRAM(s) IV Push daily  dextrose 5% + sodium chloride 0.9%. 1000 milliLiter(s) (60 mL/Hr) IV Continuous <Continuous>  enoxaparin Injectable 40 milliGRAM(s) SubCutaneous daily  ferrous    sulfate 325 milliGRAM(s) Oral daily  FLUoxetine 20 milliGRAM(s) Oral daily  folic acid 1 milliGRAM(s) Oral daily  insulin lispro (ADMELOG) corrective regimen sliding scale   SubCutaneous three times a day before meals  metoprolol succinate ER 25 milliGRAM(s) Oral daily  multivitamin 1 Tablet(s) Oral daily  pantoprazole    Tablet 40 milliGRAM(s) Oral before breakfast  QUEtiapine 100 milliGRAM(s) Oral at bedtime  risperiDONE   Tablet 1 milliGRAM(s) Oral two times a day  sertraline 50 milliGRAM(s) Oral daily  zinc sulfate 220 milliGRAM(s) Oral daily    MEDICATIONS  (PRN):  acetaminophen     Tablet .. 650 milliGRAM(s) Oral every 6 hours PRN Temp greater or equal to 38C (100.4F), Mild Pain (1 - 3)  ALBUTerol    90 MICROgram(s) HFA Inhaler 2 Puff(s) Inhalation every 6 hours PRN Shortness of Breath and/or Wheezing  melatonin 3 milliGRAM(s) Oral at bedtime PRN Insomnia      Allergies    penicillins (Unknown)    Intolerances        LABS:                        11.9   4.66  )-----------( 227      ( 2022 09:14 )             37.1     01-05    141  |  106  |  25<H>  ----------------------------<  77  3.7   |  28  |  1.19    Ca    8.8      2022 10:42  Phos  2.7     -05  Mg     2.1     -05    TPro  6.6  /  Alb  3.1<L>  /  TBili  0.3  /  DBili  x   /  AST  42<H>  /  ALT  44  /  AlkPhos  62  01-04    PT/INR - ( 2022 22:46 )   PT: 12.4 sec;   INR: 1.04 ratio         PTT - ( 2022 22:46 )  PTT:28.7 sec  Urinalysis Basic - ( 2022 04:18 )    Color: Yellow / Appearance: Clear / S.010 / pH: x  Gluc: x / Ketone: Negative  / Bili: Negative / Urobili: Negative   Blood: x / Protein: 15 / Nitrite: Positive   Leuk Esterase: Trace / RBC: 2-5 /HPF / WBC 3-5 /HPF   Sq Epi: x / Non Sq Epi: Moderate /HPF / Bacteria: Few /HPF        CARDIAC MARKERS ( 2022 22:46 )  x     / x     / 501 U/L / x     / x          CAPILLARY BLOOD GLUCOSE      POCT Blood Glucose.: 157 mg/dL (2022 08:06)  POCT Blood Glucose.: 103 mg/dL (2022 22:23)  POCT Blood Glucose.: 61 mg/dL (2022 21:28)  POCT Blood Glucose.: 177 mg/dL (2022 18:54)  POCT Blood Glucose.: 586 mg/dL (2022 17:21)  POCT Blood Glucose.: 74 mg/dL (2022 14:04)  POCT Blood Glucose.: 69 mg/dL (2022 12:31)    pro-bnp --  @ 06:56     d-dimer 414   @ 06:56  pro-bnp --  @ 10:42     d-dimer 686   @ 10:42  pro-bnp 820  @ 22:46     d-dimer --   @ 22:46      RADIOLOGY & ADDITIONAL TESTS:    CXR:  < from: Xray Chest 1 View AP/PA (22 @ 23:27) >  IMPRESSION:  Negative for acute cardiopulmonary disease.  No acute bony fracture.      < end of copied text >    Ct scan chest:    ekg;    echo:

## 2022-01-06 NOTE — PROGRESS NOTE ADULT - PROBLEM SELECTOR PLAN 2
- As above - Positive for COVID19  - CXR clear  - F/u COVID markers q72hrs  - Currently on room air  - Albuterol MDI Q2H PRN  - Tylenol PRN for fever  - O2 support as needed  - Vit C, D, Zinc  - Started Decadron  - Will hold Remdesivir due to SHELIA  - Additional supportive therapy as needed.

## 2022-01-06 NOTE — PROGRESS NOTE ADULT - PROBLEM SELECTOR PLAN 3
- On many psych meds at home: Sertraline, Quetiapine, Risperidone, Fluoxetine  - Consult psych in AM to optimize medication - Frequent falls, partially due to weakness from COVID  - Most recently, unwitnessed fall  - CT Head: Moderately limited by motion. No acute intracranial hemorrhage or calvarial fracture. Short-term repeat. - CT evaluation may be obtained as clinically warranted.  - CT cervical spine: No acute cervical spine fracture or evidence of traumatic malalignment.  - Fall precautions  - PT consulted

## 2022-01-06 NOTE — PROGRESS NOTE ADULT - PROBLEM SELECTOR PLAN 1
isolation precautions  oxygen supp  monitor oxygen sat  monitor LDH, LFTs, D-Dimer, CRP, Ferritin and procalcitonin  cont meds  follow up Covid PCR

## 2022-01-06 NOTE — PROGRESS NOTE ADULT - PROBLEM SELECTOR PLAN 5
- P/w Cr 1.43 (baseline ~0.9)  - Likely due to poor oral intake(dehydration)  - S/p 1L bolus in ED  - Gentle IVF 60cc/hr for 12 hrs  - Can obtain urine lytes if no improvement with IVF (and no use of obtaining urine lytes now when pt is on IVF) - Takes meds at home  - Continue as sliding scale  - FS achs  - A1c - 7.4

## 2022-01-06 NOTE — PROGRESS NOTE ADULT - PROBLEM SELECTOR PLAN 9
- History of iron deficiency anemia, but normocytic now.  - C/w home supplements  - P/w Hgb 11.1 (baseline ~12)  - No sign of bleeding  - Monitor CBC - Daughter having difficulty taking care of patient at home recently  - SHAKEEL+CM consulted

## 2022-01-07 DIAGNOSIS — U07.1 COVID-19: ICD-10-CM

## 2022-01-07 DIAGNOSIS — R41.0 DISORIENTATION, UNSPECIFIED: ICD-10-CM

## 2022-01-07 LAB
-  AMIKACIN: SIGNIFICANT CHANGE UP
-  AMOXICILLIN/CLAVULANIC ACID: SIGNIFICANT CHANGE UP
-  AMPICILLIN/SULBACTAM: SIGNIFICANT CHANGE UP
-  AMPICILLIN: SIGNIFICANT CHANGE UP
-  AZTREONAM: SIGNIFICANT CHANGE UP
-  CEFAZOLIN: SIGNIFICANT CHANGE UP
-  CEFEPIME: SIGNIFICANT CHANGE UP
-  CEFOXITIN: SIGNIFICANT CHANGE UP
-  CEFTRIAXONE: SIGNIFICANT CHANGE UP
-  CIPROFLOXACIN: SIGNIFICANT CHANGE UP
-  ERTAPENEM: SIGNIFICANT CHANGE UP
-  GENTAMICIN: SIGNIFICANT CHANGE UP
-  IMIPENEM: SIGNIFICANT CHANGE UP
-  LEVOFLOXACIN: SIGNIFICANT CHANGE UP
-  MEROPENEM: SIGNIFICANT CHANGE UP
-  NITROFURANTOIN: SIGNIFICANT CHANGE UP
-  PIPERACILLIN/TAZOBACTAM: SIGNIFICANT CHANGE UP
-  TIGECYCLINE: SIGNIFICANT CHANGE UP
-  TOBRAMYCIN: SIGNIFICANT CHANGE UP
-  TRIMETHOPRIM/SULFAMETHOXAZOLE: SIGNIFICANT CHANGE UP
ALBUMIN SERPL ELPH-MCNC: 3.2 G/DL — LOW (ref 3.5–5)
ALP SERPL-CCNC: 57 U/L — SIGNIFICANT CHANGE UP (ref 40–120)
ALT FLD-CCNC: 51 U/L DA — SIGNIFICANT CHANGE UP (ref 10–60)
ANION GAP SERPL CALC-SCNC: 7 MMOL/L — SIGNIFICANT CHANGE UP (ref 5–17)
APTT BLD: 30.8 SEC — SIGNIFICANT CHANGE UP (ref 27.5–35.5)
AST SERPL-CCNC: 61 U/L — HIGH (ref 10–40)
BASOPHILS # BLD AUTO: 0.02 K/UL — SIGNIFICANT CHANGE UP (ref 0–0.2)
BASOPHILS NFR BLD AUTO: 0.3 % — SIGNIFICANT CHANGE UP (ref 0–2)
BILIRUB SERPL-MCNC: 0.4 MG/DL — SIGNIFICANT CHANGE UP (ref 0.2–1.2)
BUN SERPL-MCNC: 17 MG/DL — SIGNIFICANT CHANGE UP (ref 7–18)
CALCIUM SERPL-MCNC: 8.6 MG/DL — SIGNIFICANT CHANGE UP (ref 8.4–10.5)
CHLORIDE SERPL-SCNC: 111 MMOL/L — HIGH (ref 96–108)
CO2 SERPL-SCNC: 23 MMOL/L — SIGNIFICANT CHANGE UP (ref 22–31)
CREAT SERPL-MCNC: 0.78 MG/DL — SIGNIFICANT CHANGE UP (ref 0.5–1.3)
CRP SERPL-MCNC: 9 MG/L — HIGH
CULTURE RESULTS: SIGNIFICANT CHANGE UP
D DIMER BLD IA.RAPID-MCNC: 385 NG/ML DDU — HIGH
EOSINOPHIL # BLD AUTO: 0.03 K/UL — SIGNIFICANT CHANGE UP (ref 0–0.5)
EOSINOPHIL NFR BLD AUTO: 0.5 % — SIGNIFICANT CHANGE UP (ref 0–6)
ERYTHROCYTE [SEDIMENTATION RATE] IN BLOOD: 14 MM/HR — SIGNIFICANT CHANGE UP (ref 0–20)
FERRITIN SERPL-MCNC: 224 NG/ML — HIGH (ref 15–150)
GLUCOSE BLDC GLUCOMTR-MCNC: 174 MG/DL — HIGH (ref 70–99)
GLUCOSE BLDC GLUCOMTR-MCNC: 188 MG/DL — HIGH (ref 70–99)
GLUCOSE BLDC GLUCOMTR-MCNC: 276 MG/DL — HIGH (ref 70–99)
GLUCOSE BLDC GLUCOMTR-MCNC: 309 MG/DL — HIGH (ref 70–99)
GLUCOSE SERPL-MCNC: 183 MG/DL — HIGH (ref 70–99)
HCT VFR BLD CALC: 33.9 % — LOW (ref 34.5–45)
HGB BLD-MCNC: 11.3 G/DL — LOW (ref 11.5–15.5)
IMM GRANULOCYTES NFR BLD AUTO: 0.2 % — SIGNIFICANT CHANGE UP (ref 0–1.5)
INR BLD: 1.04 RATIO — SIGNIFICANT CHANGE UP (ref 0.88–1.16)
LDH SERPL L TO P-CCNC: 418 U/L — HIGH (ref 120–225)
LYMPHOCYTES # BLD AUTO: 0.83 K/UL — LOW (ref 1–3.3)
LYMPHOCYTES # BLD AUTO: 13.8 % — SIGNIFICANT CHANGE UP (ref 13–44)
MAGNESIUM SERPL-MCNC: 2 MG/DL — SIGNIFICANT CHANGE UP (ref 1.6–2.6)
MCHC RBC-ENTMCNC: 30.5 PG — SIGNIFICANT CHANGE UP (ref 27–34)
MCHC RBC-ENTMCNC: 33.3 GM/DL — SIGNIFICANT CHANGE UP (ref 32–36)
MCV RBC AUTO: 91.6 FL — SIGNIFICANT CHANGE UP (ref 80–100)
METHOD TYPE: SIGNIFICANT CHANGE UP
MONOCYTES # BLD AUTO: 0.45 K/UL — SIGNIFICANT CHANGE UP (ref 0–0.9)
MONOCYTES NFR BLD AUTO: 7.5 % — SIGNIFICANT CHANGE UP (ref 2–14)
NEUTROPHILS # BLD AUTO: 4.68 K/UL — SIGNIFICANT CHANGE UP (ref 1.8–7.4)
NEUTROPHILS NFR BLD AUTO: 77.7 % — HIGH (ref 43–77)
NRBC # BLD: 0 /100 WBCS — SIGNIFICANT CHANGE UP (ref 0–0)
ORGANISM # SPEC MICROSCOPIC CNT: SIGNIFICANT CHANGE UP
ORGANISM # SPEC MICROSCOPIC CNT: SIGNIFICANT CHANGE UP
PHOSPHATE SERPL-MCNC: 1.8 MG/DL — LOW (ref 2.5–4.5)
PLATELET # BLD AUTO: 230 K/UL — SIGNIFICANT CHANGE UP (ref 150–400)
POTASSIUM SERPL-MCNC: 3.8 MMOL/L — SIGNIFICANT CHANGE UP (ref 3.5–5.3)
POTASSIUM SERPL-SCNC: 3.8 MMOL/L — SIGNIFICANT CHANGE UP (ref 3.5–5.3)
PROT SERPL-MCNC: 6.2 G/DL — SIGNIFICANT CHANGE UP (ref 6–8.3)
PROTHROM AB SERPL-ACNC: 12.3 SEC — SIGNIFICANT CHANGE UP (ref 10.6–13.6)
RBC # BLD: 3.7 M/UL — LOW (ref 3.8–5.2)
RBC # FLD: 13.9 % — SIGNIFICANT CHANGE UP (ref 10.3–14.5)
SODIUM SERPL-SCNC: 141 MMOL/L — SIGNIFICANT CHANGE UP (ref 135–145)
SPECIMEN SOURCE: SIGNIFICANT CHANGE UP
WBC # BLD: 6.02 K/UL — SIGNIFICANT CHANGE UP (ref 3.8–10.5)
WBC # FLD AUTO: 6.02 K/UL — SIGNIFICANT CHANGE UP (ref 3.8–10.5)

## 2022-01-07 PROCEDURE — 90792 PSYCH DIAG EVAL W/MED SRVCS: CPT

## 2022-01-07 RX ORDER — FLUOXETINE HCL 10 MG
1 CAPSULE ORAL
Qty: 0 | Refills: 0 | DISCHARGE

## 2022-01-07 RX ORDER — OLANZAPINE 15 MG/1
5 TABLET, FILM COATED ORAL AT BEDTIME
Refills: 0 | Status: DISCONTINUED | OUTPATIENT
Start: 2022-01-07 | End: 2022-01-12

## 2022-01-07 RX ORDER — POTASSIUM PHOSPHATE, MONOBASIC POTASSIUM PHOSPHATE, DIBASIC 236; 224 MG/ML; MG/ML
30 INJECTION, SOLUTION INTRAVENOUS ONCE
Refills: 0 | Status: COMPLETED | OUTPATIENT
Start: 2022-01-07 | End: 2022-01-07

## 2022-01-07 RX ORDER — INSULIN GLARGINE 100 [IU]/ML
6 INJECTION, SOLUTION SUBCUTANEOUS AT BEDTIME
Refills: 0 | Status: DISCONTINUED | OUTPATIENT
Start: 2022-01-07 | End: 2022-01-11

## 2022-01-07 RX ORDER — OLANZAPINE 15 MG/1
2.5 TABLET, FILM COATED ORAL EVERY 12 HOURS
Refills: 0 | Status: DISCONTINUED | OUTPATIENT
Start: 2022-01-07 | End: 2022-01-13

## 2022-01-07 RX ADMIN — Medication 500 MILLIGRAM(S): at 11:33

## 2022-01-07 RX ADMIN — ZINC SULFATE TAB 220 MG (50 MG ZINC EQUIVALENT) 220 MILLIGRAM(S): 220 (50 ZN) TAB at 11:39

## 2022-01-07 RX ADMIN — RISPERIDONE 1 MILLIGRAM(S): 4 TABLET ORAL at 06:49

## 2022-01-07 RX ADMIN — Medication 2: at 17:58

## 2022-01-07 RX ADMIN — Medication 81 MILLIGRAM(S): at 11:39

## 2022-01-07 RX ADMIN — PANTOPRAZOLE SODIUM 40 MILLIGRAM(S): 20 TABLET, DELAYED RELEASE ORAL at 06:49

## 2022-01-07 RX ADMIN — Medication 6 MILLIGRAM(S): at 06:49

## 2022-01-07 RX ADMIN — Medication 20 MILLIGRAM(S): at 16:52

## 2022-01-07 RX ADMIN — Medication 1 TABLET(S): at 11:33

## 2022-01-07 RX ADMIN — Medication 1 MILLIGRAM(S): at 11:33

## 2022-01-07 RX ADMIN — OLANZAPINE 5 MILLIGRAM(S): 15 TABLET, FILM COATED ORAL at 22:05

## 2022-01-07 RX ADMIN — INSULIN GLARGINE 6 UNIT(S): 100 INJECTION, SOLUTION SUBCUTANEOUS at 22:05

## 2022-01-07 RX ADMIN — Medication 8: at 11:21

## 2022-01-07 RX ADMIN — SERTRALINE 50 MILLIGRAM(S): 25 TABLET, FILM COATED ORAL at 11:34

## 2022-01-07 RX ADMIN — Medication 2: at 08:14

## 2022-01-07 RX ADMIN — ENOXAPARIN SODIUM 40 MILLIGRAM(S): 100 INJECTION SUBCUTANEOUS at 11:35

## 2022-01-07 RX ADMIN — POTASSIUM PHOSPHATE, MONOBASIC POTASSIUM PHOSPHATE, DIBASIC 83.33 MILLIMOLE(S): 236; 224 INJECTION, SOLUTION INTRAVENOUS at 14:11

## 2022-01-07 RX ADMIN — OLANZAPINE 2.5 MILLIGRAM(S): 15 TABLET, FILM COATED ORAL at 16:52

## 2022-01-07 RX ADMIN — Medication 25 MILLIGRAM(S): at 06:49

## 2022-01-07 RX ADMIN — Medication 325 MILLIGRAM(S): at 11:34

## 2022-01-07 NOTE — PROGRESS NOTE ADULT - PROBLEM SELECTOR PLAN 1
- On many psych meds at home: Sertraline, Quetiapine, Risperidone, Fluoxetine  - f/u Psych consult (Dr. Voss)

## 2022-01-07 NOTE — BH CONSULTATION LIAISON ASSESSMENT NOTE - NSBHCONSULTRECOMMENDOTHER_PSY_A_CORE FT
1. DC Seroquel and Risperdal, substitute Zyprexa 5 mg PO qhs standing as alternate antipsychotic  2. C/w home Zoloft 50 mg qd  3. For acute agitation, recommend Zyprexa 2.5 mg q12h PRN agitation  -- PO dose form is preferred if pt is able to swallow, but IM can be used as backup if PO refused or cannot be given  4. Medical management as directed by primary team  5. Pt will need LTP--she clearly lacks capacity, so daughter should be approached for necessary consents  6. Psychiatry will continue to follow  7. Case d/w Dr. Carvajal of primary team    Lanie Voss MD  Director, Consultation-Liaison Psychiatry Service  [id number]

## 2022-01-07 NOTE — BH CONSULTATION LIAISON ASSESSMENT NOTE - DIFFERENTIAL
Vascular dementia with behavioral disturbance  Medication-induced delirium Vascular dementia with behavioral disturbance  Encephalopathy due to COVID-19 infection  Medication-induced delirium

## 2022-01-07 NOTE — BH CONSULTATION LIAISON ASSESSMENT NOTE - SUMMARY
89 yo Dutch-speaking F, retired and living with daughter, with PHx of dementia formerly f/w psychiatrist Dr. Namrata Grya (but not currently) and taking multiple psych meds (Seroquel, Risperdal and Zoloft) and MHx of HTN, IDDM and anemia, BIBEMS from home for generalized weakness and unwitnessed fall, found to be COVID+. Psych consult was requested for management of agitation/behavioral disturbance since admission (trying to get OOB, removing clothing and attempting to leave room naked). On exam, pt presents awake, alert and calm but obviously confused and unable to provide any meaningful information. Based on chart and information obtained from collateral, we suspect pt's behavioral disturbances are most c/w progressive dementia (likely vascular, given imaging findings), with polypharmacy as potential exacerbating factor. To manage pt's behavior while reducing potential for SE, we recommend DC'ing both of pt's antipsychotics (category overlap/insufficient efficacy) and substituting Zyprexa standing and PRN, while retaining pt's home SSRI due to daughter's strong belief that it is helpful. We agree with reported impression of pt's family that pt needs LTP. Pt does not appear to present an acute risk of harm to self or others at the time of assessment, and does not appear to be in need of admission to IP psych at the time of assessment.   89 yo Welsh-speaking F, retired and living with daughter, with PHx of dementia formerly f/w psychiatrist Dr. Namrata Gray (but not currently) and taking multiple psych meds (Seroquel, Risperdal and Zoloft) and MHx of HTN, IDDM and anemia, BIBEMS from home for generalized weakness and unwitnessed fall, found to be COVID+. Psych consult was requested for management of agitation/behavioral disturbance since admission (trying to get OOB, removing clothing and attempting to leave room naked). On exam, pt presents awake, alert and calm but obviously confused and unable to provide any meaningful information. 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 To manage pt's behavior while reducing potential for SE, we recommend DC'ing both of pt's antipsychotics (category overlap/insufficient efficacy) and substituting Zyprexa standing and PRN, while retaining pt's home SSRI due to daughter's strong belief that it is helpful. We agree with reported impression of pt's family that pt needs LTP. Pt does not appear to present an acute risk of harm to self or others at the time of assessment, and does not appear to be in need of admission to IP psych at the time of assessment.

## 2022-01-07 NOTE — CONSULT NOTE ADULT - ASSESSMENT
89 y.o. F w/ PMHx of Alzheimer's dementia, HTN, DM,Mpjuf-Bjryr-h/p PPM  and anemia presents to the ED from home with complaints of generalized weakness,COVID.  1.WCT- low dose b blocker.  2.COVID-dexamethasone,Remdesmivir.  3.DM-Insulin.  4.HTN- low dose b blocker.  5.Cont asa,statin.  6.Dementia-seroquel,risperidone.  7.GI and DVT prophylaxis

## 2022-01-07 NOTE — BH CONSULTATION LIAISON ASSESSMENT NOTE - NSBHCHARTREVIEWVS_PSY_A_CORE FT
Vital Signs Last 24 Hrs  T(C): 36.9 (07 Jan 2022 14:48), Max: 36.9 (07 Jan 2022 14:48)  T(F): 98.4 (07 Jan 2022 14:48), Max: 98.4 (07 Jan 2022 14:48)  HR: 72 (07 Jan 2022 15:37) (65 - 100)  BP: 145/79 (07 Jan 2022 14:48) (136/49 - 147/73)  BP(mean): --  RR: 18 (07 Jan 2022 14:48) (18 - 18)  SpO2: 95% (07 Jan 2022 15:37) (94% - 96%)

## 2022-01-07 NOTE — BH CONSULTATION LIAISON ASSESSMENT NOTE - DESCRIPTION
B/R in [facility]. Per pt, has 5 children (4 daughters, 1 son). Lives with daughter Tresa in [place].

## 2022-01-07 NOTE — PROGRESS NOTE ADULT - SUBJECTIVE AND OBJECTIVE BOX
Patient is a 89y old  Female who presents with a chief complaint of Weakness and Fall (06 Jan 2022 11:06)    pt seen in icu [  ], reg med floor [ x  ], bed [x  ], chair at bedside [   ], awake and responsive [ x ], lethargic [  ],    nad [ x ]      Allergies    penicillins (Unknown)        Vitals    T(F): 97.4 (01-06-22 @ 21:10), Max: 98.1 (01-06-22 @ 14:34)  HR: 96 (01-07-22 @ 05:28) (72 - 100)  BP: 136/49 (01-07-22 @ 05:28) (131/85 - 147/73)  RR: 18 (01-07-22 @ 05:28) (18 - 18)  SpO2: 96% (01-07-22 @ 05:28) (96% - 99%)  Wt(kg): --  CAPILLARY BLOOD GLUCOSE      POCT Blood Glucose.: 188 mg/dL (07 Jan 2022 08:00)      Labs                          11.3   6.02  )-----------( 230      ( 07 Jan 2022 09:14 )             33.9       01-07    141  |  111<H>  |  17  ----------------------------<  183<H>  3.8   |  23  |  0.78    Ca    8.6      07 Jan 2022 09:14  Phos  1.8     01-07  Mg     2.0     01-07    TPro  6.2  /  Alb  3.2<L>  /  TBili  0.4  /  DBili  x   /  AST  61<H>  /  ALT  51  /  AlkPhos  57  01-07          Troponin I, High Sensitivity Result: 10.0 ng/L (01-04-22 @ 22:46)    Clean Catch Clean Catch (Midstream)  01-05 @ 11:36   >100,000 CFU/ml Escherichia coli  --  --          Radiology Results      Meds    MEDICATIONS  (STANDING):  ascorbic acid 500 milliGRAM(s) Oral daily  aspirin enteric coated 81 milliGRAM(s) Oral daily  dexAMETHasone  Injectable 6 milliGRAM(s) IV Push daily  dextrose 5% + sodium chloride 0.9%. 1000 milliLiter(s) (60 mL/Hr) IV Continuous <Continuous>  enoxaparin Injectable 40 milliGRAM(s) SubCutaneous daily  ferrous    sulfate 325 milliGRAM(s) Oral daily  FLUoxetine 20 milliGRAM(s) Oral daily  folic acid 1 milliGRAM(s) Oral daily  insulin lispro (ADMELOG) corrective regimen sliding scale   SubCutaneous three times a day before meals  metoprolol succinate ER 25 milliGRAM(s) Oral daily  multivitamin 1 Tablet(s) Oral daily  pantoprazole    Tablet 40 milliGRAM(s) Oral before breakfast  QUEtiapine 100 milliGRAM(s) Oral at bedtime  risperiDONE   Tablet 1 milliGRAM(s) Oral two times a day  sertraline 50 milliGRAM(s) Oral daily  zinc sulfate 220 milliGRAM(s) Oral daily      MEDICATIONS  (PRN):  acetaminophen     Tablet .. 650 milliGRAM(s) Oral every 6 hours PRN Temp greater or equal to 38C (100.4F), Mild Pain (1 - 3)  ALBUTerol    90 MICROgram(s) HFA Inhaler 2 Puff(s) Inhalation every 6 hours PRN Shortness of Breath and/or Wheezing  melatonin 3 milliGRAM(s) Oral at bedtime PRN Insomnia      Physical Exam    Neuro :  no focal deficits  Respiratory: CTA B/L  CV: RRR, S1S2, no murmurs,   Abdominal: Soft, NT, ND +BS,  Extremities: No edema, + peripheral pulses        ASSESSMENT    pneumonia 2nd to covid 19,   s/p fall,   h/o Alzheimer's dementia,   HTN,   DM,   2 to 1 heart block s/p micra ppm placement 9/17/21   MESERET (iron deficiency anemia)        PLAN    cont decadron,   start remdesivir   cont vit c, vitamin d, zinc, pepcid, singulair, flonase   contact and airborne isolation,   pulm f/u   cont albuterol inhaler,   afebrile   tylenol prn,   robitussin prn   O2 sat 100% (94% - 100%) on ra  O2 via nasal canula as needed,   serum creat wnl   cardio cons  check ppm  cont cardiac meds   endo cons  cont lispro ss  lantus d/c'd 2nd to hypoglycemic episode   f/u free t4 given elevated tsh   phys tx eval noted and rec phys tx 2-3x/week x 2WKS home w/ home PT; Pending progress and participation with [facility]  case mgmt ,   soc wk for placement    cont current meds       Patient is a 89y old  Female who presents with a chief complaint of Weakness and Fall (06 Jan 2022 11:06)    pt seen in icu [  ], reg med floor [ x  ], bed [x  ], chair at bedside [   ], awake and responsive [ x ], lethargic [  ],    nad [ x ]      Allergies    penicillins (Unknown)        Vitals    T(F): 97.4 (01-06-22 @ 21:10), Max: 98.1 (01-06-22 @ 14:34)  HR: 96 (01-07-22 @ 05:28) (72 - 100)  BP: 136/49 (01-07-22 @ 05:28) (131/85 - 147/73)  RR: 18 (01-07-22 @ 05:28) (18 - 18)  SpO2: 96% (01-07-22 @ 05:28) (96% - 99%)  Wt(kg): --  CAPILLARY BLOOD GLUCOSE      POCT Blood Glucose.: 188 mg/dL (07 Jan 2022 08:00)      Labs                          11.3   6.02  )-----------( 230      ( 07 Jan 2022 09:14 )             33.9       01-07    141  |  111<H>  |  17  ----------------------------<  183<H>  3.8   |  23  |  0.78    Ca    8.6      07 Jan 2022 09:14  Phos  1.8     01-07  Mg     2.0     01-07    TPro  6.2  /  Alb  3.2<L>  /  TBili  0.4  /  DBili  x   /  AST  61<H>  /  ALT  51  /  AlkPhos  57  01-07    Free Thyroxine, Serum in AM (01.06.22 @ 18:31)   Free Thyroxine, Serum: 1.1 ng/dL      Troponin I, High Sensitivity Result: 10.0 ng/L (01-04-22 @ 22:46)    Clean Catch Clean Catch (Midstream)  01-05 @ 11:36   >100,000 CFU/ml Escherichia coli  --  --          Radiology Results      Meds    MEDICATIONS  (STANDING):  ascorbic acid 500 milliGRAM(s) Oral daily  aspirin enteric coated 81 milliGRAM(s) Oral daily  dexAMETHasone  Injectable 6 milliGRAM(s) IV Push daily  dextrose 5% + sodium chloride 0.9%. 1000 milliLiter(s) (60 mL/Hr) IV Continuous <Continuous>  enoxaparin Injectable 40 milliGRAM(s) SubCutaneous daily  ferrous    sulfate 325 milliGRAM(s) Oral daily  FLUoxetine 20 milliGRAM(s) Oral daily  folic acid 1 milliGRAM(s) Oral daily  insulin lispro (ADMELOG) corrective regimen sliding scale   SubCutaneous three times a day before meals  metoprolol succinate ER 25 milliGRAM(s) Oral daily  multivitamin 1 Tablet(s) Oral daily  pantoprazole    Tablet 40 milliGRAM(s) Oral before breakfast  QUEtiapine 100 milliGRAM(s) Oral at bedtime  risperiDONE   Tablet 1 milliGRAM(s) Oral two times a day  sertraline 50 milliGRAM(s) Oral daily  zinc sulfate 220 milliGRAM(s) Oral daily      MEDICATIONS  (PRN):  acetaminophen     Tablet .. 650 milliGRAM(s) Oral every 6 hours PRN Temp greater or equal to 38C (100.4F), Mild Pain (1 - 3)  ALBUTerol    90 MICROgram(s) HFA Inhaler 2 Puff(s) Inhalation every 6 hours PRN Shortness of Breath and/or Wheezing  melatonin 3 milliGRAM(s) Oral at bedtime PRN Insomnia      Physical Exam    Neuro :  no focal deficits  Respiratory: CTA B/L  CV: RRR, S1S2, no murmurs,   Abdominal: Soft, NT, ND +BS,  Extremities: No edema, + peripheral pulses        ASSESSMENT    pneumonia 2nd to covid 19,   s/p fall,   h/o Alzheimer's dementia,   HTN,   DM,   2 to 1 heart block s/p micra ppm placement 9/17/21   MESERET (iron deficiency anemia)        PLAN    cont decadron,   start remdesivir   cont vit c, vitamin d, zinc, pepcid, singulair, flonase   contact and airborne isolation,   pulm f/u   cont albuterol inhaler,   afebrile   tylenol prn,   robitussin prn   O2 sat 96% (96% - 99%) on ra  O2 via nasal canula as needed,   serum creat wnl   cardio cons  check ppm  cont cardiac meds   endo cons  cont lispro ss  resume lantus 6 units qhs  free t4 wnl noted above   phys tx eval noted and rec phys tx 2-3x/week x 2WKS home w/ home PT; Pending progress and participation with [facility]  case mgmt ,   soc wk for placement    cont current meds

## 2022-01-07 NOTE — BH CONSULTATION LIAISON ASSESSMENT NOTE - PREPARATORY ACTS:
Encounter Date: 5/2/2017       History     Chief Complaint   Patient presents with    Abdominal Pain     abd pain with bloating, n/v, and decreased output from ileostomy     Review of patient's allergies indicates:  No Known Allergies  HPI Comments: Gradual onset increased distention and bloating with failure to pass stool, resolved spontaneously after large BM through ostomy, now no complaints.    Patient is a 79 y.o. male presenting with the following complaint: cramps.   Abdominal Cramping   The primary symptoms of the illness include abdominal pain. The primary symptoms of the illness do not include shortness of breath or dysuria. The current episode started 3 to 5 hours ago. The onset of the illness was gradual. The problem has been resolved.   The abdominal pain began 3 to 5 hours ago. The abdominal pain has been resolved since its onset. The abdominal pain is relieved by passing flatus and bowel movement. The abdominal pain is exacerbated by certain positions.   The illness is associated with laxative use. The patient has had a change in bowel habit. Additional symptoms associated with the illness include constipation. Symptoms associated with the illness do not include chills.     Past Medical History:   Diagnosis Date    C. difficile diarrhea     COPD (chronic obstructive pulmonary disease)      Past Surgical History:   Procedure Laterality Date    ABDOMINAL SURGERY      EYE SURGERY      HERNIA REPAIR      ILEOSTOMY      SPLENECTOMY, TOTAL       History reviewed. No pertinent family history.  Social History   Substance Use Topics    Smoking status: Former Smoker    Smokeless tobacco: None    Alcohol use Yes     Review of Systems   Constitutional: Negative for appetite change and chills.   Respiratory: Negative for shortness of breath.    Gastrointestinal: Positive for abdominal pain and constipation.   Genitourinary: Negative for dysuria.   All other systems reviewed and are  negative.      Physical Exam   Initial Vitals   BP Pulse Resp Temp SpO2   05/02/17 0309 05/02/17 0309 05/02/17 0309 05/02/17 0309 05/02/17 0309   136/95 95 18 98.5 °F (36.9 °C) 99 %     Physical Exam    Nursing note and vitals reviewed.  Constitutional:   Underweight     HENT:   Mouth/Throat: Oropharynx is clear and moist.   Eyes: Conjunctivae and EOM are normal.   Neck: Normal range of motion.   Cardiovascular: Normal rate, regular rhythm and intact distal pulses.   Pulmonary/Chest: No respiratory distress.   Abdominal: Soft. He exhibits distension (mild, tympanic). There is no tenderness. There is no rebound and no guarding.   Ostomy with liquid brown stool   Musculoskeletal: Normal range of motion.   Neurological: He is alert and oriented to person, place, and time. He has normal strength.   Skin: Skin is warm and dry. No rash noted.   Psychiatric: He has a normal mood and affect.         ED Course   Procedures  Labs Reviewed - No data to display          Medical Decision Making:   Initial Assessment:   Comfortable, pain free  Differential Diagnosis:   Obstruction, constipation, mass, anastamosis failure  ED Management:  Benign abdomen, passing stool, recommend DC immodium and will rx bentyl for cramping.                   ED Course     Clinical Impression:   The primary encounter diagnosis was Abdominal cramping. A diagnosis of Altered bowel elimination due to intestinal ostomy was also pertinent to this visit.    Disposition:   Disposition: Discharged  Condition: Stable       Guy J. Lefort, MD  05/02/17 0578     None known

## 2022-01-07 NOTE — BH CONSULTATION LIAISON ASSESSMENT NOTE - NSSUICPROTFACT_PSY_ALL_CORE
Responsibility to children, family, or others/Identifies reasons for living/Supportive social network of family or friends/Cultural, spiritual and/or moral attitudes against suicide

## 2022-01-07 NOTE — CONSULT NOTE ADULT - SUBJECTIVE AND OBJECTIVE BOX
CHIEF COMPLAINT:Patient is a 89y old  Female who presents with a chief complaint of Weakness and Fall.      HPI:  Patient is 89 y.o. F w/ PMHx of Alzheimer's dementia, HTN, DM, and anemia presents to the ED from home with complaints of generalized weakness and unwitnessed fall. Unable to obtain history from patient because she was sedated with Ativan.    As per ED: "In ED patient agitated, attempting to get out of stretcher, initially restraints placed with no improvement, given ativan 1mg IV, redosed 0.5mg IV after agitation recurred."    ED Note: "Per EMS, she is unsure of whether she experienced loss of consciousness. Per daughter patient has had generalized weakness for at least 3 weeks, now worsening, frequent falls despite assistance from daughter, last night fell, daughter unable to pick her up. Daughter reports patient tries to get up on her own in the middle of the night for the last few months. denies recent fever, cough, V/D."   (05 Jan 2022 04:07)      PAST MEDICAL & SURGICAL HISTORY:  HTN (hypertension)    DM (diabetes mellitus)    Alzheimer disease    MESERET (iron deficiency anemia)    Ktsew-oflcp-h/p PM    WCT        MEDICATIONS  (STANDING):  ascorbic acid 500 milliGRAM(s) Oral daily  aspirin enteric coated 81 milliGRAM(s) Oral daily  dexAMETHasone  Injectable 6 milliGRAM(s) IV Push daily  dextrose 5% + sodium chloride 0.9%. 1000 milliLiter(s) (60 mL/Hr) IV Continuous <Continuous>  enoxaparin Injectable 40 milliGRAM(s) SubCutaneous daily  ferrous    sulfate 325 milliGRAM(s) Oral daily  FLUoxetine 20 milliGRAM(s) Oral daily  folic acid 1 milliGRAM(s) Oral daily  insulin glargine Injectable (LANTUS) 6 Unit(s) SubCutaneous at bedtime  insulin lispro (ADMELOG) corrective regimen sliding scale   SubCutaneous three times a day before meals  metoprolol succinate ER 25 milliGRAM(s) Oral daily  multivitamin 1 Tablet(s) Oral daily  pantoprazole    Tablet 40 milliGRAM(s) Oral before breakfast  QUEtiapine 100 milliGRAM(s) Oral at bedtime  risperiDONE   Tablet 1 milliGRAM(s) Oral two times a day  sertraline 50 milliGRAM(s) Oral daily  zinc sulfate 220 milliGRAM(s) Oral daily    MEDICATIONS  (PRN):  acetaminophen     Tablet .. 650 milliGRAM(s) Oral every 6 hours PRN Temp greater or equal to 38C (100.4F), Mild Pain (1 - 3)  ALBUTerol    90 MICROgram(s) HFA Inhaler 2 Puff(s) Inhalation every 6 hours PRN Shortness of Breath and/or Wheezing  melatonin 3 milliGRAM(s) Oral at bedtime PRN Insomnia      FAMILY HISTORY:unable to obtain      SOCIAL HISTORY:    unable to obtain    Allergies    penicillins (Unknown)    Intolerances    	    REVIEW OF SYSTEMS:  unable to obtain    PHYSICAL EXAM:  T(C): 36.3 (01-06-22 @ 21:10), Max: 36.7 (01-06-22 @ 14:34)  HR: 96 (01-07-22 @ 05:28) (72 - 100)  BP: 136/49 (01-07-22 @ 05:28) (131/85 - 147/73)  RR: 18 (01-07-22 @ 05:28) (18 - 18)  SpO2: 96% (01-07-22 @ 05:28) (96% - 99%)  Wt(kg): --  I&O's Summary    06 Jan 2022 07:01  -  07 Jan 2022 07:00  --------------------------------------------------------  IN: 300 mL / OUT: 0 mL / NET: 300 mL        Appearance: Normal	  HEENT:   Normal oral mucosa, PERRL, EOMI	  Lymphatic: No lymphadenopathy  Cardiovascular: Normal S1 S2, No JVD, No murmurs, No edema  Respiratory: B/L ronchi  Gastrointestinal:  Soft, Non-tender, + BS	  Skin: No rashes, No ecchymoses, No cyanosis	  Extremities: Normal range of motion, No clubbing, cyanosis or edema  Vascular: Peripheral pulses palpable 2+ bilaterally    	    ECG:    Normal sinus rhythm  Right bundle branch block  Possible Lateral infarct , age undetermined    	  	  LABS:	 	    Troponin I, High Sensitivity Result: 10.0 ng/L (01-04-22 @ 22:46)                          11.3   6.02  )-----------( 230      ( 07 Jan 2022 09:14 )             33.9     01-07    141  |  111<H>  |  17  ----------------------------<  183<H>  3.8   |  23  |  0.78    Ca    8.6      07 Jan 2022 09:14  Phos  1.8     01-07  Mg     2.0     01-07    TPro  6.2  /  Alb  3.2<L>  /  TBili  0.4  /  DBili  x   /  AST  61<H>  /  ALT  51  /  AlkPhos  57  01-07    proBNP:   Lipid Profile:   HgA1c:   TSH:     PREVIOUS DIAGNOSTIC TESTING:    [ ] Echocardiogram:  [ ]  Catheterization:  [ ] Stress Test:        
PULMONARY CONSULT NOTE      BELA QUEEN  [id number]    History of Present Illness:  Reason for Admission: Weakness and Fall  History of Present Illness:   Patient is 89 y.o. F w/ PMHx of Alzheimer's dementia, HTN, DM, and anemia presents to the ED from home with complaints of generalized weakness and unwitnessed fall. Unable to obtain history from patient because she was sedated with Ativan.    As per ED: "In ED patient agitated, attempting to get out of stretcher, initially restraints placed with no improvement, given ativan 1mg IV, redosed 0.5mg IV after agitation recurred."    ED Note: "Per EMS, she is unsure of whether she experienced loss of consciousness. Per daughter patient has had generalized weakness for at least 3 weeks, now worsening, frequent falls despite assistance from daughter, last night fell, daughter unable to pick her up. Daughter reports patient tries to get up on her own in the middle of the night for the last few months. denies recent fever, cough, V/D."          HISTORY OF PRESENT ILLNESS: As above. Aawake, alert, laying in bed in NAD    MEDICATIONS  (STANDING):  ascorbic acid 500 milliGRAM(s) Oral daily  aspirin enteric coated 81 milliGRAM(s) Oral daily  dexAMETHasone  Injectable 6 milliGRAM(s) IV Push daily  enoxaparin Injectable 30 milliGRAM(s) SubCutaneous daily  ferrous    sulfate 325 milliGRAM(s) Oral daily  FLUoxetine 20 milliGRAM(s) Oral daily  folic acid 1 milliGRAM(s) Oral daily  insulin lispro (ADMELOG) corrective regimen sliding scale   SubCutaneous three times a day before meals  insulin lispro (ADMELOG) corrective regimen sliding scale   SubCutaneous at bedtime  metoprolol succinate ER 25 milliGRAM(s) Oral daily  multivitamin 1 Tablet(s) Oral daily  pantoprazole    Tablet 40 milliGRAM(s) Oral before breakfast  QUEtiapine 100 milliGRAM(s) Oral at bedtime  risperiDONE   Tablet 1 milliGRAM(s) Oral two times a day  sertraline 50 milliGRAM(s) Oral daily  sodium chloride 0.9%. 1000 milliLiter(s) (60 mL/Hr) IV Continuous <Continuous>  zinc sulfate 220 milliGRAM(s) Oral daily      MEDICATIONS  (PRN):  acetaminophen     Tablet .. 650 milliGRAM(s) Oral every 6 hours PRN Temp greater or equal to 38C (100.4F), Mild Pain (1 - 3)  ALBUTerol    90 MICROgram(s) HFA Inhaler 2 Puff(s) Inhalation every 6 hours PRN Shortness of Breath and/or Wheezing  melatonin 3 milliGRAM(s) Oral at bedtime PRN Insomnia      Allergies    penicillins (Unknown)    Intolerances        PAST MEDICAL & SURGICAL HISTORY:  HTN (hypertension)    DM (diabetes mellitus)    Alzheimer disease    MESERET (iron deficiency anemia)    No significant past surgical history        FAMILY HISTORY:      SOCIAL HISTORY  Smoking History:     REVIEW OF SYSTEMS:    CONSTITUTIONAL:  No fevers, chills, sweats    HEENT:  Eyes:  No diplopia or blurred vision. ENT:  No earache, sore throat or runny nose.    CARDIOVASCULAR:  No pressure, squeezing, tightness, or heaviness about the chest; no palpitations.    RESPIRATORY:  Per HPI    GASTROINTESTINAL:  No abdominal pain, nausea, vomiting or diarrhea.    GENITOURINARY:  No dysuria, frequency or urgency.    NEUROLOGIC:  No paresthesias, fasciculations, seizures or weakness.    PSYCHIATRIC:  No disorder of thought or mood.    Vital Signs Last 24 Hrs  T(C): 36.8 (2022 10:18), Max: 37.4 (2022 21:10)  T(F): 98.2 (2022 10:18), Max: 99.4 (2022 21:10)  HR: 81 (2022 10:18) (72 - 87)  BP: 137/76 (2022 10:18) (106/66 - 150/65)  BP(mean): --  RR: 18 (2022 10:18) (17 - 19)  SpO2: 95% (2022 10:18) (95% - 99%)  I&O's Detail      PHYSICAL EXAMINATION:    GENERAL: The patient is a well-developed, well-nourished _____in no apparent distress.     HEENT: Head is normocephalic and atraumatic. Extraocular muscles are intact. Mucous membranes are moist.     NECK: Supple.     LUNGS: Clear to auscultation without wheezing, rales, or rhonchi. Respirations unlabored    HEART: Regular rate and rhythm without murmur.    ABDOMEN: Soft, nontender, and nondistended.  No hepatosplenomegaly is noted.    EXTREMITIES: Without any cyanosis, clubbing, rash, lesions or edema.    NEUROLOGIC: Grossly intact.      LABS:                        12.0   6.02  )-----------( 226      ( 2022 10:42 )             36.1         141  |  106  |  25<H>  ----------------------------<  77  3.7   |  28  |  1.19    Ca    8.8      2022 10:42  Phos  2.7       Mg     2.1         TPro  6.6  /  Alb  3.1<L>  /  TBili  0.3  /  DBili  x   /  AST  42<H>  /  ALT  44  /  AlkPhos  62      PT/INR - ( 2022 22:46 )   PT: 12.4 sec;   INR: 1.04 ratio         PTT - ( 2022 22:46 )  PTT:28.7 sec  Urinalysis Basic - ( 2022 04:18 )    Color: Yellow / Appearance: Clear / S.010 / pH: x  Gluc: x / Ketone: Negative  / Bili: Negative / Urobili: Negative   Blood: x / Protein: 15 / Nitrite: Positive   Leuk Esterase: Trace / RBC: 2-5 /HPF / WBC 3-5 /HPF   Sq Epi: x / Non Sq Epi: Moderate /HPF / Bacteria: Few /HPF        CARDIAC MARKERS ( 2022 22:46 )  x     / x     / 501 U/L / x     / x          D-Dimer Assay, Quantitative: 686 ng/mL DDU (22 @ 10:42)    Serum Pro-Brain Natriuretic Peptide: 820 pg/mL (22 @ 22:46)    COVID-19 PCR: Detected: EUA/IVD   You can help in the fight against COVID-19. [facility] may contact   you to see if you are interested in voluntarily participating in one of   our clinical trials.   This test has been validated by [employer] to be accurate;   though it has not been FDA cleared/approved by the usual pathway   As with all laboratory test, results should be correlated with clinical   findings.   https://www.fda.gov/media/060512/download   https://www.fda.gov/media/941088/download (22 @ 22:46)       MICROBIOLOGY:    RADIOLOGY & ADDITIONAL STUDIES:    CXR:  < from: Xray Chest 1 View AP/PA (22 @ 23:27) >  IMPRESSION:  Negative for acute cardiopulmonary disease.  No acute bony fracture.    < end of copied text >    Ct scan chest;    ekg;    echo:

## 2022-01-07 NOTE — PROGRESS NOTE ADULT - PROBLEM SELECTOR PLAN 9
- Daughter having difficulty taking care of patient at home recently  - SHAKEEL+CM consulted - Daughter having difficulty taking care of patient at home recently  - [facility]KE[facility]CM consulted  - currently FULL CODE as per daughter (Ms. Tresa Hutton - [phone])

## 2022-01-07 NOTE — BH CONSULTATION LIAISON ASSESSMENT NOTE - RISK ASSESSMENT
Risk factors: Advanced age, progressive dementia, multiple medical problems. Protective factors: Absent h/o SI/SA/SIB, stable domicile with supportive family, attachment to independence, help seeking. Acute risk level appears low at the time of assessment, but chronic risk may be mildly elevated due to above risk factors.

## 2022-01-07 NOTE — PROGRESS NOTE ADULT - PROBLEM SELECTOR PLAN 4
- P/w Cr 1.43 (baseline ~0.9)  - Likely due to poor oral intake(dehydration)  - S/p 1L bolus in ED  - Gentle IVF 60cc/hr for 12 hrs  - Can obtain urine lytes if no improvement with IVF (and no use of obtaining urine lytes now when pt is on IVF)

## 2022-01-07 NOTE — BH CONSULTATION LIAISON ASSESSMENT NOTE - NSICDXBHSECONDARYDX_PSY_ALL_CORE
Medication-induced delirium, persistent, mixed level of activity   R41.0   Encephalopathy due to COVID-19 virus   U07.1  Medication-induced delirium, persistent, mixed level of activity   R41.0

## 2022-01-07 NOTE — PROGRESS NOTE ADULT - PROBLEM SELECTOR PLAN 3
- Frequent falls, partially due to weakness from COVID  - Most recently, unwitnessed fall  - CT Head: Moderately limited by motion. No acute intracranial hemorrhage or calvarial fracture. Short-term repeat. - CT evaluation may be obtained as clinically warranted.  - CT cervical spine: No acute cervical spine fracture or evidence of traumatic malalignment.  - Fall precautions  - PT consulted

## 2022-01-07 NOTE — PROGRESS NOTE ADULT - SUBJECTIVE AND OBJECTIVE BOX
PGY-1 Progress Note discussed with attending    PAGER #: [[phone]] TILL 5:00 PM  PLEASE CONTACT ON CALL TEAM:  - On Call Team (Please refer to Morena) FROM 5:00 PM - 8:30PM  - Nightfloat Team FROM 8:30 -7:30 AM    CHIEF COMPLAINT & BRIEF HOSPITAL COURSE:      INTERVAL HPI/OVERNIGHT EVENTS:       REVIEW OF SYSTEMS:  CONSTITUTIONAL: No fever, weight loss, or fatigue  RESPIRATORY: No cough, wheezing, chills or hemoptysis; No shortness of breath  CARDIOVASCULAR: No chest pain, palpitations, dizziness, or leg swelling  GASTROINTESTINAL: No abdominal pain. No nausea, vomiting, or hematemesis; No diarrhea or constipation. No melena or hematochezia.  GENITOURINARY: No dysuria or hematuria, urinary frequency  NEUROLOGICAL: No headaches, memory loss, loss of strength, numbness, or tremors  SKIN: No itching, burning, rashes, or lesions     MEDICATIONS  (STANDING):  ascorbic acid 500 milliGRAM(s) Oral daily  aspirin enteric coated 81 milliGRAM(s) Oral daily  dexAMETHasone  Injectable 6 milliGRAM(s) IV Push daily  dextrose 5% + sodium chloride 0.9%. 1000 milliLiter(s) (60 mL/Hr) IV Continuous <Continuous>  enoxaparin Injectable 40 milliGRAM(s) SubCutaneous daily  ferrous    sulfate 325 milliGRAM(s) Oral daily  FLUoxetine 20 milliGRAM(s) Oral daily  folic acid 1 milliGRAM(s) Oral daily  insulin lispro (ADMELOG) corrective regimen sliding scale   SubCutaneous three times a day before meals  metoprolol succinate ER 25 milliGRAM(s) Oral daily  multivitamin 1 Tablet(s) Oral daily  pantoprazole    Tablet 40 milliGRAM(s) Oral before breakfast  QUEtiapine 100 milliGRAM(s) Oral at bedtime  risperiDONE   Tablet 1 milliGRAM(s) Oral two times a day  sertraline 50 milliGRAM(s) Oral daily  zinc sulfate 220 milliGRAM(s) Oral daily    MEDICATIONS  (PRN):  acetaminophen     Tablet .. 650 milliGRAM(s) Oral every 6 hours PRN Temp greater or equal to 38C (100.4F), Mild Pain (1 - 3)  ALBUTerol    90 MICROgram(s) HFA Inhaler 2 Puff(s) Inhalation every 6 hours PRN Shortness of Breath and/or Wheezing  melatonin 3 milliGRAM(s) Oral at bedtime PRN Insomnia      Vital Signs Last 24 Hrs  T(C): 36.3 (06 Jan 2022 21:10), Max: 36.7 (06 Jan 2022 14:34)  T(F): 97.4 (06 Jan 2022 21:10), Max: 98.1 (06 Jan 2022 14:34)  HR: 96 (07 Jan 2022 05:28) (72 - 100)  BP: 136/49 (07 Jan 2022 05:28) (131/85 - 147/73)  BP(mean): --  RR: 18 (07 Jan 2022 05:28) (18 - 18)  SpO2: 96% (07 Jan 2022 05:28) (96% - 99%)    PHYSICAL EXAMINATION:  GENERAL: NAD, well built  HEAD:  Atraumatic, Normocephalic  EYES:  conjunctiva and sclera clear  NECK: Supple, No JVD, Normal thyroid  CHEST/LUNG: Clear to auscultation. Clear to percussion bilaterally; No rales, rhonchi, wheezing, or rubs  HEART: Regular rate and rhythm; No murmurs, rubs, or gallops  ABDOMEN: Soft, Nontender, Nondistended; Bowel sounds present, no pain or masses on palpation  NERVOUS SYSTEM:  Alert & Oriented X3  : voiding well  EXTREMITIES:  2+ Peripheral Pulses, No clubbing, cyanosis, or edema  SKIN: warm dry                          11.3   6.02  )-----------( 230      ( 07 Jan 2022 09:14 )             33.9     01-07    141  |  111<H>  |  17  ----------------------------<  183<H>  3.8   |  23  |  0.78    Ca    8.6      07 Jan 2022 09:14  Phos  1.8     01-07  Mg     2.0     01-07    TPro  6.2  /  Alb  3.2<L>  /  TBili  0.4  /  DBili  x   /  AST  61<H>  /  ALT  51  /  AlkPhos  57  01-07    LIVER FUNCTIONS - ( 07 Jan 2022 09:14 )  Alb: 3.2 g/dL / Pro: 6.2 g/dL / ALK PHOS: 57 U/L / ALT: 51 U/L DA / AST: 61 U/L / GGT: x               PT/INR - ( 07 Jan 2022 09:14 )   PT: 12.3 sec;   INR: 1.04 ratio         PTT - ( 07 Jan 2022 09:14 )  PTT:30.8 sec    I&O's Summary    06 Jan 2022 07:01  -  07 Jan 2022 07:00  --------------------------------------------------------  IN: 300 mL / OUT: 0 mL / NET: 300 mL          Culture - Urine (collected 05 Jan 2022 11:36)  Source: Clean Catch Clean Catch (Midstream)  Preliminary Report (06 Jan 2022 19:49):    >100,000 CFU/ml Escherichia coli        CAPILLARY BLOOD GLUCOSE      RADIOLOGY & ADDITIONAL TESTS:                   PGY-1 Progress Note discussed with attending    PAGER #: [[phone]] TILL 5:00 PM  PLEASE CONTACT ON CALL TEAM:  - On Call Team (Please refer to Morena) FROM 5:00 PM - 8:30PM  - Nightfloat Team FROM 8:30 -7:30 AM    CHIEF COMPLAINT & BRIEF HOSPITAL COURSE:    Patient is 89 y.o. F w/ PMHx of Alzheimer's dementia, HTN, DM, and anemia presents to the ED from home with complaints of generalized weakness and unwitnessed fall. Unable to obtain history from patient because she was sedated with Ativan.    As per ED: "In ED patient agitated, attempting to get out of stretcher, initially restraints placed with no improvement, given ativan 1mg IV, redosed 0.5mg IV after agitation recurred."    ED Note: "Per EMS, she is unsure of whether she experienced loss of consciousness. Per daughter patient has had generalized weakness for at least 3 weeks, now worsening, frequent falls despite assistance from daughter, last night fell, daughter unable to pick her up. Daughter reports patient tries to get up on her own in the middle of the night for the last few months. denies recent fever, cough, V/D."    INTERVAL HPI/OVERNIGHT EVENTS:     Patient was examined at bedside, AAOx1, confused but stable and NAD, saturating well at room air. Pending Psychiatry consultation for review of her current medication. No other significant events overnight.    REVIEW OF SYSTEMS: cannot fully assess due to her poor mental status  CONSTITUTIONAL: No fever, weight loss, or fatigue  RESPIRATORY: No cough, wheezing, chills or hemoptysis; No shortness of breath  CARDIOVASCULAR: No chest pain, palpitations, dizziness, or leg swelling  GASTROINTESTINAL: No abdominal pain. No nausea, vomiting, or hematemesis; No diarrhea or constipation. No melena or hematochezia.  GENITOURINARY: No dysuria or hematuria, urinary frequency  NEUROLOGICAL: No headaches, memory loss, loss of strength, numbness, or tremors  SKIN: No itching, burning, rashes, or lesions     MEDICATIONS  (STANDING):  ascorbic acid 500 milliGRAM(s) Oral daily  aspirin enteric coated 81 milliGRAM(s) Oral daily  dexAMETHasone  Injectable 6 milliGRAM(s) IV Push daily  dextrose 5% + sodium chloride 0.9%. 1000 milliLiter(s) (60 mL/Hr) IV Continuous <Continuous>  enoxaparin Injectable 40 milliGRAM(s) SubCutaneous daily  ferrous    sulfate 325 milliGRAM(s) Oral daily  FLUoxetine 20 milliGRAM(s) Oral daily  folic acid 1 milliGRAM(s) Oral daily  insulin lispro (ADMELOG) corrective regimen sliding scale   SubCutaneous three times a day before meals  metoprolol succinate ER 25 milliGRAM(s) Oral daily  multivitamin 1 Tablet(s) Oral daily  pantoprazole    Tablet 40 milliGRAM(s) Oral before breakfast  QUEtiapine 100 milliGRAM(s) Oral at bedtime  risperiDONE   Tablet 1 milliGRAM(s) Oral two times a day  sertraline 50 milliGRAM(s) Oral daily  zinc sulfate 220 milliGRAM(s) Oral daily    MEDICATIONS  (PRN):  acetaminophen     Tablet .. 650 milliGRAM(s) Oral every 6 hours PRN Temp greater or equal to 38C (100.4F), Mild Pain (1 - 3)  ALBUTerol    90 MICROgram(s) HFA Inhaler 2 Puff(s) Inhalation every 6 hours PRN Shortness of Breath and/or Wheezing  melatonin 3 milliGRAM(s) Oral at bedtime PRN Insomnia      Vital Signs Last 24 Hrs  T(C): 36.3 (06 Jan 2022 21:10), Max: 36.7 (06 Jan 2022 14:34)  T(F): 97.4 (06 Jan 2022 21:10), Max: 98.1 (06 Jan 2022 14:34)  HR: 96 (07 Jan 2022 05:28) (72 - 100)  BP: 136/49 (07 Jan 2022 05:28) (131/85 - 147/73)  BP(mean): --  RR: 18 (07 Jan 2022 05:28) (18 - 18)  SpO2: 96% (07 Jan 2022 05:28) (96% - 99%)    PHYSICAL EXAMINATION:  GENERAL: NAD  HEAD:  Atraumatic, Normocephalic  EYES:  conjunctiva and sclera clear  NECK: Supple, No JVD, Normal thyroid  CHEST/LUNG: Clear to auscultation. Clear to percussion bilaterally; No rales, rhonchi, wheezing, or rubs  HEART: Regular rate and rhythm; No murmurs, rubs, or gallops  ABDOMEN: Soft, Nontender, Nondistended; Bowel sounds present, no pain or masses on palpation  NERVOUS SYSTEM:  Alert & Oriented X1, confused (cannot fully assess due to poor mental status)  : voiding well  EXTREMITIES:  2+ Peripheral Pulses, No clubbing, cyanosis, or edema  SKIN: warm dry                          11.3   6.02  )-----------( 230      ( 07 Jan 2022 09:14 )             33.9     01-07    141  |  111<H>  |  17  ----------------------------<  183<H>  3.8   |  23  |  0.78    Ca    8.6      07 Jan 2022 09:14  Phos  1.8     01-07  Mg     2.0     01-07    TPro  6.2  /  Alb  3.2<L>  /  TBili  0.4  /  DBili  x   /  AST  61<H>  /  ALT  51  /  AlkPhos  57  01-07    LIVER FUNCTIONS - ( 07 Jan 2022 09:14 )  Alb: 3.2 g/dL / Pro: 6.2 g/dL / ALK PHOS: 57 U/L / ALT: 51 U/L DA / AST: 61 U/L / GGT: x               PT/INR - ( 07 Jan 2022 09:14 )   PT: 12.3 sec;   INR: 1.04 ratio         PTT - ( 07 Jan 2022 09:14 )  PTT:30.8 sec    I&O's Summary    06 Jan 2022 07:01  -  07 Jan 2022 07:00  --------------------------------------------------------  IN: 300 mL / OUT: 0 mL / NET: 300 mL          Culture - Urine (collected 05 Jan 2022 11:36)  Source: Clean Catch Clean Catch (Midstream)  Preliminary Report (06 Jan 2022 19:49):    >100,000 CFU/ml Escherichia coli        CAPILLARY BLOOD GLUCOSE      RADIOLOGY & ADDITIONAL TESTS:

## 2022-01-07 NOTE — PROGRESS NOTE ADULT - PROBLEM SELECTOR PLAN 8
- Heparin SQ (due to SHELIA)  - Continue home PPI as pantoprazole RISK                                                          Points  [] Previous VTE                                           3  [] Thrombophilia                                        2  [] Lower limb paralysis                              2   [] Current Cancer                                       2   [x] Immobilization > 24 hrs                        1  [] ICU/CCU stay > 24 hours                       1  [x] Age > 60                                                   1    Score: 2    - Heparin SQ (due to SHELIA)  - Continue home PPI as pantoprazole

## 2022-01-07 NOTE — PROGRESS NOTE ADULT - PROBLEM SELECTOR PLAN 2
- Positive for COVID19  - CXR clear  - F/u COVID markers q72hrs  - Currently on room air  - Albuterol MDI Q2H PRN  - Tylenol PRN for fever  - O2 support as needed  - Vit C, D, Zinc  - Started Decadron  - Will hold Remdesivir due to SHELIA  - Additional supportive therapy as needed. - Positive for COVID19  - CXR clear  - F/u COVID markers q72hrs  - Currently on room air  - Albuterol MDI Q2H PRN  - Tylenol PRN for fever  - O2 support as needed  - Vit C, D, Zinc  - continue Decadron  - hold Remdesivir due to SHELIA  - supportive therapy as needed.

## 2022-01-07 NOTE — PROGRESS NOTE ADULT - PROBLEM SELECTOR PLAN 7
- History of iron deficiency anemia, but normocytic now.  - C/w home supplements  - P/w Hgb 11.1 (baseline ~12)  - No sign of bleeding  - Monitor CBC

## 2022-01-07 NOTE — BH CONSULTATION LIAISON ASSESSMENT NOTE - CURRENT MEDICATION
MEDICATIONS  (STANDING):  ascorbic acid 500 milliGRAM(s) Oral daily  aspirin enteric coated 81 milliGRAM(s) Oral daily  dexAMETHasone  Injectable 6 milliGRAM(s) IV Push daily  dextrose 5% + sodium chloride 0.9%. 1000 milliLiter(s) (60 mL/Hr) IV Continuous <Continuous>  enoxaparin Injectable 40 milliGRAM(s) SubCutaneous daily  ferrous    sulfate 325 milliGRAM(s) Oral daily  FLUoxetine 20 milliGRAM(s) Oral daily  folic acid 1 milliGRAM(s) Oral daily  insulin glargine Injectable (LANTUS) 6 Unit(s) SubCutaneous at bedtime  insulin lispro (ADMELOG) corrective regimen sliding scale   SubCutaneous three times a day before meals  metoprolol succinate ER 25 milliGRAM(s) Oral daily  multivitamin 1 Tablet(s) Oral daily  OLANZapine 5 milliGRAM(s) Oral at bedtime  pantoprazole    Tablet 40 milliGRAM(s) Oral before breakfast  sertraline 50 milliGRAM(s) Oral daily  zinc sulfate 220 milliGRAM(s) Oral daily    MEDICATIONS  (PRN):  acetaminophen     Tablet .. 650 milliGRAM(s) Oral every 6 hours PRN Temp greater or equal to 38C (100.4F), Mild Pain (1 - 3)  ALBUTerol    90 MICROgram(s) HFA Inhaler 2 Puff(s) Inhalation every 6 hours PRN Shortness of Breath and/or Wheezing  melatonin 3 milliGRAM(s) Oral at bedtime PRN Insomnia  OLANZapine 2.5 milliGRAM(s) Oral every 12 hours PRN agitation

## 2022-01-08 ENCOUNTER — TRANSCRIPTION ENCOUNTER (OUTPATIENT)
Age: 87
End: 2022-01-08

## 2022-01-08 LAB
ANION GAP SERPL CALC-SCNC: 8 MMOL/L — SIGNIFICANT CHANGE UP (ref 5–17)
APTT BLD: 29.1 SEC — SIGNIFICANT CHANGE UP (ref 27.5–35.5)
BUN SERPL-MCNC: 16 MG/DL — SIGNIFICANT CHANGE UP (ref 7–18)
CALCIUM SERPL-MCNC: 8.7 MG/DL — SIGNIFICANT CHANGE UP (ref 8.4–10.5)
CHLORIDE SERPL-SCNC: 109 MMOL/L — HIGH (ref 96–108)
CO2 SERPL-SCNC: 23 MMOL/L — SIGNIFICANT CHANGE UP (ref 22–31)
CREAT SERPL-MCNC: 0.78 MG/DL — SIGNIFICANT CHANGE UP (ref 0.5–1.3)
D DIMER BLD IA.RAPID-MCNC: 346 NG/ML DDU — HIGH
FERRITIN SERPL-MCNC: 237 NG/ML — HIGH (ref 15–150)
GLUCOSE BLDC GLUCOMTR-MCNC: 191 MG/DL — HIGH (ref 70–99)
GLUCOSE BLDC GLUCOMTR-MCNC: 195 MG/DL — HIGH (ref 70–99)
GLUCOSE BLDC GLUCOMTR-MCNC: 199 MG/DL — HIGH (ref 70–99)
GLUCOSE BLDC GLUCOMTR-MCNC: 211 MG/DL — HIGH (ref 70–99)
GLUCOSE SERPL-MCNC: 196 MG/DL — HIGH (ref 70–99)
HCT VFR BLD CALC: 36.8 % — SIGNIFICANT CHANGE UP (ref 34.5–45)
HGB BLD-MCNC: 12.3 G/DL — SIGNIFICANT CHANGE UP (ref 11.5–15.5)
INR BLD: 1.16 RATIO — SIGNIFICANT CHANGE UP (ref 0.88–1.16)
LDH SERPL L TO P-CCNC: 443 U/L — HIGH (ref 120–225)
MAGNESIUM SERPL-MCNC: 1.9 MG/DL — SIGNIFICANT CHANGE UP (ref 1.6–2.6)
MCHC RBC-ENTMCNC: 30.4 PG — SIGNIFICANT CHANGE UP (ref 27–34)
MCHC RBC-ENTMCNC: 33.4 GM/DL — SIGNIFICANT CHANGE UP (ref 32–36)
MCV RBC AUTO: 90.9 FL — SIGNIFICANT CHANGE UP (ref 80–100)
NRBC # BLD: 0 /100 WBCS — SIGNIFICANT CHANGE UP (ref 0–0)
PHOSPHATE SERPL-MCNC: 2.2 MG/DL — LOW (ref 2.5–4.5)
PLATELET # BLD AUTO: 253 K/UL — SIGNIFICANT CHANGE UP (ref 150–400)
POTASSIUM SERPL-MCNC: 3.9 MMOL/L — SIGNIFICANT CHANGE UP (ref 3.5–5.3)
POTASSIUM SERPL-SCNC: 3.9 MMOL/L — SIGNIFICANT CHANGE UP (ref 3.5–5.3)
PROTHROM AB SERPL-ACNC: 13.7 SEC — HIGH (ref 10.6–13.6)
RBC # BLD: 4.05 M/UL — SIGNIFICANT CHANGE UP (ref 3.8–5.2)
RBC # FLD: 13.8 % — SIGNIFICANT CHANGE UP (ref 10.3–14.5)
SODIUM SERPL-SCNC: 140 MMOL/L — SIGNIFICANT CHANGE UP (ref 135–145)
WBC # BLD: 7.33 K/UL — SIGNIFICANT CHANGE UP (ref 3.8–10.5)
WBC # FLD AUTO: 7.33 K/UL — SIGNIFICANT CHANGE UP (ref 3.8–10.5)

## 2022-01-08 RX ORDER — OLANZAPINE 15 MG/1
1 TABLET, FILM COATED ORAL
Qty: 30 | Refills: 0
Start: 2022-01-08 | End: 2022-02-06

## 2022-01-08 RX ORDER — OLANZAPINE 15 MG/1
1 TABLET, FILM COATED ORAL
Qty: 60 | Refills: 0
Start: 2022-01-08 | End: 2022-02-06

## 2022-01-08 RX ORDER — FLUOXETINE HCL 10 MG
1 CAPSULE ORAL
Qty: 30 | Refills: 0
Start: 2022-01-08 | End: 2022-02-06

## 2022-01-08 RX ORDER — SERTRALINE 25 MG/1
1 TABLET, FILM COATED ORAL
Qty: 0 | Refills: 0 | DISCHARGE

## 2022-01-08 RX ORDER — QUETIAPINE FUMARATE 200 MG/1
1 TABLET, FILM COATED ORAL
Qty: 0 | Refills: 0 | DISCHARGE

## 2022-01-08 RX ORDER — RISPERIDONE 4 MG/1
1 TABLET ORAL
Qty: 0 | Refills: 0 | DISCHARGE

## 2022-01-08 RX ADMIN — Medication 2: at 08:42

## 2022-01-08 RX ADMIN — Medication 325 MILLIGRAM(S): at 12:20

## 2022-01-08 RX ADMIN — Medication 2: at 12:17

## 2022-01-08 RX ADMIN — Medication 25 MILLIGRAM(S): at 06:05

## 2022-01-08 RX ADMIN — SERTRALINE 50 MILLIGRAM(S): 25 TABLET, FILM COATED ORAL at 12:20

## 2022-01-08 RX ADMIN — Medication 6 MILLIGRAM(S): at 06:05

## 2022-01-08 RX ADMIN — Medication 1 TABLET(S): at 12:16

## 2022-01-08 RX ADMIN — ENOXAPARIN SODIUM 40 MILLIGRAM(S): 100 INJECTION SUBCUTANEOUS at 12:16

## 2022-01-08 RX ADMIN — INSULIN GLARGINE 6 UNIT(S): 100 INJECTION, SOLUTION SUBCUTANEOUS at 21:46

## 2022-01-08 RX ADMIN — Medication 2: at 17:37

## 2022-01-08 RX ADMIN — PANTOPRAZOLE SODIUM 40 MILLIGRAM(S): 20 TABLET, DELAYED RELEASE ORAL at 06:05

## 2022-01-08 RX ADMIN — Medication 20 MILLIGRAM(S): at 12:19

## 2022-01-08 RX ADMIN — Medication 500 MILLIGRAM(S): at 12:16

## 2022-01-08 RX ADMIN — OLANZAPINE 5 MILLIGRAM(S): 15 TABLET, FILM COATED ORAL at 21:47

## 2022-01-08 RX ADMIN — ZINC SULFATE TAB 220 MG (50 MG ZINC EQUIVALENT) 220 MILLIGRAM(S): 220 (50 ZN) TAB at 12:16

## 2022-01-08 RX ADMIN — Medication 81 MILLIGRAM(S): at 12:16

## 2022-01-08 RX ADMIN — Medication 1 MILLIGRAM(S): at 12:20

## 2022-01-08 NOTE — PROGRESS NOTE ADULT - SUBJECTIVE AND OBJECTIVE BOX
PGY-1 Progress Note discussed with attending    PAGER #: [[phone]] TILL 5:00 PM  PLEASE CONTACT ON CALL TEAM:  - On Call Team (Please refer to Morena) FROM 5:00 PM - 8:30PM  - Nightfloat Team FROM 8:30 -7:30 AM    CHIEF COMPLAINT & BRIEF HOSPITAL COURSE:      INTERVAL HPI/OVERNIGHT EVENTS:       REVIEW OF SYSTEMS:  CONSTITUTIONAL: No fever, weight loss, or fatigue  RESPIRATORY: No cough, wheezing, chills or hemoptysis; No shortness of breath  CARDIOVASCULAR: No chest pain, palpitations, dizziness, or leg swelling  GASTROINTESTINAL: No abdominal pain. No nausea, vomiting, or hematemesis; No diarrhea or constipation. No melena or hematochezia.  GENITOURINARY: No dysuria or hematuria, urinary frequency  NEUROLOGICAL: No headaches, memory loss, loss of strength, numbness, or tremors  SKIN: No itching, burning, rashes, or lesions     MEDICATIONS  (STANDING):  ascorbic acid 500 milliGRAM(s) Oral daily  aspirin enteric coated 81 milliGRAM(s) Oral daily  dexAMETHasone  Injectable 6 milliGRAM(s) IV Push daily  dextrose 5% + sodium chloride 0.9%. 1000 milliLiter(s) (60 mL/Hr) IV Continuous <Continuous>  enoxaparin Injectable 40 milliGRAM(s) SubCutaneous daily  ferrous    sulfate 325 milliGRAM(s) Oral daily  FLUoxetine 20 milliGRAM(s) Oral daily  folic acid 1 milliGRAM(s) Oral daily  insulin glargine Injectable (LANTUS) 6 Unit(s) SubCutaneous at bedtime  insulin lispro (ADMELOG) corrective regimen sliding scale   SubCutaneous three times a day before meals  metoprolol succinate ER 25 milliGRAM(s) Oral daily  multivitamin 1 Tablet(s) Oral daily  OLANZapine 5 milliGRAM(s) Oral at bedtime  pantoprazole    Tablet 40 milliGRAM(s) Oral before breakfast  sertraline 50 milliGRAM(s) Oral daily  zinc sulfate 220 milliGRAM(s) Oral daily    MEDICATIONS  (PRN):  acetaminophen     Tablet .. 650 milliGRAM(s) Oral every 6 hours PRN Temp greater or equal to 38C (100.4F), Mild Pain (1 - 3)  ALBUTerol    90 MICROgram(s) HFA Inhaler 2 Puff(s) Inhalation every 6 hours PRN Shortness of Breath and/or Wheezing  melatonin 3 milliGRAM(s) Oral at bedtime PRN Insomnia  OLANZapine 2.5 milliGRAM(s) Oral every 12 hours PRN agitation      Vital Signs Last 24 Hrs  T(C): 36.7 (08 Jan 2022 05:42), Max: 36.9 (07 Jan 2022 14:48)  T(F): 98 (08 Jan 2022 05:42), Max: 98.4 (07 Jan 2022 14:48)  HR: 63 (08 Jan 2022 05:42) (63 - 72)  BP: 148/52 (08 Jan 2022 05:42) (145/79 - 163/58)  BP(mean): --  RR: 18 (08 Jan 2022 05:42) (18 - 18)  SpO2: 96% (08 Jan 2022 05:42) (94% - 96%)    PHYSICAL EXAMINATION:  GENERAL: NAD, well built  HEAD:  Atraumatic, Normocephalic  EYES:  conjunctiva and sclera clear  NECK: Supple, No JVD, Normal thyroid  CHEST/LUNG: Clear to auscultation. Clear to percussion bilaterally; No rales, rhonchi, wheezing, or rubs  HEART: Regular rate and rhythm; No murmurs, rubs, or gallops  ABDOMEN: Soft, Nontender, Nondistended; Bowel sounds present, no pain or masses on palpation  NERVOUS SYSTEM:  Alert & Oriented X3  : voiding well  EXTREMITIES:  2+ Peripheral Pulses, No clubbing, cyanosis, or edema  SKIN: warm dry                          12.3   7.33  )-----------( 253      ( 08 Jan 2022 08:32 )             36.8     01-08    140  |  109<H>  |  16  ----------------------------<  196<H>  3.9   |  23  |  0.78    Ca    8.7      08 Jan 2022 08:32  Phos  2.2     01-08  Mg     1.9     01-08    TPro  6.2  /  Alb  3.2<L>  /  TBili  0.4  /  DBili  x   /  AST  61<H>  /  ALT  51  /  AlkPhos  57  01-07    LIVER FUNCTIONS - ( 07 Jan 2022 09:14 )  Alb: 3.2 g/dL / Pro: 6.2 g/dL / ALK PHOS: 57 U/L / ALT: 51 U/L DA / AST: 61 U/L / GGT: x               PT/INR - ( 08 Jan 2022 08:32 )   PT: 13.7 sec;   INR: 1.16 ratio         PTT - ( 08 Jan 2022 08:32 )  PTT:29.1 sec    I&O's Summary    07 Jan 2022 07:01  -  08 Jan 2022 07:00  --------------------------------------------------------  IN: 0 mL / OUT: 300 mL / NET: -300 mL          Culture - Urine (collected 05 Jan 2022 11:36)  Source: Clean Catch Clean Catch (Midstream)  Final Report (07 Jan 2022 15:43):    >100,000 CFU/ml Escherichia coli  Organism: Escherichia coli (07 Jan 2022 15:43)  Organism: Escherichia coli (07 Jan 2022 15:43)        CAPILLARY BLOOD GLUCOSE      RADIOLOGY & ADDITIONAL TESTS:                   PGY-1 Progress Note discussed with attending    PAGER #: [[phone]] TILL 5:00 PM  PLEASE CONTACT ON CALL TEAM:  - On Call Team (Please refer to Morena) FROM 5:00 PM - 8:30PM  - Nightfloat Team FROM 8:30 -7:30 AM    INTERVAL HPI/OVERNIGHT EVENTS:     Patient was examined at bedside, AAOx1, stable, NAD. We tried speaking to her using a , Prince ([id number]) but she is very confused. No other significant events overnight. The nurse reported difficulty swallowing with water and apple sauce. She was coughing and grimacing. Speech and Swallow evaluation was ordered. Psychiatry came to review her medications. Risperdal and Seroquel were discontinued, zoloft was resumed and zyprexa was started. We called her daughter, Tresa Hutton ([phone]). She tested positive for COVID and symptomatic. She is unable to care for her mother at this moment. She was in touch with  yesterday and she was told to follow-up on Monday and see how her condition is.    REVIEW OF SYSTEMS: unable to assess due to confusion  CONSTITUTIONAL: No fever, weight loss, or fatigue  RESPIRATORY: No cough, wheezing, chills or hemoptysis; No shortness of breath  CARDIOVASCULAR: No chest pain, palpitations, dizziness, or leg swelling  HEENT: (+) dysphagia  GASTROINTESTINAL: No abdominal pain. No nausea, vomiting, or hematemesis; No diarrhea or constipation. No melena or hematochezia.  GENITOURINARY: No dysuria or hematuria, urinary frequency  NEUROLOGICAL: No headaches, memory loss, loss of strength, numbness, or tremors  SKIN: No itching, burning, rashes, or lesions     MEDICATIONS  (STANDING):  ascorbic acid 500 milliGRAM(s) Oral daily  aspirin enteric coated 81 milliGRAM(s) Oral daily  dexAMETHasone  Injectable 6 milliGRAM(s) IV Push daily  dextrose 5% + sodium chloride 0.9%. 1000 milliLiter(s) (60 mL/Hr) IV Continuous <Continuous>  enoxaparin Injectable 40 milliGRAM(s) SubCutaneous daily  ferrous    sulfate 325 milliGRAM(s) Oral daily  FLUoxetine 20 milliGRAM(s) Oral daily  folic acid 1 milliGRAM(s) Oral daily  insulin glargine Injectable (LANTUS) 6 Unit(s) SubCutaneous at bedtime  insulin lispro (ADMELOG) corrective regimen sliding scale   SubCutaneous three times a day before meals  metoprolol succinate ER 25 milliGRAM(s) Oral daily  multivitamin 1 Tablet(s) Oral daily  OLANZapine 5 milliGRAM(s) Oral at bedtime  pantoprazole    Tablet 40 milliGRAM(s) Oral before breakfast  sertraline 50 milliGRAM(s) Oral daily  zinc sulfate 220 milliGRAM(s) Oral daily    MEDICATIONS  (PRN):  acetaminophen     Tablet .. 650 milliGRAM(s) Oral every 6 hours PRN Temp greater or equal to 38C (100.4F), Mild Pain (1 - 3)  ALBUTerol    90 MICROgram(s) HFA Inhaler 2 Puff(s) Inhalation every 6 hours PRN Shortness of Breath and/or Wheezing  melatonin 3 milliGRAM(s) Oral at bedtime PRN Insomnia  OLANZapine 2.5 milliGRAM(s) Oral every 12 hours PRN agitation      Vital Signs Last 24 Hrs  T(C): 36.7 (08 Jan 2022 05:42), Max: 36.9 (07 Jan 2022 14:48)  T(F): 98 (08 Jan 2022 05:42), Max: 98.4 (07 Jan 2022 14:48)  HR: 63 (08 Jan 2022 05:42) (63 - 72)  BP: 148/52 (08 Jan 2022 05:42) (145/79 - 163/58)  BP(mean): --  RR: 18 (08 Jan 2022 05:42) (18 - 18)  SpO2: 96% (08 Jan 2022 05:42) (94% - 96%)    PHYSICAL EXAMINATION:  GENERAL: NAD  HEAD:  Atraumatic, Normocephalic  EYES:  conjunctiva and sclera clear  NECK: Supple, No JVD, Normal thyroid  CHEST/LUNG: Clear to auscultation. Clear to percussion bilaterally; No rales, rhonchi, wheezing, or rubs  HEART: Regular rate and rhythm; No murmurs, rubs, or gallops  ABDOMEN: Soft, Nontender, Nondistended; Bowel sounds present, no pain or masses on palpation  NERVOUS SYSTEM:  Alert & Oriented X1 (cannot fully assess due to her confusion)  : voiding well  EXTREMITIES:  2+ Peripheral Pulses, No clubbing, cyanosis, or edema  SKIN: warm dry                          12.3   7.33  )-----------( 253      ( 08 Jan 2022 08:32 )             36.8     01-08    140  |  109<H>  |  16  ----------------------------<  196<H>  3.9   |  23  |  0.78    Ca    8.7      08 Jan 2022 08:32  Phos  2.2     01-08  Mg     1.9     01-08    TPro  6.2  /  Alb  3.2<L>  /  TBili  0.4  /  DBili  x   /  AST  61<H>  /  ALT  51  /  AlkPhos  57  01-07    LIVER FUNCTIONS - ( 07 Jan 2022 09:14 )  Alb: 3.2 g/dL / Pro: 6.2 g/dL / ALK PHOS: 57 U/L / ALT: 51 U/L DA / AST: 61 U/L / GGT: x               PT/INR - ( 08 Jan 2022 08:32 )   PT: 13.7 sec;   INR: 1.16 ratio         PTT - ( 08 Jan 2022 08:32 )  PTT:29.1 sec    I&O's Summary    07 Jan 2022 07:01  -  08 Jan 2022 07:00  --------------------------------------------------------  IN: 0 mL / OUT: 300 mL / NET: -300 mL          Culture - Urine (collected 05 Jan 2022 11:36)  Source: Clean Catch Clean Catch (Midstream)  Final Report (07 Jan 2022 15:43):    >100,000 CFU/ml Escherichia coli  Organism: Escherichia coli (07 Jan 2022 15:43)  Organism: Escherichia coli (07 Jan 2022 15:43)        CAPILLARY BLOOD GLUCOSE      RADIOLOGY & ADDITIONAL TESTS:

## 2022-01-08 NOTE — PROGRESS NOTE ADULT - SUBJECTIVE AND OBJECTIVE BOX
CHIEF COMPLAINT:Patient is a 89y old  Female who presents with a chief complaint of Weakness and Fall.Pt appears comfortable.    	  REVIEW OF SYSTEMS:  	  [x ] Unable to obtain    PHYSICAL EXAM:  T(C): 37.1 (01-08-22 @ 10:53), Max: 37.1 (01-08-22 @ 10:53)  HR: 70 (01-08-22 @ 10:53) (63 - 72)  BP: 137/64 (01-08-22 @ 10:53) (137/64 - 163/58)  RR: 17 (01-08-22 @ 10:53) (17 - 18)  SpO2: 97% (01-08-22 @ 10:53) (94% - 97%)  Wt(kg): --  I&O's Summary    07 Jan 2022 07:01  -  08 Jan 2022 07:00  --------------------------------------------------------  IN: 0 mL / OUT: 300 mL / NET: -300 mL        Appearance: Normal	  HEENT:   Normal oral mucosa, PERRL, EOMI	  Lymphatic: No lymphadenopathy  Cardiovascular: Normal S1 S2, No JVD, No murmurs, No edema  Respiratory: Lungs clear to auscultation	  Gastrointestinal:  Soft, Non-tender, + BS	  Skin: No rashes, No ecchymoses, No cyanosis	  Extremities: Normal range of motion, No clubbing, cyanosis or edema  Vascular: Peripheral pulses palpable 2+ bilaterally    MEDICATIONS  (STANDING):  ascorbic acid 500 milliGRAM(s) Oral daily  aspirin enteric coated 81 milliGRAM(s) Oral daily  dexAMETHasone  Injectable 6 milliGRAM(s) IV Push daily  dextrose 5% + sodium chloride 0.9%. 1000 milliLiter(s) (60 mL/Hr) IV Continuous <Continuous>  enoxaparin Injectable 40 milliGRAM(s) SubCutaneous daily  ferrous    sulfate 325 milliGRAM(s) Oral daily  FLUoxetine 20 milliGRAM(s) Oral daily  folic acid 1 milliGRAM(s) Oral daily  insulin glargine Injectable (LANTUS) 6 Unit(s) SubCutaneous at bedtime  insulin lispro (ADMELOG) corrective regimen sliding scale   SubCutaneous three times a day before meals  metoprolol succinate ER 25 milliGRAM(s) Oral daily  multivitamin 1 Tablet(s) Oral daily  OLANZapine 5 milliGRAM(s) Oral at bedtime  pantoprazole    Tablet 40 milliGRAM(s) Oral before breakfast  sertraline 50 milliGRAM(s) Oral daily  zinc sulfate 220 milliGRAM(s) Oral daily    	  	  LABS:	 	                     12.3   7.33  )-----------( 253      ( 08 Jan 2022 08:32 )             36.8     01-08    140  |  109<H>  |  16  ----------------------------<  196<H>  3.9   |  23  |  0.78    Ca    8.7      08 Jan 2022 08:32  Phos  2.2     01-08  Mg     1.9     01-08    TPro  6.2  /  Alb  3.2<L>  /  TBili  0.4  /  DBili  x   /  AST  61<H>  /  ALT  51  /  AlkPhos  57  01-07    proBNP: Serum Pro-Brain Natriuretic Peptide: 820 pg/mL (01-04 @ 22:46)    Lipid Profile: Cholesterol 155  LDL --  HDL 45    Ldl calc 87  Ratio --    HgA1c:   TSH: Thyroid Stimulating Hormone, Serum: 6.67 uU/mL (01-05 @ 10:42)

## 2022-01-08 NOTE — PROGRESS NOTE ADULT - PROBLEM SELECTOR PLAN 9
- Daughter having difficulty taking care of patient at home recently  - SHAKE[facility]+CM consulted  - currently FULL CODE as per daughter (Ms. Tresa Hutton - [phone])

## 2022-01-08 NOTE — PROGRESS NOTE ADULT - PROBLEM SELECTOR PLAN 8
RISK                                                          Points  [] Previous VTE                                           3  [] Thrombophilia                                        2  [] Lower limb paralysis                              2   [] Current Cancer                                       2   [x] Immobilization > 24 hrs                        1  [] ICU/CCU stay > 24 hours                       1  [x] Age > 60                                                   1    Score: 2    - Heparin SQ (due to SHELIA)  - Continue home PPI as pantoprazole

## 2022-01-08 NOTE — DISCHARGE NOTE PROVIDER - HOSPITAL COURSE
Patient is a 89 y/o F w/ Hx of Alzheimer's dementia, HTN, T2DM, anemia. She presented to the ED from home w/ complaints of generalized weakness and an unwitnessed fall. In the ED, she was agitated and attempted to get off her stretcher. Initially she was placed on restraints but with no improvement. She was given Ativan 1mg IV instead. Patient is a poor historian, as per daughter she has been having generalized weakness in the past 3 weeks with frequent falls. There were no episodes of headache, dizziness, syncope, chest pain, shortness of breath, palpitation.    On admission, CT Head which was negative for any acute ischemia nor bleed. She tested positive for COVID-19 and placed on isolation. Chest Xray was clear and she was saturating well at room air. Supportive measures such as albuterol and Tylenol was needed was ordered. Decadron was started and Remdesvir was held in the setting of SHELIA. Her Creatinine was elevated at 1.43 likely in the setting of dehydration. She was given bolus of IVF and maintained at 60cc/hr. She still had episodes of restlessness and agitation. She was on Ativan 0.5 prn and home medications Seroquel, Risperidone and Zoloft. Psychiatry was consulted. Seroquel and Risperidone were discontinued. Zoloft was resumed. She was started on Zyprexa 5mg at bedtime and 2.5mg q12 as needed. Cardiology was consulted to check her PPM (Micra) which was placed last Sept. 2021. She was placed on insulin sliding scale for coverage and blood glucose was checked. Medications for Hypertension and Anemia were resumed. You were stable, improved and was subsequently discharged.

## 2022-01-08 NOTE — DISCHARGE NOTE PROVIDER - PROVIDER TOKENS
FREE:[LAST:[Puma],FIRST:[Elijah],PHONE:[phone]],FAX:[(   )    -],ADDRESS:[[address]  [place]  [address]],ESTABLISHEDPATIENT:[T]]

## 2022-01-08 NOTE — PROGRESS NOTE ADULT - SUBJECTIVE AND OBJECTIVE BOX
Patient is a 89y old  Female who presents with a chief complaint of Weakness and Fall (07 Jan 2022 14:31)    pt seen in icu [  ], reg med floor [ x  ], bed [x  ], chair at bedside [   ], awake and responsive [ x ], lethargic [  ],    nad [ x ]      Allergies    penicillins (Unknown)        Vitals    T(F): 98 (01-08-22 @ 05:42), Max: 98.4 (01-07-22 @ 14:48)  HR: 63 (01-08-22 @ 05:42) (63 - 72)  BP: 148/52 (01-08-22 @ 05:42) (145/79 - 163/58)  RR: 18 (01-08-22 @ 05:42) (18 - 18)  SpO2: 96% (01-08-22 @ 05:42) (94% - 96%)  Wt(kg): --  CAPILLARY BLOOD GLUCOSE      POCT Blood Glucose.: 276 mg/dL (07 Jan 2022 21:37)      Labs                          11.3   6.02  )-----------( 230      ( 07 Jan 2022 09:14 )             33.9       01-07    141  |  111<H>  |  17  ----------------------------<  183<H>  3.8   |  23  |  0.78    Ca    8.6      07 Jan 2022 09:14  Phos  1.8     01-07  Mg     2.0     01-07    TPro  6.2  /  Alb  3.2<L>  /  TBili  0.4  /  DBili  x   /  AST  61<H>  /  ALT  51  /  AlkPhos  57  01-07            Clean Catch Clean Catch (Midstream)  01-05 @ 11:36   >100,000 CFU/ml Escherichia coli  --  Escherichia coli          Radiology Results      Meds    MEDICATIONS  (STANDING):  ascorbic acid 500 milliGRAM(s) Oral daily  aspirin enteric coated 81 milliGRAM(s) Oral daily  dexAMETHasone  Injectable 6 milliGRAM(s) IV Push daily  dextrose 5% + sodium chloride 0.9%. 1000 milliLiter(s) (60 mL/Hr) IV Continuous <Continuous>  enoxaparin Injectable 40 milliGRAM(s) SubCutaneous daily  ferrous    sulfate 325 milliGRAM(s) Oral daily  FLUoxetine 20 milliGRAM(s) Oral daily  folic acid 1 milliGRAM(s) Oral daily  insulin glargine Injectable (LANTUS) 6 Unit(s) SubCutaneous at bedtime  insulin lispro (ADMELOG) corrective regimen sliding scale   SubCutaneous three times a day before meals  metoprolol succinate ER 25 milliGRAM(s) Oral daily  multivitamin 1 Tablet(s) Oral daily  OLANZapine 5 milliGRAM(s) Oral at bedtime  pantoprazole    Tablet 40 milliGRAM(s) Oral before breakfast  sertraline 50 milliGRAM(s) Oral daily  zinc sulfate 220 milliGRAM(s) Oral daily      MEDICATIONS  (PRN):  acetaminophen     Tablet .. 650 milliGRAM(s) Oral every 6 hours PRN Temp greater or equal to 38C (100.4F), Mild Pain (1 - 3)  ALBUTerol    90 MICROgram(s) HFA Inhaler 2 Puff(s) Inhalation every 6 hours PRN Shortness of Breath and/or Wheezing  melatonin 3 milliGRAM(s) Oral at bedtime PRN Insomnia  OLANZapine 2.5 milliGRAM(s) Oral every 12 hours PRN agitation      Physical Exam    Neuro :  no focal deficits  Respiratory: CTA B/L  CV: RRR, S1S2, no murmurs,   Abdominal: Soft, NT, ND +BS,  Extremities: No edema, + peripheral pulses        ASSESSMENT    pneumonia 2nd to covid 19,   s/p fall,   h/o Alzheimer's dementia,   HTN,   DM,   2 to 1 heart block s/p micra ppm placement 9/17/21   MESERET (iron deficiency anemia)        PLAN    cont decadron,   start remdesivir   cont vit c, vitamin d, zinc, pepcid, singulair, flonase   contact and airborne isolation,   pulm f/u   cont albuterol inhaler,   afebrile   tylenol prn,   robitussin prn   O2 sat 96% (96% - 99%) on ra  O2 via nasal canula as needed,   serum creat wnl   cardio cons  check ppm  cont cardiac meds   endo cons  cont lispro ss  resume lantus 6 units qhs  free t4 wnl noted above   phys tx eval noted and rec phys tx 2-3x/week x 2WKS home w/ home PT; Pending progress and participation with JOB  case mgmt ,   soc wk for placement    cont current meds         Patient is a 89y old  Female who presents with a chief complaint of Weakness and Fall (07 Jan 2022 14:31)    pt seen in icu [  ], reg med floor [ x  ], bed [x  ], chair at bedside [   ], awake and responsive [ x ], lethargic [  ],    nad [ x ]      Allergies    penicillins (Unknown)        Vitals    T(F): 98 (01-08-22 @ 05:42), Max: 98.4 (01-07-22 @ 14:48)  HR: 63 (01-08-22 @ 05:42) (63 - 72)  BP: 148/52 (01-08-22 @ 05:42) (145/79 - 163/58)  RR: 18 (01-08-22 @ 05:42) (18 - 18)  SpO2: 96% (01-08-22 @ 05:42) (94% - 96%)  Wt(kg): --  CAPILLARY BLOOD GLUCOSE      POCT Blood Glucose.: 276 mg/dL (07 Jan 2022 21:37)      Labs                          11.3   6.02  )-----------( 230      ( 07 Jan 2022 09:14 )             33.9       01-07    141  |  111<H>  |  17  ----------------------------<  183<H>  3.8   |  23  |  0.78    Ca    8.6      07 Jan 2022 09:14  Phos  1.8     01-07  Mg     2.0     01-07    TPro  6.2  /  Alb  3.2<L>  /  TBili  0.4  /  DBili  x   /  AST  61<H>  /  ALT  51  /  AlkPhos  57  01-07            Clean Catch Clean Catch (Midstream)  01-05 @ 11:36   >100,000 CFU/ml Escherichia coli  --  Escherichia coli          Radiology Results      Meds    MEDICATIONS  (STANDING):  ascorbic acid 500 milliGRAM(s) Oral daily  aspirin enteric coated 81 milliGRAM(s) Oral daily  dexAMETHasone  Injectable 6 milliGRAM(s) IV Push daily  dextrose 5% + sodium chloride 0.9%. 1000 milliLiter(s) (60 mL/Hr) IV Continuous <Continuous>  enoxaparin Injectable 40 milliGRAM(s) SubCutaneous daily  ferrous    sulfate 325 milliGRAM(s) Oral daily  FLUoxetine 20 milliGRAM(s) Oral daily  folic acid 1 milliGRAM(s) Oral daily  insulin glargine Injectable (LANTUS) 6 Unit(s) SubCutaneous at bedtime  insulin lispro (ADMELOG) corrective regimen sliding scale   SubCutaneous three times a day before meals  metoprolol succinate ER 25 milliGRAM(s) Oral daily  multivitamin 1 Tablet(s) Oral daily  OLANZapine 5 milliGRAM(s) Oral at bedtime  pantoprazole    Tablet 40 milliGRAM(s) Oral before breakfast  sertraline 50 milliGRAM(s) Oral daily  zinc sulfate 220 milliGRAM(s) Oral daily      MEDICATIONS  (PRN):  acetaminophen     Tablet .. 650 milliGRAM(s) Oral every 6 hours PRN Temp greater or equal to 38C (100.4F), Mild Pain (1 - 3)  ALBUTerol    90 MICROgram(s) HFA Inhaler 2 Puff(s) Inhalation every 6 hours PRN Shortness of Breath and/or Wheezing  melatonin 3 milliGRAM(s) Oral at bedtime PRN Insomnia  OLANZapine 2.5 milliGRAM(s) Oral every 12 hours PRN agitation      Physical Exam    Neuro :  no focal deficits  Respiratory: CTA B/L  CV: RRR, S1S2, no murmurs,   Abdominal: Soft, NT, ND +BS,  Extremities: No edema, + peripheral pulses        ASSESSMENT    pneumonia 2nd to covid 19,   s/p fall,   uti   h/o Alzheimer's dementia,   HTN,   DM,   2 to 1 heart block s/p micra ppm placement 9/17/21   MESERET (iron deficiency anemia)        PLAN    cont decadron,   cont vit c, vitamin d, zinc, singulair, flonase   contact and airborne isolation,   pulm f/u   cont albuterol inhaler,   afebrile   tylenol prn,   robitussin prn   O2 sat 96% (01-08-22 @ 05:42) (94% - 96%) on ra  O2 via nasal canula as needed,   start rocephin 1 daily x 3 days   ucx with e coli noted above  serum creat wnl   cardio f/u  check ppm  cont cardiac meds   endo cons  cont lispro ss  resume lantus 6 units qhs  free t4 wnl noted above   phys tx eval noted and rec phys tx 2-3x/week x 2WKS home w/ home PT; Pending progress and participation with P.T.  psych f/u   DC Seroquel and Risperdal, substitute Zyprexa 5 mg PO qhs standing as alternate antipsychotic  C/w home Zoloft 50 mg qd  For acute agitation, recommend Zyprexa 2.5 mg q12h PRN agitation  -- PO dose form is preferred if pt is able to swallow, but IM can be used as backup if PO refused or cannot be given  Pt will need LTP--she clearly lacks capacity, so daughter should be approached for necessary consents  case mgmt ,   soc wk for placement    cont current meds

## 2022-01-08 NOTE — PROGRESS NOTE ADULT - PROBLEM SELECTOR PLAN 2
- Positive for COVID19  - CXR clear  - F/u COVID markers q72hrs  - Currently on room air  - Albuterol MDI Q2H PRN  - Tylenol PRN for fever  - O2 support as needed  - Vit C, D, Zinc  - continue Decadron  - hold Remdesivir due to SHELIA  - supportive therapy as needed.

## 2022-01-08 NOTE — PROGRESS NOTE ADULT - PROBLEM SELECTOR PLAN 1
- On many psych meds at home: Sertraline, Quetiapine, Risperidone, Fluoxetine  - f/u Psych consult (Dr. Voss) - On many psych meds at home: Sertraline, Quetiapine, Risperidone, Fluoxetine  - Risperdal and Seroquel were discontinued, zoloft was resumed and zyprexa was started.   - Psych consulted (Dr. Voss)  - had episode of dysphagia w/ water and apple sauce  - f/u Speech and Swallow

## 2022-01-08 NOTE — DISCHARGE NOTE PROVIDER - NSDCMRMEDTOKEN_GEN_ALL_CORE_FT
Aspirin Enteric Coated 81 mg oral delayed release tablet: 1 tab(s) orally once a day  Basaglar KwikPen 100 units/mL subcutaneous solution: 34 unit(s) subcutaneous once a day (at bedtime)  esomeprazole 40 mg oral delayed release capsule: 1 cap(s) orally once a day  FLUoxetine 20 mg oral capsule: 1 cap(s) orally once a day  metFORMIN 500 mg oral tablet: 1 tab(s) orally once a day  OLANZapine 2.5 mg oral tablet: 1 tab(s) orally every 12 hours, As needed, agitation  OLANZapine 5 mg oral tablet: 1 tab(s) orally once a day (at bedtime)  Toprol-XL 25 mg oral tablet, extended release: 1 tab(s) orally 2 times a day  Vitamin C 500 mg oral tablet: 1 tab(s) orally once a day  Vitamin D3 1250 mcg (50,000 intl units) oral capsule: 1 cap(s) orally once a week   Aspirin Enteric Coated 81 mg oral delayed release tablet: 1 tab(s) orally once a day  Basaglar KwikPen 100 units/mL subcutaneous solution: 34 unit(s) subcutaneous once a day (at bedtime)  cefuroxime 250 mg oral tablet: 1 tab(s) orally 2 times a day   esomeprazole 40 mg oral delayed release capsule: 1 cap(s) orally once a day  FLUoxetine 20 mg oral capsule: 1 cap(s) orally once a day  metFORMIN 500 mg oral tablet: 1 tab(s) orally once a day  OLANZapine 2.5 mg oral tablet: 1 tab(s) orally every 12 hours, As needed, agitation  OLANZapine 5 mg oral tablet: 1 tab(s) orally once a day (at bedtime)  Toprol-XL 25 mg oral tablet, extended release: 1 tab(s) orally 2 times a day  Vitamin C 500 mg oral tablet: 1 tab(s) orally once a day  Vitamin D3 1250 mcg (50,000 intl units) oral capsule: 1 cap(s) orally once a week

## 2022-01-08 NOTE — DISCHARGE NOTE PROVIDER - NSDCCPCAREPLAN_GEN_ALL_CORE_FT
PRINCIPAL DISCHARGE DIAGNOSIS  Diagnosis: 2019 novel coronavirus disease (COVID-19)  Assessment and Plan of Treatment: You presented with generalized weakness. You tested positive for COVID-19. You were stable, not in distress and saturating on room air. Due to your multiple comorbid conditions, Dexamethasone was started, Lovenox was started for deep venous thrombosis prophylaxis. Supportive measures were done. Inflammatory markers were trended and they were stable. Your symptoms improved and you were subsequently discharged. Follow-up with your Primary care doctor.      SECONDARY DISCHARGE DIAGNOSES  Diagnosis: Fall  Assessment and Plan of Treatment: You had generalized weakness and unwitnessed episode of fall at home. You denied any episodes of headache, dizziness, syncope, head trauma. CT Head and Cervical Spine were done and it was negative for any acute ischemia or bleed. Physical Therapy was consulted. Your symptoms improved and you were subsequently discharged. Follow-up with your Primary care doctor.    Diagnosis: Dementia in Alzheimer's disease  Assessment and Plan of Treatment: You have Alzheimer's Disease with Dementia. Your home medications are seroquel, risperidone, and zoloft. You had episodes of restlessness and agitation. You were initially on Ativan IV. Psychiatry was consulted and she discontinued your Seroquel and Risperidone. You were started on Zyprexa 5mg at bedtime and 2.5mg as needed for agitation. You were stable, improved and subsequently discharged. Follow-up with your Primary care doctor and schedule an appointment with a Psychiatrist.    Diagnosis: SHELIA (acute kidney injury)  Assessment and Plan of Treatment: You presented with Acute Kidney Injury. Your serum creatinine was elevated on admission. IV fluids were given in the setting of dehydration. Your renal function improved and were subsequently discharged. Follow-up with your primary care doctor.    Diagnosis: HTN (hypertension)  Assessment and Plan of Treatment: You have history of Hypertension onmetoprolol at home. Blood pressure was monitored and we resumed your home medications. Your blood pressure has been stable. You were subseqeuntly discharged. Follow-up with your primary care doctor.    Diagnosis: T2DM (type 2 diabetes mellitus)  Assessment and Plan of Treatment: You have Type 2 Diabetes Mellitus on insulin glargine and Metformin. Your hemoglobin A1c was 7.4. Blood glucose was monitored regularly. Insulin sliding scale was started for coverage. Your blood glucose has been stable and you were subsequently discharged. Resume your home medications. Follow-up with your primary care doctor.    Diagnosis: Anemia  Assessment and Plan of Treatment: You have history of iron deficiency anemia with baseline hemoglobin of 12. Your hemoglobin on admission was 11.1. You do not have any signs of active bleeding. CBC was monitored and hemoglobin has been stable. You were subsequently discharged. Follow-up with your primary care doctor.    Diagnosis: 2019 novel coronavirus disease (COVID-19)  Assessment and Plan of Treatment:      PRINCIPAL DISCHARGE DIAGNOSIS  Diagnosis: 2019 novel coronavirus disease (COVID-19)  Assessment and Plan of Treatment: You presented with generalized weakness. You tested positive for COVID-19. You were stable, not in distress and saturating on room air. Due to your multiple comorbid conditions, Dexamethasone was started, Lovenox was started for deep venous thrombosis prophylaxis. Supportive measures were done. Inflammatory markers were trended and they were stable. Your symptoms improved and you were subsequently discharged. Follow-up with your Primary care doctor.      SECONDARY DISCHARGE DIAGNOSES  Diagnosis: Fall  Assessment and Plan of Treatment: You had generalized weakness and unwitnessed episode of fall at home. You denied any episodes of headache, dizziness, syncope, head trauma. CT Head and Cervical Spine were done and it was negative for any acute ischemia or bleed. Physical Therapy was consulted. Your symptoms improved and you were subsequently discharged. Follow-up with your Primary care doctor.    Diagnosis: Dementia in Alzheimer's disease  Assessment and Plan of Treatment: You have Alzheimer's Disease with Dementia. Your home medications are seroquel, risperidone, and zoloft. You had episodes of restlessness and agitation. You were initially on Ativan IV. Psychiatry was consulted and she discontinued your Seroquel and Risperidone. You were started on Zyprexa 2.5mg at bedtime and 2.5mg as needed every 12 hours for agitation. You were stable, improved and subsequently discharged. Follow-up with your Primary care doctor and schedule an appointment with a Psychiatrist.    Diagnosis: SHELIA (acute kidney injury)  Assessment and Plan of Treatment: You presented with Acute Kidney Injury. Your serum creatinine was elevated on admission. IV fluids were given in the setting of dehydration. Your renal function improved and were subsequently discharged. Follow-up with your primary care doctor.    Diagnosis: HTN (hypertension)  Assessment and Plan of Treatment: You have history of Hypertension onmetoprolol at home. Blood pressure was monitored and we resumed your home medications. Your blood pressure has been stable. You were subseqeuntly discharged. Follow-up with your primary care doctor.    Diagnosis: T2DM (type 2 diabetes mellitus)  Assessment and Plan of Treatment: You have Type 2 Diabetes Mellitus on insulin glargine and Metformin. Your hemoglobin A1c was 7.4. Blood glucose was monitored regularly. Insulin sliding scale was started for coverage. Your blood glucose has been elevated and insulin dosage has been adjusted accordingly. You were subsequently discharged. Resume your home medications. Follow-up with your primary care doctor.    Diagnosis: Anemia  Assessment and Plan of Treatment: You have history of iron deficiency anemia with baseline hemoglobin of 12. Your hemoglobin on admission was 11.1. You do not have any signs of active bleeding. CBC was monitored and hemoglobin has been stable. You were subsequently discharged. Follow-up with your primary care doctor.    Diagnosis: 2019 novel coronavirus disease (COVID-19)  Assessment and Plan of Treatment:      PRINCIPAL DISCHARGE DIAGNOSIS  Diagnosis: 2019 novel coronavirus disease (COVID-19)  Assessment and Plan of Treatment: You presented with generalized weakness. You tested positive for COVID-19. You were stable, not in distress and saturating on room air. Due to your multiple comorbid conditions, Dexamethasone was started, Lovenox was started for deep venous thrombosis prophylaxis. Supportive measures were done. Inflammatory markers were trended and they were stable. Your symptoms improved and you were subsequently discharged. Follow-up with your Primary care doctor.      SECONDARY DISCHARGE DIAGNOSES  Diagnosis: Fall  Assessment and Plan of Treatment: You had generalized weakness and unwitnessed episode of fall at home. You denied any episodes of headache, dizziness, syncope, head trauma. CT Head and Cervical Spine were done and it was negative for any acute ischemia or bleed. Physical Therapy was consulted. Your symptoms improved and you were subsequently discharged. Follow-up with your Primary care doctor.    Diagnosis: SHELIA (acute kidney injury)  Assessment and Plan of Treatment: You presented with Acute Kidney Injury. Your serum creatinine was elevated on admission. IV fluids were given in the setting of dehydration. Your renal function improved and were subsequently discharged. Follow-up with your primary care doctor.    Diagnosis: HTN (hypertension)  Assessment and Plan of Treatment: You have history of Hypertension onmetoprolol at home. Blood pressure was monitored and we resumed your home medications. Your blood pressure has been stable. You were subseqeuntly discharged. Follow-up with your primary care doctor.    Diagnosis: T2DM (type 2 diabetes mellitus)  Assessment and Plan of Treatment: You have Type 2 Diabetes Mellitus on insulin glargine and Metformin. Your hemoglobin A1c was 7.4. Blood glucose was monitored regularly. Insulin sliding scale was started for coverage. Your blood glucose has been elevated and insulin dosage has been adjusted accordingly. You were subsequently discharged. Resume your home medications. Follow-up with your primary care doctor.    Diagnosis: Anemia  Assessment and Plan of Treatment: You have history of iron deficiency anemia with baseline hemoglobin of 12. Your hemoglobin on admission was 11.1. You do not have any signs of active bleeding. CBC was monitored and hemoglobin has been stable. You were subsequently discharged. Follow-up with your primary care doctor.    Diagnosis: Dementia in Alzheimer's disease  Assessment and Plan of Treatment: You have Alzheimer's Disease with Dementia. Your home medications are seroquel, risperidone, and zoloft. You had episodes of restlessness and agitation. You were initially on Ativan IV. Psychiatry was consulted and she discontinued your Seroquel and Risperidone. You were started on Zyprexa 2.5mg at bedtime and 2.5mg as needed every 12 hours for agitation. You were stable, improved and subsequently discharged. Follow-up with your Primary care doctor and schedule an appointment with a Psychiatrist.

## 2022-01-08 NOTE — DISCHARGE NOTE PROVIDER - CARE PROVIDER_API CALL
[facility]  [address]  [place]  [address]  Phone: [phone]  Fax: (   )    -  Established Patient  Follow Up Time:

## 2022-01-09 LAB
GLUCOSE BLDC GLUCOMTR-MCNC: 104 MG/DL — HIGH (ref 70–99)
GLUCOSE BLDC GLUCOMTR-MCNC: 175 MG/DL — HIGH (ref 70–99)
GLUCOSE BLDC GLUCOMTR-MCNC: 253 MG/DL — HIGH (ref 70–99)
GLUCOSE BLDC GLUCOMTR-MCNC: 314 MG/DL — HIGH (ref 70–99)

## 2022-01-09 RX ORDER — CEFUROXIME AXETIL 250 MG
250 TABLET ORAL EVERY 12 HOURS
Refills: 0 | Status: DISCONTINUED | OUTPATIENT
Start: 2022-01-09 | End: 2022-01-13

## 2022-01-09 RX ORDER — NITROFURANTOIN MACROCRYSTAL 50 MG
100 CAPSULE ORAL
Refills: 0 | Status: DISCONTINUED | OUTPATIENT
Start: 2022-01-09 | End: 2022-01-09

## 2022-01-09 RX ORDER — METFORMIN HYDROCHLORIDE 850 MG/1
250 TABLET ORAL
Refills: 0 | Status: DISCONTINUED | OUTPATIENT
Start: 2022-01-09 | End: 2022-01-09

## 2022-01-09 RX ADMIN — INSULIN GLARGINE 6 UNIT(S): 100 INJECTION, SOLUTION SUBCUTANEOUS at 22:06

## 2022-01-09 RX ADMIN — Medication 325 MILLIGRAM(S): at 11:38

## 2022-01-09 RX ADMIN — Medication 2: at 11:39

## 2022-01-09 RX ADMIN — Medication 1 TABLET(S): at 11:38

## 2022-01-09 RX ADMIN — Medication 250 MILLIGRAM(S): at 17:45

## 2022-01-09 RX ADMIN — Medication 20 MILLIGRAM(S): at 11:38

## 2022-01-09 RX ADMIN — ZINC SULFATE TAB 220 MG (50 MG ZINC EQUIVALENT) 220 MILLIGRAM(S): 220 (50 ZN) TAB at 11:37

## 2022-01-09 RX ADMIN — ENOXAPARIN SODIUM 40 MILLIGRAM(S): 100 INJECTION SUBCUTANEOUS at 11:37

## 2022-01-09 RX ADMIN — Medication 81 MILLIGRAM(S): at 11:37

## 2022-01-09 RX ADMIN — OLANZAPINE 5 MILLIGRAM(S): 15 TABLET, FILM COATED ORAL at 22:06

## 2022-01-09 RX ADMIN — Medication 25 MILLIGRAM(S): at 05:57

## 2022-01-09 RX ADMIN — PANTOPRAZOLE SODIUM 40 MILLIGRAM(S): 20 TABLET, DELAYED RELEASE ORAL at 06:05

## 2022-01-09 RX ADMIN — Medication 8: at 16:47

## 2022-01-09 RX ADMIN — Medication 6: at 08:15

## 2022-01-09 RX ADMIN — OLANZAPINE 2.5 MILLIGRAM(S): 15 TABLET, FILM COATED ORAL at 16:16

## 2022-01-09 RX ADMIN — Medication 500 MILLIGRAM(S): at 11:37

## 2022-01-09 RX ADMIN — Medication 1 MILLIGRAM(S): at 11:38

## 2022-01-09 RX ADMIN — SERTRALINE 50 MILLIGRAM(S): 25 TABLET, FILM COATED ORAL at 11:38

## 2022-01-09 RX ADMIN — Medication 6 MILLIGRAM(S): at 05:57

## 2022-01-09 NOTE — PHARMACOTHERAPY INTERVENTION NOTE - COMMENTS
Discontinue Nitrofurantoin twice a day for 89-years-old female with UTI / CRCL=46.5-CONTRAINDICATED- and start Cefuroxime 250 mg oral twice a day for 5 days

## 2022-01-09 NOTE — PROGRESS NOTE ADULT - SUBJECTIVE AND OBJECTIVE BOX
CHIEF COMPLAINT:Patient is a 89y old  Female who presents with a chief complaint of Weakness and Fall .Pt appears comfortable.    	  REVIEW OF SYSTEMS:    [ x] Unable to obtain    PHYSICAL EXAM:  T(C): 36.1 (01-09-22 @ 05:10), Max: 36.7 (01-08-22 @ 14:27)  HR: 89 (01-09-22 @ 05:10) (60 - 89)  BP: 121/58 (01-09-22 @ 05:10) (99/47 - 121/58)  RR: 18 (01-09-22 @ 05:10) (18 - 18)  SpO2: 96% (01-09-22 @ 05:10) (96% - 96%)  Wt(kg): --  I&O's Summary      Appearance: Normal	  HEENT:   Normal oral mucosa, PERRL, EOMI	  Lymphatic: No lymphadenopathy  Cardiovascular: Normal S1 S2, No JVD, No murmurs, No edema  Respiratory: Lungs clear to auscultation	  Gastrointestinal:  Soft, Non-tender, + BS	  Skin: No rashes, No ecchymoses, No cyanosis	  Extremities: Normal range of motion, No clubbing, cyanosis or edema  Vascular: Peripheral pulses palpable 2+ bilaterally    MEDICATIONS  (STANDING):  ascorbic acid 500 milliGRAM(s) Oral daily  aspirin enteric coated 81 milliGRAM(s) Oral daily  cefuroxime   Tablet 250 milliGRAM(s) Oral every 12 hours  dexAMETHasone  Injectable 6 milliGRAM(s) IV Push daily  dextrose 5% + sodium chloride 0.9%. 1000 milliLiter(s) (60 mL/Hr) IV Continuous <Continuous>  enoxaparin Injectable 40 milliGRAM(s) SubCutaneous daily  ferrous    sulfate 325 milliGRAM(s) Oral daily  FLUoxetine 20 milliGRAM(s) Oral daily  folic acid 1 milliGRAM(s) Oral daily  insulin glargine Injectable (LANTUS) 6 Unit(s) SubCutaneous at bedtime  insulin lispro (ADMELOG) corrective regimen sliding scale   SubCutaneous three times a day before meals  metoprolol succinate ER 25 milliGRAM(s) Oral daily  multivitamin 1 Tablet(s) Oral daily  OLANZapine 5 milliGRAM(s) Oral at bedtime  pantoprazole    Tablet 40 milliGRAM(s) Oral before breakfast  sertraline 50 milliGRAM(s) Oral daily  zinc sulfate 220 milliGRAM(s) Oral daily    	  	  LABS:	 	                        12.3   7.33  )-----------( 253      ( 08 Jan 2022 08:32 )             36.8     01-08    140  |  109<H>  |  16  ----------------------------<  196<H>  3.9   |  23  |  0.78    Ca    8.7      08 Jan 2022 08:32  Phos  2.2     01-08  Mg     1.9     01-08      proBNP: Serum Pro-Brain Natriuretic Peptide: 820 pg/mL (01-04 @ 22:46)    Lipid Profile: Cholesterol 155  LDL --  HDL 45    Ldl calc 87  Ratio --    HgA1c:   TSH: Thyroid Stimulating Hormone, Serum: 6.67 uU/mL (01-05 @ 10:42)

## 2022-01-09 NOTE — PROGRESS NOTE ADULT - SUBJECTIVE AND OBJECTIVE BOX
Patient is a 89y old  Female who presents with a chief complaint of Weakness and Fall (08 Jan 2022 14:00)    pt seen in icu [  ], reg med floor [ x  ], bed [x  ], chair at bedside [   ], awake and responsive [ x ], lethargic [  ],    nad [ x ]      Allergies    penicillins (Unknown)        Vitals    T(F): 97 (01-09-22 @ 05:10), Max: 98.7 (01-08-22 @ 10:53)  HR: 89 (01-09-22 @ 05:10) (60 - 89)  BP: 121/58 (01-09-22 @ 05:10) (99/47 - 137/64)  RR: 18 (01-09-22 @ 05:10) (17 - 18)  SpO2: 96% (01-09-22 @ 05:10) (96% - 97%)  Wt(kg): --  CAPILLARY BLOOD GLUCOSE      POCT Blood Glucose.: 211 mg/dL (08 Jan 2022 21:29)      Labs                          12.3   7.33  )-----------( 253      ( 08 Jan 2022 08:32 )             36.8       01-08    140  |  109<H>  |  16  ----------------------------<  196<H>  3.9   |  23  |  0.78    Ca    8.7      08 Jan 2022 08:32  Phos  2.2     01-08  Mg     1.9     01-08    TPro  6.2  /  Alb  3.2<L>  /  TBili  0.4  /  DBili  x   /  AST  61<H>  /  ALT  51  /  AlkPhos  57  01-07            Clean Catch Clean Catch (Midstream)  01-05 @ 11:36   >100,000 CFU/ml Escherichia coli  --  Escherichia coli          Radiology Results      Meds    MEDICATIONS  (STANDING):  ascorbic acid 500 milliGRAM(s) Oral daily  aspirin enteric coated 81 milliGRAM(s) Oral daily  dexAMETHasone  Injectable 6 milliGRAM(s) IV Push daily  dextrose 5% + sodium chloride 0.9%. 1000 milliLiter(s) (60 mL/Hr) IV Continuous <Continuous>  enoxaparin Injectable 40 milliGRAM(s) SubCutaneous daily  ferrous    sulfate 325 milliGRAM(s) Oral daily  FLUoxetine 20 milliGRAM(s) Oral daily  folic acid 1 milliGRAM(s) Oral daily  insulin glargine Injectable (LANTUS) 6 Unit(s) SubCutaneous at bedtime  insulin lispro (ADMELOG) corrective regimen sliding scale   SubCutaneous three times a day before meals  metoprolol succinate ER 25 milliGRAM(s) Oral daily  multivitamin 1 Tablet(s) Oral daily  OLANZapine 5 milliGRAM(s) Oral at bedtime  pantoprazole    Tablet 40 milliGRAM(s) Oral before breakfast  sertraline 50 milliGRAM(s) Oral daily  zinc sulfate 220 milliGRAM(s) Oral daily      MEDICATIONS  (PRN):  acetaminophen     Tablet .. 650 milliGRAM(s) Oral every 6 hours PRN Temp greater or equal to 38C (100.4F), Mild Pain (1 - 3)  ALBUTerol    90 MICROgram(s) HFA Inhaler 2 Puff(s) Inhalation every 6 hours PRN Shortness of Breath and/or Wheezing  melatonin 3 milliGRAM(s) Oral at bedtime PRN Insomnia  OLANZapine 2.5 milliGRAM(s) Oral every 12 hours PRN agitation      Physical Exam    Neuro :  no focal deficits  Respiratory: CTA B/L  CV: RRR, S1S2, no murmurs,   Abdominal: Soft, NT, ND +BS,  Extremities: No edema, + peripheral pulses        ASSESSMENT    pneumonia 2nd to covid 19,   s/p fall,   uti   h/o Alzheimer's dementia,   HTN,   DM,   2 to 1 heart block s/p micra ppm placement 9/17/21   MESERET (iron deficiency anemia)        PLAN    cont decadron,   cont vit c, vitamin d, zinc, singulair, flonase   contact and airborne isolation,   pulm f/u   cont albuterol inhaler,   afebrile   tylenol prn,   robitussin prn   O2 sat 96% (01-08-22 @ 05:42) (94% - 96%) on ra  O2 via nasal canula as needed,   start rocephin 1 daily x 3 days   ucx with e coli noted above  serum creat wnl   cardio f/u  check ppm  cont cardiac meds   endo cons  cont lispro ss  resume lantus 6 units qhs  free t4 wnl noted above   phys tx eval noted and rec phys tx 2-3x/week x 2WKS home w/ home PT; Pending progress and participation with P.T.  psych f/u   DC Seroquel and Risperdal, substitute Zyprexa 5 mg PO qhs standing as alternate antipsychotic  C/w home Zoloft 50 mg qd  For acute agitation, recommend Zyprexa 2.5 mg q12h PRN agitation  -- PO dose form is preferred if pt is able to swallow, but IM can be used as backup if PO refused or cannot be given  Pt will need LTP--she clearly lacks capacity, so daughter should be approached for necessary consents  case mgmt ,   soc wk for placement    cont current meds           Patient is a 89y old  Female who presents with a chief complaint of Weakness and Fall (08 Jan 2022 14:00)    pt seen in icu [  ], reg med floor [ x  ], bed [x  ], chair at bedside [   ], awake and responsive [ x ], lethargic [  ],    nad [ x ]      Allergies    penicillins (Unknown)        Vitals    T(F): 97 (01-09-22 @ 05:10), Max: 98.7 (01-08-22 @ 10:53)  HR: 89 (01-09-22 @ 05:10) (60 - 89)  BP: 121/58 (01-09-22 @ 05:10) (99/47 - 137/64)  RR: 18 (01-09-22 @ 05:10) (17 - 18)  SpO2: 96% (01-09-22 @ 05:10) (96% - 97%)  Wt(kg): --  CAPILLARY BLOOD GLUCOSE      POCT Blood Glucose.: 211 mg/dL (08 Jan 2022 21:29)      Labs                          12.3   7.33  )-----------( 253      ( 08 Jan 2022 08:32 )             36.8       01-08    140  |  109<H>  |  16  ----------------------------<  196<H>  3.9   |  23  |  0.78    Ca    8.7      08 Jan 2022 08:32  Phos  2.2     01-08  Mg     1.9     01-08    TPro  6.2  /  Alb  3.2<L>  /  TBili  0.4  /  DBili  x   /  AST  61<H>  /  ALT  51  /  AlkPhos  57  01-07            Clean Catch Clean Catch (Midstream)  01-05 @ 11:36   >100,000 CFU/ml Escherichia coli  --  Escherichia coli          Radiology Results      Meds    MEDICATIONS  (STANDING):  ascorbic acid 500 milliGRAM(s) Oral daily  aspirin enteric coated 81 milliGRAM(s) Oral daily  dexAMETHasone  Injectable 6 milliGRAM(s) IV Push daily  dextrose 5% + sodium chloride 0.9%. 1000 milliLiter(s) (60 mL/Hr) IV Continuous <Continuous>  enoxaparin Injectable 40 milliGRAM(s) SubCutaneous daily  ferrous    sulfate 325 milliGRAM(s) Oral daily  FLUoxetine 20 milliGRAM(s) Oral daily  folic acid 1 milliGRAM(s) Oral daily  insulin glargine Injectable (LANTUS) 6 Unit(s) SubCutaneous at bedtime  insulin lispro (ADMELOG) corrective regimen sliding scale   SubCutaneous three times a day before meals  metoprolol succinate ER 25 milliGRAM(s) Oral daily  multivitamin 1 Tablet(s) Oral daily  OLANZapine 5 milliGRAM(s) Oral at bedtime  pantoprazole    Tablet 40 milliGRAM(s) Oral before breakfast  sertraline 50 milliGRAM(s) Oral daily  zinc sulfate 220 milliGRAM(s) Oral daily      MEDICATIONS  (PRN):  acetaminophen     Tablet .. 650 milliGRAM(s) Oral every 6 hours PRN Temp greater or equal to 38C (100.4F), Mild Pain (1 - 3)  ALBUTerol    90 MICROgram(s) HFA Inhaler 2 Puff(s) Inhalation every 6 hours PRN Shortness of Breath and/or Wheezing  melatonin 3 milliGRAM(s) Oral at bedtime PRN Insomnia  OLANZapine 2.5 milliGRAM(s) Oral every 12 hours PRN agitation      Physical Exam    Neuro :  no focal deficits  Respiratory: CTA B/L  CV: RRR, S1S2, no murmurs,   Abdominal: Soft, NT, ND +BS,  Extremities: No edema, + peripheral pulses        ASSESSMENT    pneumonia 2nd to covid 19,   s/p fall,   uti   h/o Alzheimer's dementia,   HTN,   DM,   2 to 1 heart block s/p micra ppm placement 9/17/21   MESERET (iron deficiency anemia)        PLAN    cont decadron,   cont vit c, vitamin d, zinc, singulair, flonase   contact and airborne isolation,   pulm f/u   cont albuterol inhaler,   afebrile   tylenol prn,   robitussin prn   O2 sat 96% (96% - 97%) on ra  O2 via nasal canula as needed,   start ceftin 250 mg daily x 5 days  ucx with e coli noted above  serum creat wnl   cardio f/u  cont cardiac meds   endo cons  cont lispro ss  cont lantus 6 units qhs   resume metformin 500 mg daily  free t4 wnl noted    phys tx eval noted and rec phys tx 2-3x/week x 2WKS home w/ home PT; Pending progress and participation with P.T.  psych f/u   DC Seroquel and Risperdal, substitute Zyprexa 5 mg PO qhs standing as alternate antipsychotic  C/w home Zoloft 50 mg qd  For acute agitation, recommend Zyprexa 2.5 mg q12h PRN agitation  -- PO dose form is preferred if pt is able to swallow, but IM can be used as backup if PO refused or cannot be given  Pt will need LTP--she clearly lacks capacity, so daughter should be approached for necessary consents  case mgmt ,   soc wk for placement    cont current meds   d/c planning for am

## 2022-01-10 LAB
ALBUMIN SERPL ELPH-MCNC: 3.8 G/DL — SIGNIFICANT CHANGE UP (ref 3.5–5)
ALP SERPL-CCNC: 74 U/L — SIGNIFICANT CHANGE UP (ref 40–120)
ALT FLD-CCNC: 63 U/L DA — HIGH (ref 10–60)
ANION GAP SERPL CALC-SCNC: 7 MMOL/L — SIGNIFICANT CHANGE UP (ref 5–17)
APTT BLD: 30.8 SEC — SIGNIFICANT CHANGE UP (ref 27.5–35.5)
AST SERPL-CCNC: 48 U/L — HIGH (ref 10–40)
BASOPHILS # BLD AUTO: 0.03 K/UL — SIGNIFICANT CHANGE UP (ref 0–0.2)
BASOPHILS NFR BLD AUTO: 0.5 % — SIGNIFICANT CHANGE UP (ref 0–2)
BILIRUB SERPL-MCNC: 0.5 MG/DL — SIGNIFICANT CHANGE UP (ref 0.2–1.2)
BUN SERPL-MCNC: 30 MG/DL — HIGH (ref 7–18)
CALCIUM SERPL-MCNC: 9.4 MG/DL — SIGNIFICANT CHANGE UP (ref 8.4–10.5)
CHLORIDE SERPL-SCNC: 112 MMOL/L — HIGH (ref 96–108)
CO2 SERPL-SCNC: 27 MMOL/L — SIGNIFICANT CHANGE UP (ref 22–31)
CREAT SERPL-MCNC: 1.03 MG/DL — SIGNIFICANT CHANGE UP (ref 0.5–1.3)
D DIMER BLD IA.RAPID-MCNC: 470 NG/ML DDU — HIGH
EOSINOPHIL # BLD AUTO: 0.03 K/UL — SIGNIFICANT CHANGE UP (ref 0–0.5)
EOSINOPHIL NFR BLD AUTO: 0.5 % — SIGNIFICANT CHANGE UP (ref 0–6)
FERRITIN SERPL-MCNC: 237 NG/ML — HIGH (ref 15–150)
GLUCOSE BLDC GLUCOMTR-MCNC: 223 MG/DL — HIGH (ref 70–99)
GLUCOSE BLDC GLUCOMTR-MCNC: 232 MG/DL — HIGH (ref 70–99)
GLUCOSE BLDC GLUCOMTR-MCNC: 242 MG/DL — HIGH (ref 70–99)
GLUCOSE BLDC GLUCOMTR-MCNC: 286 MG/DL — HIGH (ref 70–99)
GLUCOSE SERPL-MCNC: 193 MG/DL — HIGH (ref 70–99)
HCT VFR BLD CALC: 39.8 % — SIGNIFICANT CHANGE UP (ref 34.5–45)
HGB BLD-MCNC: 12.8 G/DL — SIGNIFICANT CHANGE UP (ref 11.5–15.5)
IMM GRANULOCYTES NFR BLD AUTO: 0.3 % — SIGNIFICANT CHANGE UP (ref 0–1.5)
INR BLD: 1.14 RATIO — SIGNIFICANT CHANGE UP (ref 0.88–1.16)
LDH SERPL L TO P-CCNC: 391 U/L — HIGH (ref 120–225)
LYMPHOCYTES # BLD AUTO: 1.51 K/UL — SIGNIFICANT CHANGE UP (ref 1–3.3)
LYMPHOCYTES # BLD AUTO: 23 % — SIGNIFICANT CHANGE UP (ref 13–44)
MAGNESIUM SERPL-MCNC: 2.3 MG/DL — SIGNIFICANT CHANGE UP (ref 1.6–2.6)
MCHC RBC-ENTMCNC: 30.2 PG — SIGNIFICANT CHANGE UP (ref 27–34)
MCHC RBC-ENTMCNC: 32.2 GM/DL — SIGNIFICANT CHANGE UP (ref 32–36)
MCV RBC AUTO: 93.9 FL — SIGNIFICANT CHANGE UP (ref 80–100)
MONOCYTES # BLD AUTO: 0.86 K/UL — SIGNIFICANT CHANGE UP (ref 0–0.9)
MONOCYTES NFR BLD AUTO: 13.1 % — SIGNIFICANT CHANGE UP (ref 2–14)
NEUTROPHILS # BLD AUTO: 4.12 K/UL — SIGNIFICANT CHANGE UP (ref 1.8–7.4)
NEUTROPHILS NFR BLD AUTO: 62.6 % — SIGNIFICANT CHANGE UP (ref 43–77)
NRBC # BLD: 0 /100 WBCS — SIGNIFICANT CHANGE UP (ref 0–0)
PHOSPHATE SERPL-MCNC: 3 MG/DL — SIGNIFICANT CHANGE UP (ref 2.5–4.5)
PLATELET # BLD AUTO: 247 K/UL — SIGNIFICANT CHANGE UP (ref 150–400)
POTASSIUM SERPL-MCNC: 4.1 MMOL/L — SIGNIFICANT CHANGE UP (ref 3.5–5.3)
POTASSIUM SERPL-SCNC: 4.1 MMOL/L — SIGNIFICANT CHANGE UP (ref 3.5–5.3)
PROT SERPL-MCNC: 7.5 G/DL — SIGNIFICANT CHANGE UP (ref 6–8.3)
PROTHROM AB SERPL-ACNC: 13.5 SEC — SIGNIFICANT CHANGE UP (ref 10.6–13.6)
RBC # BLD: 4.24 M/UL — SIGNIFICANT CHANGE UP (ref 3.8–5.2)
RBC # FLD: 14.1 % — SIGNIFICANT CHANGE UP (ref 10.3–14.5)
SODIUM SERPL-SCNC: 146 MMOL/L — HIGH (ref 135–145)
WBC # BLD: 6.57 K/UL — SIGNIFICANT CHANGE UP (ref 3.8–10.5)
WBC # FLD AUTO: 6.57 K/UL — SIGNIFICANT CHANGE UP (ref 3.8–10.5)

## 2022-01-10 PROCEDURE — 99232 SBSQ HOSP IP/OBS MODERATE 35: CPT

## 2022-01-10 RX ORDER — SODIUM CHLORIDE 9 MG/ML
1000 INJECTION, SOLUTION INTRAVENOUS
Refills: 0 | Status: DISCONTINUED | OUTPATIENT
Start: 2022-01-10 | End: 2022-01-12

## 2022-01-10 RX ORDER — OLANZAPINE 15 MG/1
2.5 TABLET, FILM COATED ORAL
Refills: 0 | Status: DISCONTINUED | OUTPATIENT
Start: 2022-01-11 | End: 2022-01-11

## 2022-01-10 RX ADMIN — ZINC SULFATE TAB 220 MG (50 MG ZINC EQUIVALENT) 220 MILLIGRAM(S): 220 (50 ZN) TAB at 12:10

## 2022-01-10 RX ADMIN — OLANZAPINE 5 MILLIGRAM(S): 15 TABLET, FILM COATED ORAL at 21:26

## 2022-01-10 RX ADMIN — Medication 250 MILLIGRAM(S): at 17:21

## 2022-01-10 RX ADMIN — INSULIN GLARGINE 6 UNIT(S): 100 INJECTION, SOLUTION SUBCUTANEOUS at 21:25

## 2022-01-10 RX ADMIN — Medication 4: at 12:08

## 2022-01-10 RX ADMIN — PANTOPRAZOLE SODIUM 40 MILLIGRAM(S): 20 TABLET, DELAYED RELEASE ORAL at 05:11

## 2022-01-10 RX ADMIN — Medication 4: at 08:24

## 2022-01-10 RX ADMIN — Medication 6 MILLIGRAM(S): at 05:10

## 2022-01-10 RX ADMIN — Medication 250 MILLIGRAM(S): at 05:10

## 2022-01-10 RX ADMIN — OLANZAPINE 2.5 MILLIGRAM(S): 15 TABLET, FILM COATED ORAL at 12:11

## 2022-01-10 RX ADMIN — Medication 1 TABLET(S): at 12:09

## 2022-01-10 RX ADMIN — Medication 81 MILLIGRAM(S): at 12:09

## 2022-01-10 RX ADMIN — SODIUM CHLORIDE 60 MILLILITER(S): 9 INJECTION, SOLUTION INTRAVENOUS at 08:26

## 2022-01-10 RX ADMIN — Medication 1 MILLIGRAM(S): at 12:09

## 2022-01-10 RX ADMIN — Medication 6: at 17:22

## 2022-01-10 RX ADMIN — ENOXAPARIN SODIUM 40 MILLIGRAM(S): 100 INJECTION SUBCUTANEOUS at 12:10

## 2022-01-10 RX ADMIN — Medication 25 MILLIGRAM(S): at 05:10

## 2022-01-10 RX ADMIN — SERTRALINE 50 MILLIGRAM(S): 25 TABLET, FILM COATED ORAL at 12:09

## 2022-01-10 RX ADMIN — Medication 500 MILLIGRAM(S): at 12:09

## 2022-01-10 RX ADMIN — Medication 325 MILLIGRAM(S): at 12:09

## 2022-01-10 RX ADMIN — Medication 20 MILLIGRAM(S): at 12:09

## 2022-01-10 NOTE — PROGRESS NOTE ADULT - SUBJECTIVE AND OBJECTIVE BOX
PGY-1 Progress Note discussed with attending    PAGER #: [[phone]] TILL 5:00 PM  PLEASE CONTACT ON CALL TEAM:  - On Call Team (Please refer to Morena) FROM 5:00 PM - 8:30PM  - Nightfloat Team FROM 8:30 -7:30 AM    CHIEF COMPLAINT & BRIEF HOSPITAL COURSE:      INTERVAL HPI/OVERNIGHT EVENTS:       REVIEW OF SYSTEMS:  CONSTITUTIONAL: No fever, weight loss, or fatigue  RESPIRATORY: No cough, wheezing, chills or hemoptysis; No shortness of breath  CARDIOVASCULAR: No chest pain, palpitations, dizziness, or leg swelling  GASTROINTESTINAL: No abdominal pain. No nausea, vomiting, or hematemesis; No diarrhea or constipation. No melena or hematochezia.  GENITOURINARY: No dysuria or hematuria, urinary frequency  NEUROLOGICAL: No headaches, memory loss, loss of strength, numbness, or tremors  SKIN: No itching, burning, rashes, or lesions     MEDICATIONS  (STANDING):  ascorbic acid 500 milliGRAM(s) Oral daily  aspirin enteric coated 81 milliGRAM(s) Oral daily  cefuroxime   Tablet 250 milliGRAM(s) Oral every 12 hours  dexAMETHasone  Injectable 6 milliGRAM(s) IV Push daily  enoxaparin Injectable 40 milliGRAM(s) SubCutaneous daily  ferrous    sulfate 325 milliGRAM(s) Oral daily  FLUoxetine 20 milliGRAM(s) Oral daily  folic acid 1 milliGRAM(s) Oral daily  insulin glargine Injectable (LANTUS) 6 Unit(s) SubCutaneous at bedtime  insulin lispro (ADMELOG) corrective regimen sliding scale   SubCutaneous three times a day before meals  metoprolol succinate ER 25 milliGRAM(s) Oral daily  multivitamin 1 Tablet(s) Oral daily  OLANZapine 5 milliGRAM(s) Oral at bedtime  pantoprazole    Tablet 40 milliGRAM(s) Oral before breakfast  sertraline 50 milliGRAM(s) Oral daily  sodium chloride 0.45%. 1000 milliLiter(s) (60 mL/Hr) IV Continuous <Continuous>  zinc sulfate 220 milliGRAM(s) Oral daily    MEDICATIONS  (PRN):  acetaminophen     Tablet .. 650 milliGRAM(s) Oral every 6 hours PRN Temp greater or equal to 38C (100.4F), Mild Pain (1 - 3)  ALBUTerol    90 MICROgram(s) HFA Inhaler 2 Puff(s) Inhalation every 6 hours PRN Shortness of Breath and/or Wheezing  melatonin 3 milliGRAM(s) Oral at bedtime PRN Insomnia  OLANZapine 2.5 milliGRAM(s) Oral every 12 hours PRN agitation      Vital Signs Last 24 Hrs  T(C): 36.7 (10 Javier 2022 06:00), Max: 37.1 (09 Jan 2022 21:31)  T(F): 98 (10 Javier 2022 06:00), Max: 98.8 (09 Jan 2022 21:31)  HR: 79 (10 Javier 2022 06:00) (79 - 91)  BP: 133/72 (10 Javier 2022 06:00) (124/62 - 133/72)  BP(mean): --  RR: 18 (10 Javier 2022 06:00) (18 - 18)  SpO2: 95% (10 Javier 2022 06:00) (95% - 98%)    PHYSICAL EXAMINATION:  GENERAL: NAD, well built  HEAD:  Atraumatic, Normocephalic  EYES:  conjunctiva and sclera clear  NECK: Supple, No JVD, Normal thyroid  CHEST/LUNG: Clear to auscultation. Clear to percussion bilaterally; No rales, rhonchi, wheezing, or rubs  HEART: Regular rate and rhythm; No murmurs, rubs, or gallops  ABDOMEN: Soft, Nontender, Nondistended; Bowel sounds present, no pain or masses on palpation  NERVOUS SYSTEM:  Alert & Oriented X3  : voiding well  EXTREMITIES:  2+ Peripheral Pulses, No clubbing, cyanosis, or edema  SKIN: warm dry                          12.8   6.57  )-----------( 247      ( 10 Javier 2022 06:18 )             39.8     01-10    146<H>  |  112<H>  |  30<H>  ----------------------------<  193<H>  4.1   |  27  |  1.03    Ca    9.4      10 Javier 2022 06:18  Phos  3.0     01-10  Mg     2.3     01-10    TPro  7.5  /  Alb  3.8  /  TBili  0.5  /  DBili  x   /  AST  48<H>  /  ALT  63<H>  /  AlkPhos  74  01-10    LIVER FUNCTIONS - ( 10 Javier 2022 06:18 )  Alb: 3.8 g/dL / Pro: 7.5 g/dL / ALK PHOS: 74 U/L / ALT: 63 U/L DA / AST: 48 U/L / GGT: x               PT/INR - ( 10 Javier 2022 06:18 )   PT: 13.5 sec;   INR: 1.14 ratio         PTT - ( 10 Javier 2022 06:18 )  PTT:30.8 sec    I&O's Summary          CAPILLARY BLOOD GLUCOSE      RADIOLOGY & ADDITIONAL TESTS:                   PGY-1 Progress Note discussed with attending    PAGER #: [[phone]] TILL 5:00 PM  PLEASE CONTACT ON CALL TEAM:  - On Call Team (Please refer to Morena) FROM 5:00 PM - 8:30PM  - Nightfloat Team FROM 8:30 -7:30 AM    INTERVAL HPI/OVERNIGHT EVENTS:     Patient was examined at bedside, AAOx2, stable, NAD and saturating well on room air. She has episodes of confusion and restlessness. No significant events overnight.    REVIEW OF SYSTEMS:  CONSTITUTIONAL: No fever, weight loss, or fatigue  RESPIRATORY: No cough, wheezing, chills or hemoptysis; No shortness of breath  CARDIOVASCULAR: No chest pain, palpitations, dizziness, or leg swelling  GASTROINTESTINAL: No abdominal pain. No nausea, vomiting, or hematemesis; No diarrhea or constipation. No melena or hematochezia.  GENITOURINARY: No dysuria or hematuria, urinary frequency  NEUROLOGICAL: No headaches, memory loss, loss of strength, numbness, or tremors  SKIN: No itching, burning, rashes, or lesions     MEDICATIONS  (STANDING):  ascorbic acid 500 milliGRAM(s) Oral daily  aspirin enteric coated 81 milliGRAM(s) Oral daily  cefuroxime   Tablet 250 milliGRAM(s) Oral every 12 hours  dexAMETHasone  Injectable 6 milliGRAM(s) IV Push daily  enoxaparin Injectable 40 milliGRAM(s) SubCutaneous daily  ferrous    sulfate 325 milliGRAM(s) Oral daily  FLUoxetine 20 milliGRAM(s) Oral daily  folic acid 1 milliGRAM(s) Oral daily  insulin glargine Injectable (LANTUS) 6 Unit(s) SubCutaneous at bedtime  insulin lispro (ADMELOG) corrective regimen sliding scale   SubCutaneous three times a day before meals  metoprolol succinate ER 25 milliGRAM(s) Oral daily  multivitamin 1 Tablet(s) Oral daily  OLANZapine 5 milliGRAM(s) Oral at bedtime  pantoprazole    Tablet 40 milliGRAM(s) Oral before breakfast  sertraline 50 milliGRAM(s) Oral daily  sodium chloride 0.45%. 1000 milliLiter(s) (60 mL/Hr) IV Continuous <Continuous>  zinc sulfate 220 milliGRAM(s) Oral daily    MEDICATIONS  (PRN):  acetaminophen     Tablet .. 650 milliGRAM(s) Oral every 6 hours PRN Temp greater or equal to 38C (100.4F), Mild Pain (1 - 3)  ALBUTerol    90 MICROgram(s) HFA Inhaler 2 Puff(s) Inhalation every 6 hours PRN Shortness of Breath and/or Wheezing  melatonin 3 milliGRAM(s) Oral at bedtime PRN Insomnia  OLANZapine 2.5 milliGRAM(s) Oral every 12 hours PRN agitation      Vital Signs Last 24 Hrs  T(C): 36.7 (10 Javier 2022 06:00), Max: 37.1 (09 Jan 2022 21:31)  T(F): 98 (10 Javier 2022 06:00), Max: 98.8 (09 Jan 2022 21:31)  HR: 79 (10 Javier 2022 06:00) (79 - 91)  BP: 133/72 (10 Javier 2022 06:00) (124/62 - 133/72)  BP(mean): --  RR: 18 (10 Javier 2022 06:00) (18 - 18)  SpO2: 95% (10 Javier 2022 06:00) (95% - 98%)    PHYSICAL EXAMINATION:  GENERAL: NAD, well built  HEAD:  Atraumatic, Normocephalic  EYES:  conjunctiva and sclera clear  NECK: Supple, No JVD, Normal thyroid  CHEST/LUNG: Clear to auscultation. Clear to percussion bilaterally; No rales, rhonchi, wheezing, or rubs  HEART: Regular rate and rhythm; No murmurs, rubs, or gallops  ABDOMEN: Soft, Nontender, Nondistended; Bowel sounds present, no pain or masses on palpation  NERVOUS SYSTEM:  Alert & Oriented X3  : voiding well  EXTREMITIES:  2+ Peripheral Pulses, No clubbing, cyanosis, or edema  SKIN: warm dry                          12.8   6.57  )-----------( 247      ( 10 Javier 2022 06:18 )             39.8     01-10    146<H>  |  112<H>  |  30<H>  ----------------------------<  193<H>  4.1   |  27  |  1.03    Ca    9.4      10 Javier 2022 06:18  Phos  3.0     01-10  Mg     2.3     01-10    TPro  7.5  /  Alb  3.8  /  TBili  0.5  /  DBili  x   /  AST  48<H>  /  ALT  63<H>  /  AlkPhos  74  01-10    LIVER FUNCTIONS - ( 10 Javier 2022 06:18 )  Alb: 3.8 g/dL / Pro: 7.5 g/dL / ALK PHOS: 74 U/L / ALT: 63 U/L DA / AST: 48 U/L / GGT: x               PT/INR - ( 10 Javier 2022 06:18 )   PT: 13.5 sec;   INR: 1.14 ratio         PTT - ( 10 Javier 2022 06:18 )  PTT:30.8 sec    I&O's Summary          CAPILLARY BLOOD GLUCOSE      RADIOLOGY & ADDITIONAL TESTS:

## 2022-01-10 NOTE — BH CONSULTATION LIAISON PROGRESS NOTE - [id number]_PSY_ALL_CORE
Encephalopathy due to COVID-19 virus   U07.1  Medication-induced delirium, persistent, mixed level of activity   R41.0   Encephalopathy due to COVID-19 virus   U07.1

## 2022-01-10 NOTE — PROGRESS NOTE ADULT - SUBJECTIVE AND OBJECTIVE BOX
PULMONARY  progress note    BELA QUEEN  [id number]    Patient is a 89y old  Female who presents with a chief complaint of Weakness and Fall (10 Javier 2022 10:20)  no cough or sob      MEDICATIONS  (STANDING):  ascorbic acid 500 milliGRAM(s) Oral daily  aspirin enteric coated 81 milliGRAM(s) Oral daily  cefuroxime   Tablet 250 milliGRAM(s) Oral every 12 hours  dexAMETHasone  Injectable 6 milliGRAM(s) IV Push daily  enoxaparin Injectable 40 milliGRAM(s) SubCutaneous daily  ferrous    sulfate 325 milliGRAM(s) Oral daily  FLUoxetine 20 milliGRAM(s) Oral daily  folic acid 1 milliGRAM(s) Oral daily  insulin glargine Injectable (LANTUS) 6 Unit(s) SubCutaneous at bedtime  insulin lispro (ADMELOG) corrective regimen sliding scale   SubCutaneous three times a day before meals  metoprolol succinate ER 25 milliGRAM(s) Oral daily  multivitamin 1 Tablet(s) Oral daily  OLANZapine 5 milliGRAM(s) Oral at bedtime  pantoprazole    Tablet 40 milliGRAM(s) Oral before breakfast  sertraline 50 milliGRAM(s) Oral daily  sodium chloride 0.45%. 1000 milliLiter(s) (60 mL/Hr) IV Continuous <Continuous>  zinc sulfate 220 milliGRAM(s) Oral daily      Allergies    penicillins (Unknown)    Intolerances        PAST MEDICAL & SURGICAL HISTORY:  HTN (hypertension)    DM (diabetes mellitus)    Alzheimer disease    MESERET (iron deficiency anemia)    No significant past surgical history             REVIEW OF SYSTEMS:  CONSTITUTIONAL: No fever, weight loss, or fatigue   EYES: No eye pain, visual disturbances, or discharge  ENT:  No difficulty hearing, tinnitus, vertigo; No sinus or throat pain  NECK: No pain or stiffness or nodes  RESPIRATORY:  cough--   wheezing   chills--   hemoptysis--  Shortness of Breath--  CARDIOVASCULAR: No chest pain, palpitations, passing out, dizziness, or leg swelling  GASTROINTESTINAL: No abdominal or epigastric pain. No nausea, vomiting,   SKIN: No itching, burning, rashes, or lesions   LYMPH Nodes: No enlarged glands  ENDOCRINE: No heat or cold intolerance; No hair loss  ALLERGY AND IMMUNOLOGIC: No hives or eczema    Vital Signs Last 24 Hrs  T(C): 36.7 (10 Javier 2022 06:00), Max: 37.1 (09 Jan 2022 21:31)  T(F): 98 (10 Javier 2022 06:00), Max: 98.8 (09 Jan 2022 21:31)  HR: 79 (10 Javier 2022 06:00) (79 - 91)  BP: 133/72 (10 Javier 2022 06:00) (124/62 - 133/72)  BP(mean): --  RR: 18 (10 Javier 2022 06:00) (18 - 18)  SpO2: 95% (10 Javier 2022 06:00) (95% - 98%)  I&O's Detail      PHYSICAL EXAMINATION:        GENERAL: The patient is a  h/f  in no apparent distress.   SKIN no rash ecchymoses or bruises  HEENT: Head is normocephalic and atraumatic  CRYS , Extraocular muscles are intact. Mucous membranes  moist.   Neck supple ,No LN felt JVP not increased  Thyroid not enlarged  Cardiovascular:  S1 S2 heard, RSR, No JVD , systolic  murmur at apex, No gallop or rub  Respiratory: Chest wall symmetrical with good air entry ,Percussion note normal,    Lungs vesicular breathing with no   rales or   wheeze	  ABDOMEN:  Soft, Non-tender,  no hepatomegaly or splenomegaly BS positive	  Extremities: Normal range of motion, No clubbing, cyanosis or edema  Vascular: Peripheral pulses palpable 2+ bilaterally  CNS:  Alert and oriented x3   Cranial nerves intact  sensory intact  motor power5/5  dtr 2+   Babinski neg    LABS:                        12.8   6.57  )-----------( 247      ( 10 Javier 2022 06:18 )             39.8     01-10    146<H>  |  112<H>  |  30<H>  ----------------------------<  193<H>  4.1   |  27  |  1.03    Ca    9.4      10 Javier 2022 06:18  Phos  3.0     01-10  Mg     2.3     01-10    TPro  7.5  /  Alb  3.8  /  TBili  0.5  /  DBili  x   /  AST  48<H>  /  ALT  63<H>  /  AlkPhos  74  01-10    PT/INR - ( 10 Javier 2022 06:18 )   PT: 13.5 sec;   INR: 1.14 ratio         PTT - ( 10 Javier 2022 06:18 )  PTT:30.8 sec      D-Dimer Assay, Quantitative: 470 ng/mL DDU (01-10-22 @ 06:18)    Ferritin, Serum: 237 ng/mL (01-10-22 @ 10:05)  Ferritin, Serum: 237 ng/mL (01-08-22 @ 17:09)  Ferritin, Serum: 224 ng/mL (01-07-22 @ 13:03)  Ferritin, Serum: 217 ng/mL (01-06-22 @ 18:31)  Ferritin, Serum: 210 ng/mL (01-05-22 @ 20:30)      MICROBIOLOGY:    Culture - Urine (collected 01-05-22 @ 11:36)  Source: Clean Catch Clean Catch (Midstream)  Final Report (01-07-22 @ 15:43):    >100,000 CFU/ml Escherichia coli  Organism: Escherichia coli (01-07-22 @ 15:43)  Organism: Escherichia coli (01-07-22 @ 15:43)      -  Amikacin: S <=16      -  Amoxicillin/Clavulanic Acid: S <=8/4      -  Ampicillin: R >16 These ampicillin results predict results for amoxicillin      -  Ampicillin/Sulbactam: I 16/8 Enterobacter, Klebsiella aerogenes, Citrobacter, and Serratia may develop resistance during prolonged therapy (3-4 days)      -  Aztreonam: S <=4      -  Cefazolin: S <=2 ([id number]_[id number]) For uncomplicated UTI with K. pneumoniae, E. coli, or P. mirablis: PRISCA <=16 is sensitive and PRISCA >=32 is resistant. This also predicts results for oral agents cefaclor, cefdinir, cefpodoxime, cefprozil, cefuroxime axetil, cephalexin and locarbef for uncomplicated UTI. Note that some isolates may be susceptible to these agents while testing resistant to cefazolin.      -  Cefepime: S <=2      -  Cefoxitin: S <=8      -  Ceftriaxone: S <=1 Enterobacter, Klebsiella aerogenes, Citrobacter, and Serratia may develop resistance during prolonged therapy      -  Ciprofloxacin: R >2      -  Ertapenem: S <=0.5      -  Gentamicin: S <=2      -  Imipenem: S <=1      -  Levofloxacin: R >4      -  Meropenem: S <=1      -  Nitrofurantoin: S <=32 Should not be used to treat pyelonephritis      -  Piperacillin/Tazobactam: S <=8      -  Tigecycline: S <=2      -  Tobramycin: R >8      -  Trimethoprim/Sulfamethoxazole: R >2/38      Method Type: PRISCA          RADIOLOGY & ADDITIONAL STUDIES:    CXR:< from: Xray Chest 1 View AP/PA (01.04.22 @ 23:27) >  Negative for acute cardiopulmonary disease.  No acute bony fracture.    CT Head  < from: CT Head No Cont (01.05.22 @ 02:32) >    CT Head: Moderately limited by motion. No acute intracranial hemorrhage   or calvarial fracture. Short-term repeat CT evaluation may be obtained as   clinically warranted.          CT C. spine  CT cervical spine: No acute cervical spine fracture or evidence of   traumatic malalignment.

## 2022-01-10 NOTE — PROGRESS NOTE ADULT - PROBLEM SELECTOR PLAN 9
- Daughter having difficulty taking care of patient at home recently  - SHAKE[facility]CM consulted  - currently FULL CODE as per daughter (Ms. Tresa Hutton - [phone]) - Daughter having difficulty taking care of patient at home recently  - SHAKEEL+CM consulted  - currently FULL CODE as per daughter (Ms. Tresa Hutton - [phone])  - daughter tested positive and cannot take care of her over the weekend

## 2022-01-10 NOTE — PROGRESS NOTE ADULT - SUBJECTIVE AND OBJECTIVE BOX
CHIEF COMPLAINT:Patient is a 89y old  Female who presents with a chief complaint of Weakness and Fall.Pt appears comfortable.    	  REVIEW OF SYSTEMS:    [x ] Unable to obtain    PHYSICAL EXAM:  T(C): 36.7 (01-10-22 @ 06:00), Max: 37.1 (01-09-22 @ 21:31)  HR: 79 (01-10-22 @ 06:00) (79 - 91)  BP: 133/72 (01-10-22 @ 06:00) (124/62 - 133/72)  RR: 18 (01-10-22 @ 06:00) (18 - 18)  SpO2: 95% (01-10-22 @ 06:00) (95% - 98%)  Wt(kg): --  I&O's Summary      Appearance: Normal	  HEENT:   Normal oral mucosa, PERRL, EOMI	  Lymphatic: No lymphadenopathy  Cardiovascular: Normal S1 S2, No JVD, No murmurs, No edema  Respiratory: Lungs clear to auscultation	  Gastrointestinal:  Soft, Non-tender, + BS	  Skin: No rashes, No ecchymoses, No cyanosis	  Extremities: Normal range of motion, No clubbing, cyanosis or edema  Vascular: Peripheral pulses palpable 2+ bilaterally    MEDICATIONS  (STANDING):  ascorbic acid 500 milliGRAM(s) Oral daily  aspirin enteric coated 81 milliGRAM(s) Oral daily  cefuroxime   Tablet 250 milliGRAM(s) Oral every 12 hours  dexAMETHasone  Injectable 6 milliGRAM(s) IV Push daily  enoxaparin Injectable 40 milliGRAM(s) SubCutaneous daily  ferrous    sulfate 325 milliGRAM(s) Oral daily  FLUoxetine 20 milliGRAM(s) Oral daily  folic acid 1 milliGRAM(s) Oral daily  insulin glargine Injectable (LANTUS) 6 Unit(s) SubCutaneous at bedtime  insulin lispro (ADMELOG) corrective regimen sliding scale   SubCutaneous three times a day before meals  metoprolol succinate ER 25 milliGRAM(s) Oral daily  multivitamin 1 Tablet(s) Oral daily  OLANZapine 5 milliGRAM(s) Oral at bedtime  pantoprazole    Tablet 40 milliGRAM(s) Oral before breakfast  sertraline 50 milliGRAM(s) Oral daily  sodium chloride 0.45%. 1000 milliLiter(s) (60 mL/Hr) IV Continuous <Continuous>  zinc sulfate 220 milliGRAM(s) Oral daily        	  LABS:	 	                        12.8   6.57  )-----------( 247      ( 10 Javier 2022 06:18 )             39.8     01-10    146<H>  |  112<H>  |  30<H>  ----------------------------<  193<H>  4.1   |  27  |  1.03    Ca    9.4      10 Javier 2022 06:18  Phos  3.0     01-10  Mg     2.3     01-10    TPro  7.5  /  Alb  3.8  /  TBili  0.5  /  DBili  x   /  AST  48<H>  /  ALT  63<H>  /  AlkPhos  74  01-10    proBNP: Serum Pro-Brain Natriuretic Peptide: 820 pg/mL (01-04 @ 22:46)    Lipid Profile: Cholesterol 155  LDL --  HDL 45    Ldl calc 87  Ratio --    TSH: Thyroid Stimulating Hormone, Serum: 6.67 uU/mL (01-05 @ 10:42)

## 2022-01-10 NOTE — PROGRESS NOTE ADULT - PROBLEM SELECTOR PLAN 1
- On many psych meds at home: Sertraline, Quetiapine, Risperidone, Fluoxetine  - Risperdal and Seroquel were discontinued, zoloft was resumed and zyprexa was started.   - Psych consulted (Dr. Voss)  - had episode of dysphagia w/ water and apple sauce  - f/u Speech and Swallow

## 2022-01-10 NOTE — PROGRESS NOTE ADULT - SUBJECTIVE AND OBJECTIVE BOX
Patient is a 89y old  Female who presents with a chief complaint of Weakness and Fall (09 Jan 2022 10:57)      pt seen in icu [  ], reg med floor [ x  ], bed [x  ], chair at bedside [   ], awake and responsive [ x ], lethargic [  ],    nad [ x ]      Allergies    penicillins (Unknown)        Vitals    T(F): 98 (01-10-22 @ 06:00), Max: 98.8 (01-09-22 @ 21:31)  HR: 79 (01-10-22 @ 06:00) (79 - 91)  BP: 133/72 (01-10-22 @ 06:00) (124/62 - 133/72)  RR: 18 (01-10-22 @ 06:00) (18 - 18)  SpO2: 95% (01-10-22 @ 06:00) (95% - 98%)  Wt(kg): --  CAPILLARY BLOOD GLUCOSE      POCT Blood Glucose.: 104 mg/dL (09 Jan 2022 21:48)      Labs                          12.3   7.33  )-----------( 253      ( 08 Jan 2022 08:32 )             36.8       01-08    140  |  109<H>  |  16  ----------------------------<  196<H>  3.9   |  23  |  0.78    Ca    8.7      08 Jan 2022 08:32  Phos  2.2     01-08  Mg     1.9     01-08              Clean Catch Clean Catch (Midstream)  01-05 @ 11:36   >100,000 CFU/ml Escherichia coli  --  Escherichia coli          Radiology Results      Meds    MEDICATIONS  (STANDING):  ascorbic acid 500 milliGRAM(s) Oral daily  aspirin enteric coated 81 milliGRAM(s) Oral daily  cefuroxime   Tablet 250 milliGRAM(s) Oral every 12 hours  dexAMETHasone  Injectable 6 milliGRAM(s) IV Push daily  dextrose 5% + sodium chloride 0.9%. 1000 milliLiter(s) (60 mL/Hr) IV Continuous <Continuous>  enoxaparin Injectable 40 milliGRAM(s) SubCutaneous daily  ferrous    sulfate 325 milliGRAM(s) Oral daily  FLUoxetine 20 milliGRAM(s) Oral daily  folic acid 1 milliGRAM(s) Oral daily  insulin glargine Injectable (LANTUS) 6 Unit(s) SubCutaneous at bedtime  insulin lispro (ADMELOG) corrective regimen sliding scale   SubCutaneous three times a day before meals  metoprolol succinate ER 25 milliGRAM(s) Oral daily  multivitamin 1 Tablet(s) Oral daily  OLANZapine 5 milliGRAM(s) Oral at bedtime  pantoprazole    Tablet 40 milliGRAM(s) Oral before breakfast  sertraline 50 milliGRAM(s) Oral daily  zinc sulfate 220 milliGRAM(s) Oral daily      MEDICATIONS  (PRN):  acetaminophen     Tablet .. 650 milliGRAM(s) Oral every 6 hours PRN Temp greater or equal to 38C (100.4F), Mild Pain (1 - 3)  ALBUTerol    90 MICROgram(s) HFA Inhaler 2 Puff(s) Inhalation every 6 hours PRN Shortness of Breath and/or Wheezing  melatonin 3 milliGRAM(s) Oral at bedtime PRN Insomnia  OLANZapine 2.5 milliGRAM(s) Oral every 12 hours PRN agitation      Physical Exam    Neuro :  no focal deficits  Respiratory: CTA B/L  CV: RRR, S1S2, no murmurs,   Abdominal: Soft, NT, ND +BS,  Extremities: No edema, + peripheral pulses        ASSESSMENT    pneumonia 2nd to covid 19,   s/p fall,   uti   h/o Alzheimer's dementia,   HTN,   DM,   2 to 1 heart block s/p micra ppm placement 9/17/21   MESERET (iron deficiency anemia)        PLAN    cont decadron,   cont vit c, vitamin d, zinc, singulair, flonase   contact and airborne isolation,   pulm f/u   cont albuterol inhaler,   afebrile   tylenol prn,   robitussin prn   O2 sat 96% (96% - 97%) on ra  O2 via nasal canula as needed,   start ceftin 250 mg daily x 5 days  ucx with e coli noted above  serum creat wnl   cardio f/u  cont cardiac meds   endo cons  cont lispro ss  cont lantus 6 units qhs   resume metformin 500 mg daily  free t4 wnl noted    phys tx eval noted and rec phys tx 2-3x/week x 2WKS home w/ home PT; Pending progress and participation with P.T.  psych f/u   DC Seroquel and Risperdal, substitute Zyprexa 5 mg PO qhs standing as alternate antipsychotic  C/w home Zoloft 50 mg qd  For acute agitation, recommend Zyprexa 2.5 mg q12h PRN agitation  -- PO dose form is preferred if pt is able to swallow, but IM can be used as backup if PO refused or cannot be given  Pt will need LTP--she clearly lacks capacity, so daughter should be approached for necessary consents  case mgmt ,   soc wk for placement    cont current meds   d/c planning for am        Patient is a 89y old  Female who presents with a chief complaint of Weakness and Fall (09 Jan 2022 10:57)      pt seen in icu [  ], reg med floor [ x  ], bed [x  ], chair at bedside [   ], awake and responsive [ x ], lethargic [  ],    nad [ x ]      Allergies    penicillins (Unknown)        Vitals    T(F): 98 (01-10-22 @ 06:00), Max: 98.8 (01-09-22 @ 21:31)  HR: 79 (01-10-22 @ 06:00) (79 - 91)  BP: 133/72 (01-10-22 @ 06:00) (124/62 - 133/72)  RR: 18 (01-10-22 @ 06:00) (18 - 18)  SpO2: 95% (01-10-22 @ 06:00) (95% - 98%)  Wt(kg): --  CAPILLARY BLOOD GLUCOSE      POCT Blood Glucose.: 104 mg/dL (09 Jan 2022 21:48)      Labs                          12.3   7.33  )-----------( 253      ( 08 Jan 2022 08:32 )             36.8       01-08    140  |  109<H>  |  16  ----------------------------<  196<H>  3.9   |  23  |  0.78    Ca    8.7      08 Jan 2022 08:32  Phos  2.2     01-08  Mg     1.9     01-08              Clean Catch Clean Catch (Midstream)  01-05 @ 11:36   >100,000 CFU/ml Escherichia coli  --  Escherichia coli          Radiology Results      Meds    MEDICATIONS  (STANDING):  ascorbic acid 500 milliGRAM(s) Oral daily  aspirin enteric coated 81 milliGRAM(s) Oral daily  cefuroxime   Tablet 250 milliGRAM(s) Oral every 12 hours  dexAMETHasone  Injectable 6 milliGRAM(s) IV Push daily  dextrose 5% + sodium chloride 0.9%. 1000 milliLiter(s) (60 mL/Hr) IV Continuous <Continuous>  enoxaparin Injectable 40 milliGRAM(s) SubCutaneous daily  ferrous    sulfate 325 milliGRAM(s) Oral daily  FLUoxetine 20 milliGRAM(s) Oral daily  folic acid 1 milliGRAM(s) Oral daily  insulin glargine Injectable (LANTUS) 6 Unit(s) SubCutaneous at bedtime  insulin lispro (ADMELOG) corrective regimen sliding scale   SubCutaneous three times a day before meals  metoprolol succinate ER 25 milliGRAM(s) Oral daily  multivitamin 1 Tablet(s) Oral daily  OLANZapine 5 milliGRAM(s) Oral at bedtime  pantoprazole    Tablet 40 milliGRAM(s) Oral before breakfast  sertraline 50 milliGRAM(s) Oral daily  zinc sulfate 220 milliGRAM(s) Oral daily      MEDICATIONS  (PRN):  acetaminophen     Tablet .. 650 milliGRAM(s) Oral every 6 hours PRN Temp greater or equal to 38C (100.4F), Mild Pain (1 - 3)  ALBUTerol    90 MICROgram(s) HFA Inhaler 2 Puff(s) Inhalation every 6 hours PRN Shortness of Breath and/or Wheezing  melatonin 3 milliGRAM(s) Oral at bedtime PRN Insomnia  OLANZapine 2.5 milliGRAM(s) Oral every 12 hours PRN agitation      Physical Exam    Neuro :  no focal deficits  Respiratory: CTA B/L  CV: RRR, S1S2, no murmurs,   Abdominal: Soft, NT, ND +BS,  Extremities: No edema, + peripheral pulses        ASSESSMENT    pneumonia 2nd to covid 19,   s/p fall,   uti   h/o Alzheimer's dementia,   HTN,   DM,   2 to 1 heart block s/p micra ppm placement 9/17/21   MESERET (iron deficiency anemia)        PLAN    cont decadron,   cont vit c, vitamin d, zinc, singulair, flonase   contact and airborne isolation,   pulm f/u   cont albuterol inhaler,   afebrile   tylenol prn,   robitussin prn   O2 sat 95% (95% - 98%) on ra  O2 via nasal canula as needed,   cont ceftin 250 mg daily x 5 days  ucx with e coli noted above  serum creat wnl   cardio f/u  cont cardiac meds   endo cons  cont lispro ss  cont lantus 6 units qhs   free t4 wnl noted    phys tx eval noted and rec phys tx 2-3x/week x 2WKS home w/ home PT; Pending progress and participation with P.T.  psych f/u   DC Seroquel and Risperdal, substitute Zyprexa 5 mg PO qhs standing as alternate antipsychotic  C/w home Zoloft 50 mg qd  For acute agitation, recommend Zyprexa 2.5 mg q12h PRN agitation  -- PO dose form is preferred if pt is able to swallow, but IM can be used as backup if PO refused or cannot be given  Pt will need LTP--she clearly lacks capacity, so daughter should be approached for necessary consents  case mgmt ,   cont current meds   d/c planning

## 2022-01-11 LAB
ANION GAP SERPL CALC-SCNC: 6 MMOL/L — SIGNIFICANT CHANGE UP (ref 5–17)
BUN SERPL-MCNC: 28 MG/DL — HIGH (ref 7–18)
CALCIUM SERPL-MCNC: 9.1 MG/DL — SIGNIFICANT CHANGE UP (ref 8.4–10.5)
CHLORIDE SERPL-SCNC: 113 MMOL/L — HIGH (ref 96–108)
CO2 SERPL-SCNC: 24 MMOL/L — SIGNIFICANT CHANGE UP (ref 22–31)
CREAT SERPL-MCNC: 0.75 MG/DL — SIGNIFICANT CHANGE UP (ref 0.5–1.3)
GLUCOSE BLDC GLUCOMTR-MCNC: 155 MG/DL — HIGH (ref 70–99)
GLUCOSE BLDC GLUCOMTR-MCNC: 171 MG/DL — HIGH (ref 70–99)
GLUCOSE BLDC GLUCOMTR-MCNC: 188 MG/DL — HIGH (ref 70–99)
GLUCOSE BLDC GLUCOMTR-MCNC: 361 MG/DL — HIGH (ref 70–99)
GLUCOSE SERPL-MCNC: 223 MG/DL — HIGH (ref 70–99)
HCT VFR BLD CALC: 35.1 % — SIGNIFICANT CHANGE UP (ref 34.5–45)
HGB BLD-MCNC: 11.9 G/DL — SIGNIFICANT CHANGE UP (ref 11.5–15.5)
MAGNESIUM SERPL-MCNC: 2.2 MG/DL — SIGNIFICANT CHANGE UP (ref 1.6–2.6)
MCHC RBC-ENTMCNC: 31.1 PG — SIGNIFICANT CHANGE UP (ref 27–34)
MCHC RBC-ENTMCNC: 33.9 GM/DL — SIGNIFICANT CHANGE UP (ref 32–36)
MCV RBC AUTO: 91.6 FL — SIGNIFICANT CHANGE UP (ref 80–100)
NRBC # BLD: 0 /100 WBCS — SIGNIFICANT CHANGE UP (ref 0–0)
PHOSPHATE SERPL-MCNC: 2.7 MG/DL — SIGNIFICANT CHANGE UP (ref 2.5–4.5)
PLATELET # BLD AUTO: 221 K/UL — SIGNIFICANT CHANGE UP (ref 150–400)
POTASSIUM SERPL-MCNC: 3.7 MMOL/L — SIGNIFICANT CHANGE UP (ref 3.5–5.3)
POTASSIUM SERPL-SCNC: 3.7 MMOL/L — SIGNIFICANT CHANGE UP (ref 3.5–5.3)
RBC # BLD: 3.83 M/UL — SIGNIFICANT CHANGE UP (ref 3.8–5.2)
RBC # FLD: 14.3 % — SIGNIFICANT CHANGE UP (ref 10.3–14.5)
SODIUM SERPL-SCNC: 143 MMOL/L — SIGNIFICANT CHANGE UP (ref 135–145)
WBC # BLD: 7.23 K/UL — SIGNIFICANT CHANGE UP (ref 3.8–10.5)
WBC # FLD AUTO: 7.23 K/UL — SIGNIFICANT CHANGE UP (ref 3.8–10.5)

## 2022-01-11 PROCEDURE — 99232 SBSQ HOSP IP/OBS MODERATE 35: CPT

## 2022-01-11 RX ORDER — INSULIN GLARGINE 100 [IU]/ML
10 INJECTION, SOLUTION SUBCUTANEOUS AT BEDTIME
Refills: 0 | Status: DISCONTINUED | OUTPATIENT
Start: 2022-01-11 | End: 2022-01-12

## 2022-01-11 RX ORDER — INSULIN LISPRO 100/ML
2 VIAL (ML) SUBCUTANEOUS
Refills: 0 | Status: DISCONTINUED | OUTPATIENT
Start: 2022-01-11 | End: 2022-01-12

## 2022-01-11 RX ORDER — DEXTROSE 50 % IN WATER 50 %
15 SYRINGE (ML) INTRAVENOUS ONCE
Refills: 0 | Status: DISCONTINUED | OUTPATIENT
Start: 2022-01-11 | End: 2022-01-11

## 2022-01-11 RX ORDER — DEXTROSE 50 % IN WATER 50 %
12.5 SYRINGE (ML) INTRAVENOUS ONCE
Refills: 0 | Status: DISCONTINUED | OUTPATIENT
Start: 2022-01-11 | End: 2022-01-11

## 2022-01-11 RX ORDER — DEXTROSE 50 % IN WATER 50 %
25 SYRINGE (ML) INTRAVENOUS ONCE
Refills: 0 | Status: DISCONTINUED | OUTPATIENT
Start: 2022-01-11 | End: 2022-01-11

## 2022-01-11 RX ORDER — SODIUM CHLORIDE 9 MG/ML
1000 INJECTION, SOLUTION INTRAVENOUS
Refills: 0 | Status: DISCONTINUED | OUTPATIENT
Start: 2022-01-11 | End: 2022-01-11

## 2022-01-11 RX ORDER — GLUCAGON INJECTION, SOLUTION 0.5 MG/.1ML
1 INJECTION, SOLUTION SUBCUTANEOUS ONCE
Refills: 0 | Status: DISCONTINUED | OUTPATIENT
Start: 2022-01-11 | End: 2022-01-13

## 2022-01-11 RX ADMIN — Medication 81 MILLIGRAM(S): at 11:38

## 2022-01-11 RX ADMIN — Medication 2: at 17:23

## 2022-01-11 RX ADMIN — ZINC SULFATE TAB 220 MG (50 MG ZINC EQUIVALENT) 220 MILLIGRAM(S): 220 (50 ZN) TAB at 11:37

## 2022-01-11 RX ADMIN — Medication 1 MILLIGRAM(S): at 11:37

## 2022-01-11 RX ADMIN — SERTRALINE 50 MILLIGRAM(S): 25 TABLET, FILM COATED ORAL at 11:37

## 2022-01-11 RX ADMIN — INSULIN GLARGINE 10 UNIT(S): 100 INJECTION, SOLUTION SUBCUTANEOUS at 22:02

## 2022-01-11 RX ADMIN — Medication 500 MILLIGRAM(S): at 11:39

## 2022-01-11 RX ADMIN — Medication 2 UNIT(S): at 17:23

## 2022-01-11 RX ADMIN — ENOXAPARIN SODIUM 40 MILLIGRAM(S): 100 INJECTION SUBCUTANEOUS at 11:38

## 2022-01-11 RX ADMIN — OLANZAPINE 2.5 MILLIGRAM(S): 15 TABLET, FILM COATED ORAL at 11:37

## 2022-01-11 RX ADMIN — Medication 250 MILLIGRAM(S): at 17:23

## 2022-01-11 RX ADMIN — Medication 250 MILLIGRAM(S): at 06:49

## 2022-01-11 RX ADMIN — Medication 10: at 11:59

## 2022-01-11 RX ADMIN — Medication 1 TABLET(S): at 11:37

## 2022-01-11 RX ADMIN — Medication 25 MILLIGRAM(S): at 07:04

## 2022-01-11 RX ADMIN — Medication 6 MILLIGRAM(S): at 06:47

## 2022-01-11 RX ADMIN — PANTOPRAZOLE SODIUM 40 MILLIGRAM(S): 20 TABLET, DELAYED RELEASE ORAL at 06:49

## 2022-01-11 RX ADMIN — OLANZAPINE 5 MILLIGRAM(S): 15 TABLET, FILM COATED ORAL at 22:02

## 2022-01-11 RX ADMIN — Medication 2: at 08:05

## 2022-01-11 RX ADMIN — Medication 2 UNIT(S): at 11:59

## 2022-01-11 RX ADMIN — Medication 325 MILLIGRAM(S): at 11:37

## 2022-01-11 NOTE — PHYSICAL THERAPY INITIAL EVALUATION ADULT - MANUAL MUSCLE TESTING RESULTS, REHAB EVAL
Patient does not follow but grossly graded 2PLUS-3minus/5
unable to assess as patient does not follow

## 2022-01-11 NOTE — PHYSICAL THERAPY INITIAL EVALUATION ADULT - ADDITIONAL COMMENTS
Patient used [employer] at home. Daughter assisted with ADLs. Daughter is approved as CDPAP HHA 7.5 [id number] starting 2/1/22
Patient used [employer] at home. Daughter assisted with ADLs. Daughter is approved as CDPAP HHA 7.5 [id number]

## 2022-01-11 NOTE — PROGRESS NOTE ADULT - SUBJECTIVE AND OBJECTIVE BOX
CHIEF COMPLAINT:Patient is a 89y old  Female who presents with a chief complaint of Weakness and Fall .Pt appears comfortable.    	  REVIEW OF SYSTEMS:  unable to obtain    PHYSICAL EXAM:  T(C): 36.7 (01-11-22 @ 05:23), Max: 37.1 (01-10-22 @ 21:10)  HR: 79 (01-11-22 @ 05:23) (75 - 83)  BP: 117/59 (01-11-22 @ 05:23) (101/54 - 131/55)  RR: 17 (01-11-22 @ 05:23) (17 - 18)  SpO2: 98% (01-11-22 @ 05:23) (98% - 100%)      Appearance: Normal	  HEENT:   Normal oral mucosa, PERRL, EOMI	  Lymphatic: No lymphadenopathy  Cardiovascular: Normal S1 S2, No JVD, No murmurs, No edema  Respiratory: Lungs clear to auscultation	  Gastrointestinal:  Soft, Non-tender, + BS	  Skin: No rashes, No ecchymoses, No cyanosis	  Extremities: Normal range of motion, No clubbing, cyanosis or edema  Vascular: Peripheral pulses palpable 2+ bilaterally    MEDICATIONS  (STANDING):  ascorbic acid 500 milliGRAM(s) Oral daily  aspirin enteric coated 81 milliGRAM(s) Oral daily  cefuroxime   Tablet 250 milliGRAM(s) Oral every 12 hours  dexAMETHasone  Injectable 6 milliGRAM(s) IV Push daily  enoxaparin Injectable 40 milliGRAM(s) SubCutaneous daily  ferrous    sulfate 325 milliGRAM(s) Oral daily  folic acid 1 milliGRAM(s) Oral daily  glucagon  Injectable 1 milliGRAM(s) IntraMuscular once  insulin glargine Injectable (LANTUS) 10 Unit(s) SubCutaneous at bedtime  insulin lispro (ADMELOG) corrective regimen sliding scale   SubCutaneous three times a day before meals  insulin lispro Injectable (ADMELOG) 2 Unit(s) SubCutaneous three times a day before meals  metoprolol succinate ER 25 milliGRAM(s) Oral daily  multivitamin 1 Tablet(s) Oral daily  OLANZapine 5 milliGRAM(s) Oral at bedtime  OLANZapine 2.5 milliGRAM(s) Oral <User Schedule>  pantoprazole    Tablet 40 milliGRAM(s) Oral before breakfast  sertraline 50 milliGRAM(s) Oral daily  sodium chloride 0.45%. 1000 milliLiter(s) (60 mL/Hr) IV Continuous <Continuous>  zinc sulfate 220 milliGRAM(s) Oral daily      	  	  LABS:	 	                       11.9   7.23  )-----------( 221      ( 11 Jan 2022 07:25 )             35.1     01-11    143  |  113<H>  |  28<H>  ----------------------------<  223<H>  3.7   |  24  |  0.75    Ca    9.1      11 Jan 2022 07:25  Phos  2.7     01-11  Mg     2.2     01-11    TPro  7.5  /  Alb  3.8  /  TBili  0.5  /  DBili  x   /  AST  48<H>  /  ALT  63<H>  /  AlkPhos  74  01-10    proBNP: Serum Pro-Brain Natriuretic Peptide: 820 pg/mL (01-04 @ 22:46)    Lipid Profile: Cholesterol 155  LDL --  HDL 45    Ldl calc 87  Ratio --    TSH: Thyroid Stimulating Hormone, Serum: 6.67 uU/mL (01-05 @ 10:42)

## 2022-01-11 NOTE — PROGRESS NOTE ADULT - SUBJECTIVE AND OBJECTIVE BOX
PGY-1 Progress Note discussed with attending    PAGER #: [[phone]] TILL 5:00 PM  PLEASE CONTACT ON CALL TEAM:  - On Call Team (Please refer to Morena) FROM 5:00 PM - 8:30PM  - Nightfloat Team FROM 8:30 -7:30 AM    CHIEF COMPLAINT & BRIEF HOSPITAL COURSE:      INTERVAL HPI/OVERNIGHT EVENTS:       REVIEW OF SYSTEMS:  CONSTITUTIONAL: No fever, weight loss, or fatigue  RESPIRATORY: No cough, wheezing, chills or hemoptysis; No shortness of breath  CARDIOVASCULAR: No chest pain, palpitations, dizziness, or leg swelling  GASTROINTESTINAL: No abdominal pain. No nausea, vomiting, or hematemesis; No diarrhea or constipation. No melena or hematochezia.  GENITOURINARY: No dysuria or hematuria, urinary frequency  NEUROLOGICAL: No headaches, memory loss, loss of strength, numbness, or tremors  SKIN: No itching, burning, rashes, or lesions     MEDICATIONS  (STANDING):  ascorbic acid 500 milliGRAM(s) Oral daily  aspirin enteric coated 81 milliGRAM(s) Oral daily  cefuroxime   Tablet 250 milliGRAM(s) Oral every 12 hours  dexAMETHasone  Injectable 6 milliGRAM(s) IV Push daily  enoxaparin Injectable 40 milliGRAM(s) SubCutaneous daily  ferrous    sulfate 325 milliGRAM(s) Oral daily  folic acid 1 milliGRAM(s) Oral daily  insulin glargine Injectable (LANTUS) 6 Unit(s) SubCutaneous at bedtime  insulin lispro (ADMELOG) corrective regimen sliding scale   SubCutaneous three times a day before meals  metoprolol succinate ER 25 milliGRAM(s) Oral daily  multivitamin 1 Tablet(s) Oral daily  OLANZapine 5 milliGRAM(s) Oral at bedtime  OLANZapine 2.5 milliGRAM(s) Oral <User Schedule>  pantoprazole    Tablet 40 milliGRAM(s) Oral before breakfast  sertraline 50 milliGRAM(s) Oral daily  sodium chloride 0.45%. 1000 milliLiter(s) (60 mL/Hr) IV Continuous <Continuous>  zinc sulfate 220 milliGRAM(s) Oral daily    MEDICATIONS  (PRN):  acetaminophen     Tablet .. 650 milliGRAM(s) Oral every 6 hours PRN Temp greater or equal to 38C (100.4F), Mild Pain (1 - 3)  ALBUTerol    90 MICROgram(s) HFA Inhaler 2 Puff(s) Inhalation every 6 hours PRN Shortness of Breath and/or Wheezing  melatonin 3 milliGRAM(s) Oral at bedtime PRN Insomnia  OLANZapine 2.5 milliGRAM(s) Oral every 12 hours PRN agitation      Vital Signs Last 24 Hrs  T(C): 36.7 (11 Jan 2022 05:23), Max: 37.1 (10 Javier 2022 21:10)  T(F): 98.1 (11 Jan 2022 05:23), Max: 98.8 (10 Javier 2022 21:10)  HR: 79 (11 Jan 2022 05:23) (75 - 83)  BP: 117/59 (11 Jan 2022 05:23) (101/54 - 131/55)  BP(mean): --  RR: 17 (11 Jan 2022 05:23) (17 - 18)  SpO2: 98% (11 Jan 2022 05:23) (98% - 100%)    PHYSICAL EXAMINATION:  GENERAL: NAD, well built  HEAD:  Atraumatic, Normocephalic  EYES:  conjunctiva and sclera clear  NECK: Supple, No JVD, Normal thyroid  CHEST/LUNG: Clear to auscultation. Clear to percussion bilaterally; No rales, rhonchi, wheezing, or rubs  HEART: Regular rate and rhythm; No murmurs, rubs, or gallops  ABDOMEN: Soft, Nontender, Nondistended; Bowel sounds present, no pain or masses on palpation  NERVOUS SYSTEM:  Alert & Oriented X3  : voiding well  EXTREMITIES:  2+ Peripheral Pulses, No clubbing, cyanosis, or edema  SKIN: warm dry                          12.8   6.57  )-----------( 247      ( 10 Javier 2022 06:18 )             39.8     01-10    146<H>  |  112<H>  |  30<H>  ----------------------------<  193<H>  4.1   |  27  |  1.03    Ca    9.4      10 Javier 2022 06:18  Phos  3.0     01-10  Mg     2.3     01-10    TPro  7.5  /  Alb  3.8  /  TBili  0.5  /  DBili  x   /  AST  48<H>  /  ALT  63<H>  /  AlkPhos  74  01-10    LIVER FUNCTIONS - ( 10 Javier 2022 06:18 )  Alb: 3.8 g/dL / Pro: 7.5 g/dL / ALK PHOS: 74 U/L / ALT: 63 U/L DA / AST: 48 U/L / GGT: x               PT/INR - ( 10 Javier 2022 06:18 )   PT: 13.5 sec;   INR: 1.14 ratio         PTT - ( 10 Javier 2022 06:18 )  PTT:30.8 sec    I&O's Summary          CAPILLARY BLOOD GLUCOSE      RADIOLOGY & ADDITIONAL TESTS:                   PGY-1 Progress Note discussed with attending    PAGER #: [[phone]] TILL 5:00 PM  PLEASE CONTACT ON CALL TEAM:  - On Call Team (Please refer to Morena) FROM 5:00 PM - 8:30PM  - Nightfloat Team FROM 8:30 -7:30 AM    INTERVAL HPI/OVERNIGHT EVENTS:     Patient was examined at bedside, AAOx2, stable, NAD and saturating well on room air. Has episodes of confusion and agitation. She has good appetite this morning. Blood glucose were elevated. Insulin was adjusted accordingly. No other significant events overnight. Pending discharge.    REVIEW OF SYSTEMS: cannot fully assess due to confusion  CONSTITUTIONAL: No fever, weight loss, or fatigue  RESPIRATORY: No cough, wheezing, chills or hemoptysis; No shortness of breath  CARDIOVASCULAR: No chest pain, palpitations, dizziness, or leg swelling  GASTROINTESTINAL: No abdominal pain. No nausea, vomiting, or hematemesis; No diarrhea or constipation. No melena or hematochezia.  GENITOURINARY: No dysuria or hematuria, urinary frequency  NEUROLOGICAL: No headaches, memory loss, loss of strength, numbness, or tremors  SKIN: No itching, burning, rashes, or lesions     MEDICATIONS  (STANDING):  ascorbic acid 500 milliGRAM(s) Oral daily  aspirin enteric coated 81 milliGRAM(s) Oral daily  cefuroxime   Tablet 250 milliGRAM(s) Oral every 12 hours  dexAMETHasone  Injectable 6 milliGRAM(s) IV Push daily  enoxaparin Injectable 40 milliGRAM(s) SubCutaneous daily  ferrous    sulfate 325 milliGRAM(s) Oral daily  folic acid 1 milliGRAM(s) Oral daily  insulin glargine Injectable (LANTUS) 6 Unit(s) SubCutaneous at bedtime  insulin lispro (ADMELOG) corrective regimen sliding scale   SubCutaneous three times a day before meals  metoprolol succinate ER 25 milliGRAM(s) Oral daily  multivitamin 1 Tablet(s) Oral daily  OLANZapine 5 milliGRAM(s) Oral at bedtime  OLANZapine 2.5 milliGRAM(s) Oral <User Schedule>  pantoprazole    Tablet 40 milliGRAM(s) Oral before breakfast  sertraline 50 milliGRAM(s) Oral daily  sodium chloride 0.45%. 1000 milliLiter(s) (60 mL/Hr) IV Continuous <Continuous>  zinc sulfate 220 milliGRAM(s) Oral daily    MEDICATIONS  (PRN):  acetaminophen     Tablet .. 650 milliGRAM(s) Oral every 6 hours PRN Temp greater or equal to 38C (100.4F), Mild Pain (1 - 3)  ALBUTerol    90 MICROgram(s) HFA Inhaler 2 Puff(s) Inhalation every 6 hours PRN Shortness of Breath and/or Wheezing  melatonin 3 milliGRAM(s) Oral at bedtime PRN Insomnia  OLANZapine 2.5 milliGRAM(s) Oral every 12 hours PRN agitation      Vital Signs Last 24 Hrs  T(C): 36.7 (11 Jan 2022 05:23), Max: 37.1 (10 Javier 2022 21:10)  T(F): 98.1 (11 Jan 2022 05:23), Max: 98.8 (10 Javier 2022 21:10)  HR: 79 (11 Jan 2022 05:23) (75 - 83)  BP: 117/59 (11 Jan 2022 05:23) (101/54 - 131/55)  BP(mean): --  RR: 17 (11 Jan 2022 05:23) (17 - 18)  SpO2: 98% (11 Jan 2022 05:23) (98% - 100%)    PHYSICAL EXAMINATION:  GENERAL: NAD  HEAD:  Atraumatic, Normocephalic  EYES:  conjunctiva and sclera clear  NECK: Supple, No JVD, Normal thyroid  CHEST/LUNG: Clear to auscultation. Clear to percussion bilaterally; No rales, rhonchi, wheezing, or rubs  HEART: Regular rate and rhythm; No murmurs, rubs, or gallops  ABDOMEN: Soft, Nontender, Nondistended; Bowel sounds present, no pain or masses on palpation  NERVOUS SYSTEM:  Alert & Oriented X2 (cannot fully assess due to confusion)  : voiding well  EXTREMITIES:  2+ Peripheral Pulses, No clubbing, cyanosis, or edema  SKIN: warm dry                          12.8   6.57  )-----------( 247      ( 10 Javier 2022 06:18 )             39.8     01-10    146<H>  |  112<H>  |  30<H>  ----------------------------<  193<H>  4.1   |  27  |  1.03    Ca    9.4      10 Javier 2022 06:18  Phos  3.0     01-10  Mg     2.3     01-10    TPro  7.5  /  Alb  3.8  /  TBili  0.5  /  DBili  x   /  AST  48<H>  /  ALT  63<H>  /  AlkPhos  74  01-10    LIVER FUNCTIONS - ( 10 Javier 2022 06:18 )  Alb: 3.8 g/dL / Pro: 7.5 g/dL / ALK PHOS: 74 U/L / ALT: 63 U/L DA / AST: 48 U/L / GGT: x               PT/INR - ( 10 Javier 2022 06:18 )   PT: 13.5 sec;   INR: 1.14 ratio         PTT - ( 10 Javier 2022 06:18 )  PTT:30.8 sec    I&O's Summary          CAPILLARY BLOOD GLUCOSE      RADIOLOGY & ADDITIONAL TESTS:

## 2022-01-11 NOTE — PHYSICAL THERAPY INITIAL EVALUATION ADULT - PLANNED THERAPY INTERVENTIONS, PT EVAL
balance training/bed mobility training/gait training/ROM/transfer training
balance training/bed mobility training/gait training/ROM/transfer training

## 2022-01-11 NOTE — SWALLOW BEDSIDE ASSESSMENT ADULT - SPECIFY REASON(S)
Subjective assessment of swallowing function to assess thiago-pharyngeal function in determining least restrictive diet level given hx of dysphagia

## 2022-01-11 NOTE — SWALLOW BEDSIDE ASSESSMENT ADULT - SLP GENERAL OBSERVATIONS
Pt presents as A+Ox1 (self); confused with ability to make simple wants/needs known in Irish (pacific lang line used).

## 2022-01-11 NOTE — SWALLOW BEDSIDE ASSESSMENT ADULT - SLP PERTINENT HISTORY OF CURRENT PROBLEM
Patient is 89 y.o. F w/ PMHx of Alzheimer's dementia, HTN, DM, and anemia presents to the ED from home with complaints of generalized weakness and unwitnessed fall. Unable to obtain history from patient because she was sedated with Ativan upon admission. Recent COVID test revealed positive test result

## 2022-01-11 NOTE — PHYSICAL THERAPY INITIAL EVALUATION ADULT - PERTINENT HX OF CURRENT PROBLEM, REHAB EVAL
PMHx of Alzheimer's dementia, HTN, DM, and anemia presents to the ED from home with complaints of generalized weakness and unwitnessed fall.
PMHx of Alzheimer's dementia, HTN, DM, and anemia presents to the ED from home with complaints of generalized weakness and unwitnessed fall.

## 2022-01-11 NOTE — PHYSICAL THERAPY INITIAL EVALUATION ADULT - PATIENT/FAMILY/SIGNIFICANT OTHER GOALS STATEMENT, PT EVAL
As per daughter patient to come home with services but daughter herself has symptomatic COVID and unable to take care of patient

## 2022-01-11 NOTE — SWALLOW BEDSIDE ASSESSMENT ADULT - SWALLOW EVAL: RECOMMENDED FEEDING/EATING TECHNIQUES
allow for swallow between intakes/alternate food with liquid/check mouth frequently for oral residue/pocketing/maintain upright posture during/after eating for 30 mins/small sips/bites

## 2022-01-11 NOTE — SWALLOW BEDSIDE ASSESSMENT ADULT - SWALLOW EVAL: DIAGNOSIS
Pt p/w adequate oral & pharyngeal phases of chew & swallow for tolerating  current diet of minced and moist textures/thin liquids- Timely and efficient oropharyngeal swallow with no overt s/s of laryngeal penetration or aspiration at this exam.

## 2022-01-11 NOTE — PHYSICAL THERAPY INITIAL EVALUATION ADULT - CRITERIA FOR SKILLED THERAPEUTIC INTERVENTIONS
impairments found/functional limitations in following categories/risk reduction/prevention/rehab potential/therapy frequency/predicted duration of therapy intervention
impairments found/functional limitations in following categories/risk reduction/prevention/rehab potential/therapy frequency/predicted duration of therapy intervention/anticipated discharge recommendation

## 2022-01-11 NOTE — PROGRESS NOTE ADULT - PROBLEM SELECTOR PLAN 9
- Daughter having difficulty taking care of patient at home recently  - SHAKE[facility]+CM consulted  - currently FULL CODE as per daughter (Ms. Tresa Hutton - [phone])  - daughter tested positive and cannot take care of her over the weekend

## 2022-01-11 NOTE — SWALLOW BEDSIDE ASSESSMENT ADULT - COMMENTS
Contact/Iso precautions maintained due COVID-19 +. Pt presents as A+Ox1 (self) Timely and efficient thiago-pharyngeal function; no overt s/s of asp/pen

## 2022-01-11 NOTE — PROGRESS NOTE ADULT - PROBLEM SELECTOR PLAN 5
- Takes meds at home  - Continue as sliding scale  - FS achs  - A1c - 7.4 - Takes meds at home  - Continue as sliding scale  - FS achs  - on lantus 12u bedtime + admelog 2u premeals  - A1c - 7.4 - Takes meds at home  - Continue as sliding scale  - FS achs  - on lantus 10u bedtime + admelog 2u premeals  - A1c - 7.4

## 2022-01-11 NOTE — PHYSICAL THERAPY INITIAL EVALUATION ADULT - DIAGNOSIS, PT EVAL
PMHx of Alzheimer's dementia, HTN, DM, and anemia presents to the ED from home with complaints of generalized weakness and unwitnessed fall.
Declined functional status.

## 2022-01-11 NOTE — SWALLOW BEDSIDE ASSESSMENT ADULT - CONSISTENCIES ADMINISTERED
water/thin liquid water; applesauce mixed with camille cracker/thin liquid/minced & moist applesauce/pureed

## 2022-01-11 NOTE — PROGRESS NOTE ADULT - SUBJECTIVE AND OBJECTIVE BOX
PULMONARY  progress note    BELA QUEEN  MRN-[id number]    Patient is a 89y old  Female who presents with a chief complaint of Weakness and Fall (11 Jan 2022 07:32)  no cough or sob      MEDICATIONS  (STANDING):  ascorbic acid 500 milliGRAM(s) Oral daily  aspirin enteric coated 81 milliGRAM(s) Oral daily  cefuroxime   Tablet 250 milliGRAM(s) Oral every 12 hours  dexAMETHasone  Injectable 6 milliGRAM(s) IV Push daily  dextrose 40% Gel 15 Gram(s) Oral once  enoxaparin Injectable 40 milliGRAM(s) SubCutaneous daily  ferrous    sulfate 325 milliGRAM(s) Oral daily  folic acid 1 milliGRAM(s) Oral daily  glucagon  Injectable 1 milliGRAM(s) IntraMuscular once  insulin glargine Injectable (LANTUS) 6 Unit(s) SubCutaneous at bedtime  insulin lispro (ADMELOG) corrective regimen sliding scale   SubCutaneous three times a day before meals  insulin lispro Injectable (ADMELOG) 2 Unit(s) SubCutaneous three times a day before meals  metoprolol succinate ER 25 milliGRAM(s) Oral daily  multivitamin 1 Tablet(s) Oral daily  OLANZapine 5 milliGRAM(s) Oral at bedtime  OLANZapine 2.5 milliGRAM(s) Oral <User Schedule>  pantoprazole    Tablet 40 milliGRAM(s) Oral before breakfast  sertraline 50 milliGRAM(s) Oral daily  sodium chloride 0.45%. 1000 milliLiter(s) (60 mL/Hr) IV Continuous <Continuous>  zinc sulfate 220 milliGRAM(s) Oral daily      Allergies    penicillins (Unknown)            PAST MEDICAL & SURGICAL HISTORY:  HTN (hypertension)    DM (diabetes mellitus)    Alzheimer disease    MEESRET (iron deficiency anemia)                as per RN  CONSTITUTIONAL: No fever, weight loss, or fatigue   EYES: No eye pain, visual disturbances, or discharge  ENT:  No difficulty hearing, tinnitus, vertigo; No sinus or throat painr hematemesi  GENITOURINARY: No dysuria, frequency, hematuria, or incontinence  NEUROLOGICAL: No headaches, memory loss++, no loss of strength, numbness, or tremors  SKIN: No itching, burning, rashes, or lesions   LYMPH Nodes: No enlarged glands  ENDOCRINE: No heat or cold intolerance; No hair loss  MUSCULOSKELETAL: No joint pain or swelling; No muscle, back, or extremity pain  HEME/LYMPH: No easy bruising, or bleeding gums  ALLERGY AND IMMUNOLOGIC: No hives or eczema        Vital Signs Last 24 Hrs  T(C): 36.7 (11 Jan 2022 05:23), Max: 37.1 (10 Javier 2022 21:10)  T(F): 98.1 (11 Jan 2022 05:23), Max: 98.8 (10 Javier 2022 21:10)  HR: 79 (11 Jan 2022 05:23) (75 - 83)  BP: 117/59 (11 Jan 2022 05:23) (101/54 - 131/55)  BP(mean): --  RR: 17 (11 Jan 2022 05:23) (17 - 18)  SpO2: 98% (11 Jan 2022 05:23) (98% - 100%)  I&O's Detail      PHYSICAL EXAMINATION:    GENERAL: The patient is a   elderly h/f  in no apparent distress.   SKIN no rash ecchymoses or bruises  HEENT: Head is normocephalic and atraumatic  CRYS , Extraocular muscles are intact. Mucous membranes  moist.   Neck supple ,No LN felt JVP not increased  Thyroid not enlarged  Cardiovascular:  S1 S2 heard, RSR, No JVD , systolic  murmur at apex, No gallop or rub  Respiratory: Chest wall symmetrical with good air entry ,Percussion note normal,    Lungs vesicular breathing with  no  rales  or  wheeze	  ABDOMEN:  Soft, Non-tender,  no hepatomegaly or splenomegaly BS positive	  Extremities: Normal range of motion, No clubbing, cyanosis or edema  Vascular: Peripheral pulses palpable 2+ bilaterally  CNS:  Alert but confused  motor power5/5  dtr 2+  Babinski neg        LABS:                        11.9   7.23  )-----------( 221      ( 11 Jan 2022 07:25 )             35.1     01-11    143  |  113<H>  |  28<H>  ----------------------------<  223<H>  3.7   |  24  |  0.75    Ca    9.1      11 Jan 2022 07:25  Phos  2.7     01-11  Mg     2.2     01-11    TPro  7.5  /  Alb  3.8  /  TBili  0.5  /  DBili  x   /  AST  48<H>  /  ALT  63<H>  /  AlkPhos  74  01-10    PT/INR - ( 10 Javier 2022 06:18 )   PT: 13.5 sec;   INR: 1.14 ratio         PTT - ( 10 Javier 2022 06:18 )  PTT:30.8 sec    Ferritin, Serum: 237 ng/mL (01-10-22 @ 10:05)  Ferritin, Serum: 237 ng/mL (01-08-22 @ 17:09)  Ferritin, Serum: 224 ng/mL (01-07-22 @ 13:03)  Ferritin, Serum: 217 ng/mL (01-06-22 @ 18:31)  Ferritin, Serum: 210 ng/mL (01-05-22 @ 20:30)      MICROBIOLOGY:    Culture - Urine (collected 01-05-22 @ 11:36)  Source: Clean Catch Clean Catch (Midstream)  Final Report (01-07-22 @ 15:43):    >100,000 CFU/ml Escherichia coli  Organism: Escherichia coli (01-07-22 @ 15:43)  Organism: Escherichia coli (01-07-22 @ 15:43)      -  Amikacin: S <=16      -  Amoxicillin/Clavulanic Acid: S <=8/4      -  Ampicillin: R >16 These ampicillin results predict results for amoxicillin      -  Ampicillin/Sulbactam: I 16/8 Enterobacter, Klebsiella aerogenes, Citrobacter, and Serratia may develop resistance during prolonged therapy (3-4 days)      -  Aztreonam: S <=4      -  Cefazolin: S <=2 ([id number]) For uncomplicated UTI with K. pneumoniae, E. coli, or P. mirablis: PRISCA <=16 is sensitive and PRISCA >=32 is resistant. This also predicts results for oral agents cefaclor, cefdinir, cefpodoxime, cefprozil, cefuroxime axetil, cephalexin and locarbef for uncomplicated UTI. Note that some isolates may be susceptible to these agents while testing resistant to cefazolin.      -  Cefepime: S <=2      -  Cefoxitin: S <=8      -  Ceftriaxone: S <=1 Enterobacter, Klebsiella aerogenes, Citrobacter, and Serratia may develop resistance during prolonged therapy      -  Ciprofloxacin: R >2      -  Ertapenem: S <=0.5      -  Gentamicin: S <=2      -  Imipenem: S <=1      -  Levofloxacin: R >4      -  Meropenem: S <=1      -  Nitrofurantoin: S <=32 Should not be used to treat pyelonephritis      -  Piperacillin/Tazobactam: S <=8      -  Tigecycline: S <=2      -  Tobramycin: R >8      -  Trimethoprim/Sulfamethoxazole: R >2/38      Method Type: PRISCA

## 2022-01-11 NOTE — PHYSICAL THERAPY INITIAL EVALUATION ADULT - LEVEL OF INDEPENDENCE: SIT/STAND, REHAB EVAL
maximum assist (25% patients effort)
patient does not able to wake up/tolerate sitting up/unable to perform

## 2022-01-11 NOTE — PROGRESS NOTE ADULT - SUBJECTIVE AND OBJECTIVE BOX
Patient is a 89y old  Female who presents with a chief complaint of Weakness and Fall (10 Javier 2022 10:52)    pt seen in icu [  ], reg med floor [ x  ], bed [x  ], chair at bedside [   ], awake and responsive [ x ], lethargic [  ],    nad [ x ]      Allergies    penicillins (Unknown)        Vitals    T(F): 98.1 (01-11-22 @ 05:23), Max: 98.8 (01-10-22 @ 21:10)  HR: 79 (01-11-22 @ 05:23) (75 - 83)  BP: 117/59 (01-11-22 @ 05:23) (101/54 - 131/55)  RR: 17 (01-11-22 @ 05:23) (17 - 18)  SpO2: 98% (01-11-22 @ 05:23) (98% - 100%)  Wt(kg): --  CAPILLARY BLOOD GLUCOSE      POCT Blood Glucose.: 223 mg/dL (10 Javier 2022 21:17)      Labs                          12.8   6.57  )-----------( 247      ( 10 Javier 2022 06:18 )             39.8       01-10    146<H>  |  112<H>  |  30<H>  ----------------------------<  193<H>  4.1   |  27  |  1.03    Ca    9.4      10 Javier 2022 06:18  Phos  3.0     01-10  Mg     2.3     01-10    TPro  7.5  /  Alb  3.8  /  TBili  0.5  /  DBili  x   /  AST  48<H>  /  ALT  63<H>  /  AlkPhos  74  01-10            Clean Catch Clean Catch (Midstream)  01-05 @ 11:36   >100,000 CFU/ml Escherichia coli  --  Escherichia coli          Radiology Results      Meds    MEDICATIONS  (STANDING):  ascorbic acid 500 milliGRAM(s) Oral daily  aspirin enteric coated 81 milliGRAM(s) Oral daily  cefuroxime   Tablet 250 milliGRAM(s) Oral every 12 hours  dexAMETHasone  Injectable 6 milliGRAM(s) IV Push daily  enoxaparin Injectable 40 milliGRAM(s) SubCutaneous daily  ferrous    sulfate 325 milliGRAM(s) Oral daily  folic acid 1 milliGRAM(s) Oral daily  insulin glargine Injectable (LANTUS) 6 Unit(s) SubCutaneous at bedtime  insulin lispro (ADMELOG) corrective regimen sliding scale   SubCutaneous three times a day before meals  metoprolol succinate ER 25 milliGRAM(s) Oral daily  multivitamin 1 Tablet(s) Oral daily  OLANZapine 2.5 milliGRAM(s) Oral <User Schedule>  OLANZapine 5 milliGRAM(s) Oral at bedtime  pantoprazole    Tablet 40 milliGRAM(s) Oral before breakfast  sertraline 50 milliGRAM(s) Oral daily  sodium chloride 0.45%. 1000 milliLiter(s) (60 mL/Hr) IV Continuous <Continuous>  zinc sulfate 220 milliGRAM(s) Oral daily      MEDICATIONS  (PRN):  acetaminophen     Tablet .. 650 milliGRAM(s) Oral every 6 hours PRN Temp greater or equal to 38C (100.4F), Mild Pain (1 - 3)  ALBUTerol    90 MICROgram(s) HFA Inhaler 2 Puff(s) Inhalation every 6 hours PRN Shortness of Breath and/or Wheezing  melatonin 3 milliGRAM(s) Oral at bedtime PRN Insomnia  OLANZapine 2.5 milliGRAM(s) Oral every 12 hours PRN agitation      Physical Exam    Neuro :  no focal deficits  Respiratory: CTA B/L  CV: RRR, S1S2, no murmurs,   Abdominal: Soft, NT, ND +BS,  Extremities: No edema, + peripheral pulses        ASSESSMENT    pneumonia 2nd to covid 19,   s/p fall,   uti   h/o Alzheimer's dementia,   HTN,   DM,   2 to 1 heart block s/p micra ppm placement 9/17/21   MESERET (iron deficiency anemia)        PLAN    cont decadron,   cont vit c, vitamin d, zinc, singulair, flonase   contact and airborne isolation,   pulm f/u   cont albuterol inhaler,   afebrile   tylenol prn,   robitussin prn   O2 sat 95% (95% - 98%) on ra  O2 via nasal canula as needed,   cont ceftin 250 mg daily x 5 days  ucx with e coli noted above  serum creat wnl   cardio f/u  cont cardiac meds   endo cons  cont lispro ss  cont lantus 6 units qhs   free t4 wnl noted    phys tx eval noted and rec phys tx 2-3x/week x 2WKS home w/ home PT; Pending progress and participation with P.T.  psych f/u   DC Seroquel and Risperdal, substitute Zyprexa 5 mg PO qhs standing as alternate antipsychotic  C/w home Zoloft 50 mg qd  For acute agitation, recommend Zyprexa 2.5 mg q12h PRN agitation  -- PO dose form is preferred if pt is able to swallow, but IM can be used as backup if PO refused or cannot be given  Pt will need LTP--she clearly lacks capacity, so daughter should be approached for necessary consents  case mgmt ,   cont current meds   d/c planning         Patient is a 89y old  Female who presents with a chief complaint of Weakness and Fall (10 Javier 2022 10:52)    pt seen in icu [  ], reg med floor [ x  ], bed [x  ], chair at bedside [   ], awake and responsive [ x ], lethargic [  ],    nad [ x ]      Allergies    penicillins (Unknown)        Vitals    T(F): 98.1 (01-11-22 @ 05:23), Max: 98.8 (01-10-22 @ 21:10)  HR: 79 (01-11-22 @ 05:23) (75 - 83)  BP: 117/59 (01-11-22 @ 05:23) (101/54 - 131/55)  RR: 17 (01-11-22 @ 05:23) (17 - 18)  SpO2: 98% (01-11-22 @ 05:23) (98% - 100%)  Wt(kg): --  CAPILLARY BLOOD GLUCOSE      POCT Blood Glucose.: 223 mg/dL (10 Javier 2022 21:17)      Labs                          12.8   6.57  )-----------( 247      ( 10 Javier 2022 06:18 )             39.8       01-10    146<H>  |  112<H>  |  30<H>  ----------------------------<  193<H>  4.1   |  27  |  1.03    Ca    9.4      10 Javier 2022 06:18  Phos  3.0     01-10  Mg     2.3     01-10    TPro  7.5  /  Alb  3.8  /  TBili  0.5  /  DBili  x   /  AST  48<H>  /  ALT  63<H>  /  AlkPhos  74  01-10            Clean Catch Clean Catch (Midstream)  01-05 @ 11:36   >100,000 CFU/ml Escherichia coli  --  Escherichia coli  - Amikacin: S <=16   - Amoxicillin/Clavulanic Acid: S <=8/4   - Ampicillin: R >16 These ampicillin results predict results for amoxicillin   - Ampicillin/Sulbactam: I 16/8 Enterobacter, Klebsiella aerogenes, Citrobacter, and Serratia may develop resistance during prolonged therapy (3-4 days)   - Aztreonam: S <=4   - Cefazolin: S <=2 (MIC_CL_COM_ENTERIC_CEFAZU) For uncomplicated UTI with K. pneumoniae, E. coli, or P. mirablis: PRISCA <=16 is sensitive and PRISCA >=32 is resistant. This also predicts results for oral agents cefaclor, cefdinir, cefpodoxime, cefprozil, cefuroxime axetil, cephalexin and locarbef for uncomplicated UTI. Note that some isolates may be susceptible to these agents while testing resistant to cefazolin.   - Cefepime: S <=2   - Cefoxitin: S <=8   - Ceftriaxone: S <=1 Enterobacter, Klebsiella aerogenes, Citrobacter, and Serratia may develop resistance during prolonged therapy   - Ciprofloxacin: R >2   - Ertapenem: S <=0.5   - Gentamicin: S <=2   - Imipenem: S <=1   - Levofloxacin: R >4   - Meropenem: S <=1   - Nitrofurantoin: S <=32 Should not be used to treat pyelonephritis   - Piperacillin/Tazobactam: S <=8   - Tigecycline: S <=2   - Tobramycin: R >8   - Trimethoprim/Sulfamethoxazole: R >2/38         Radiology Results      Meds    MEDICATIONS  (STANDING):  ascorbic acid 500 milliGRAM(s) Oral daily  aspirin enteric coated 81 milliGRAM(s) Oral daily  cefuroxime   Tablet 250 milliGRAM(s) Oral every 12 hours  dexAMETHasone  Injectable 6 milliGRAM(s) IV Push daily  enoxaparin Injectable 40 milliGRAM(s) SubCutaneous daily  ferrous    sulfate 325 milliGRAM(s) Oral daily  folic acid 1 milliGRAM(s) Oral daily  insulin glargine Injectable (LANTUS) 6 Unit(s) SubCutaneous at bedtime  insulin lispro (ADMELOG) corrective regimen sliding scale   SubCutaneous three times a day before meals  metoprolol succinate ER 25 milliGRAM(s) Oral daily  multivitamin 1 Tablet(s) Oral daily  OLANZapine 2.5 milliGRAM(s) Oral <User Schedule>  OLANZapine 5 milliGRAM(s) Oral at bedtime  pantoprazole    Tablet 40 milliGRAM(s) Oral before breakfast  sertraline 50 milliGRAM(s) Oral daily  sodium chloride 0.45%. 1000 milliLiter(s) (60 mL/Hr) IV Continuous <Continuous>  zinc sulfate 220 milliGRAM(s) Oral daily      MEDICATIONS  (PRN):  acetaminophen     Tablet .. 650 milliGRAM(s) Oral every 6 hours PRN Temp greater or equal to 38C (100.4F), Mild Pain (1 - 3)  ALBUTerol    90 MICROgram(s) HFA Inhaler 2 Puff(s) Inhalation every 6 hours PRN Shortness of Breath and/or Wheezing  melatonin 3 milliGRAM(s) Oral at bedtime PRN Insomnia  OLANZapine 2.5 milliGRAM(s) Oral every 12 hours PRN agitation      Physical Exam    Neuro :  no focal deficits  Respiratory: CTA B/L  CV: RRR, S1S2, no murmurs,   Abdominal: Soft, NT, ND +BS,  Extremities: No edema, + peripheral pulses        ASSESSMENT    pneumonia 2nd to covid 19,   s/p fall,   uti   h/o Alzheimer's dementia,   HTN,   DM,   2 to 1 heart block s/p micra ppm placement 9/17/21   MESERET (iron deficiency anemia)        PLAN    cont decadron,   cont vit c, vitamin d, zinc, singulair, flonase   contact and airborne isolation,   pulm f/u   cont albuterol inhaler,   afebrile   tylenol prn,   robitussin prn   O2 sat 98% (98% - 100%) on ra  O2 via nasal canula as needed,   cont ceftin 250 mg daily x 5 days until 1/14/22  ucx and sens with  e coli noted above  serum creat wnl   cardio f/u  cont cardiac meds   endo cons  cont lispro ss  cont lantus 6 units qhs   free t4 wnl noted    phys tx eval noted and rec phys tx 2-3x/week x 2WKS home w/ home PT; Pending progress and participation with P.T.  psych f/u   Increase Zyprexa dosing to 2.5 mg q1200/5 mg PO qhs standing to better address behavioral disturbance  C/w home Zoloft 50 mg qd  For acute agitation, recommend Zyprexa 2.5 mg q12h PRN agitation  -- PO dose form is preferred if pt is able to swallow, but IM can be used as backup if PO refused or cannot be given  Pt will need LTP--she clearly lacks capacity, so daughter should be approached for necessary consents  case mgmt ,   cont current meds   d/c planning         Patient is a 89y old  Female who presents with a chief complaint of Weakness and Fall (10 Javier 2022 10:52)    pt seen in icu [  ], reg med floor [ x  ], bed [x  ], chair at bedside [   ], awake and responsive [ x ], lethargic [  ],    nad [ x ]      Allergies    penicillins (Unknown)        Vitals    T(F): 98.1 (01-11-22 @ 05:23), Max: 98.8 (01-10-22 @ 21:10)  HR: 79 (01-11-22 @ 05:23) (75 - 83)  BP: 117/59 (01-11-22 @ 05:23) (101/54 - 131/55)  RR: 17 (01-11-22 @ 05:23) (17 - 18)  SpO2: 98% (01-11-22 @ 05:23) (98% - 100%)  Wt(kg): --  CAPILLARY BLOOD GLUCOSE      POCT Blood Glucose.: 223 mg/dL (10 Javier 2022 21:17)      Labs                          12.8   6.57  )-----------( 247      ( 10 Javier 2022 06:18 )             39.8       01-10    146<H>  |  112<H>  |  30<H>  ----------------------------<  193<H>  4.1   |  27  |  1.03    Ca    9.4      10 Javier 2022 06:18  Phos  3.0     01-10  Mg     2.3     01-10    TPro  7.5  /  Alb  3.8  /  TBili  0.5  /  DBili  x   /  AST  48<H>  /  ALT  63<H>  /  AlkPhos  74  01-10            Clean Catch Clean Catch (Midstream)  01-05 @ 11:36   >100,000 CFU/ml Escherichia coli  --  Escherichia coli  - Amikacin: S <=16   - Amoxicillin/Clavulanic Acid: S <=8/4   - Ampicillin: R >16 These ampicillin results predict results for amoxicillin   - Ampicillin/Sulbactam: I 16/8 Enterobacter, Klebsiella aerogenes, Citrobacter, and Serratia may develop resistance during prolonged therapy (3-4 days)   - Aztreonam: S <=4   - Cefazolin: S <=2 (MIC_CL_COM_ENTERIC_CEFAZU) For uncomplicated UTI with K. pneumoniae, E. coli, or P. mirablis: PRISCA <=16 is sensitive and PRISCA >=32 is resistant. This also predicts results for oral agents cefaclor, cefdinir, cefpodoxime, cefprozil, cefuroxime axetil, cephalexin and locarbef for uncomplicated UTI. Note that some isolates may be susceptible to these agents while testing resistant to cefazolin.   - Cefepime: S <=2   - Cefoxitin: S <=8   - Ceftriaxone: S <=1 Enterobacter, Klebsiella aerogenes, Citrobacter, and Serratia may develop resistance during prolonged therapy   - Ciprofloxacin: R >2   - Ertapenem: S <=0.5   - Gentamicin: S <=2   - Imipenem: S <=1   - Levofloxacin: R >4   - Meropenem: S <=1   - Nitrofurantoin: S <=32 Should not be used to treat pyelonephritis   - Piperacillin/Tazobactam: S <=8   - Tigecycline: S <=2   - Tobramycin: R >8   - Trimethoprim/Sulfamethoxazole: R >2/38         Radiology Results      Meds    MEDICATIONS  (STANDING):  ascorbic acid 500 milliGRAM(s) Oral daily  aspirin enteric coated 81 milliGRAM(s) Oral daily  cefuroxime   Tablet 250 milliGRAM(s) Oral every 12 hours  dexAMETHasone  Injectable 6 milliGRAM(s) IV Push daily  enoxaparin Injectable 40 milliGRAM(s) SubCutaneous daily  ferrous    sulfate 325 milliGRAM(s) Oral daily  folic acid 1 milliGRAM(s) Oral daily  insulin glargine Injectable (LANTUS) 6 Unit(s) SubCutaneous at bedtime  insulin lispro (ADMELOG) corrective regimen sliding scale   SubCutaneous three times a day before meals  metoprolol succinate ER 25 milliGRAM(s) Oral daily  multivitamin 1 Tablet(s) Oral daily  OLANZapine 2.5 milliGRAM(s) Oral <User Schedule>  OLANZapine 5 milliGRAM(s) Oral at bedtime  pantoprazole    Tablet 40 milliGRAM(s) Oral before breakfast  sertraline 50 milliGRAM(s) Oral daily  sodium chloride 0.45%. 1000 milliLiter(s) (60 mL/Hr) IV Continuous <Continuous>  zinc sulfate 220 milliGRAM(s) Oral daily      MEDICATIONS  (PRN):  acetaminophen     Tablet .. 650 milliGRAM(s) Oral every 6 hours PRN Temp greater or equal to 38C (100.4F), Mild Pain (1 - 3)  ALBUTerol    90 MICROgram(s) HFA Inhaler 2 Puff(s) Inhalation every 6 hours PRN Shortness of Breath and/or Wheezing  melatonin 3 milliGRAM(s) Oral at bedtime PRN Insomnia  OLANZapine 2.5 milliGRAM(s) Oral every 12 hours PRN agitation      Physical Exam    Neuro :  no focal deficits  Respiratory: CTA B/L  CV: RRR, S1S2, no murmurs,   Abdominal: Soft, NT, ND +BS,  Extremities: No edema, + peripheral pulses        ASSESSMENT    pneumonia 2nd to covid 19,   s/p fall,   uti   h/o Alzheimer's dementia,   HTN,   DM,   2 to 1 heart block s/p micra ppm placement 9/17/21   MESERET (iron deficiency anemia)        PLAN    cont decadron,   cont vit c, vitamin d, zinc, singulair, flonase   contact and airborne isolation,   pulm f/u   cont albuterol inhaler,   afebrile   tylenol prn,   robitussin prn   O2 sat 98% (98% - 100%) on ra  O2 via nasal canula as needed,   cont ceftin 250 mg daily x 5 days until 1/14/22  ucx and sens with  e coli noted above  serum creat wnl   cardio f/u  cont cardiac meds   endo cons  cont lispro ss  cont lantus 6 units qhs   lispro 2 units actid  free t4 wnl noted    phys tx eval noted and rec phys tx 2-3x/week x 2WKS home w/ home PT; Pending progress and participation with P.T.  psych f/u   Increase Zyprexa dosing to 2.5 mg q1200/5 mg PO qhs standing to better address behavioral disturbance  C/w home Zoloft 50 mg qd  For acute agitation, recommend Zyprexa 2.5 mg q12h PRN agitation  -- PO dose form is preferred if pt is able to swallow, but IM can be used as backup if PO refused or cannot be given  Pt will need LTP--she clearly lacks capacity, so daughter should be approached for necessary consents  case mgmt ,   cont current meds   d/c planning

## 2022-01-12 LAB
ANION GAP SERPL CALC-SCNC: 5 MMOL/L — SIGNIFICANT CHANGE UP (ref 5–17)
APTT BLD: 28 SEC — SIGNIFICANT CHANGE UP (ref 27.5–35.5)
BUN SERPL-MCNC: 30 MG/DL — HIGH (ref 7–18)
CALCIUM SERPL-MCNC: 9.3 MG/DL — SIGNIFICANT CHANGE UP (ref 8.4–10.5)
CHLORIDE SERPL-SCNC: 112 MMOL/L — HIGH (ref 96–108)
CO2 SERPL-SCNC: 25 MMOL/L — SIGNIFICANT CHANGE UP (ref 22–31)
CREAT SERPL-MCNC: 0.91 MG/DL — SIGNIFICANT CHANGE UP (ref 0.5–1.3)
D DIMER BLD IA.RAPID-MCNC: 450 NG/ML DDU — HIGH
FERRITIN SERPL-MCNC: 221 NG/ML — HIGH (ref 15–150)
GLUCOSE BLDC GLUCOMTR-MCNC: 123 MG/DL — HIGH (ref 70–99)
GLUCOSE BLDC GLUCOMTR-MCNC: 232 MG/DL — HIGH (ref 70–99)
GLUCOSE BLDC GLUCOMTR-MCNC: 326 MG/DL — HIGH (ref 70–99)
GLUCOSE BLDC GLUCOMTR-MCNC: 342 MG/DL — HIGH (ref 70–99)
GLUCOSE SERPL-MCNC: 230 MG/DL — HIGH (ref 70–99)
HCT VFR BLD CALC: 36.1 % — SIGNIFICANT CHANGE UP (ref 34.5–45)
HGB BLD-MCNC: 11.9 G/DL — SIGNIFICANT CHANGE UP (ref 11.5–15.5)
INR BLD: 1.19 RATIO — HIGH (ref 0.88–1.16)
LDH SERPL L TO P-CCNC: 327 U/L — HIGH (ref 120–225)
MAGNESIUM SERPL-MCNC: 2.1 MG/DL — SIGNIFICANT CHANGE UP (ref 1.6–2.6)
MCHC RBC-ENTMCNC: 30.7 PG — SIGNIFICANT CHANGE UP (ref 27–34)
MCHC RBC-ENTMCNC: 33 GM/DL — SIGNIFICANT CHANGE UP (ref 32–36)
MCV RBC AUTO: 93 FL — SIGNIFICANT CHANGE UP (ref 80–100)
NRBC # BLD: 0 /100 WBCS — SIGNIFICANT CHANGE UP (ref 0–0)
PHOSPHATE SERPL-MCNC: 3.2 MG/DL — SIGNIFICANT CHANGE UP (ref 2.5–4.5)
PLATELET # BLD AUTO: 222 K/UL — SIGNIFICANT CHANGE UP (ref 150–400)
POTASSIUM SERPL-MCNC: 3.7 MMOL/L — SIGNIFICANT CHANGE UP (ref 3.5–5.3)
POTASSIUM SERPL-SCNC: 3.7 MMOL/L — SIGNIFICANT CHANGE UP (ref 3.5–5.3)
PROTHROM AB SERPL-ACNC: 14.1 SEC — HIGH (ref 10.6–13.6)
RBC # BLD: 3.88 M/UL — SIGNIFICANT CHANGE UP (ref 3.8–5.2)
RBC # FLD: 14.4 % — SIGNIFICANT CHANGE UP (ref 10.3–14.5)
SODIUM SERPL-SCNC: 142 MMOL/L — SIGNIFICANT CHANGE UP (ref 135–145)
WBC # BLD: 7.33 K/UL — SIGNIFICANT CHANGE UP (ref 3.8–10.5)
WBC # FLD AUTO: 7.33 K/UL — SIGNIFICANT CHANGE UP (ref 3.8–10.5)

## 2022-01-12 PROCEDURE — 99232 SBSQ HOSP IP/OBS MODERATE 35: CPT

## 2022-01-12 RX ORDER — INSULIN GLARGINE 100 [IU]/ML
15 INJECTION, SOLUTION SUBCUTANEOUS AT BEDTIME
Refills: 0 | Status: DISCONTINUED | OUTPATIENT
Start: 2022-01-12 | End: 2022-01-13

## 2022-01-12 RX ORDER — OLANZAPINE 15 MG/1
2.5 TABLET, FILM COATED ORAL
Refills: 0 | Status: DISCONTINUED | OUTPATIENT
Start: 2022-01-12 | End: 2022-01-13

## 2022-01-12 RX ORDER — INSULIN LISPRO 100/ML
4 VIAL (ML) SUBCUTANEOUS
Refills: 0 | Status: DISCONTINUED | OUTPATIENT
Start: 2022-01-12 | End: 2022-01-13

## 2022-01-12 RX ADMIN — OLANZAPINE 2.5 MILLIGRAM(S): 15 TABLET, FILM COATED ORAL at 19:39

## 2022-01-12 RX ADMIN — Medication 250 MILLIGRAM(S): at 17:14

## 2022-01-12 RX ADMIN — Medication 10: at 12:51

## 2022-01-12 RX ADMIN — Medication 1 MILLIGRAM(S): at 12:51

## 2022-01-12 RX ADMIN — OLANZAPINE 2.5 MILLIGRAM(S): 15 TABLET, FILM COATED ORAL at 13:59

## 2022-01-12 RX ADMIN — ENOXAPARIN SODIUM 40 MILLIGRAM(S): 100 INJECTION SUBCUTANEOUS at 12:51

## 2022-01-12 RX ADMIN — Medication 4 UNIT(S): at 12:45

## 2022-01-12 RX ADMIN — Medication 500 MILLIGRAM(S): at 12:51

## 2022-01-12 RX ADMIN — INSULIN GLARGINE 15 UNIT(S): 100 INJECTION, SOLUTION SUBCUTANEOUS at 23:00

## 2022-01-12 RX ADMIN — Medication 81 MILLIGRAM(S): at 12:51

## 2022-01-12 RX ADMIN — Medication 6 MILLIGRAM(S): at 05:36

## 2022-01-12 RX ADMIN — Medication 250 MILLIGRAM(S): at 05:36

## 2022-01-12 RX ADMIN — Medication 8: at 17:14

## 2022-01-12 RX ADMIN — Medication 25 MILLIGRAM(S): at 05:36

## 2022-01-12 RX ADMIN — Medication 325 MILLIGRAM(S): at 12:51

## 2022-01-12 RX ADMIN — Medication 4 UNIT(S): at 17:14

## 2022-01-12 RX ADMIN — Medication 1 TABLET(S): at 12:51

## 2022-01-12 RX ADMIN — SERTRALINE 50 MILLIGRAM(S): 25 TABLET, FILM COATED ORAL at 12:51

## 2022-01-12 RX ADMIN — ZINC SULFATE TAB 220 MG (50 MG ZINC EQUIVALENT) 220 MILLIGRAM(S): 220 (50 ZN) TAB at 12:51

## 2022-01-12 RX ADMIN — PANTOPRAZOLE SODIUM 40 MILLIGRAM(S): 20 TABLET, DELAYED RELEASE ORAL at 07:11

## 2022-01-12 NOTE — BH CONSULTATION LIAISON PROGRESS NOTE - NSBHCHARTREVIEWLAB_PSY_A_CORE FT
12.8   6.57  )-----------( 247      ( 10 Javier 2022 06:18 )             39.8   01-10    146<H>  |  112<H>  |  30<H>  ----------------------------<  193<H>  4.1   |  27  |  1.03    Ca    9.4      10 Javier 2022 06:18  Phos  3.0     01-10  Mg     2.3     01-10    TPro  7.5  /  Alb  3.8  /  TBili  0.5  /  DBili  x   /  AST  48<H>  /  ALT  63<H>  /  AlkPhos  74  01-10    COVID-19 PCR: Detected (04 Jan 2022 22:46)  COVID-19 PCR: NotDetec (19 Sep 2021 06:43)  COVID-19 PCR: NotDetec (12 Sep 2021 13:37)  
                      11.9   7.33  )-----------( 222      ( 12 Jan 2022 07:17 )             36.1   01-12    142  |  112<H>  |  30<H>  ----------------------------<  230<H>  3.7   |  25  |  0.91    Ca    9.3      12 Jan 2022 07:17  Phos  3.2     01-12  Mg     2.1     01-12    
                      12.8   6.57  )-----------( 247      ( 10 Javier 2022 06:18 )             39.8   01-10    146<H>  |  112<H>  |  30<H>  ----------------------------<  193<H>  4.1   |  27  |  1.03    Ca    9.4      10 Javier 2022 06:18  Phos  3.0     01-10  Mg     2.3     01-10    TPro  7.5  /  Alb  3.8  /  TBili  0.5  /  DBili  x   /  AST  48<H>  /  ALT  63<H>  /  AlkPhos  74  01-10    COVID-19 PCR: Detected (04 Jan 2022 22:46)  COVID-19 PCR: NotDetec (19 Sep 2021 06:43)  COVID-19 PCR: NotDetec (12 Sep 2021 13:37)

## 2022-01-12 NOTE — PROGRESS NOTE ADULT - SUBJECTIVE AND OBJECTIVE BOX
PGY-1 Progress Note discussed with attending    PAGER #: [[phone]] TILL 5:00 PM  PLEASE CONTACT ON CALL TEAM:  - On Call Team (Please refer to Morena) FROM 5:00 PM - 8:30PM  - Nightfloat Team FROM 8:30 -7:30 AM    CHIEF COMPLAINT & BRIEF HOSPITAL COURSE:      INTERVAL HPI/OVERNIGHT EVENTS:       REVIEW OF SYSTEMS:  CONSTITUTIONAL: No fever, weight loss, or fatigue  RESPIRATORY: No cough, wheezing, chills or hemoptysis; No shortness of breath  CARDIOVASCULAR: No chest pain, palpitations, dizziness, or leg swelling  GASTROINTESTINAL: No abdominal pain. No nausea, vomiting, or hematemesis; No diarrhea or constipation. No melena or hematochezia.  GENITOURINARY: No dysuria or hematuria, urinary frequency  NEUROLOGICAL: No headaches, memory loss, loss of strength, numbness, or tremors  SKIN: No itching, burning, rashes, or lesions     MEDICATIONS  (STANDING):  ascorbic acid 500 milliGRAM(s) Oral daily  aspirin enteric coated 81 milliGRAM(s) Oral daily  cefuroxime   Tablet 250 milliGRAM(s) Oral every 12 hours  dexAMETHasone  Injectable 6 milliGRAM(s) IV Push daily  enoxaparin Injectable 40 milliGRAM(s) SubCutaneous daily  ferrous    sulfate 325 milliGRAM(s) Oral daily  folic acid 1 milliGRAM(s) Oral daily  glucagon  Injectable 1 milliGRAM(s) IntraMuscular once  insulin glargine Injectable (LANTUS) 10 Unit(s) SubCutaneous at bedtime  insulin lispro (ADMELOG) corrective regimen sliding scale   SubCutaneous three times a day before meals  insulin lispro Injectable (ADMELOG) 2 Unit(s) SubCutaneous three times a day before meals  metoprolol succinate ER 25 milliGRAM(s) Oral daily  multivitamin 1 Tablet(s) Oral daily  OLANZapine 5 milliGRAM(s) Oral at bedtime  pantoprazole    Tablet 40 milliGRAM(s) Oral before breakfast  sertraline 50 milliGRAM(s) Oral daily  sodium chloride 0.45%. 1000 milliLiter(s) (60 mL/Hr) IV Continuous <Continuous>  zinc sulfate 220 milliGRAM(s) Oral daily    MEDICATIONS  (PRN):  acetaminophen     Tablet .. 650 milliGRAM(s) Oral every 6 hours PRN Temp greater or equal to 38C (100.4F), Mild Pain (1 - 3)  ALBUTerol    90 MICROgram(s) HFA Inhaler 2 Puff(s) Inhalation every 6 hours PRN Shortness of Breath and/or Wheezing  melatonin 3 milliGRAM(s) Oral at bedtime PRN Insomnia  OLANZapine 2.5 milliGRAM(s) Oral every 12 hours PRN agitation      Vital Signs Last 24 Hrs  T(C): 37 (12 Jan 2022 04:57), Max: 37 (12 Jan 2022 04:57)  T(F): 98.6 (12 Jan 2022 04:57), Max: 98.6 (12 Jan 2022 04:57)  HR: 75 (12 Jan 2022 04:57) (71 - 84)  BP: 138/48 (12 Jan 2022 04:57) (113/50 - 138/48)  BP(mean): --  RR: 18 (12 Jan 2022 04:57) (18 - 18)  SpO2: 98% (12 Jan 2022 04:57) (95% - 98%)    PHYSICAL EXAMINATION:  GENERAL: NAD, well built  HEAD:  Atraumatic, Normocephalic  EYES:  conjunctiva and sclera clear  NECK: Supple, No JVD, Normal thyroid  CHEST/LUNG: Clear to auscultation. Clear to percussion bilaterally; No rales, rhonchi, wheezing, or rubs  HEART: Regular rate and rhythm; No murmurs, rubs, or gallops  ABDOMEN: Soft, Nontender, Nondistended; Bowel sounds present, no pain or masses on palpation  NERVOUS SYSTEM:  Alert & Oriented X3  : voiding well  EXTREMITIES:  2+ Peripheral Pulses, No clubbing, cyanosis, or edema  SKIN: warm dry                          11.9   7.23  )-----------( 221      ( 11 Jan 2022 07:25 )             35.1     01-11    143  |  113<H>  |  28<H>  ----------------------------<  223<H>  3.7   |  24  |  0.75    Ca    9.1      11 Jan 2022 07:25  Phos  2.7     01-11  Mg     2.2     01-11                I&O's Summary          CAPILLARY BLOOD GLUCOSE      RADIOLOGY & ADDITIONAL TESTS:                   PGY-1 Progress Note discussed with attending    PAGER #: [[phone]] TILL 5:00 PM  PLEASE CONTACT ON CALL TEAM:  - On Call Team (Please refer to Morena) FROM 5:00 PM - 8:30PM  - Nightfloat Team FROM 8:30 -7:30 AM    INTERVAL HPI/OVERNIGHT EVENTS:     Patient was examined at bedside, AAOx2, stabl, NAD and saturating well on room air. We tried speaking to her using a  ([employer] - [id number]) but she was very confused and mumbled her words. She has been calm and cooperative in the past 2 days. She has good appetite but had noticeable dysphagia with drinking water. She is eating well her solid food. Her blood glucose was elevated and lantus was adjusted from 10u to 15u while Admelog was increased from 2u to 4u. No other significant events overnight. Pending discharge.    REVIEW OF SYSTEMS: cannot fully assess due to confusion  CONSTITUTIONAL: No fever, weight loss, or fatigue  RESPIRATORY: No cough, wheezing, chills or hemoptysis; No shortness of breath  CARDIOVASCULAR: No chest pain, palpitations, dizziness, or leg swelling  GASTROINTESTINAL: No abdominal pain. No nausea, vomiting, or hematemesis; No diarrhea or constipation. No melena or hematochezia.  GENITOURINARY: No dysuria or hematuria, urinary frequency  NEUROLOGICAL: No headaches, memory loss, loss of strength, numbness, or tremors  SKIN: No itching, burning, rashes, or lesions     MEDICATIONS  (STANDING):  ascorbic acid 500 milliGRAM(s) Oral daily  aspirin enteric coated 81 milliGRAM(s) Oral daily  cefuroxime   Tablet 250 milliGRAM(s) Oral every 12 hours  dexAMETHasone  Injectable 6 milliGRAM(s) IV Push daily  enoxaparin Injectable 40 milliGRAM(s) SubCutaneous daily  ferrous    sulfate 325 milliGRAM(s) Oral daily  folic acid 1 milliGRAM(s) Oral daily  glucagon  Injectable 1 milliGRAM(s) IntraMuscular once  insulin glargine Injectable (LANTUS) 10 Unit(s) SubCutaneous at bedtime  insulin lispro (ADMELOG) corrective regimen sliding scale   SubCutaneous three times a day before meals  insulin lispro Injectable (ADMELOG) 2 Unit(s) SubCutaneous three times a day before meals  metoprolol succinate ER 25 milliGRAM(s) Oral daily  multivitamin 1 Tablet(s) Oral daily  OLANZapine 5 milliGRAM(s) Oral at bedtime  pantoprazole    Tablet 40 milliGRAM(s) Oral before breakfast  sertraline 50 milliGRAM(s) Oral daily  sodium chloride 0.45%. 1000 milliLiter(s) (60 mL/Hr) IV Continuous <Continuous>  zinc sulfate 220 milliGRAM(s) Oral daily    MEDICATIONS  (PRN):  acetaminophen     Tablet .. 650 milliGRAM(s) Oral every 6 hours PRN Temp greater or equal to 38C (100.4F), Mild Pain (1 - 3)  ALBUTerol    90 MICROgram(s) HFA Inhaler 2 Puff(s) Inhalation every 6 hours PRN Shortness of Breath and/or Wheezing  melatonin 3 milliGRAM(s) Oral at bedtime PRN Insomnia  OLANZapine 2.5 milliGRAM(s) Oral every 12 hours PRN agitation      Vital Signs Last 24 Hrs  T(C): 37 (12 Jan 2022 04:57), Max: 37 (12 Jan 2022 04:57)  T(F): 98.6 (12 Jan 2022 04:57), Max: 98.6 (12 Jan 2022 04:57)  HR: 75 (12 Jan 2022 04:57) (71 - 84)  BP: 138/48 (12 Jan 2022 04:57) (113/50 - 138/48)  BP(mean): --  RR: 18 (12 Jan 2022 04:57) (18 - 18)  SpO2: 98% (12 Jan 2022 04:57) (95% - 98%)    PHYSICAL EXAMINATION:  GENERAL: NAD, well built  HEAD:  Atraumatic, Normocephalic  EYES:  conjunctiva and sclera clear  NECK: Supple, No JVD, Normal thyroid  CHEST/LUNG: Clear to auscultation. Clear to percussion bilaterally; No rales, rhonchi, wheezing, or rubs  HEART: Regular rate and rhythm; No murmurs, rubs, or gallops  ABDOMEN: Soft, Nontender, Nondistended; Bowel sounds present, no pain or masses on palpation  NERVOUS SYSTEM:  Alert & Oriented X3  : voiding well  EXTREMITIES:  2+ Peripheral Pulses, No clubbing, cyanosis, or edema  SKIN: warm dry                          11.9   7.23  )-----------( 221      ( 11 Jan 2022 07:25 )             35.1     01-11    143  |  113<H>  |  28<H>  ----------------------------<  223<H>  3.7   |  24  |  0.75    Ca    9.1      11 Jan 2022 07:25  Phos  2.7     01-11  Mg     2.2     01-11                I&O's Summary          CAPILLARY BLOOD GLUCOSE      RADIOLOGY & ADDITIONAL TESTS:

## 2022-01-12 NOTE — DIETITIAN INITIAL EVALUATION ADULT. - PERTINENT MEDS FT
MEDICATIONS  (STANDING):  ascorbic acid 500 milliGRAM(s) Oral daily  aspirin enteric coated 81 milliGRAM(s) Oral daily  cefuroxime   Tablet 250 milliGRAM(s) Oral every 12 hours  dexAMETHasone  Injectable 6 milliGRAM(s) IV Push daily  enoxaparin Injectable 40 milliGRAM(s) SubCutaneous daily  ferrous    sulfate 325 milliGRAM(s) Oral daily  folic acid 1 milliGRAM(s) Oral daily  glucagon  Injectable 1 milliGRAM(s) IntraMuscular once  insulin glargine Injectable (LANTUS) 15 Unit(s) SubCutaneous at bedtime  insulin lispro (ADMELOG) corrective regimen sliding scale   SubCutaneous three times a day before meals  insulin lispro Injectable (ADMELOG) 4 Unit(s) SubCutaneous three times a day before meals  metoprolol succinate ER 25 milliGRAM(s) Oral daily  multivitamin 1 Tablet(s) Oral daily  OLANZapine 5 milliGRAM(s) Oral at bedtime  pantoprazole    Tablet 40 milliGRAM(s) Oral before breakfast  sertraline 50 milliGRAM(s) Oral daily  zinc sulfate 220 milliGRAM(s) Oral daily

## 2022-01-12 NOTE — PROGRESS NOTE ADULT - PROBLEM SELECTOR PLAN 4
- P/w Cr 1.43 (baseline ~0.9)  - Likely due to poor oral intake(dehydration)  - S/p 1L bolus in ED  - Gentle IVF 60cc/hr for 12 hrs  - Can obtain urine lytes if no improvement with IVF (and no use of obtaining urine lytes now when pt is on IVF) - P/w Cr 1.43 (baseline ~0.9)  - Likely due to poor oral intake(dehydration)  - S/p 1L bolus in ED  - Gentle IVF 60cc/hr for 12 hrs

## 2022-01-12 NOTE — BH CONSULTATION LIAISON PROGRESS NOTE - NSICDXBHPRIMARYDX_PSY_ALL_CORE
Vascular dementia with behavioral disturbance   F01.51  

## 2022-01-12 NOTE — BH CONSULTATION LIAISON PROGRESS NOTE - NSBHMSESPEECH_PSY_A_CORE
Non-verbal: unable to assess speech further
Abnormal as indicated, otherwise normal...
Non-verbal: unable to assess speech further

## 2022-01-12 NOTE — DIETITIAN INITIAL EVALUATION ADULT. - PROBLEM SELECTOR PLAN 3
- On many psych meds at home: Sertraline, Quetiapine, Risperidone, Fluoxetine  - Consult psych in AM to optimize medication plan per MD

## 2022-01-12 NOTE — DIETITIAN INITIAL EVALUATION ADULT. - PERTINENT LABORATORY DATA
01-12 Na142 mmol/L Glu 230 mg/dL<H> K+ 3.7 mmol/L Cr  0.91 mg/dL BUN 30 mg/dL<H>   01-12 Phos 3.2 mg/dL   01-10 Alb 3.8 g/dL       01-05 Chol 155 mg/dL LDL --    HDL 45 mg/dL<L> Trig 115 mg/dL  01-06-22 @ 09:25 HgbA1C 7.4 [4.0 - 5.6]  01-05-22 @ 18:24 HgbA1C 7.4 [4.0 - 5.6]

## 2022-01-12 NOTE — PROGRESS NOTE ADULT - SUBJECTIVE AND OBJECTIVE BOX
PULMONARY  progress note    BELA QUEEN  MRN-[id number]    Patient is a 89y old  Female who presents with a chief complaint of Weakness and Fall (12 Jan 2022 08:41)  no cough or sob      MEDICATIONS  (STANDING):  ascorbic acid 500 milliGRAM(s) Oral daily  aspirin enteric coated 81 milliGRAM(s) Oral daily  cefuroxime   Tablet 250 milliGRAM(s) Oral every 12 hours  dexAMETHasone  Injectable 6 milliGRAM(s) IV Push daily  enoxaparin Injectable 40 milliGRAM(s) SubCutaneous daily  ferrous    sulfate 325 milliGRAM(s) Oral daily  folic acid 1 milliGRAM(s) Oral daily  glucagon  Injectable 1 milliGRAM(s) IntraMuscular once  insulin glargine Injectable (LANTUS) 15 Unit(s) SubCutaneous at bedtime  insulin lispro (ADMELOG) corrective regimen sliding scale   SubCutaneous three times a day before meals  insulin lispro Injectable (ADMELOG) 4 Unit(s) SubCutaneous three times a day before meals  metoprolol succinate ER 25 milliGRAM(s) Oral daily  multivitamin 1 Tablet(s) Oral daily  OLANZapine 5 milliGRAM(s) Oral at bedtime  pantoprazole    Tablet 40 milliGRAM(s) Oral before breakfast  sertraline 50 milliGRAM(s) Oral daily  zinc sulfate 220 milliGRAM(s) Oral daily        Allergies    penicillins (Unknown)            PAST MEDICAL & SURGICAL HISTORY:  HTN (hypertension)    DM (diabetes mellitus)    Alzheimer disease    anemia              Vital Signs Last 24 Hrs  T(C): 37 (12 Jan 2022 04:57), Max: 37 (12 Jan 2022 04:57)  T(F): 98.6 (12 Jan 2022 04:57), Max: 98.6 (12 Jan 2022 04:57)  HR: 75 (12 Jan 2022 04:57) (71 - 84)  BP: 138/48 (12 Jan 2022 04:57) (113/50 - 138/48)  BP(mean): --  RR: 18 (12 Jan 2022 04:57) (18 - 18)  SpO2: 98% (12 Jan 2022 04:57) (95% - 98%)  I&O's Detail      PHYSICAL EXAMINATION:    GENERAL: The patient is a H/F lying in bed  SKIN no rash ecchymoses or bruises  HEENT: Head is normocephalic and atraumatic  CRYS , Extraocular muscles are intact. Mucous membranes  moist.   Neck supple ,No LN felt JVP not increased  Thyroid not enlarged  Cardiovascular:  S1 S2 heard, RSR, No JVD , systolic  murmur at apex, No gallop or rub  Respiratory: Chest wall symmetrical with good air entry ,Percussion note normal,    Lungs vesicular breathing with no rales orheeze	  ABDOMEN:  Soft, Non-tender,  no hepatomegaly or splenomegaly BS positive	  Extremities: Normal range of motion, No clubbing, cyanosis or edema  Vascular: Peripheral pulses palpable 2+ bilaterally  CNS: sleepy but arousable  sensory intact  motor power5/5  dtr 2+  Babinski neg        LABS:                        11.9   7.33  )-----------( 222      ( 12 Jan 2022 07:17 )             36.1     01-12    142  |  112<H>  |  30<H>  ----------------------------<  230<H>  3.7   |  25  |  0.91    Ca    9.3      12 Jan 2022 07:17  Phos  3.2     01-12  Mg     2.1     01-12      PT/INR - ( 12 Jan 2022 07:17 )   PT: 14.1 sec;   INR: 1.19 ratio         PTT - ( 12 Jan 2022 07:17 )  PTT:28.0 sec            D-Dimer Assay, Quantitative: 450 ng/mL DDU (01-12-22 @ 07:17)          Ferritin    : 221 ng/mL (01-12-22 @ 09:42)  Ferritin, Serum: 237 ng/mL (01-10-22 @ 10:05)  Ferritin, Serum: 237 ng/mL (01-08-22 @ 17:09)  Ferritin, Serum: 224 ng/mL (01-07-22 @ 13:03)  Ferritin, Serum: 217 ng/mL (01-06-22 @ 18:31)  Ferritin, Serum: 210 ng/mL (01-05-22 @ 20:30)      MICROBIOLOGY:    Culture - Urine (collected 01-05-22 @ 11:36)  Source: Clean Catch Clean Catch (Midstream)  Final Report (01-07-22 @ 15:43):    >100,000 CFU/ml Escherichia coli  Organism: Escherichia coli (01-07-22 @ 15:43)  Organism: Escherichia coli (01-07-22 @ 15:43)      -  Amikacin: S <=16      -  Amoxicillin/Clavulanic Acid: S <=8/4      -  Ampicillin: R >16 These ampicillin results predict results for amoxicillin      -  Ampicillin/Sulbactam: I 16/8 Enterobacter, Klebsiella aerogenes, Citrobacter, and Serratia may develop resistance during prolonged therapy (3-4 days)      -  Aztreonam: S <=4      -  Cefazolin: S <=2 (MIC_CL_COM_ENTERIC_CEFAZU) For uncomplicated UTI with K. pneumoniae, E. coli, or P. mirablis: PRISCA <=16 is sensitive and PRISCA >=32 is resistant. This also predicts results for oral agents cefaclor, cefdinir, cefpodoxime, cefprozil, cefuroxime axetil, cephalexin and locarbef for uncomplicated UTI. Note that some isolates may be susceptible to these agents while testing resistant to cefazolin.      -  Cefepime: S <=2      -  Cefoxitin: S <=8      -  Ceftriaxone: S <=1 Enterobacter, Klebsiella aerogenes, Citrobacter, and Serratia may develop resistance during prolonged therapy      -  Ciprofloxacin: R >2      -  Ertapenem: S <=0.5      -  Gentamicin: S <=2      -  Imipenem: S <=1      -  Levofloxacin: R >4      -  Meropenem: S <=1      -  Nitrofurantoin: S <=32 Should not be used to treat pyelonephritis      -  Piperacillin/Tazobactam: S <=8      -  Tigecycline: S <=2      -  Tobramycin: R >8      -  Trimethoprim/Sulfamethoxazole: R >2/38      Method Type: PRISCA

## 2022-01-12 NOTE — BH CONSULTATION LIAISON PROGRESS NOTE - NSBHFUPREASONCONS_PSY_A_CORE
delirium/dementia/agitation/med management

## 2022-01-12 NOTE — DIETITIAN INITIAL EVALUATION ADULT. - OTHER INFO
Pt lives home with family PTA, confused with dementia, COVID19+viet, in airborne/contact isolation room; s/p swallow evaluation with Minced and Moist food, thin liquid recommended 1/11/22; Limited intake/wt change history data available at present; 76 to 100% at time per flow sheet

## 2022-01-12 NOTE — DIETITIAN INITIAL EVALUATION ADULT. - PROBLEM SELECTOR PLAN 4
- Positive for COVID19  - CXR clear  - F/u COVID markers q72hrs  - Currently on room air  - Albuterol MDI Q2H PRN  - Tylenol PRN for fever  - O2 support as needed  - Vit C, D, Zinc  - Started Decadron  - Will hold Remdesivir due to SHELIA  - Additional supportive therapy as needed. plan per MD

## 2022-01-12 NOTE — BH CONSULTATION LIAISON PROGRESS NOTE - NSCDBILL_PSY_A_CORE
[id number] - Inpatient, moderate complexity
[id number] - Inpatient, moderate complexity
[id number] - Inpatient, moderate complexity

## 2022-01-12 NOTE — BH CONSULTATION LIAISON PROGRESS NOTE - NSBHCONSULTRECOMMENDOTHER_PSY_A_CORE FT
1. Decrease Zyprexa dosing to5 mg PO qhs [DC 12pm dose] standing to reduce daytime somnolence  2. C/w home Zoloft 50 mg qd  3. For acute agitation, recommend Zyprexa 2.5 mg q12h PRN agitation  -- PO dose form is preferred if pt is able to swallow, but IM can be used as backup if PO refused or cannot be given  4. Medical management as directed by primary team  5. Pt will need LTP--she clearly lacks capacity, so daughter should be approached for necessary consents  6. Psychiatry will continue to follow  7. Case d/w Dr. Carvajal of primary team    Lanie Voss MD  Director, Consultation-Liaison Psychiatry Service  [phone]    
1. Increase Zyprexa dosing to 2.5 mg q1200/5 mg PO qhs standing to better address behavioral disturbance  2. C/w home Zoloft 50 mg qd  3. For acute agitation, recommend Zyprexa 2.5 mg q12h PRN agitation  -- PO dose form is preferred if pt is able to swallow, but IM can be used as backup if PO refused or cannot be given  4. Medical management as directed by primary team  5. Pt will need LTP--she clearly lacks capacity, so daughter should be approached for necessary consents  6. Psychiatry will continue to follow  7. Case d/w Dr. Carvajal of primary team    Lanie Voss MD  Director, Consultation-Liaison Psychiatry Service  [phone]    
1. C/w Zyprexa 2.5 mg PO qhs standing to prevent O/N agitation  2. C/w home Zoloft 50 mg qd  3. For acute agitation, recommend Zyprexa 2.5 mg q12h PRN agitation  -- PO dose form is preferred if pt is able to swallow, but IM can be used as backup if PO refused or cannot be given  4. Medical management as directed by primary team  5. Pt is planned for DC home tomorrow at daughter's request  6. Psychiatry is signing off in anticipation of DC  7. Case d/w Dr. Carvajal of primary team    aLnie Voss MD  Director, Consultation-Liaison Psychiatry Service  [phone]

## 2022-01-12 NOTE — BH CONSULTATION LIAISON PROGRESS NOTE - NSBHCONSULTFOLLOWAFTERCARE_PSY_A_CORE FT
Behavior needs to be managed prior to DC
Pt is returning home tomorrow
Behavior needs to be managed prior to DC

## 2022-01-12 NOTE — PROGRESS NOTE ADULT - PROBLEM SELECTOR PLAN 1
- On many psych meds at home: Sertraline, Quetiapine, Risperidone, Fluoxetine  - Risperdal and Seroquel were discontinued, zoloft was resumed and zyprexa was started.   - Psych consulted (Dr. Voss)  - had episode of dysphagia w/ water and apple sauce  - f/u Speech and Swallow - On many psych meds at home: Sertraline, Quetiapine, Risperidone, Fluoxetine  - Risperdal and Seroquel were discontinued, zoloft was resumed and zyprexa was started.   - Psych consulted (Dr. Voss)  - had episode of dysphagia w/ water and apple sauce  - Speech and Swallow - minced and moist

## 2022-01-12 NOTE — PROGRESS NOTE ADULT - PROBLEM SELECTOR PLAN 9
- Daughter having difficulty taking care of patient at home recently  - SHAKE[facility]+CM consulted  - currently FULL CODE as per daughter (Ms. Tresa Hutton - [phone])  - daughter tested positive and cannot take care of her over the weekend - Daughter having difficulty taking care of patient at home recently  - SHAKEEL+CM consulted  - currently FULL CODE as per daughter (Ms. Tresa Hutton - [phone])  - daughter tested positive and cannot take care of her  - for possible d/c to [facility]

## 2022-01-12 NOTE — BH CONSULTATION LIAISON PROGRESS NOTE - NSBHFUPINTERVALHXFT_PSY_A_CORE
Pt seen in her room for f/u, encountered sitting up in bed sleeping deeply, unarousable to voice or light touch. Per staff reports, pt's daughter has expressed interest in pt's returning home to her tomorrow.
Per chart, pt's behavior was improved over the weekend, but she did receive 1 PRN (Zyprexa 2.5 mg) most days. Pt seen in her room for f/u, encountered sitting in armchair by her bed eating lunch (pureed foods). Pt is interview with [employer] video  #[id number]. Pt appears to have no difficulty feeding herself and appears to be enjoying a dish of pureed vegetable, but she is so preoccupied by eating that she ignores most of MD's questions (she does respond "more or less OK") when asked how she is feeling. When she is finished eating, pt requests coffee with milk and sugar. MD explains that pt has been given a cup of hot water and a teabag; MD prepares tea for pt, but she is impatient and will not wait for it to brew, pouring the water into the cup and demanding MD give her milk and sugar. Pt grabs 2 containers of cream and 2 packets of sweetener, but coughs when she tries to drink the beverage, complaining that it is too sweet. 
Pt seen in her room for f/u, encountered sitting up in bed napping while watching TV. Pt opens her eyes slightly after multiple greetings, and [employer] video  is provided, but pt does not turn her face toward video screen despite repeated requests to do so and quickly falls back asleep.

## 2022-01-12 NOTE — BH CONSULTATION LIAISON PROGRESS NOTE - NSBHCHARTREVIEWVS_PSY_A_CORE FT
Vital Signs Last 24 Hrs  T(C): 36.2 (10 Javier 2022 13:51), Max: 37.1 (09 Jan 2022 21:31)  T(F): 97.2 (10 Javier 2022 13:51), Max: 98.8 (09 Jan 2022 21:31)  HR: 83 (10 Javier 2022 13:51) (79 - 90)  BP: 101/54 (10 Javier 2022 13:51) (101/54 - 133/72)  BP(mean): --  RR: 18 (10 Javier 2022 13:51) (18 - 18)  SpO2: 100% (10 Javier 2022 13:51) (95% - 100%)
Vital Signs Last 24 Hrs  T(C): 36.8 (11 Jan 2022 21:39), Max: 36.8 (11 Jan 2022 14:28)  T(F): 98.2 (11 Jan 2022 21:39), Max: 98.2 (11 Jan 2022 14:28)  HR: 84 (11 Jan 2022 21:39) (71 - 84)  BP: 113/50 (11 Jan 2022 21:39) (113/50 - 117/59)  BP(mean): --  RR: 18 (11 Jan 2022 21:39) (17 - 18)  SpO2: 95% (11 Jan 2022 21:39) (95% - 98%)
Vital Signs Last 24 Hrs  T(C): 36.3 (12 Jan 2022 21:17), Max: 37.1 (12 Jan 2022 14:30)  T(F): 97.4 (12 Jan 2022 21:17), Max: 98.8 (12 Jan 2022 14:30)  HR: 66 (12 Jan 2022 21:17) (66 - 110)  BP: 113/43 (12 Jan 2022 21:17) (113/43 - 138/48)  BP(mean): --  RR: 18 (12 Jan 2022 21:17) (18 - 18)  SpO2: 96% (12 Jan 2022 21:17) (96% - 98%)

## 2022-01-12 NOTE — DIETITIAN INITIAL EVALUATION ADULT. - PROBLEM SELECTOR PLAN 1
- Frequent falls, partially due to weakness from COVID  - Most recently, unwitnessed fall  - CT Head: Moderately limited by motion. No acute intracranial hemorrhage or calvarial fracture. Short-term repeat. CT evaluation may be obtained as clinically warranted.  - CT cervical spine: No acute cervical spine fracture or evidence of traumatic malalignment.  - Fall precautions  - PT consulted plan per MD

## 2022-01-12 NOTE — BH CONSULTATION LIAISON PROGRESS NOTE - CURRENT MEDICATION
MEDICATIONS  (STANDING):  ascorbic acid 500 milliGRAM(s) Oral daily  aspirin enteric coated 81 milliGRAM(s) Oral daily  cefuroxime   Tablet 250 milliGRAM(s) Oral every 12 hours  dexAMETHasone  Injectable 6 milliGRAM(s) IV Push daily  enoxaparin Injectable 40 milliGRAM(s) SubCutaneous daily  ferrous    sulfate 325 milliGRAM(s) Oral daily  folic acid 1 milliGRAM(s) Oral daily  glucagon  Injectable 1 milliGRAM(s) IntraMuscular once  insulin glargine Injectable (LANTUS) 10 Unit(s) SubCutaneous at bedtime  insulin lispro (ADMELOG) corrective regimen sliding scale   SubCutaneous three times a day before meals  insulin lispro Injectable (ADMELOG) 2 Unit(s) SubCutaneous three times a day before meals  metoprolol succinate ER 25 milliGRAM(s) Oral daily  multivitamin 1 Tablet(s) Oral daily  OLANZapine 5 milliGRAM(s) Oral at bedtime  pantoprazole    Tablet 40 milliGRAM(s) Oral before breakfast  sertraline 50 milliGRAM(s) Oral daily  sodium chloride 0.45%. 1000 milliLiter(s) (60 mL/Hr) IV Continuous <Continuous>  zinc sulfate 220 milliGRAM(s) Oral daily    MEDICATIONS  (PRN):  acetaminophen     Tablet .. 650 milliGRAM(s) Oral every 6 hours PRN Temp greater or equal to 38C (100.4F), Mild Pain (1 - 3)  ALBUTerol    90 MICROgram(s) HFA Inhaler 2 Puff(s) Inhalation every 6 hours PRN Shortness of Breath and/or Wheezing  melatonin 3 milliGRAM(s) Oral at bedtime PRN Insomnia  OLANZapine 2.5 milliGRAM(s) Oral every 12 hours PRN agitation  
MEDICATIONS  (STANDING):  ascorbic acid 500 milliGRAM(s) Oral daily  aspirin enteric coated 81 milliGRAM(s) Oral daily  cefuroxime   Tablet 250 milliGRAM(s) Oral every 12 hours  dexAMETHasone  Injectable 6 milliGRAM(s) IV Push daily  enoxaparin Injectable 40 milliGRAM(s) SubCutaneous daily  ferrous    sulfate 325 milliGRAM(s) Oral daily  folic acid 1 milliGRAM(s) Oral daily  glucagon  Injectable 1 milliGRAM(s) IntraMuscular once  insulin glargine Injectable (LANTUS) 15 Unit(s) SubCutaneous at bedtime  insulin lispro (ADMELOG) corrective regimen sliding scale   SubCutaneous three times a day before meals  insulin lispro Injectable (ADMELOG) 4 Unit(s) SubCutaneous three times a day before meals  metoprolol succinate ER 25 milliGRAM(s) Oral daily  multivitamin 1 Tablet(s) Oral daily  OLANZapine 2.5 milliGRAM(s) Oral <User Schedule>  pantoprazole    Tablet 40 milliGRAM(s) Oral before breakfast  sertraline 50 milliGRAM(s) Oral daily  zinc sulfate 220 milliGRAM(s) Oral daily    MEDICATIONS  (PRN):  acetaminophen     Tablet .. 650 milliGRAM(s) Oral every 6 hours PRN Temp greater or equal to 38C (100.4F), Mild Pain (1 - 3)  ALBUTerol    90 MICROgram(s) HFA Inhaler 2 Puff(s) Inhalation every 6 hours PRN Shortness of Breath and/or Wheezing  melatonin 3 milliGRAM(s) Oral at bedtime PRN Insomnia  OLANZapine 2.5 milliGRAM(s) Oral every 12 hours PRN agitation  
MEDICATIONS  (STANDING):  ascorbic acid 500 milliGRAM(s) Oral daily  aspirin enteric coated 81 milliGRAM(s) Oral daily  cefuroxime   Tablet 250 milliGRAM(s) Oral every 12 hours  dexAMETHasone  Injectable 6 milliGRAM(s) IV Push daily  enoxaparin Injectable 40 milliGRAM(s) SubCutaneous daily  ferrous    sulfate 325 milliGRAM(s) Oral daily  folic acid 1 milliGRAM(s) Oral daily  insulin glargine Injectable (LANTUS) 6 Unit(s) SubCutaneous at bedtime  insulin lispro (ADMELOG) corrective regimen sliding scale   SubCutaneous three times a day before meals  metoprolol succinate ER 25 milliGRAM(s) Oral daily  multivitamin 1 Tablet(s) Oral daily  OLANZapine 5 milliGRAM(s) Oral at bedtime  pantoprazole    Tablet 40 milliGRAM(s) Oral before breakfast  sertraline 50 milliGRAM(s) Oral daily  sodium chloride 0.45%. 1000 milliLiter(s) (60 mL/Hr) IV Continuous <Continuous>  zinc sulfate 220 milliGRAM(s) Oral daily    MEDICATIONS  (PRN):  acetaminophen     Tablet .. 650 milliGRAM(s) Oral every 6 hours PRN Temp greater or equal to 38C (100.4F), Mild Pain (1 - 3)  ALBUTerol    90 MICROgram(s) HFA Inhaler 2 Puff(s) Inhalation every 6 hours PRN Shortness of Breath and/or Wheezing  melatonin 3 milliGRAM(s) Oral at bedtime PRN Insomnia  OLANZapine 2.5 milliGRAM(s) Oral every 12 hours PRN agitation

## 2022-01-12 NOTE — PROGRESS NOTE ADULT - PROBLEM SELECTOR PLAN 5
- Takes meds at home  - Continue as sliding scale  - FS [facility]  - on lantus 10u bedtime + admelog 2u premeals  - A1c - 7.4 - Takes meds at home  - Continue as sliding scale  - FS [facility]  - inc. lantus 10u to 15u bedtime + admelog 2u to 4u premeals  - A1c - 7.4

## 2022-01-12 NOTE — BH CONSULTATION LIAISON PROGRESS NOTE - NSBHASSESSMENTFT_PSY_ALL_CORE
89 yo Iraqi-speaking F, retired and living with daughter, with PHx of dementia formerly f/w psychiatrist Dr. Namrata Gray (but not currently) and taking multiple psych meds (Seroquel, Risperdal and Zoloft) and MHx of HTN, IDDM and anemia, BIBEMS from home for generalized weakness and unwitnessed fall, found to be COVID+. Psych consult was requested for management of agitation/behavioral disturbance since admission (trying to get OOB, removing clothing and attempting to leave room naked). On initial exam, pt presents awake, alert and calm but obviously confused and unable to provide any meaningful information. 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 To manage pt's behavior while reducing potential for SE, we recommended DC'ing both of pt's antipsychotics (category overlap/insufficient efficacy) and substituting Zyprexa standing and PRN, while retaining pt's home SSRI due to daughter's strong belief that it is helpful. On 1/10, pt's behavior appeared significantly improved s/p standing Zyprexa qhs, but we recommended adding afternoon standing dose to reflect need for PRNs over the weekend. Today, however, pt appears very somnolent on midday dose of Zyprexa, so we recommend DC'ing the additional dose. We agree with reported impression of pt's family that pt needs LTP. Pt does not appear to present an acute risk of harm to self or others at the time of assessment, and does not appear to be in need of admission to  psych at the time of assessment.  
89 yo Scottish-speaking F, retired and living with daughter, with PHx of dementia formerly f/w psychiatrist Dr. Namrata Gray (but not currently) and taking multiple psych meds (Seroquel, Risperdal and Zoloft) and MHx of HTN, IDDM and anemia, BIBEMS from home for generalized weakness and unwitnessed fall, found to be COVID+. Psych consult was requested for management of agitation/behavioral disturbance since admission (trying to get OOB, removing clothing and attempting to leave room naked). On initial exam, pt presents awake, alert and calm but obviously confused and unable to provide any meaningful information. 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 To manage pt's behavior while reducing potential for SE, we recommended DC'ing both of pt's antipsychotics (category overlap/insufficient efficacy) and substituting Zyprexa standing and PRN, while retaining pt's home SSRI due to daughter's strong belief that it is helpful. On 1/10, pt's behavior appeared significantly improved s/p standing Zyprexa qhs, but we recommended adding afternoon standing dose to reflect need for PRNs over the weekend. On 1/11, pt appeared very somnolent on midday dose of Zyprexa, so we recommended DC'ing the additional dose. Today on exam, pt remains somnolent, but we recommend c/w reduced hs dose of Zyprexa to prevent O/N agitation. Pt's daughter has now decided she would prefer for pt to return home with plan for P/T HHA coverage. Pt does not appear to present an acute risk of harm to self or others at the time of assessment, and does not appear to be in need of admission to IP psych at the time of assessment.  
89 yo Turkmen-speaking F, retired and living with daughter, with PHx of dementia formerly f/w psychiatrist Dr. Namrata Gray (but not currently) and taking multiple psych meds (Seroquel, Risperdal and Zoloft) and MHx of HTN, IDDM and anemia, BIBEMS from home for generalized weakness and unwitnessed fall, found to be COVID+. Psych consult was requested for management of agitation/behavioral disturbance since admission (trying to get OOB, removing clothing and attempting to leave room naked). On initial exam, pt presents awake, alert and calm but obviously confused and unable to provide any meaningful information. 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 To manage pt's behavior while reducing potential for SE, we recommended DC'ing both of pt's antipsychotics (category overlap/insufficient efficacy) and substituting Zyprexa standing and PRN, while retaining pt's home SSRI due to daughter's strong belief that it is helpful. On f/u today, pt's behavior appears significantly improved s/p standing Zyprexa qhs, but we now recommend adding afternoon standing dose to reflect recent need for PRNs. We agree with reported impression of pt's family that pt needs LTP. Pt does not appear to present an acute risk of harm to self or others at the time of assessment, and does not appear to be in need of admission to IP psych at the time of assessment.

## 2022-01-12 NOTE — PROGRESS NOTE ADULT - SUBJECTIVE AND OBJECTIVE BOX
CHIEF COMPLAINT:Patient is a 89y old  Female who presents with a chief complaint of Weakness and Fall.Pt appears comfortable.    	  REVIEW OF SYSTEMS:  CONSTITUTIONAL: No fever, weight loss, or fatigue  EYES: No eye pain, visual disturbances, or discharge  ENT:  No difficulty hearing, tinnitus, vertigo; No sinus or throat pain  NECK: No pain or stiffness  RESPIRATORY: No cough, wheezing, chills or hemoptysis; No Shortness of Breath  CARDIOVASCULAR: No chest pain, palpitations, passing out, dizziness, or leg swelling  GASTROINTESTINAL: No abdominal or epigastric pain. No nausea, vomiting, or hematemesis; No diarrhea or constipation. No melena or hematochezia.  GENITOURINARY: No dysuria, frequency, hematuria, or incontinence  NEUROLOGICAL: No headaches, memory loss, loss of strength, numbness, or tremors  SKIN: No itching, burning, rashes, or lesions   LYMPH Nodes: No enlarged glands  ENDOCRINE: No heat or cold intolerance; No hair loss  MUSCULOSKELETAL: No joint pain or swelling; No muscle, back, or extremity pain  PSYCHIATRIC: No depression, anxiety, mood swings, or difficulty sleeping  HEME/LYMPH: No easy bruising, or bleeding gums  ALLERGY AND IMMUNOLOGIC: No hives or eczema	    PHYSICAL EXAM:  T(C): 37 (01-12-22 @ 04:57), Max: 37 (01-12-22 @ 04:57)  HR: 75 (01-12-22 @ 04:57) (71 - 84)  BP: 138/48 (01-12-22 @ 04:57) (113/50 - 138/48)  RR: 18 (01-12-22 @ 04:57) (18 - 18)  SpO2: 98% (01-12-22 @ 04:57) (95% - 98%)        Appearance: Normal	  HEENT:   Normal oral mucosa, PERRL, EOMI	  Lymphatic: No lymphadenopathy  Cardiovascular: Normal S1 S2, No JVD, No murmurs, No edema  Respiratory: Lungs clear to auscultation	  Psychiatry: A & O x 3, Mood & affect appropriate  Gastrointestinal:  Soft, Non-tender, + BS	  Skin: No rashes, No ecchymoses, No cyanosis	  Neurologic: Non-focal  Extremities: Normal range of motion, No clubbing, cyanosis or edema  Vascular: Peripheral pulses palpable 2+ bilaterally    MEDICATIONS  (STANDING):  ascorbic acid 500 milliGRAM(s) Oral daily  aspirin enteric coated 81 milliGRAM(s) Oral daily  cefuroxime   Tablet 250 milliGRAM(s) Oral every 12 hours  dexAMETHasone  Injectable 6 milliGRAM(s) IV Push daily  enoxaparin Injectable 40 milliGRAM(s) SubCutaneous daily  ferrous    sulfate 325 milliGRAM(s) Oral daily  folic acid 1 milliGRAM(s) Oral daily  glucagon  Injectable 1 milliGRAM(s) IntraMuscular once  insulin glargine Injectable (LANTUS) 15 Unit(s) SubCutaneous at bedtime  insulin lispro (ADMELOG) corrective regimen sliding scale   SubCutaneous three times a day before meals  insulin lispro Injectable (ADMELOG) 4 Unit(s) SubCutaneous three times a day before meals  metoprolol succinate ER 25 milliGRAM(s) Oral daily  multivitamin 1 Tablet(s) Oral daily  OLANZapine 5 milliGRAM(s) Oral at bedtime  pantoprazole    Tablet 40 milliGRAM(s) Oral before breakfast  sertraline 50 milliGRAM(s) Oral daily  zinc sulfate 220 milliGRAM(s) Oral daily      	  	  LABS:	 	                        11.9   7.33  )-----------( 222      ( 12 Jan 2022 07:17 )             36.1     01-12    142  |  112<H>  |  30<H>  ----------------------------<  230<H>  3.7   |  25  |  0.91    Ca    9.3      12 Jan 2022 07:17  Phos  3.2     01-12  Mg     2.1     01-12      proBNP: Serum Pro-Brain Natriuretic Peptide: 820 pg/mL (01-04 @ 22:46)    Lipid Profile: Cholesterol 155  LDL --  HDL 45    Ldl calc 87  Ratio --    TSH: Thyroid Stimulating Hormone, Serum: 6.67 uU/mL (01-05 @ 10:42)

## 2022-01-12 NOTE — PROGRESS NOTE ADULT - SUBJECTIVE AND OBJECTIVE BOX
Patient is a 89y old  Female who presents with a chief complaint of Weakness and Fall (11 Jan 2022 10:21)    pt seen in icu [  ], reg med floor [ x  ], bed [x  ], chair at bedside [   ], awake and responsive [ x ], lethargic [  ],    nad [ x ]      Allergies    penicillins (Unknown)        Vitals    T(F): 98.6 (01-12-22 @ 04:57), Max: 98.6 (01-12-22 @ 04:57)  HR: 75 (01-12-22 @ 04:57) (71 - 84)  BP: 138/48 (01-12-22 @ 04:57) (113/50 - 138/48)  RR: 18 (01-12-22 @ 04:57) (18 - 18)  SpO2: 98% (01-12-22 @ 04:57) (95% - 98%)  Wt(kg): --  CAPILLARY BLOOD GLUCOSE      POCT Blood Glucose.: 155 mg/dL (11 Jan 2022 21:09)      Labs                          11.9   7.23  )-----------( 221      ( 11 Jan 2022 07:25 )             35.1       01-11    143  |  113<H>  |  28<H>  ----------------------------<  223<H>  3.7   |  24  |  0.75    Ca    9.1      11 Jan 2022 07:25  Phos  2.7     01-11  Mg     2.2     01-11              Clean Catch Clean Catch (Midstream)  01-05 @ 11:36   >100,000 CFU/ml Escherichia coli  --  Escherichia coli          Radiology Results      Meds    MEDICATIONS  (STANDING):  ascorbic acid 500 milliGRAM(s) Oral daily  aspirin enteric coated 81 milliGRAM(s) Oral daily  cefuroxime   Tablet 250 milliGRAM(s) Oral every 12 hours  dexAMETHasone  Injectable 6 milliGRAM(s) IV Push daily  enoxaparin Injectable 40 milliGRAM(s) SubCutaneous daily  ferrous    sulfate 325 milliGRAM(s) Oral daily  folic acid 1 milliGRAM(s) Oral daily  glucagon  Injectable 1 milliGRAM(s) IntraMuscular once  insulin glargine Injectable (LANTUS) 10 Unit(s) SubCutaneous at bedtime  insulin lispro (ADMELOG) corrective regimen sliding scale   SubCutaneous three times a day before meals  insulin lispro Injectable (ADMELOG) 2 Unit(s) SubCutaneous three times a day before meals  metoprolol succinate ER 25 milliGRAM(s) Oral daily  multivitamin 1 Tablet(s) Oral daily  OLANZapine 5 milliGRAM(s) Oral at bedtime  pantoprazole    Tablet 40 milliGRAM(s) Oral before breakfast  sertraline 50 milliGRAM(s) Oral daily  sodium chloride 0.45%. 1000 milliLiter(s) (60 mL/Hr) IV Continuous <Continuous>  zinc sulfate 220 milliGRAM(s) Oral daily      MEDICATIONS  (PRN):  acetaminophen     Tablet .. 650 milliGRAM(s) Oral every 6 hours PRN Temp greater or equal to 38C (100.4F), Mild Pain (1 - 3)  ALBUTerol    90 MICROgram(s) HFA Inhaler 2 Puff(s) Inhalation every 6 hours PRN Shortness of Breath and/or Wheezing  melatonin 3 milliGRAM(s) Oral at bedtime PRN Insomnia  OLANZapine 2.5 milliGRAM(s) Oral every 12 hours PRN agitation      Physical Exam    Neuro :  no focal deficits  Respiratory: CTA B/L  CV: RRR, S1S2, no murmurs,   Abdominal: Soft, NT, ND +BS,  Extremities: No edema, + peripheral pulses        ASSESSMENT    pneumonia 2nd to covid 19,   s/p fall,   uti   h/o Alzheimer's dementia,   HTN,   DM,   2 to 1 heart block s/p micra ppm placement 9/17/21   MESERET (iron deficiency anemia)        PLAN    cont decadron,   cont vit c, vitamin d, zinc, singulair, flonase   contact and airborne isolation,   pulm f/u   cont albuterol inhaler,   afebrile   tylenol prn,   robitussin prn   O2 sat 98% (98% - 100%) on ra  O2 via nasal canula as needed,   cont ceftin 250 mg daily x 5 days until 1/14/22  ucx and sens with  e coli noted above  serum creat wnl   cardio f/u  cont cardiac meds   endo cons  cont lispro ss  cont lantus 6 units qhs   lispro 2 units actid  free t4 wnl noted    phys tx eval noted and rec phys tx 2-3x/week x 2WKS home w/ home PT; Pending progress and participation with P.T.  psych f/u   Increase Zyprexa dosing to 2.5 mg q1200/5 mg PO qhs standing to better address behavioral disturbance  C/w home Zoloft 50 mg qd  For acute agitation, recommend Zyprexa 2.5 mg q12h PRN agitation  -- PO dose form is preferred if pt is able to swallow, but IM can be used as backup if PO refused or cannot be given  Pt will need LTP--she clearly lacks capacity, so daughter should be approached for necessary consents  case mgmt ,   cont current meds   d/c planning           Patient is a 89y old  Female who presents with a chief complaint of Weakness and Fall (11 Jan 2022 10:21)    pt seen in icu [  ], reg med floor [ x  ], bed [x  ], chair at bedside [   ], awake and responsive [ x ], lethargic [  ],    nad [ x ]      Allergies    penicillins (Unknown)        Vitals    T(F): 98.6 (01-12-22 @ 04:57), Max: 98.6 (01-12-22 @ 04:57)  HR: 75 (01-12-22 @ 04:57) (71 - 84)  BP: 138/48 (01-12-22 @ 04:57) (113/50 - 138/48)  RR: 18 (01-12-22 @ 04:57) (18 - 18)  SpO2: 98% (01-12-22 @ 04:57) (95% - 98%)  Wt(kg): --  CAPILLARY BLOOD GLUCOSE      POCT Blood Glucose.: 155 mg/dL (11 Jan 2022 21:09)      Labs                          11.9   7.23  )-----------( 221      ( 11 Jan 2022 07:25 )             35.1       01-11    143  |  113<H>  |  28<H>  ----------------------------<  223<H>  3.7   |  24  |  0.75    Ca    9.1      11 Jan 2022 07:25  Phos  2.7     01-11  Mg     2.2     01-11              Clean Catch Clean Catch (Midstream)  01-05 @ 11:36   >100,000 CFU/ml Escherichia coli  --  Escherichia coli          Radiology Results      Meds    MEDICATIONS  (STANDING):  ascorbic acid 500 milliGRAM(s) Oral daily  aspirin enteric coated 81 milliGRAM(s) Oral daily  cefuroxime   Tablet 250 milliGRAM(s) Oral every 12 hours  dexAMETHasone  Injectable 6 milliGRAM(s) IV Push daily  enoxaparin Injectable 40 milliGRAM(s) SubCutaneous daily  ferrous    sulfate 325 milliGRAM(s) Oral daily  folic acid 1 milliGRAM(s) Oral daily  glucagon  Injectable 1 milliGRAM(s) IntraMuscular once  insulin glargine Injectable (LANTUS) 10 Unit(s) SubCutaneous at bedtime  insulin lispro (ADMELOG) corrective regimen sliding scale   SubCutaneous three times a day before meals  insulin lispro Injectable (ADMELOG) 2 Unit(s) SubCutaneous three times a day before meals  metoprolol succinate ER 25 milliGRAM(s) Oral daily  multivitamin 1 Tablet(s) Oral daily  OLANZapine 5 milliGRAM(s) Oral at bedtime  pantoprazole    Tablet 40 milliGRAM(s) Oral before breakfast  sertraline 50 milliGRAM(s) Oral daily  sodium chloride 0.45%. 1000 milliLiter(s) (60 mL/Hr) IV Continuous <Continuous>  zinc sulfate 220 milliGRAM(s) Oral daily      MEDICATIONS  (PRN):  acetaminophen     Tablet .. 650 milliGRAM(s) Oral every 6 hours PRN Temp greater or equal to 38C (100.4F), Mild Pain (1 - 3)  ALBUTerol    90 MICROgram(s) HFA Inhaler 2 Puff(s) Inhalation every 6 hours PRN Shortness of Breath and/or Wheezing  melatonin 3 milliGRAM(s) Oral at bedtime PRN Insomnia  OLANZapine 2.5 milliGRAM(s) Oral every 12 hours PRN agitation      Physical Exam    Neuro :  no focal deficits  Respiratory: CTA B/L  CV: RRR, S1S2, no murmurs,   Abdominal: Soft, NT, ND +BS,  Extremities: No edema, + peripheral pulses        ASSESSMENT    pneumonia 2nd to covid 19,   s/p fall,   uti   h/o Alzheimer's dementia,   HTN,   DM,   2 to 1 heart block s/p micra ppm placement 9/17/21   MESERET (iron deficiency anemia)        PLAN    cont decadron,   cont vit c, vitamin d, zinc, singulair, flonase   contact and airborne isolation,   pulm f/u   cont albuterol inhaler,   afebrile   tylenol prn,   robitussin prn   O2 sat 98% (95% - 98%) on ra  O2 via nasal canula as needed,   cont ceftin 250 mg daily x 5 days until 1/14/22  ucx and sens with  e coli noted above  serum creat wnl   cardio f/u  cont cardiac meds   endo cons  cont lispro ss  cont lantus 10 units qhs   lispro 2 units actid  free t4 wnl noted    phys tx re-eval noted and rec phys tx 3-5x/week x 2wks at rehabilitation facility  psych f/u   Decrease Zyprexa dosing to5 mg PO qhs [DC 12pm dose] standing to reduce daytime somnolence  C/w home Zoloft 50 mg qd  For acute agitation, recommend Zyprexa 2.5 mg q12h PRN agitation  -- PO dose form is preferred if pt is able to swallow, but IM can be used as backup if PO refused or cannot be given  Pt will need LTP--she clearly lacks capacity, so daughter should be approached for necessary consents  case mgmt ,   cont current meds   d/c planning

## 2022-01-13 ENCOUNTER — TRANSCRIPTION ENCOUNTER (OUTPATIENT)
Age: 87
End: 2022-01-13

## 2022-01-13 VITALS
OXYGEN SATURATION: 97 % | HEART RATE: 65 BPM | SYSTOLIC BLOOD PRESSURE: 122 MMHG | TEMPERATURE: 98 F | RESPIRATION RATE: 18 BRPM | DIASTOLIC BLOOD PRESSURE: 44 MMHG

## 2022-01-13 LAB
ALBUMIN SERPL ELPH-MCNC: 3.2 G/DL — LOW (ref 3.5–5)
ALP SERPL-CCNC: 78 U/L — SIGNIFICANT CHANGE UP (ref 40–120)
ALT FLD-CCNC: 55 U/L DA — SIGNIFICANT CHANGE UP (ref 10–60)
ANION GAP SERPL CALC-SCNC: 6 MMOL/L — SIGNIFICANT CHANGE UP (ref 5–17)
APTT BLD: 31.1 SEC — SIGNIFICANT CHANGE UP (ref 27.5–35.5)
AST SERPL-CCNC: 44 U/L — HIGH (ref 10–40)
BILIRUB SERPL-MCNC: 0.4 MG/DL — SIGNIFICANT CHANGE UP (ref 0.2–1.2)
BUN SERPL-MCNC: 36 MG/DL — HIGH (ref 7–18)
CALCIUM SERPL-MCNC: 9.8 MG/DL — SIGNIFICANT CHANGE UP (ref 8.4–10.5)
CHLORIDE SERPL-SCNC: 112 MMOL/L — HIGH (ref 96–108)
CO2 SERPL-SCNC: 27 MMOL/L — SIGNIFICANT CHANGE UP (ref 22–31)
CREAT SERPL-MCNC: 0.86 MG/DL — SIGNIFICANT CHANGE UP (ref 0.5–1.3)
D DIMER BLD IA.RAPID-MCNC: 562 NG/ML DDU — HIGH
FERRITIN SERPL-MCNC: 295 NG/ML — HIGH (ref 15–150)
GLUCOSE BLDC GLUCOMTR-MCNC: 225 MG/DL — HIGH (ref 70–99)
GLUCOSE SERPL-MCNC: 203 MG/DL — HIGH (ref 70–99)
HCT VFR BLD CALC: 38.8 % — SIGNIFICANT CHANGE UP (ref 34.5–45)
HGB BLD-MCNC: 12.6 G/DL — SIGNIFICANT CHANGE UP (ref 11.5–15.5)
INR BLD: 1.17 RATIO — HIGH (ref 0.88–1.16)
LDH SERPL L TO P-CCNC: 345 U/L — HIGH (ref 120–225)
MAGNESIUM SERPL-MCNC: 2.5 MG/DL — SIGNIFICANT CHANGE UP (ref 1.6–2.6)
MCHC RBC-ENTMCNC: 30.5 PG — SIGNIFICANT CHANGE UP (ref 27–34)
MCHC RBC-ENTMCNC: 32.5 GM/DL — SIGNIFICANT CHANGE UP (ref 32–36)
MCV RBC AUTO: 93.9 FL — SIGNIFICANT CHANGE UP (ref 80–100)
NRBC # BLD: 0 /100 WBCS — SIGNIFICANT CHANGE UP (ref 0–0)
PHOSPHATE SERPL-MCNC: 2.9 MG/DL — SIGNIFICANT CHANGE UP (ref 2.5–4.5)
PLATELET # BLD AUTO: 247 K/UL — SIGNIFICANT CHANGE UP (ref 150–400)
POTASSIUM SERPL-MCNC: 3.8 MMOL/L — SIGNIFICANT CHANGE UP (ref 3.5–5.3)
POTASSIUM SERPL-SCNC: 3.8 MMOL/L — SIGNIFICANT CHANGE UP (ref 3.5–5.3)
PROT SERPL-MCNC: 7 G/DL — SIGNIFICANT CHANGE UP (ref 6–8.3)
PROTHROM AB SERPL-ACNC: 13.8 SEC — HIGH (ref 10.6–13.6)
RBC # BLD: 4.13 M/UL — SIGNIFICANT CHANGE UP (ref 3.8–5.2)
RBC # FLD: 13.8 % — SIGNIFICANT CHANGE UP (ref 10.3–14.5)
SODIUM SERPL-SCNC: 145 MMOL/L — SIGNIFICANT CHANGE UP (ref 135–145)
WBC # BLD: 7 K/UL — SIGNIFICANT CHANGE UP (ref 3.8–10.5)
WBC # FLD AUTO: 7 K/UL — SIGNIFICANT CHANGE UP (ref 3.8–10.5)

## 2022-01-13 PROCEDURE — 83880 ASSAY OF NATRIURETIC PEPTIDE: CPT

## 2022-01-13 PROCEDURE — 84145 PROCALCITONIN (PCT): CPT

## 2022-01-13 PROCEDURE — 80053 COMPREHEN METABOLIC PANEL: CPT

## 2022-01-13 PROCEDURE — 71045 X-RAY EXAM CHEST 1 VIEW: CPT

## 2022-01-13 PROCEDURE — 83615 LACTATE (LD) (LDH) ENZYME: CPT

## 2022-01-13 PROCEDURE — 80048 BASIC METABOLIC PNL TOTAL CA: CPT

## 2022-01-13 PROCEDURE — 84443 ASSAY THYROID STIM HORMONE: CPT

## 2022-01-13 PROCEDURE — 85379 FIBRIN DEGRADATION QUANT: CPT

## 2022-01-13 PROCEDURE — 36415 COLL VENOUS BLD VENIPUNCTURE: CPT

## 2022-01-13 PROCEDURE — 87635 SARS-COV-2 COVID-19 AMP PRB: CPT

## 2022-01-13 PROCEDURE — 87086 URINE CULTURE/COLONY COUNT: CPT

## 2022-01-13 PROCEDURE — 99285 EMERGENCY DEPT VISIT HI MDM: CPT | Mod: 25

## 2022-01-13 PROCEDURE — 72170 X-RAY EXAM OF PELVIS: CPT

## 2022-01-13 PROCEDURE — 96374 THER/PROPH/DIAG INJ IV PUSH: CPT

## 2022-01-13 PROCEDURE — 93005 ELECTROCARDIOGRAM TRACING: CPT

## 2022-01-13 PROCEDURE — 72125 CT NECK SPINE W/O DYE: CPT | Mod: MA

## 2022-01-13 PROCEDURE — 92610 EVALUATE SWALLOWING FUNCTION: CPT

## 2022-01-13 PROCEDURE — 85610 PROTHROMBIN TIME: CPT

## 2022-01-13 PROCEDURE — 84439 ASSAY OF FREE THYROXINE: CPT

## 2022-01-13 PROCEDURE — 85025 COMPLETE CBC W/AUTO DIFF WBC: CPT

## 2022-01-13 PROCEDURE — 87186 SC STD MICRODIL/AGAR DIL: CPT

## 2022-01-13 PROCEDURE — 85730 THROMBOPLASTIN TIME PARTIAL: CPT

## 2022-01-13 PROCEDURE — 70450 CT HEAD/BRAIN W/O DYE: CPT | Mod: MA

## 2022-01-13 PROCEDURE — 82728 ASSAY OF FERRITIN: CPT

## 2022-01-13 PROCEDURE — 86140 C-REACTIVE PROTEIN: CPT

## 2022-01-13 PROCEDURE — 85652 RBC SED RATE AUTOMATED: CPT

## 2022-01-13 PROCEDURE — 83036 HEMOGLOBIN GLYCOSYLATED A1C: CPT

## 2022-01-13 PROCEDURE — 80061 LIPID PANEL: CPT

## 2022-01-13 PROCEDURE — 84484 ASSAY OF TROPONIN QUANT: CPT

## 2022-01-13 PROCEDURE — 82962 GLUCOSE BLOOD TEST: CPT

## 2022-01-13 PROCEDURE — 85027 COMPLETE CBC AUTOMATED: CPT

## 2022-01-13 PROCEDURE — 81001 URINALYSIS AUTO W/SCOPE: CPT

## 2022-01-13 PROCEDURE — 83735 ASSAY OF MAGNESIUM: CPT

## 2022-01-13 PROCEDURE — 87077 CULTURE AEROBIC IDENTIFY: CPT

## 2022-01-13 PROCEDURE — 82550 ASSAY OF CK (CPK): CPT

## 2022-01-13 PROCEDURE — 84100 ASSAY OF PHOSPHORUS: CPT

## 2022-01-13 RX ORDER — CEFUROXIME AXETIL 250 MG
1 TABLET ORAL
Qty: 4 | Refills: 0
Start: 2022-01-13 | End: 2022-01-14

## 2022-01-13 RX ADMIN — PANTOPRAZOLE SODIUM 40 MILLIGRAM(S): 20 TABLET, DELAYED RELEASE ORAL at 06:12

## 2022-01-13 RX ADMIN — Medication 250 MILLIGRAM(S): at 06:11

## 2022-01-13 RX ADMIN — Medication 25 MILLIGRAM(S): at 06:11

## 2022-01-13 RX ADMIN — Medication 6 MILLIGRAM(S): at 06:11

## 2022-01-13 RX ADMIN — Medication 4 UNIT(S): at 08:00

## 2022-01-13 RX ADMIN — Medication 4: at 08:01

## 2022-01-13 NOTE — PROGRESS NOTE ADULT - ASSESSMENT
89 y.o. F w/ PMHx of Alzheimer's dementia, HTN, DM,Bktmw-Ycfmu-p/p PPM  and anemia presents to the ED from home with complaints of generalized weakness,COVID.  1.WCT- low dose b blocker.  2.COVID-dexamethasone.  3.DM-Insulin.  4.HTN- low dose b blocker.  5.Cont asa,statin.  6.Dementia-seroquel,risperidone.  7.GI and DVT prophylaxis
 89 y.o. F w/ PMHx of Alzheimer's dementia, HTN, DM,Okuuu-Bowab-f/p PPM  and anemia presents to the ED from home with complaints of generalized weakness,COVID.  1.WCT- low dose b blocker.  2.COVID-dexamethasone.  3.DM-Insulin.  4.HTN- low dose b blocker.  5.Cont asa,statin.  6.Dementia-seroquel,risperidone.  7.GI and DVT prophylaxis
 89 y.o. F w/ PMHx of Alzheimer's dementia, HTN, DM,Utdyy-Yozoi-n/p PPM  and anemia presents to the ED from home with complaints of generalized weakness,COVID.  1.WCT- low dose b blocker.  2.COVID-dexamethasone.  3.DM-Insulin.  4.HTN- low dose b blocker.  5.Cont asa,statin.  6.Dementia-seroquel,risperidone.  7.GI and DVT prophylaxis
 89 y.o. F w/ PMHx of Alzheimer's dementia, HTN, DM,Wyqge-Inmju-z/p PPM  and anemia presents to the ED from home with complaints of generalized weakness,COVID.  1.WCT- low dose b blocker.  2.COVID-dexamethasone.  3.DM-Insulin.  4.HTN- low dose b blocker.  5.Cont asa,statin.  6.Dementia-seroquel,risperidone.  7.GI and DVT prophylaxis
Covid   DM   Htn   Anemia   Dementia  UTI with E.Coli    PLAN;   Ceftin po  OOB in chair  s/c lovenox  F/u with PMD
Covid 19 +   UTI
Patient is 89 y.o. F w/ PMHx of Alzheimer's dementia, HTN, DM, and anemia presents to the ED from home with complaints of generalized weakness and unwitnessed fall. Admitted for generalized weakness and fall (COVID+).
 89 y.o. F w/ PMHx of Alzheimer's dementia, HTN, DM,Fcvvi-Sjxah-j/p PPM  and anemia presents to the ED from home with complaints of generalized weakness,COVID.  1.WCT- low dose b blocker.  2.COVID-dexamethasone.  3.DM-Insulin.  4.HTN- low dose b blocker.  5.Cont asa,statin.  6.Dementia-seroquel,risperidone.  7.GI and DVT prophylaxis
 89 y.o. F w/ PMHx of Alzheimer's dementia, HTN, DM,Iipmi-Gnpje-c/p PPM  and anemia presents to the ED from home with complaints of generalized weakness,COVID.  1.WCT- low dose b blocker.  2.COVID-dexamethasone,Remdesmivir.  3.DM-Insulin.  4.HTN- low dose b blocker.  5.Cont asa,statin.  6.Dementia-seroquel,risperidone.  7.GI and DVT prophylaxis
Covid   DM   Htn   Anemia   Dementia  UTI with E.Coli      Ceftinpo   OOB in chair  s/c lovenox
Covid   DM   Htn   Anemia   Dementia  UTI with E.Coli    PLAN;   Ceftin po   OOB in chair  s/c lovenox
Patient is 89 y.o. F w/ PMHx of Alzheimer's dementia, HTN, DM, and anemia presents to the ED from home with complaints of generalized weakness and unwitnessed fall. Admitted for generalized weakness and fall (COVID+).

## 2022-01-13 NOTE — PROGRESS NOTE ADULT - SUBJECTIVE AND OBJECTIVE BOX
PULMONARY  progress note/ Final note    BELA QUEEN  MRN-[id number]    Patient is a 89y old  Female who presents with a chief complaint of Weakness and Fall (13 Jan 2022 08:32)  no cough or sob, going home      MEDICATIONS  (STANDING):  ascorbic acid 500 milliGRAM(s) Oral daily  aspirin enteric coated 81 milliGRAM(s) Oral daily  cefuroxime   Tablet 250 milliGRAM(s) Oral every 12 hours  dexAMETHasone  Injectable 6 milliGRAM(s) IV Push daily  enoxaparin Injectable 40 milliGRAM(s) SubCutaneous daily  ferrous    sulfate 325 milliGRAM(s) Oral daily  folic acid 1 milliGRAM(s) Oral daily  glucagon  Injectable 1 milliGRAM(s) IntraMuscular once  insulin glargine Injectable (LANTUS) 15 Unit(s) SubCutaneous at bedtime  insulin lispro (ADMELOG) corrective regimen sliding scale   SubCutaneous three times a day before meals  insulin lispro Injectable (ADMELOG) 4 Unit(s) SubCutaneous three times a day before meals  metoprolol succinate ER 25 milliGRAM(s) Oral daily  multivitamin 1 Tablet(s) Oral daily  OLANZapine 2.5 milliGRAM(s) Oral <User Schedule>  pantoprazole    Tablet 40 milliGRAM(s) Oral before breakfast  sertraline 50 milliGRAM(s) Oral daily  zinc sulfate 220 milliGRAM(s) Oral daily      MEDICATIONS  (PRN):  acetaminophen     Tablet .. 650 milliGRAM(s) Oral every 6 hours PRN Temp greater or equal to 38C (100.4F), Mild Pain (1 - 3)  ALBUTerol    90 MICROgram(s) HFA Inhaler 2 Puff(s) Inhalation every 6 hours PRN Shortness of Breath and/or Wheezing  melatonin 3 milliGRAM(s) Oral at bedtime PRN Insomnia  OLANZapine 2.5 milliGRAM(s) Oral every 12 hours PRN agitation      Allergies    penicillins (Unknown)      PAST MEDICAL & SURGICAL HISTORY:  HTN (hypertension)    DM (diabetes mellitus)    Alzheimer disease    MESERET (iron deficiency anemia)            Vital Signs Last 24 Hrs  T(C): 36.5 (13 Jan 2022 05:47), Max: 37.1 (12 Jan 2022 14:30)  T(F): 97.7 (13 Jan 2022 05:47), Max: 98.8 (12 Jan 2022 14:30)  HR: 65 (13 Jan 2022 05:47) (65 - 110)  BP: 122/44 (13 Jan 2022 05:47) (113/43 - 133/77)  BP(mean): --  RR: 18 (13 Jan 2022 05:47) (18 - 18)  SpO2: 97% (13 Jan 2022 05:47) (96% - 97%)  I&O's Detail      PHYSICAL EXAMINATION:    GENERAL: The patient is a h/f in no apparent distress.   SKIN no rash ecchymoses or bruises  HEENT: Head is normocephalic and atraumatic  JOI , Extraocular muscles are intact. Mucous membranes  moist.   Neck supple ,No LN felt JVP not increased  Thyroid not enlarged  Cardiovascular:  S1 S2 heard, RSR, No JVD , systolic  murmur at apex, No gallop or rub  Respiratory: Chest wall symmetrical with good air entry ,Percussion note normal,    Lungs vesicular breathing with no  rales  or  wheeze	  ABDOMEN:  Soft, Non-tender,  no hepatomegaly or splenomegaly BS positive	  Extremities: Normal range of motion, No clubbing, cyanosis or edema  Vascular: Peripheral pulses palpable 2+ bilaterally  CNS:  Alert and responsive  dtr 2+  Babinski neg        LABS:                        12.6   7.00  )-----------( 247      ( 13 Jan 2022 06:45 )             38.8     01-13    145  |  112<H>  |  36<H>  ----------------------------<  203<H>  3.8   |  27  |  0.86    Ca    9.8      13 Jan 2022 06:45  Phos  2.9     01-13  Mg     2.5     01-13    TPro  7.0  /  Alb  3.2<L>  /  TBili  0.4  /  DBili  x   /  AST  44<H>  /  ALT  55  /  AlkPhos  78  01-13    PT/INR - ( 13 Jan 2022 06:45 )   PT: 13.8 sec;   INR: 1.17 ratio         PTT - ( 13 Jan 2022 06:45 )  PTT:31.1 sec    D-Dimer Assay, Quantitative: 562 ng/mL DDU (01-13-22 @ 06:45)  Ferritin, Serum: 295 ng/mL (01-13-22 @ 09:45)  Ferritin, Serum: 221 ng/mL (01-12-22 @ 09:42)  Ferritin, Serum: 237 ng/mL (01-10-22 @ 10:05)  Ferritin, Serum: 237 ng/mL (01-08-22 @ 17:09)  Ferritin, Serum: 224 ng/mL (01-07-22 @ 13:03)  Ferritin, Serum: 217 ng/mL (01-06-22 @ 18:31)

## 2022-01-13 NOTE — PROGRESS NOTE ADULT - PROBLEM SELECTOR PLAN 9
- Daughter having difficulty taking care of patient at home recently  - [facility]KE[facility]+CM consulted  - currently FULL CODE as per daughter (Ms. Tresa Hutton - [phone])  - daughter tested positive and cannot take care of her  - for possible d/c to [facility]

## 2022-01-13 NOTE — PROGRESS NOTE ADULT - PROBLEM SELECTOR PLAN 1
- On many psych meds at home: Sertraline, Quetiapine, Risperidone, Fluoxetine  - Risperdal and Seroquel were discontinued, zoloft was resumed and zyprexa was started.   - Psych consulted (Dr. Voss)  - had episode of dysphagia w/ water and apple sauce  - Speech and Swallow - minced and moist - On many psych meds at home: Sertraline, Quetiapine, Risperidone, Fluoxetine  - Risperdal and Seroquel were discontinued, zoloft was resumed and zyprexa was started.   - Psych consulted (Dr. Voss)  - had episode of dysphagia w/ water and apple sauce  - Speech and Swallow - minced and moist  - discharged to [facility] today

## 2022-01-13 NOTE — PROGRESS NOTE ADULT - REASON FOR ADMISSION
Weakness and Fall

## 2022-01-13 NOTE — PROGRESS NOTE ADULT - SUBJECTIVE AND OBJECTIVE BOX
PGY-1 Progress Note discussed with attending    PAGER #: [[phone]] TILL 5:00 PM  PLEASE CONTACT ON CALL TEAM:  - On Call Team (Please refer to Morena) FROM 5:00 PM - 8:30PM  - Nightfloat Team FROM 8:30 -7:30 AM    CHIEF COMPLAINT & BRIEF HOSPITAL COURSE:      INTERVAL HPI/OVERNIGHT EVENTS:       REVIEW OF SYSTEMS:  CONSTITUTIONAL: No fever, weight loss, or fatigue  RESPIRATORY: No cough, wheezing, chills or hemoptysis; No shortness of breath  CARDIOVASCULAR: No chest pain, palpitations, dizziness, or leg swelling  GASTROINTESTINAL: No abdominal pain. No nausea, vomiting, or hematemesis; No diarrhea or constipation. No melena or hematochezia.  GENITOURINARY: No dysuria or hematuria, urinary frequency  NEUROLOGICAL: No headaches, memory loss, loss of strength, numbness, or tremors  SKIN: No itching, burning, rashes, or lesions     MEDICATIONS  (STANDING):  ascorbic acid 500 milliGRAM(s) Oral daily  aspirin enteric coated 81 milliGRAM(s) Oral daily  cefuroxime   Tablet 250 milliGRAM(s) Oral every 12 hours  dexAMETHasone  Injectable 6 milliGRAM(s) IV Push daily  enoxaparin Injectable 40 milliGRAM(s) SubCutaneous daily  ferrous    sulfate 325 milliGRAM(s) Oral daily  folic acid 1 milliGRAM(s) Oral daily  glucagon  Injectable 1 milliGRAM(s) IntraMuscular once  insulin glargine Injectable (LANTUS) 15 Unit(s) SubCutaneous at bedtime  insulin lispro (ADMELOG) corrective regimen sliding scale   SubCutaneous three times a day before meals  insulin lispro Injectable (ADMELOG) 4 Unit(s) SubCutaneous three times a day before meals  metoprolol succinate ER 25 milliGRAM(s) Oral daily  multivitamin 1 Tablet(s) Oral daily  OLANZapine 2.5 milliGRAM(s) Oral <User Schedule>  pantoprazole    Tablet 40 milliGRAM(s) Oral before breakfast  sertraline 50 milliGRAM(s) Oral daily  zinc sulfate 220 milliGRAM(s) Oral daily    MEDICATIONS  (PRN):  acetaminophen     Tablet .. 650 milliGRAM(s) Oral every 6 hours PRN Temp greater or equal to 38C (100.4F), Mild Pain (1 - 3)  ALBUTerol    90 MICROgram(s) HFA Inhaler 2 Puff(s) Inhalation every 6 hours PRN Shortness of Breath and/or Wheezing  melatonin 3 milliGRAM(s) Oral at bedtime PRN Insomnia  OLANZapine 2.5 milliGRAM(s) Oral every 12 hours PRN agitation      Vital Signs Last 24 Hrs  T(C): 36.5 (13 Jan 2022 05:47), Max: 37.1 (12 Jan 2022 14:30)  T(F): 97.7 (13 Jan 2022 05:47), Max: 98.8 (12 Jan 2022 14:30)  HR: 65 (13 Jan 2022 05:47) (65 - 110)  BP: 122/44 (13 Jan 2022 05:47) (113/43 - 133/77)  BP(mean): --  RR: 18 (13 Jan 2022 05:47) (18 - 18)  SpO2: 97% (13 Jan 2022 05:47) (96% - 97%)    PHYSICAL EXAMINATION:  GENERAL: NAD, well built  HEAD:  Atraumatic, Normocephalic  EYES:  conjunctiva and sclera clear  NECK: Supple, No JVD, Normal thyroid  CHEST/LUNG: Clear to auscultation. Clear to percussion bilaterally; No rales, rhonchi, wheezing, or rubs  HEART: Regular rate and rhythm; No murmurs, rubs, or gallops  ABDOMEN: Soft, Nontender, Nondistended; Bowel sounds present, no pain or masses on palpation  NERVOUS SYSTEM:  Alert & Oriented X3  : voiding well  EXTREMITIES:  2+ Peripheral Pulses, No clubbing, cyanosis, or edema  SKIN: warm dry                          12.6   7.00  )-----------( 247      ( 13 Jan 2022 06:45 )             38.8     01-13    145  |  112<H>  |  36<H>  ----------------------------<  203<H>  3.8   |  27  |  0.86    Ca    9.8      13 Jan 2022 06:45  Phos  2.9     01-13  Mg     2.5     01-13    TPro  7.0  /  Alb  3.2<L>  /  TBili  0.4  /  DBili  x   /  AST  44<H>  /  ALT  55  /  AlkPhos  78  01-13    LIVER FUNCTIONS - ( 13 Jan 2022 06:45 )  Alb: 3.2 g/dL / Pro: 7.0 g/dL / ALK PHOS: 78 U/L / ALT: 55 U/L DA / AST: 44 U/L / GGT: x               PT/INR - ( 13 Jan 2022 06:45 )   PT: 13.8 sec;   INR: 1.17 ratio         PTT - ( 13 Jan 2022 06:45 )  PTT:31.1 sec    I&O's Summary          CAPILLARY BLOOD GLUCOSE      RADIOLOGY & ADDITIONAL TESTS:                   PGY-1 Progress Note discussed with attending    PAGER #: [[phone]] TILL 5:00 PM  PLEASE CONTACT ON CALL TEAM:  - On Call Team (Please refer to Morena) FROM 5:00 PM - 8:30PM  - Nightfloat Team FROM 8:30 -7:30 AM    INTERVAL HPI/OVERNIGHT EVENTS:     Patient was examined at bedside, AAOx2, stable, NAD and saturating well at room air. She has episodes of confusion but calm. No other significant events overnight. For discharge today. For discharge to [facility] today.    REVIEW OF SYSTEMS: cannot fully assess due to confusion  CONSTITUTIONAL: No fever, weight loss, or fatigue  RESPIRATORY: No cough, wheezing, chills or hemoptysis; No shortness of breath  CARDIOVASCULAR: No chest pain, palpitations, dizziness, or leg swelling  GASTROINTESTINAL: No abdominal pain. No nausea, vomiting, or hematemesis; No diarrhea or constipation. No melena or hematochezia.  GENITOURINARY: No dysuria or hematuria, urinary frequency  NEUROLOGICAL: No headaches, memory loss, loss of strength, numbness, or tremors  SKIN: No itching, burning, rashes, or lesions     MEDICATIONS  (STANDING):  ascorbic acid 500 milliGRAM(s) Oral daily  aspirin enteric coated 81 milliGRAM(s) Oral daily  cefuroxime   Tablet 250 milliGRAM(s) Oral every 12 hours  dexAMETHasone  Injectable 6 milliGRAM(s) IV Push daily  enoxaparin Injectable 40 milliGRAM(s) SubCutaneous daily  ferrous    sulfate 325 milliGRAM(s) Oral daily  folic acid 1 milliGRAM(s) Oral daily  glucagon  Injectable 1 milliGRAM(s) IntraMuscular once  insulin glargine Injectable (LANTUS) 15 Unit(s) SubCutaneous at bedtime  insulin lispro (ADMELOG) corrective regimen sliding scale   SubCutaneous three times a day before meals  insulin lispro Injectable (ADMELOG) 4 Unit(s) SubCutaneous three times a day before meals  metoprolol succinate ER 25 milliGRAM(s) Oral daily  multivitamin 1 Tablet(s) Oral daily  OLANZapine 2.5 milliGRAM(s) Oral <User Schedule>  pantoprazole    Tablet 40 milliGRAM(s) Oral before breakfast  sertraline 50 milliGRAM(s) Oral daily  zinc sulfate 220 milliGRAM(s) Oral daily    MEDICATIONS  (PRN):  acetaminophen     Tablet .. 650 milliGRAM(s) Oral every 6 hours PRN Temp greater or equal to 38C (100.4F), Mild Pain (1 - 3)  ALBUTerol    90 MICROgram(s) HFA Inhaler 2 Puff(s) Inhalation every 6 hours PRN Shortness of Breath and/or Wheezing  melatonin 3 milliGRAM(s) Oral at bedtime PRN Insomnia  OLANZapine 2.5 milliGRAM(s) Oral every 12 hours PRN agitation      Vital Signs Last 24 Hrs  T(C): 36.5 (13 Jan 2022 05:47), Max: 37.1 (12 Jan 2022 14:30)  T(F): 97.7 (13 Jan 2022 05:47), Max: 98.8 (12 Jan 2022 14:30)  HR: 65 (13 Jan 2022 05:47) (65 - 110)  BP: 122/44 (13 Jan 2022 05:47) (113/43 - 133/77)  BP(mean): --  RR: 18 (13 Jan 2022 05:47) (18 - 18)  SpO2: 97% (13 Jan 2022 05:47) (96% - 97%)    PHYSICAL EXAMINATION:  GENERAL: NAD  HEAD:  Atraumatic, Normocephalic  EYES:  conjunctiva and sclera clear  NECK: Supple, No JVD, Normal thyroid  CHEST/LUNG: Clear to auscultation. Clear to percussion bilaterally; No rales, rhonchi, wheezing, or rubs  HEART: Regular rate and rhythm; No murmurs, rubs, or gallops  ABDOMEN: Soft, Nontender, Nondistended; Bowel sounds present, no pain or masses on palpation  NERVOUS SYSTEM:  Alert & Oriented X2 (cannot fully assess due to confusion)  : voiding well  EXTREMITIES:  2+ Peripheral Pulses, No clubbing, cyanosis, or edema  SKIN: warm dry                          12.6   7.00  )-----------( 247      ( 13 Jan 2022 06:45 )             38.8     01-13    145  |  112<H>  |  36<H>  ----------------------------<  203<H>  3.8   |  27  |  0.86    Ca    9.8      13 Jan 2022 06:45  Phos  2.9     01-13  Mg     2.5     01-13    TPro  7.0  /  Alb  3.2<L>  /  TBili  0.4  /  DBili  x   /  AST  44<H>  /  ALT  55  /  AlkPhos  78  01-13    LIVER FUNCTIONS - ( 13 Jan 2022 06:45 )  Alb: 3.2 g/dL / Pro: 7.0 g/dL / ALK PHOS: 78 U/L / ALT: 55 U/L DA / AST: 44 U/L / GGT: x               PT/INR - ( 13 Jan 2022 06:45 )   PT: 13.8 sec;   INR: 1.17 ratio         PTT - ( 13 Jan 2022 06:45 )  PTT:31.1 sec    I&O's Summary          CAPILLARY BLOOD GLUCOSE      RADIOLOGY & ADDITIONAL TESTS:

## 2022-01-13 NOTE — PROGRESS NOTE ADULT - PROBLEM SELECTOR PLAN 4
- P/w Cr 1.43 (baseline ~0.9)  - Likely due to poor oral intake(dehydration)  - S/p 1L bolus in ED  - Gentle IVF 60cc/hr for 12 hrs

## 2022-01-13 NOTE — PROGRESS NOTE ADULT - PROBLEM SELECTOR PLAN 5
- Takes meds at home  - Continue as sliding scale  - FS [facility]  - inc. lantus 10u to 15u bedtime + admelog 2u to 4u premeals  - A1c - 7.4

## 2022-01-13 NOTE — DISCHARGE NOTE NURSING/CASE MANAGEMENT/SOCIAL WORK - PATIENT PORTAL LINK FT
You can access the FollowMyHealth Patient Portal offered by [facility] by registering at the following website: [email or web address]. By joining [employer]’s FollowMyHealth portal, you will also be able to view your health information using other applications (apps) compatible with our system.

## 2022-01-13 NOTE — PROGRESS NOTE ADULT - SUBJECTIVE AND OBJECTIVE BOX
CHIEF COMPLAINT:Patient is a 89y old  Female who presents with a chief complaint of Weakness and Fall.Pt appears comfortable.    	  REVIEW OF SYSTEMS:  CONSTITUTIONAL: No fever, weight loss, or fatigue  EYES: No eye pain, visual disturbances, or discharge  ENT:  No difficulty hearing, tinnitus, vertigo; No sinus or throat pain  NECK: No pain or stiffness  RESPIRATORY: No cough, wheezing, chills or hemoptysis; No Shortness of Breath  CARDIOVASCULAR: No chest pain, palpitations, passing out, dizziness, or leg swelling  GASTROINTESTINAL: No abdominal or epigastric pain. No nausea, vomiting, or hematemesis; No diarrhea or constipation. No melena or hematochezia.  GENITOURINARY: No dysuria, frequency, hematuria, or incontinence  NEUROLOGICAL: No headaches, memory loss, loss of strength, numbness, or tremors  SKIN: No itching, burning, rashes, or lesions   LYMPH Nodes: No enlarged glands  ENDOCRINE: No heat or cold intolerance; No hair loss  MUSCULOSKELETAL: No joint pain or swelling; No muscle, back, or extremity pain  PSYCHIATRIC: No depression, anxiety, mood swings, or difficulty sleeping  HEME/LYMPH: No easy bruising, or bleeding gums  ALLERGY AND IMMUNOLOGIC: No hives or eczema	        PHYSICAL EXAM:  T(C): 36.5 (01-13-22 @ 05:47), Max: 37.1 (01-12-22 @ 14:30)  HR: 65 (01-13-22 @ 05:47) (65 - 110)  BP: 122/44 (01-13-22 @ 05:47) (113/43 - 133/77)  RR: 18 (01-13-22 @ 05:47) (18 - 18)  SpO2: 97% (01-13-22 @ 05:47) (96% - 97%)  Wt(kg): --  I&O's Summary      Appearance: Normal	  HEENT:   Normal oral mucosa, PERRL, EOMI	  Lymphatic: No lymphadenopathy  Cardiovascular: Normal S1 S2, No JVD, No murmurs, No edema  Respiratory: Lungs clear to auscultation	  Psychiatry: A & O x 3, Mood & affect appropriate  Gastrointestinal:  Soft, Non-tender, + BS	  Skin: No rashes, No ecchymoses, No cyanosis	  Neurologic: Non-focal  Extremities: Normal range of motion, No clubbing, cyanosis or edema  Vascular: Peripheral pulses palpable 2+ bilaterally    MEDICATIONS  (STANDING):  ascorbic acid 500 milliGRAM(s) Oral daily  aspirin enteric coated 81 milliGRAM(s) Oral daily  cefuroxime   Tablet 250 milliGRAM(s) Oral every 12 hours  dexAMETHasone  Injectable 6 milliGRAM(s) IV Push daily  enoxaparin Injectable 40 milliGRAM(s) SubCutaneous daily  ferrous    sulfate 325 milliGRAM(s) Oral daily  folic acid 1 milliGRAM(s) Oral daily  glucagon  Injectable 1 milliGRAM(s) IntraMuscular once  insulin glargine Injectable (LANTUS) 15 Unit(s) SubCutaneous at bedtime  insulin lispro (ADMELOG) corrective regimen sliding scale   SubCutaneous three times a day before meals  insulin lispro Injectable (ADMELOG) 4 Unit(s) SubCutaneous three times a day before meals  metoprolol succinate ER 25 milliGRAM(s) Oral daily  multivitamin 1 Tablet(s) Oral daily  OLANZapine 2.5 milliGRAM(s) Oral <User Schedule>  pantoprazole    Tablet 40 milliGRAM(s) Oral before breakfast  sertraline 50 milliGRAM(s) Oral daily  zinc sulfate 220 milliGRAM(s) Oral daily     	  	  LABS:	 	                        12.6   7.00  )-----------( 247      ( 13 Jan 2022 06:45 )             38.8     01-13    145  |  112<H>  |  36<H>  ----------------------------<  203<H>  3.8   |  27  |  0.86    Ca    9.8      13 Jan 2022 06:45  Phos  2.9     01-13  Mg     2.5     01-13    TPro  7.0  /  Alb  3.2<L>  /  TBili  0.4  /  DBili  x   /  AST  44<H>  /  ALT  55  /  AlkPhos  78  01-13    proBNP: Serum Pro-Brain Natriuretic Peptide: 820 pg/mL (01-04 @ 22:46)    Lipid Profile: Cholesterol 155  LDL --  HDL 45    Ldl calc 87  Ratio --    HgA1c:   TSH: Thyroid Stimulating Hormone, Serum: 6.67 uU/mL (01-05 @ 10:42)

## 2022-01-13 NOTE — PROGRESS NOTE ADULT - TIME BILLING
I have examined pt personally Hx chart lab and xrays reviewed and pt discussed with staff and PMD

## 2022-01-13 NOTE — DISCHARGE NOTE NURSING/CASE MANAGEMENT/SOCIAL WORK - NSDCPEFALRISK_GEN_ALL_CORE
For information on Fall & Injury Prevention, visit: [email or web address] OR  [email or web address] OR  https://www.cdc.gov/steadi/patient.html

## 2022-01-13 NOTE — PROGRESS NOTE ADULT - SUBJECTIVE AND OBJECTIVE BOX
Patient is a 89y old  Female who presents with a chief complaint of Weakness and Fall (12 Jan 2022 10:42)    pt seen in icu [  ], reg med floor [ x  ], bed [x  ], chair at bedside [   ], awake and responsive [ x ], lethargic [  ],    nad [ x ]        Allergies    penicillins (Unknown)        Vitals    T(F): 97.7 (01-13-22 @ 05:47), Max: 98.8 (01-12-22 @ 14:30)  HR: 65 (01-13-22 @ 05:47) (65 - 110)  BP: 122/44 (01-13-22 @ 05:47) (113/43 - 133/77)  RR: 18 (01-13-22 @ 05:47) (18 - 18)  SpO2: 97% (01-13-22 @ 05:47) (96% - 97%)  Wt(kg): --  CAPILLARY BLOOD GLUCOSE      POCT Blood Glucose.: 123 mg/dL (12 Jan 2022 22:46)      Labs                          11.9   7.33  )-----------( 222      ( 12 Jan 2022 07:17 )             36.1       01-12    142  |  112<H>  |  30<H>  ----------------------------<  230<H>  3.7   |  25  |  0.91    Ca    9.3      12 Jan 2022 07:17  Phos  3.2     01-12  Mg     2.1     01-12              Clean Catch Clean Catch (Midstream)  01-05 @ 11:36   >100,000 CFU/ml Escherichia coli  --  Escherichia coli          Radiology Results      Meds    MEDICATIONS  (STANDING):  ascorbic acid 500 milliGRAM(s) Oral daily  aspirin enteric coated 81 milliGRAM(s) Oral daily  cefuroxime   Tablet 250 milliGRAM(s) Oral every 12 hours  dexAMETHasone  Injectable 6 milliGRAM(s) IV Push daily  enoxaparin Injectable 40 milliGRAM(s) SubCutaneous daily  ferrous    sulfate 325 milliGRAM(s) Oral daily  folic acid 1 milliGRAM(s) Oral daily  glucagon  Injectable 1 milliGRAM(s) IntraMuscular once  insulin glargine Injectable (LANTUS) 15 Unit(s) SubCutaneous at bedtime  insulin lispro (ADMELOG) corrective regimen sliding scale   SubCutaneous three times a day before meals  insulin lispro Injectable (ADMELOG) 4 Unit(s) SubCutaneous three times a day before meals  metoprolol succinate ER 25 milliGRAM(s) Oral daily  multivitamin 1 Tablet(s) Oral daily  OLANZapine 2.5 milliGRAM(s) Oral <User Schedule>  pantoprazole    Tablet 40 milliGRAM(s) Oral before breakfast  sertraline 50 milliGRAM(s) Oral daily  zinc sulfate 220 milliGRAM(s) Oral daily      MEDICATIONS  (PRN):  acetaminophen     Tablet .. 650 milliGRAM(s) Oral every 6 hours PRN Temp greater or equal to 38C (100.4F), Mild Pain (1 - 3)  ALBUTerol    90 MICROgram(s) HFA Inhaler 2 Puff(s) Inhalation every 6 hours PRN Shortness of Breath and/or Wheezing  melatonin 3 milliGRAM(s) Oral at bedtime PRN Insomnia  OLANZapine 2.5 milliGRAM(s) Oral every 12 hours PRN agitation      Physical Exam    Neuro :  no focal deficits  Respiratory: CTA B/L  CV: RRR, S1S2, no murmurs,   Abdominal: Soft, NT, ND +BS,  Extremities: No edema, + peripheral pulses        ASSESSMENT    pneumonia 2nd to covid 19,   s/p fall,   uti   h/o Alzheimer's dementia,   HTN,   DM,   2 to 1 heart block s/p micra ppm placement 9/17/21   MESERET (iron deficiency anemia)        PLAN    cont decadron,   cont vit c, vitamin d, zinc, singulair, flonase   contact and airborne isolation,   pulm f/u   cont albuterol inhaler,   afebrile   tylenol prn,   robitussin prn   O2 sat 98% (95% - 98%) on ra  O2 via nasal canula as needed,   cont ceftin 250 mg daily x 5 days until 1/14/22  ucx and sens with  e coli noted above  serum creat wnl   cardio f/u  cont cardiac meds   endo cons  cont lispro ss  cont lantus 10 units qhs   lispro 2 units actid  free t4 wnl noted    phys tx re-eval noted and rec phys tx 3-5x/week x 2wks at rehabilitation facility  psych f/u   Decrease Zyprexa dosing to5 mg PO qhs [DC 12pm dose] standing to reduce daytime somnolence  C/w home Zoloft 50 mg qd  For acute agitation, recommend Zyprexa 2.5 mg q12h PRN agitation  -- PO dose form is preferred if pt is able to swallow, but IM can be used as backup if PO refused or cannot be given  Pt will need LTP--she clearly lacks capacity, so daughter should be approached for necessary consents  case mgmt ,   cont current meds   d/c planning           Patient is a 89y old  Female who presents with a chief complaint of Weakness and Fall (12 Jan 2022 10:42)    pt seen in icu [  ], reg med floor [ x  ], bed [x  ], chair at bedside [   ], awake and responsive [ x ], lethargic [  ],    nad [ x ]        Allergies    penicillins (Unknown)        Vitals    T(F): 97.7 (01-13-22 @ 05:47), Max: 98.8 (01-12-22 @ 14:30)  HR: 65 (01-13-22 @ 05:47) (65 - 110)  BP: 122/44 (01-13-22 @ 05:47) (113/43 - 133/77)  RR: 18 (01-13-22 @ 05:47) (18 - 18)  SpO2: 97% (01-13-22 @ 05:47) (96% - 97%)  Wt(kg): --  CAPILLARY BLOOD GLUCOSE      POCT Blood Glucose.: 123 mg/dL (12 Jan 2022 22:46)      Labs                          11.9   7.33  )-----------( 222      ( 12 Jan 2022 07:17 )             36.1       01-12    142  |  112<H>  |  30<H>  ----------------------------<  230<H>  3.7   |  25  |  0.91    Ca    9.3      12 Jan 2022 07:17  Phos  3.2     01-12  Mg     2.1     01-12              Clean Catch Clean Catch (Midstream)  01-05 @ 11:36   >100,000 CFU/ml Escherichia coli  --  Escherichia coli          Radiology Results      Meds    MEDICATIONS  (STANDING):  ascorbic acid 500 milliGRAM(s) Oral daily  aspirin enteric coated 81 milliGRAM(s) Oral daily  cefuroxime   Tablet 250 milliGRAM(s) Oral every 12 hours  dexAMETHasone  Injectable 6 milliGRAM(s) IV Push daily  enoxaparin Injectable 40 milliGRAM(s) SubCutaneous daily  ferrous    sulfate 325 milliGRAM(s) Oral daily  folic acid 1 milliGRAM(s) Oral daily  glucagon  Injectable 1 milliGRAM(s) IntraMuscular once  insulin glargine Injectable (LANTUS) 15 Unit(s) SubCutaneous at bedtime  insulin lispro (ADMELOG) corrective regimen sliding scale   SubCutaneous three times a day before meals  insulin lispro Injectable (ADMELOG) 4 Unit(s) SubCutaneous three times a day before meals  metoprolol succinate ER 25 milliGRAM(s) Oral daily  multivitamin 1 Tablet(s) Oral daily  OLANZapine 2.5 milliGRAM(s) Oral <User Schedule>  pantoprazole    Tablet 40 milliGRAM(s) Oral before breakfast  sertraline 50 milliGRAM(s) Oral daily  zinc sulfate 220 milliGRAM(s) Oral daily      MEDICATIONS  (PRN):  acetaminophen     Tablet .. 650 milliGRAM(s) Oral every 6 hours PRN Temp greater or equal to 38C (100.4F), Mild Pain (1 - 3)  ALBUTerol    90 MICROgram(s) HFA Inhaler 2 Puff(s) Inhalation every 6 hours PRN Shortness of Breath and/or Wheezing  melatonin 3 milliGRAM(s) Oral at bedtime PRN Insomnia  OLANZapine 2.5 milliGRAM(s) Oral every 12 hours PRN agitation      Physical Exam    Neuro :  no focal deficits  Respiratory: CTA B/L  CV: RRR, S1S2, no murmurs,   Abdominal: Soft, NT, ND +BS,  Extremities: No edema, + peripheral pulses        ASSESSMENT    pneumonia 2nd to covid 19,   s/p fall,   uti   h/o Alzheimer's dementia,   HTN,   DM,   2 to 1 heart block s/p micra ppm placement 9/17/21   MESERET (iron deficiency anemia)        PLAN    d/c decadron,   cont vit c, vitamin d, zinc, singulair, flonase   contact and airborne isolation,   pulm f/u   cont albuterol inhaler,   afebrile   tylenol prn,   robitussin prn   O2 sat 97% (96% - 97%) on ra  O2 via nasal canula as needed,   cont ceftin 250 mg daily x 5 days until 1/14/22  ucx and sens with  e coli noted above  serum creat wnl   cardio f/u  cont cardiac meds   endo cons  cont lispro ss  cont lantus 10 units qhs   lispro 2 units actid   resume outpt diabetic meds on d/c  free t4 wnl noted    phys tx re-eval noted and rec phys tx 3-5x/week x 2wks at rehabilitation facility  family prefers d/c home  psych f/u   C/w Zyprexa 2.5 mg PO qhs standing to prevent O/N agitation  C/w home Zoloft 50 mg qd  For acute agitation, recommend Zyprexa 2.5 mg q12h PRN agitation  -- PO dose form is preferred if pt is able to swallow, but IM can be used as backup if PO refused or cannot be given  Pt is planned for DC home at daughter's request  cont current meds   d/c planning for home

## 2022-01-20 NOTE — PROGRESS NOTE ADULT - PROBLEM SELECTOR PROBLEM 2
I reviewed the H&P, I examined the patient, and there are no changes in the patient's condition.  Chandni Wick MD PGY-4   2019 novel coronavirus disease (COVID-19)

## 2022-03-27 ENCOUNTER — INPATIENT (INPATIENT)
Facility: HOSPITAL | Age: 87
LOS: 3 days | Discharge: HOME | DRG: 389 | End: 2022-03-31
Attending: INTERNAL MEDICINE | Admitting: INTERNAL MEDICINE
Payer: COMMERCIAL

## 2022-03-27 VITALS
SYSTOLIC BLOOD PRESSURE: 142 MMHG | OXYGEN SATURATION: 97 % | RESPIRATION RATE: 18 BRPM | HEIGHT: 62 IN | HEART RATE: 63 BPM | DIASTOLIC BLOOD PRESSURE: 90 MMHG | TEMPERATURE: 98 F

## 2022-03-27 DIAGNOSIS — Z96.649 PRESENCE OF UNSPECIFIED ARTIFICIAL HIP JOINT: Chronic | ICD-10-CM

## 2022-03-27 DIAGNOSIS — N13.9 OBSTRUCTIVE AND REFLUX UROPATHY, UNSPECIFIED: ICD-10-CM

## 2022-03-27 DIAGNOSIS — Z90.710 ACQUIRED ABSENCE OF BOTH CERVIX AND UTERUS: Chronic | ICD-10-CM

## 2022-03-27 LAB
ACETONE SERPL-MCNC: NEGATIVE — SIGNIFICANT CHANGE UP
ALBUMIN SERPL ELPH-MCNC: 3.4 G/DL — LOW (ref 3.5–5)
ALP SERPL-CCNC: 88 U/L — SIGNIFICANT CHANGE UP (ref 40–120)
ALT FLD-CCNC: 32 U/L DA — SIGNIFICANT CHANGE UP (ref 10–60)
ANION GAP SERPL CALC-SCNC: 4 MMOL/L — LOW (ref 5–17)
APPEARANCE UR: SIGNIFICANT CHANGE UP
AST SERPL-CCNC: 27 U/L — SIGNIFICANT CHANGE UP (ref 10–40)
BACTERIA # UR AUTO: ABNORMAL /HPF
BASOPHILS # BLD AUTO: 0.06 K/UL — SIGNIFICANT CHANGE UP (ref 0–0.2)
BASOPHILS NFR BLD AUTO: 0.8 % — SIGNIFICANT CHANGE UP (ref 0–2)
BILIRUB SERPL-MCNC: 0.2 MG/DL — SIGNIFICANT CHANGE UP (ref 0.2–1.2)
BILIRUB UR-MCNC: NEGATIVE — SIGNIFICANT CHANGE UP
BUN SERPL-MCNC: 40 MG/DL — HIGH (ref 7–18)
CALCIUM SERPL-MCNC: 9 MG/DL — SIGNIFICANT CHANGE UP (ref 8.4–10.5)
CHLORIDE SERPL-SCNC: 103 MMOL/L — SIGNIFICANT CHANGE UP (ref 96–108)
CO2 SERPL-SCNC: 28 MMOL/L — SIGNIFICANT CHANGE UP (ref 22–31)
COLOR SPEC: YELLOW — SIGNIFICANT CHANGE UP
CREAT SERPL-MCNC: 1.37 MG/DL — HIGH (ref 0.5–1.3)
DIFF PNL FLD: ABNORMAL
EGFR: 36 ML/MIN/1.73M2 — LOW
EOSINOPHIL # BLD AUTO: 0.11 K/UL — SIGNIFICANT CHANGE UP (ref 0–0.5)
EOSINOPHIL NFR BLD AUTO: 1.5 % — SIGNIFICANT CHANGE UP (ref 0–6)
EPI CELLS # UR: ABNORMAL /HPF
GLUCOSE SERPL-MCNC: 241 MG/DL — HIGH (ref 70–99)
GLUCOSE UR QL: NEGATIVE — SIGNIFICANT CHANGE UP
HCT VFR BLD CALC: 34.7 % — SIGNIFICANT CHANGE UP (ref 34.5–45)
HGB BLD-MCNC: 11.4 G/DL — LOW (ref 11.5–15.5)
IMM GRANULOCYTES NFR BLD AUTO: 0.3 % — SIGNIFICANT CHANGE UP (ref 0–1.5)
KETONES UR-MCNC: NEGATIVE — SIGNIFICANT CHANGE UP
LEUKOCYTE ESTERASE UR-ACNC: ABNORMAL
LIDOCAIN IGE QN: 23 U/L — LOW (ref 73–393)
LYMPHOCYTES # BLD AUTO: 1.68 K/UL — SIGNIFICANT CHANGE UP (ref 1–3.3)
LYMPHOCYTES # BLD AUTO: 23.1 % — SIGNIFICANT CHANGE UP (ref 13–44)
MAGNESIUM SERPL-MCNC: 2.2 MG/DL — SIGNIFICANT CHANGE UP (ref 1.6–2.6)
MCHC RBC-ENTMCNC: 30 PG — SIGNIFICANT CHANGE UP (ref 27–34)
MCHC RBC-ENTMCNC: 32.9 GM/DL — SIGNIFICANT CHANGE UP (ref 32–36)
MCV RBC AUTO: 91.3 FL — SIGNIFICANT CHANGE UP (ref 80–100)
MONOCYTES # BLD AUTO: 0.62 K/UL — SIGNIFICANT CHANGE UP (ref 0–0.9)
MONOCYTES NFR BLD AUTO: 8.5 % — SIGNIFICANT CHANGE UP (ref 2–14)
NEUTROPHILS # BLD AUTO: 4.77 K/UL — SIGNIFICANT CHANGE UP (ref 1.8–7.4)
NEUTROPHILS NFR BLD AUTO: 65.8 % — SIGNIFICANT CHANGE UP (ref 43–77)
NITRITE UR-MCNC: NEGATIVE — SIGNIFICANT CHANGE UP
NRBC # BLD: 0 /100 WBCS — SIGNIFICANT CHANGE UP (ref 0–0)
NT-PROBNP SERPL-SCNC: 295 PG/ML — SIGNIFICANT CHANGE UP (ref 0–450)
PH UR: 7 — SIGNIFICANT CHANGE UP (ref 5–8)
PLATELET # BLD AUTO: 229 K/UL — SIGNIFICANT CHANGE UP (ref 150–400)
POTASSIUM SERPL-MCNC: 4.7 MMOL/L — SIGNIFICANT CHANGE UP (ref 3.5–5.3)
POTASSIUM SERPL-SCNC: 4.7 MMOL/L — SIGNIFICANT CHANGE UP (ref 3.5–5.3)
PROT SERPL-MCNC: 7 G/DL — SIGNIFICANT CHANGE UP (ref 6–8.3)
PROT UR-MCNC: NEGATIVE — SIGNIFICANT CHANGE UP
RBC # BLD: 3.8 M/UL — SIGNIFICANT CHANGE UP (ref 3.8–5.2)
RBC # FLD: 14.2 % — SIGNIFICANT CHANGE UP (ref 10.3–14.5)
RBC CASTS # UR COMP ASSIST: ABNORMAL /HPF (ref 0–2)
SARS-COV-2 RNA SPEC QL NAA+PROBE: SIGNIFICANT CHANGE UP
SODIUM SERPL-SCNC: 135 MMOL/L — SIGNIFICANT CHANGE UP (ref 135–145)
SP GR SPEC: 1.01 — SIGNIFICANT CHANGE UP (ref 1.01–1.02)
UROBILINOGEN FLD QL: NEGATIVE — SIGNIFICANT CHANGE UP
WBC # BLD: 7.26 K/UL — SIGNIFICANT CHANGE UP (ref 3.8–10.5)
WBC # FLD AUTO: 7.26 K/UL — SIGNIFICANT CHANGE UP (ref 3.8–10.5)
WBC UR QL: ABNORMAL /HPF (ref 0–5)

## 2022-03-27 PROCEDURE — 99285 EMERGENCY DEPT VISIT HI MDM: CPT

## 2022-03-27 PROCEDURE — 71045 X-RAY EXAM CHEST 1 VIEW: CPT | Mod: 26

## 2022-03-27 PROCEDURE — 74019 RADEX ABDOMEN 2 VIEWS: CPT | Mod: 26

## 2022-03-27 RX ORDER — SODIUM CHLORIDE 9 MG/ML
1000 INJECTION INTRAMUSCULAR; INTRAVENOUS; SUBCUTANEOUS
Refills: 0 | Status: DISCONTINUED | OUTPATIENT
Start: 2022-03-27 | End: 2022-03-29

## 2022-03-27 RX ADMIN — SODIUM CHLORIDE 150 MILLILITER(S): 9 INJECTION INTRAMUSCULAR; INTRAVENOUS; SUBCUTANEOUS at 20:12

## 2022-03-27 NOTE — ED PROVIDER NOTE - OBJECTIVE STATEMENT
89 y.o. female wheelchair bound with h/o IDDM, last insulin 2 hrs ago, BIBA as per daughter, pt with BM for 2 weeks, not passing gas, appetite good, pt's urinating well, no n/v, fever.  daughter gave pt enema with no result.  Pt never had constipation, last colonoscopy many yrs ago.

## 2022-03-27 NOTE — ED PROVIDER NOTE - CARE PLAN
1 Principal Discharge DX:	Obstructed, uropathy  Secondary Diagnosis:	Fecal impaction  Secondary Diagnosis:	Urinary retention

## 2022-03-27 NOTE — ED PROVIDER NOTE - AGGRAVATING FACTORS
Agree with PA note above. 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 Please Call PRN. none

## 2022-03-27 NOTE — ED PROVIDER NOTE - PROGRESS NOTE DETAILS
fecal disimpacted manually with copious amt of hard stool.  Since pt with dementia, pt will need high colonic, also with SHELIA, will place izquierdo for poss urinary retention 2/2 fecal impaction.  Case d/w Dr. Snyder, will admit izquierdo placed- 350cc obtained

## 2022-03-27 NOTE — ED ADULT NURSE NOTE - NSIMPLEMENTINTERV_GEN_ALL_ED
Implemented All Fall with Harm Risk Interventions:  [place] to call system. Call bell, personal items and telephone within reach. Instruct patient to call for assistance. Room bathroom lighting operational. Non-slip footwear when patient is off stretcher. Physically safe environment: no spills, clutter or unnecessary equipment. Stretcher in lowest position, wheels locked, appropriate side rails in place. Provide visual cue, wrist band, yellow gown, etc. Monitor gait and stability. Monitor for mental status changes and reorient to person, place, and time. Review medications for side effects contributing to fall risk. Reinforce activity limits and safety measures with patient and family. Provide visual clues: red socks.

## 2022-03-27 NOTE — ED PROVIDER NOTE - NSICDXPASTMEDICALHX_GEN_ALL_CORE_FT
PAST MEDICAL HISTORY:  Alzheimer disease     Cardiac pacemaker     DM (diabetes mellitus)     HTN (hypertension)     MESERET (iron deficiency anemia)

## 2022-03-27 NOTE — ED ADULT NURSE NOTE - NSFALLRSKHARMRISK_ED_ALL_ED
Patient reported mild nonradiating substernal chest discomfort not affected with activity not associated with shortness of breath or nausea vomiting diaphoresis  · Likely related to hypertensive urgency  · Currently asymptomatic  · Negative troponin readings  · No events noted on telemetry  · Discussed importance of medication compliance  yes

## 2022-03-28 DIAGNOSIS — N17.9 ACUTE KIDNEY FAILURE, UNSPECIFIED: ICD-10-CM

## 2022-03-28 DIAGNOSIS — G30.9 ALZHEIMER'S DISEASE, UNSPECIFIED: ICD-10-CM

## 2022-03-28 DIAGNOSIS — N39.0 URINARY TRACT INFECTION, SITE NOT SPECIFIED: ICD-10-CM

## 2022-03-28 DIAGNOSIS — E11.9 TYPE 2 DIABETES MELLITUS WITHOUT COMPLICATIONS: ICD-10-CM

## 2022-03-28 DIAGNOSIS — Z29.9 ENCOUNTER FOR PROPHYLACTIC MEASURES, UNSPECIFIED: ICD-10-CM

## 2022-03-28 DIAGNOSIS — K59.00 CONSTIPATION, UNSPECIFIED: ICD-10-CM

## 2022-03-28 DIAGNOSIS — E03.9 HYPOTHYROIDISM, UNSPECIFIED: ICD-10-CM

## 2022-03-28 LAB
ALBUMIN SERPL ELPH-MCNC: 3.4 G/DL — LOW (ref 3.5–5)
ALP SERPL-CCNC: 87 U/L — SIGNIFICANT CHANGE UP (ref 40–120)
ALT FLD-CCNC: 29 U/L DA — SIGNIFICANT CHANGE UP (ref 10–60)
ANION GAP SERPL CALC-SCNC: 5 MMOL/L — SIGNIFICANT CHANGE UP (ref 5–17)
AST SERPL-CCNC: 26 U/L — SIGNIFICANT CHANGE UP (ref 10–40)
BASOPHILS # BLD AUTO: 0.06 K/UL — SIGNIFICANT CHANGE UP (ref 0–0.2)
BASOPHILS NFR BLD AUTO: 0.7 % — SIGNIFICANT CHANGE UP (ref 0–2)
BILIRUB SERPL-MCNC: 0.3 MG/DL — SIGNIFICANT CHANGE UP (ref 0.2–1.2)
BUN SERPL-MCNC: 26 MG/DL — HIGH (ref 7–18)
CALCIUM SERPL-MCNC: 8.8 MG/DL — SIGNIFICANT CHANGE UP (ref 8.4–10.5)
CHLORIDE SERPL-SCNC: 109 MMOL/L — HIGH (ref 96–108)
CO2 SERPL-SCNC: 27 MMOL/L — SIGNIFICANT CHANGE UP (ref 22–31)
CREAT SERPL-MCNC: 0.99 MG/DL — SIGNIFICANT CHANGE UP (ref 0.5–1.3)
EGFR: 54 ML/MIN/1.73M2 — LOW
EOSINOPHIL # BLD AUTO: 0.17 K/UL — SIGNIFICANT CHANGE UP (ref 0–0.5)
EOSINOPHIL NFR BLD AUTO: 1.9 % — SIGNIFICANT CHANGE UP (ref 0–6)
GLUCOSE BLDC GLUCOMTR-MCNC: 116 MG/DL — HIGH (ref 70–99)
GLUCOSE BLDC GLUCOMTR-MCNC: 159 MG/DL — HIGH (ref 70–99)
GLUCOSE BLDC GLUCOMTR-MCNC: 323 MG/DL — HIGH (ref 70–99)
GLUCOSE BLDC GLUCOMTR-MCNC: 83 MG/DL — SIGNIFICANT CHANGE UP (ref 70–99)
GLUCOSE SERPL-MCNC: 67 MG/DL — LOW (ref 70–99)
HCT VFR BLD CALC: 34.5 % — SIGNIFICANT CHANGE UP (ref 34.5–45)
HGB BLD-MCNC: 11.3 G/DL — LOW (ref 11.5–15.5)
IMM GRANULOCYTES NFR BLD AUTO: 0.3 % — SIGNIFICANT CHANGE UP (ref 0–1.5)
LYMPHOCYTES # BLD AUTO: 2.17 K/UL — SIGNIFICANT CHANGE UP (ref 1–3.3)
LYMPHOCYTES # BLD AUTO: 24.4 % — SIGNIFICANT CHANGE UP (ref 13–44)
MAGNESIUM SERPL-MCNC: 2 MG/DL — SIGNIFICANT CHANGE UP (ref 1.6–2.6)
MCHC RBC-ENTMCNC: 29.9 PG — SIGNIFICANT CHANGE UP (ref 27–34)
MCHC RBC-ENTMCNC: 32.8 GM/DL — SIGNIFICANT CHANGE UP (ref 32–36)
MCV RBC AUTO: 91.3 FL — SIGNIFICANT CHANGE UP (ref 80–100)
MONOCYTES # BLD AUTO: 0.87 K/UL — SIGNIFICANT CHANGE UP (ref 0–0.9)
MONOCYTES NFR BLD AUTO: 9.8 % — SIGNIFICANT CHANGE UP (ref 2–14)
NEUTROPHILS # BLD AUTO: 5.6 K/UL — SIGNIFICANT CHANGE UP (ref 1.8–7.4)
NEUTROPHILS NFR BLD AUTO: 62.9 % — SIGNIFICANT CHANGE UP (ref 43–77)
NRBC # BLD: 0 /100 WBCS — SIGNIFICANT CHANGE UP (ref 0–0)
PHOSPHATE SERPL-MCNC: 2.5 MG/DL — SIGNIFICANT CHANGE UP (ref 2.5–4.5)
PLATELET # BLD AUTO: 240 K/UL — SIGNIFICANT CHANGE UP (ref 150–400)
POTASSIUM SERPL-MCNC: 3.8 MMOL/L — SIGNIFICANT CHANGE UP (ref 3.5–5.3)
POTASSIUM SERPL-SCNC: 3.8 MMOL/L — SIGNIFICANT CHANGE UP (ref 3.5–5.3)
PROT SERPL-MCNC: 6.9 G/DL — SIGNIFICANT CHANGE UP (ref 6–8.3)
RBC # BLD: 3.78 M/UL — LOW (ref 3.8–5.2)
RBC # FLD: 14.1 % — SIGNIFICANT CHANGE UP (ref 10.3–14.5)
SODIUM SERPL-SCNC: 141 MMOL/L — SIGNIFICANT CHANGE UP (ref 135–145)
T4 FREE SERPL-MCNC: 0.8 NG/DL — LOW (ref 0.9–1.8)
TSH SERPL-MCNC: 10.9 UU/ML — HIGH (ref 0.34–4.82)
WBC # BLD: 8.9 K/UL — SIGNIFICANT CHANGE UP (ref 3.8–10.5)
WBC # FLD AUTO: 8.9 K/UL — SIGNIFICANT CHANGE UP (ref 3.8–10.5)

## 2022-03-28 RX ORDER — CHOLECALCIFEROL (VITAMIN D3) 125 MCG
1 CAPSULE ORAL
Qty: 0 | Refills: 0 | DISCHARGE

## 2022-03-28 RX ORDER — SENNA PLUS 8.6 MG/1
2 TABLET ORAL AT BEDTIME
Refills: 0 | Status: DISCONTINUED | OUTPATIENT
Start: 2022-03-28 | End: 2022-03-31

## 2022-03-28 RX ORDER — RISPERIDONE 4 MG/1
1 TABLET ORAL
Qty: 0 | Refills: 0 | DISCHARGE

## 2022-03-28 RX ORDER — SODIUM CHLORIDE 9 MG/ML
1000 INJECTION, SOLUTION INTRAVENOUS
Refills: 0 | Status: DISCONTINUED | OUTPATIENT
Start: 2022-03-28 | End: 2022-03-31

## 2022-03-28 RX ORDER — CEFTRIAXONE 500 MG/1
INJECTION, POWDER, FOR SOLUTION INTRAMUSCULAR; INTRAVENOUS
Refills: 0 | Status: COMPLETED | OUTPATIENT
Start: 2022-03-28 | End: 2022-04-01

## 2022-03-28 RX ORDER — ENOXAPARIN SODIUM 100 MG/ML
40 INJECTION SUBCUTANEOUS EVERY 24 HOURS
Refills: 0 | Status: DISCONTINUED | OUTPATIENT
Start: 2022-03-28 | End: 2022-03-31

## 2022-03-28 RX ORDER — DEXTROSE 50 % IN WATER 50 %
25 SYRINGE (ML) INTRAVENOUS ONCE
Refills: 0 | Status: DISCONTINUED | OUTPATIENT
Start: 2022-03-28 | End: 2022-03-31

## 2022-03-28 RX ORDER — ASPIRIN/CALCIUM CARB/MAGNESIUM 324 MG
1 TABLET ORAL
Qty: 0 | Refills: 0 | DISCHARGE

## 2022-03-28 RX ORDER — LEVOTHYROXINE SODIUM 125 MCG
1 TABLET ORAL
Qty: 0 | Refills: 0 | DISCHARGE

## 2022-03-28 RX ORDER — RISPERIDONE 4 MG/1
1 TABLET ORAL
Refills: 0 | Status: DISCONTINUED | OUTPATIENT
Start: 2022-03-28 | End: 2022-03-31

## 2022-03-28 RX ORDER — ESOMEPRAZOLE MAGNESIUM 40 MG/1
1 CAPSULE, DELAYED RELEASE ORAL
Qty: 0 | Refills: 0 | DISCHARGE

## 2022-03-28 RX ORDER — FERROUS SULFATE 325(65) MG
325 TABLET ORAL DAILY
Refills: 0 | Status: DISCONTINUED | OUTPATIENT
Start: 2022-03-28 | End: 2022-03-31

## 2022-03-28 RX ORDER — POLYETHYLENE GLYCOL 3350 17 G/17G
17 POWDER, FOR SOLUTION ORAL AT BEDTIME
Refills: 0 | Status: DISCONTINUED | OUTPATIENT
Start: 2022-03-28 | End: 2022-03-31

## 2022-03-28 RX ORDER — QUETIAPINE FUMARATE 200 MG/1
150 TABLET, FILM COATED ORAL AT BEDTIME
Refills: 0 | Status: DISCONTINUED | OUTPATIENT
Start: 2022-03-28 | End: 2022-03-31

## 2022-03-28 RX ORDER — INSULIN GLARGINE 100 [IU]/ML
17 INJECTION, SOLUTION SUBCUTANEOUS EVERY MORNING
Refills: 0 | Status: DISCONTINUED | OUTPATIENT
Start: 2022-03-28 | End: 2022-03-31

## 2022-03-28 RX ORDER — INSULIN LISPRO 100/ML
VIAL (ML) SUBCUTANEOUS
Refills: 0 | Status: DISCONTINUED | OUTPATIENT
Start: 2022-03-28 | End: 2022-03-31

## 2022-03-28 RX ORDER — ASCORBIC ACID 60 MG
1 TABLET,CHEWABLE ORAL
Qty: 0 | Refills: 0 | DISCHARGE

## 2022-03-28 RX ORDER — QUETIAPINE FUMARATE 200 MG/1
1 TABLET, FILM COATED ORAL
Qty: 0 | Refills: 0 | DISCHARGE

## 2022-03-28 RX ORDER — FERROUS SULFATE 325(65) MG
1 TABLET ORAL
Qty: 0 | Refills: 0 | DISCHARGE

## 2022-03-28 RX ORDER — TRAZODONE HCL 50 MG
50 TABLET ORAL AT BEDTIME
Refills: 0 | Status: DISCONTINUED | OUTPATIENT
Start: 2022-03-28 | End: 2022-03-31

## 2022-03-28 RX ORDER — ASPIRIN/CALCIUM CARB/MAGNESIUM 324 MG
81 TABLET ORAL DAILY
Refills: 0 | Status: DISCONTINUED | OUTPATIENT
Start: 2022-03-28 | End: 2022-03-31

## 2022-03-28 RX ORDER — TRAZODONE HCL 50 MG
1 TABLET ORAL
Qty: 0 | Refills: 0 | DISCHARGE

## 2022-03-28 RX ORDER — CEFTRIAXONE 500 MG/1
1000 INJECTION, POWDER, FOR SOLUTION INTRAMUSCULAR; INTRAVENOUS EVERY 24 HOURS
Refills: 0 | Status: COMPLETED | OUTPATIENT
Start: 2022-03-29 | End: 2022-03-31

## 2022-03-28 RX ORDER — SIMETHICONE 80 MG/1
80 TABLET, CHEWABLE ORAL EVERY 6 HOURS
Refills: 0 | Status: COMPLETED | OUTPATIENT
Start: 2022-03-28 | End: 2022-03-30

## 2022-03-28 RX ORDER — METOPROLOL TARTRATE 50 MG
12.5 TABLET ORAL EVERY 12 HOURS
Refills: 0 | Status: DISCONTINUED | OUTPATIENT
Start: 2022-03-28 | End: 2022-03-31

## 2022-03-28 RX ORDER — FUROSEMIDE 40 MG
5 TABLET ORAL DAILY
Refills: 0 | Status: DISCONTINUED | OUTPATIENT
Start: 2022-03-28 | End: 2022-03-31

## 2022-03-28 RX ORDER — LEVOTHYROXINE SODIUM 125 MCG
25 TABLET ORAL DAILY
Refills: 0 | Status: DISCONTINUED | OUTPATIENT
Start: 2022-03-28 | End: 2022-03-31

## 2022-03-28 RX ORDER — CEFTRIAXONE 500 MG/1
1000 INJECTION, POWDER, FOR SOLUTION INTRAMUSCULAR; INTRAVENOUS ONCE
Refills: 0 | Status: COMPLETED | OUTPATIENT
Start: 2022-03-28 | End: 2022-03-28

## 2022-03-28 RX ORDER — GLUCAGON INJECTION, SOLUTION 0.5 MG/.1ML
1 INJECTION, SOLUTION SUBCUTANEOUS ONCE
Refills: 0 | Status: DISCONTINUED | OUTPATIENT
Start: 2022-03-28 | End: 2022-03-31

## 2022-03-28 RX ORDER — DEXTROSE 50 % IN WATER 50 %
15 SYRINGE (ML) INTRAVENOUS ONCE
Refills: 0 | Status: DISCONTINUED | OUTPATIENT
Start: 2022-03-28 | End: 2022-03-31

## 2022-03-28 RX ORDER — DEXTROSE 50 % IN WATER 50 %
12.5 SYRINGE (ML) INTRAVENOUS ONCE
Refills: 0 | Status: DISCONTINUED | OUTPATIENT
Start: 2022-03-28 | End: 2022-03-31

## 2022-03-28 RX ORDER — FLUOXETINE HCL 10 MG
20 CAPSULE ORAL DAILY
Refills: 0 | Status: DISCONTINUED | OUTPATIENT
Start: 2022-03-28 | End: 2022-03-31

## 2022-03-28 RX ORDER — METFORMIN HYDROCHLORIDE 850 MG/1
1 TABLET ORAL
Qty: 0 | Refills: 0 | DISCHARGE

## 2022-03-28 RX ADMIN — INSULIN GLARGINE 17 UNIT(S): 100 INJECTION, SOLUTION SUBCUTANEOUS at 08:20

## 2022-03-28 RX ADMIN — RISPERIDONE 1 MILLIGRAM(S): 4 TABLET ORAL at 18:26

## 2022-03-28 RX ADMIN — Medication 81 MILLIGRAM(S): at 11:06

## 2022-03-28 RX ADMIN — Medication 20 MILLIGRAM(S): at 11:06

## 2022-03-28 RX ADMIN — ENOXAPARIN SODIUM 40 MILLIGRAM(S): 100 INJECTION SUBCUTANEOUS at 10:43

## 2022-03-28 RX ADMIN — Medication 50 MILLIGRAM(S): at 21:19

## 2022-03-28 RX ADMIN — SENNA PLUS 2 TABLET(S): 8.6 TABLET ORAL at 21:19

## 2022-03-28 RX ADMIN — POLYETHYLENE GLYCOL 3350 17 GRAM(S): 17 POWDER, FOR SOLUTION ORAL at 21:19

## 2022-03-28 RX ADMIN — Medication 12.5 MILLIGRAM(S): at 07:14

## 2022-03-28 RX ADMIN — SODIUM CHLORIDE 150 MILLILITER(S): 9 INJECTION INTRAMUSCULAR; INTRAVENOUS; SUBCUTANEOUS at 10:51

## 2022-03-28 RX ADMIN — Medication 325 MILLIGRAM(S): at 11:05

## 2022-03-28 RX ADMIN — Medication 12.5 MILLIGRAM(S): at 18:26

## 2022-03-28 RX ADMIN — Medication 5 MILLIGRAM(S): at 07:19

## 2022-03-28 RX ADMIN — Medication 25 MICROGRAM(S): at 07:16

## 2022-03-28 RX ADMIN — CEFTRIAXONE 100 MILLIGRAM(S): 500 INJECTION, POWDER, FOR SOLUTION INTRAMUSCULAR; INTRAVENOUS at 10:42

## 2022-03-28 RX ADMIN — RISPERIDONE 1 MILLIGRAM(S): 4 TABLET ORAL at 10:42

## 2022-03-28 RX ADMIN — Medication 4: at 22:09

## 2022-03-28 RX ADMIN — Medication 1: at 11:33

## 2022-03-28 RX ADMIN — QUETIAPINE FUMARATE 150 MILLIGRAM(S): 200 TABLET, FILM COATED ORAL at 21:18

## 2022-03-28 NOTE — H&P ADULT - PROBLEM SELECTOR PLAN 5
- h/o Alzheimer, on quetiapine, trazodone, risperidone, and fluoxetine  - c/w home meds - h/o IDDM, on lantus 34u AM, and metformin  - c/w ISS and lantus 17u  - FS qAs

## 2022-03-28 NOTE — CONSULT NOTE ADULT - SUBJECTIVE AND OBJECTIVE BOX
[  ] STAT REQUEST              [ X ] ROUTINE REQUEST    Patient is a 89 year old female with constipation and fecal impaction. GI consulted to evaluate.        HPI:  Patient is a 89 year old female from home, AAOx0-1 and non-ambulatory at baseline, with past medical history significant for IDDM, alzheimer's dementia, hypothyroidism, present with 2 weeks history of constipation without bowel movement. Patient is confused and very poor historian unable to provide history. No abdominal pain, nausea, vomiting, hematemesis, hematochezia, melena, fever, chills, chest pain, SOB, cough, hematuria, dysuria or diarrhea reported.         PAIN MANAGEMENT:  Pain Scale:                0 /10  Pain Location:      Prior Colonoscopy:  Unknown    PAST MEDICAL HISTORY  HTN (hypertension)  DM (diabetes mellitus)  Alzheimer disease  MESERET (iron deficiency anemia)  Cardiac pacemaker        PAST SURGICAL HISTORY   TAMMY-BSO  Hip replacement        Allergies    penicillins (Unknown)    Intolerances  None         MEDICATIONS  (STANDING):  aspirin enteric coated 81 milliGRAM(s) Oral daily  cefTRIAXone   IVPB      dextrose 5%. 1000 milliLiter(s) (100 mL/Hr) IV Continuous <Continuous>  dextrose 5%. 1000 milliLiter(s) (50 mL/Hr) IV Continuous <Continuous>  dextrose 50% Injectable 25 Gram(s) IV Push once  dextrose 50% Injectable 12.5 Gram(s) IV Push once  dextrose 50% Injectable 25 Gram(s) IV Push once  enoxaparin Injectable 40 milliGRAM(s) SubCutaneous every 24 hours  ferrous    sulfate 325 milliGRAM(s) Oral daily  FLUoxetine 20 milliGRAM(s) Oral daily  furosemide Solution 5 milliGRAM(s) Oral daily  glucagon  Injectable 1 milliGRAM(s) IntraMuscular once  insulin glargine Injectable (LANTUS) 17 Unit(s) SubCutaneous every morning  insulin lispro (ADMELOG) corrective regimen sliding scale   SubCutaneous Before meals and at bedtime  levothyroxine 25 MICROGram(s) Oral daily  metoprolol tartrate 12.5 milliGRAM(s) Oral every 12 hours  polyethylene glycol 3350 17 Gram(s) Oral at bedtime  QUEtiapine 150 milliGRAM(s) Oral at bedtime  risperiDONE   Tablet 1 milliGRAM(s) Oral two times a day  senna 2 Tablet(s) Oral at bedtime  sodium chloride 0.9%. 1000 milliLiter(s) (150 mL/Hr) IV Continuous <Continuous>  traZODone 50 milliGRAM(s) Oral at bedtime    MEDICATIONS  (PRN):  dextrose Oral Gel 15 Gram(s) Oral once PRN Blood Glucose LESS THAN 70 milliGRAM(s)/deciliter      SOCIAL HISTORY  Advanced Directives:       [ X ] Full Code       [  ] DNR  Marital Status:         [  ] M      [ X ] S      [  ] D       [  ] W  Children:       [ X ] Yes      [  ] No  Occupation:        [  ] Employed       [ X ] Unemployed       [  ] Retired  Diet:       [ X ] Regular       [  ] PEG feeding          [  ] NG tube feeding  Drug Use:           [ X ] No                [  ] Yes  Alcohol:           [ X ] No             [  ] Yes (socially)         [  ] Yes (chronic)  Tobacco:           [  ] Yes           [ X ] No      FAMILY HISTORY  [ X ] Heart Disease            [ X ] Diabetes             [ X ] HTN             [  ] Colon Cancer             [  ] Stomach Cancer              [  ] Pancreatic Cancer      VITAL SIGNS   Vital Signs Last 24 Hrs  T(C): 36.4 (28 Mar 2022 03:59), Max: 36.9 (27 Mar 2022 16:16)  T(F): 97.5 (28 Mar 2022 03:59), Max: 98.4 (27 Mar 2022 16:16)  HR: 69 (28 Mar 2022 03:59) (63 - 74)  BP: 143/63 (28 Mar 2022 03:59) (142/90 - 160/70)   RR: 18 (28 Mar 2022 03:59) (18 - 18)  SpO2: 95% (28 Mar 2022 03:59) (95% - 99%)  Daily Height in cm: 157.48 (27 Mar 2022 16:16)    Daily Weight in k.9 (28 Mar 2022 03:59)       CBC Full  -  ( 28 Mar 2022 05:50 )  WBC Count : 8.90 K/uL  RBC Count : 3.78 M/uL  Hemoglobin : 11.3 g/dL  Hematocrit : 34.5 %  Platelet Count - Automated : 240 K/uL  Mean Cell Volume : 91.3 fl  Mean Cell Hemoglobin : 29.9 pg  Mean Cell Hemoglobin Concentration : 32.8 gm/dL  Auto Neutrophil # : 5.60 K/uL  Auto Lymphocyte # : 2.17 K/uL  Auto Monocyte # : 0.87 K/uL  Auto Eosinophil # : 0.17 K/uL  Auto Basophil # : 0.06 K/uL  Auto Neutrophil % : 62.9 %  Auto Lymphocyte % : 24.4 %  Auto Monocyte % : 9.8 %  Auto Eosinophil % : 1.9 %  Auto Basophil % : 0.7 %          141  |  109<H>  |  26<H>  ----------------------------<  67<L>  3.8   |  27  |  0.99    Ca    8.8      28 Mar 2022 05:50  Phos  2.5       Mg     2.0         TPro  6.9  /  Alb  3.4<L>  /  TBili  0.3  /  DBili  x   /  AST  26  /  ALT  29  /  AlkPhos  87        Lipase, Serum: 23 U/L ( @ 17:23)         RADIOLOGY/IMAGING                           [  ] STAT REQUEST              [ X ] ROUTINE REQUEST    Patient is a 89 year old female with constipation and fecal impaction. GI consulted to evaluate.        HPI:  Patient is a 89 year old female from home, AAOx0-1 and non-ambulatory at baseline, with past medical history significant for IDDM, alzheimer's dementia, hypothyroidism, present with 2 weeks history of constipation without bowel movement. Patient is confused and very poor historian unable to provide history. No abdominal pain, nausea, vomiting, hematemesis, hematochezia, melena, fever, chills, chest pain, SOB, cough, hematuria, dysuria or diarrhea reported.         PAIN MANAGEMENT:  Pain Scale:                0 /10  Pain Location:      Prior Colonoscopy:  Unknown    PAST MEDICAL HISTORY  HTN (hypertension)  DM (diabetes mellitus)  Alzheimer disease  MESERET (iron deficiency anemia)  Cardiac pacemaker        PAST SURGICAL HISTORY   TAMMY-BSO  Hip replacement        Allergies    penicillins (Unknown)    Intolerances  None         MEDICATIONS  (STANDING):  aspirin enteric coated 81 milliGRAM(s) Oral daily  cefTRIAXone   IVPB      dextrose 5%. 1000 milliLiter(s) (100 mL/Hr) IV Continuous <Continuous>  dextrose 5%. 1000 milliLiter(s) (50 mL/Hr) IV Continuous <Continuous>  dextrose 50% Injectable 25 Gram(s) IV Push once  dextrose 50% Injectable 12.5 Gram(s) IV Push once  dextrose 50% Injectable 25 Gram(s) IV Push once  enoxaparin Injectable 40 milliGRAM(s) SubCutaneous every 24 hours  ferrous    sulfate 325 milliGRAM(s) Oral daily  FLUoxetine 20 milliGRAM(s) Oral daily  furosemide Solution 5 milliGRAM(s) Oral daily  glucagon  Injectable 1 milliGRAM(s) IntraMuscular once  insulin glargine Injectable (LANTUS) 17 Unit(s) SubCutaneous every morning  insulin lispro (ADMELOG) corrective regimen sliding scale   SubCutaneous Before meals and at bedtime  levothyroxine 25 MICROGram(s) Oral daily  metoprolol tartrate 12.5 milliGRAM(s) Oral every 12 hours  polyethylene glycol 3350 17 Gram(s) Oral at bedtime  QUEtiapine 150 milliGRAM(s) Oral at bedtime  risperiDONE   Tablet 1 milliGRAM(s) Oral two times a day  senna 2 Tablet(s) Oral at bedtime  sodium chloride 0.9%. 1000 milliLiter(s) (150 mL/Hr) IV Continuous <Continuous>  traZODone 50 milliGRAM(s) Oral at bedtime    MEDICATIONS  (PRN):  dextrose Oral Gel 15 Gram(s) Oral once PRN Blood Glucose LESS THAN 70 milliGRAM(s)/deciliter      SOCIAL HISTORY  Advanced Directives:       [ X ] Full Code       [  ] DNR  Marital Status:         [  ] M      [ X ] S      [  ] D       [  ] W  Children:       [ X ] Yes      [  ] No  Occupation:        [  ] Employed       [ X ] Unemployed       [  ] Retired  Diet:       [ X ] Regular       [  ] PEG feeding          [  ] NG tube feeding  Drug Use:           [ X ] No                [  ] Yes  Alcohol:           [ X ] No             [  ] Yes (socially)         [  ] Yes (chronic)  Tobacco:           [  ] Yes           [ X ] No      FAMILY HISTORY  [ X ] Heart Disease            [ X ] Diabetes             [ X ] HTN             [  ] Colon Cancer             [  ] Stomach Cancer              [  ] Pancreatic Cancer      VITAL SIGNS   Vital Signs Last 24 Hrs  T(C): 36.4 (28 Mar 2022 03:59), Max: 36.9 (27 Mar 2022 16:16)  T(F): 97.5 (28 Mar 2022 03:59), Max: 98.4 (27 Mar 2022 16:16)  HR: 69 (28 Mar 2022 03:59) (63 - 74)  BP: 143/63 (28 Mar 2022 03:59) (142/90 - 160/70)   RR: 18 (28 Mar 2022 03:59) (18 - 18)  SpO2: 95% (28 Mar 2022 03:59) (95% - 99%)  Daily Height in cm: 157.48 (27 Mar 2022 16:16)    Daily Weight in k.9 (28 Mar 2022 03:59)       CBC Full  -  ( 28 Mar 2022 05:50 )  WBC Count : 8.90 K/uL  RBC Count : 3.78 M/uL  Hemoglobin : 11.3 g/dL  Hematocrit : 34.5 %  Platelet Count - Automated : 240 K/uL  Mean Cell Volume : 91.3 fl  Mean Cell Hemoglobin : 29.9 pg  Mean Cell Hemoglobin Concentration : 32.8 gm/dL  Auto Neutrophil # : 5.60 K/uL  Auto Lymphocyte # : 2.17 K/uL  Auto Monocyte # : 0.87 K/uL  Auto Eosinophil # : 0.17 K/uL  Auto Basophil # : 0.06 K/uL  Auto Neutrophil % : 62.9 %  Auto Lymphocyte % : 24.4 %  Auto Monocyte % : 9.8 %  Auto Eosinophil % : 1.9 %  Auto Basophil % : 0.7 %          141  |  109<H>  |  26<H>  ----------------------------<  67<L>  3.8   |  27  |  0.99    Ca    8.8      28 Mar 2022 05:50  Phos  2.5       Mg     2.0         TPro  6.9  /  Alb  3.4<L>  /  TBili  0.3  /  DBili  x   /  AST  26  /  ALT  29  /  AlkPhos  87        Lipase, Serum: 23 U/L ( @ 17:23)         RADIOLOGY/IMAGING    ACC: [id number] EXAM:  XR ABDOMEN 2V                          PROCEDURE DATE:  2022          INTERPRETATION:  TIME OF EXAM: 2022 at 6:00 PM.    CLINICAL INFORMATION: Abdominal pain. No bowel movement.    COMPARISON:  None available    TECHNIQUE: Abdominal supine and erect x-rays.    INTERPRETATION:    There is a nonspecific bowel gas pattern with no evidence of free air or   obstruction. There is increased stool in the right hemicolon.  A rounded opacity projecting over the pelvisis likely a fluid-filled   urinary bladder.  There is scoliosis of the spine with osteoarthritic degenerative change.   There is incompletely imaged ORIF of the right hip.  There is a faint 3 x 7 mm calcification to the left of the L2-3 level, of   indeterminate nature. A calcification related to the left urinary tract   is possible.      IMPRESSION:  Nonspecific bowel gas pattern with no evidence of free air   or obstruction.    Increased stool in the right hemicolon.    Faint 3 x 7 mm calcification to the left of the L2-3 level, of   indeterminate nature. A calcification related to the left urinary tract   is possible.

## 2022-03-28 NOTE — H&P ADULT - PROBLEM SELECTOR PLAN 3
- h/o hypothyroidism, on synthroid  - c/w synthroid  - f/u TSH - p/w constipation x2wk  - s/p fecal impaction in ED  - c/w senna and polyethylene glycol

## 2022-03-28 NOTE — PROGRESS NOTE ADULT - SUBJECTIVE AND OBJECTIVE BOX
Patient is a 89y old  Female who presents with a chief complaint of SHELIA (28 Mar 2022 13:39)    INTERVAL HPI/OVERNIGHT EVENTS: no acute events overnight, pt pulled her izquierdo per RN, has voided multiple times since then    REVIEW OF SYSTEMS: unable to assess due to pt's mental status, orientation to self at baseline.    T(C): 37 (22 @ 12:58), Max: 37 (22 @ 12:58)  HR: 81 (22 @ 12:58) (69 - 81)  BP: 142/65 (22 @ 12:58) (142/65 - 160/70)  RR: 18 (22 @ 12:58) (18 - 18)  SpO2: 98% (22 @ 12:58) (95% - 99%)  Wt(kg): --Vital Signs Last 24 Hrs  T(C): 37 (28 Mar 2022 12:58), Max: 37 (28 Mar 2022 12:58)  T(F): 98.6 (28 Mar 2022 12:58), Max: 98.6 (28 Mar 2022 12:58)  HR: 81 (28 Mar 2022 12:58) (69 - 81)  BP: 142/65 (28 Mar 2022 12:58) (142/65 - 160/70)  BP(mean): --  RR: 18 (28 Mar 2022 12:58) (18 - 18)  SpO2: 98% (28 Mar 2022 12:58) (95% - 99%)    MEDICATIONS  (STANDING):  aspirin enteric coated 81 milliGRAM(s) Oral daily  cefTRIAXone   IVPB      dextrose 5%. 1000 milliLiter(s) (100 mL/Hr) IV Continuous <Continuous>  dextrose 5%. 1000 milliLiter(s) (50 mL/Hr) IV Continuous <Continuous>  dextrose 50% Injectable 25 Gram(s) IV Push once  dextrose 50% Injectable 12.5 Gram(s) IV Push once  dextrose 50% Injectable 25 Gram(s) IV Push once  enoxaparin Injectable 40 milliGRAM(s) SubCutaneous every 24 hours  ferrous    sulfate 325 milliGRAM(s) Oral daily  FLUoxetine 20 milliGRAM(s) Oral daily  furosemide Solution 5 milliGRAM(s) Oral daily  glucagon  Injectable 1 milliGRAM(s) IntraMuscular once  insulin glargine Injectable (LANTUS) 17 Unit(s) SubCutaneous every morning  insulin lispro (ADMELOG) corrective regimen sliding scale   SubCutaneous Before meals and at bedtime  levothyroxine 25 MICROGram(s) Oral daily  metoprolol tartrate 12.5 milliGRAM(s) Oral every 12 hours  polyethylene glycol 3350 17 Gram(s) Oral at bedtime  QUEtiapine 150 milliGRAM(s) Oral at bedtime  risperiDONE   Tablet 1 milliGRAM(s) Oral two times a day  senna 2 Tablet(s) Oral at bedtime  sodium chloride 0.9%. 1000 milliLiter(s) (150 mL/Hr) IV Continuous <Continuous>  traZODone 50 milliGRAM(s) Oral at bedtime    MEDICATIONS  (PRN):  dextrose Oral Gel 15 Gram(s) Oral once PRN Blood Glucose LESS THAN 70 milliGRAM(s)/deciliter    PHYSICAL EXAM:  GENERAL: NAD  EYES: clear conjunctiva; EOMI  ENMT: Moist mucous membranes  NECK: Supple, No JVD, Normal thyroid  CHEST/LUNG: Clear to auscultation bilaterally; No rales, rhonchi, wheezing, or rubs  HEART: S1, S2, Regular rate and rhythm  ABDOMEN: Soft, Nontender, Nondistended; Bowel sounds present  NEURO: Alert & awake  EXTREMITIES: No LE edema, no calf tenderness  LYMPH: No lymphadenopathy noted  SKIN: No rashes or lesions    Consultant(s) Notes Reviewed:  [x ] YES  [ ] NO  Care Discussed with Consultants/Other Providers [ x] YES  [ ] NO    LABS:                        11.3   8.90  )-----------( 240      ( 28 Mar 2022 05:50 )             34.5         141  |  109<H>  |  26<H>  ----------------------------<  67<L>  3.8   |  27  |  0.99    Ca    8.8      28 Mar 2022 05:50  Phos  2.5       Mg     2.0         TPro  6.9  /  Alb  3.4<L>  /  TBili  0.3  /  DBili  x   /  AST  26  /  ALT  29  /  AlkPhos  87        CAPILLARY BLOOD GLUCOSE      POCT Blood Glucose.: 116 mg/dL (28 Mar 2022 16:34)  POCT Blood Glucose.: 159 mg/dL (28 Mar 2022 11:26)  POCT Blood Glucose.: 83 mg/dL (28 Mar 2022 07:46)        Urinalysis Basic - ( 27 Mar 2022 20:49 )    Color: Yellow / Appearance: Hazy / S.010 / pH: x  Gluc: x / Ketone: Negative  / Bili: Negative / Urobili: Negative   Blood: x / Protein: Negative / Nitrite: Negative   Leuk Esterase: Moderate / RBC: 2-5 /HPF / WBC 26-50 /HPF   Sq Epi: x / Non Sq Epi: Moderate /HPF / Bacteria: Moderate /HPF        RADIOLOGY & ADDITIONAL TESTS:    Imaging Personally Reviewed:  [x ] YES  [ ] NO  < from: Xray Chest 1 View AP/PA (22 @ 18:16) >    ACC: [id number] EXAM:  XR CHEST AP OR PA 1V                          PROCEDURE DATE:  2022          INTERPRETATION:  TIME OF EXAM: 2022 at 5:59 PM.    CLINICAL INFORMATION: Abdominal pain.    COMPARISON:  2022.    TECHNIQUE:   AP Portable chest x-ray.    INTERPRETATION:    Heart size and the mediastinum cannot be accurately evaluated on this   projection.  A Micra device projects over the heart.  The lungs are clear.  No pleural effusion or pneumothorax is seen.  There is osteoarthritic degenerative change of the spine and shoulders.      IMPRESSION:  Clear lungs.    --- End of Report ---            ADAMS BYRNE MD; Attending Radiologist  This document has been electronically signed. Mar 28 2022  1:53PM    < end of copied text >  < from: Xray Abdomen 2 Views (22 @ 18:15) >    ACC: [id number] EXAM:  XR ABDOMEN 2V                          PROCEDURE DATE:  2022          INTERPRETATION:  TIME OF EXAM: 2022 at 6:00 PM.    CLINICAL INFORMATION: Abdominal pain. No bowel movement.    COMPARISON:  None available    TECHNIQUE: Abdominal supine and erect x-rays.    INTERPRETATION:    There is a nonspecific bowel gas pattern with no evidence of free air or   obstruction. There is increased stool in the right hemicolon.  A rounded opacity projecting over the pelvisis likely a fluid-filled   urinary bladder.  There is scoliosis of the spine with osteoarthritic degenerative change.   There is incompletely imaged ORIF of the right hip.  There is a faint 3 x 7 mm calcification to the left of the L2-3 level, of   indeterminate nature. A calcification related to the left urinary tract   is possible.      IMPRESSION:  Nonspecific bowel gas pattern with no evidence of free air   or obstruction.    Increased stool in the right hemicolon.    Faint 3 x 7 mm calcification to the left of the L2-3 level, of   indeterminate nature. A calcification related to the left urinary tract   is possible.    --- End of Report ---        ADAMS BYRNE MD; Attending Radiologist  This document has been electronically signed. Mar 28 2022  2:00PM    < end of copied text >

## 2022-03-28 NOTE — H&P ADULT - HISTORY OF PRESENT ILLNESS
Patient is a 89yoF, from home, AAOx0-1 and non-ambulatory at baseline, w/ PMH of IDDM, alzheimer's dementia, hypothyroidism, present with constipation x2 weeks. Patient is very poor historian; thus, information obtained from the chart and daughter. Patient was brought in to the ED due to constipation for 2 weeks; however, she was otherwise in a normal state of health. She remains to have a good appetite. She denies any complaints. Of note, she had a massive bowel movement in the ED after fecal disimpaction.

## 2022-03-28 NOTE — H&P ADULT - ATTENDING COMMENTS
89yoF, from home, AAOx0-1 and non-ambulatory at baseline, w/ PMH of IDDM, alzheimer's dementia, hypothyroidism, present with constipation x2 weeks. Patient is very poor historian; thus, information obtained from the chart and daughter. Patient was brought in to the ED due to constipation for 2 weeks; however, she was otherwise in a normal state of health. She remains to have a good appetite. She denies any complaints. Of note, she had a massive bowel movement in the ED after fecal disimpaction.     assessment  --  uti, fecal impaction, lauro, h/o IDDM, alzheimer's dementia, hypothyroidism    plan  --  admit to med,  senna, miralax, lispro ss, cont preadmit home meds, gi and dvt prophylaxis  cbc, bmp, mg, phos, lipids, tsh, bld cx, ua, ucx, 89yoF, from home, AAOx0-1 and non-ambulatory at baseline, w/ PMH of IDDM, alzheimer's dementia, hypothyroidism, present with constipation x2 weeks. Patient is very poor historian; thus, information obtained from the chart and daughter. Patient was brought in to the ED due to constipation for 2 weeks; however, she was otherwise in a normal state of health. She remains to have a good appetite. She denies any complaints. Of note, she had a massive bowel movement in the ED after fecal disimpaction.     assessment  --  uti, fecal impaction, lauro, h/o IDDM, alzheimer's dementia, hypothyroidism    plan  --  admit to med, rocephin, senna, miralax, lispro ss, cont preadmit home meds, gi and dvt prophylaxis  cbc, bmp, mg, phos, lipids, tsh, bld cx, ua, ucx,

## 2022-03-28 NOTE — H&P ADULT - PROBLEM SELECTOR PLAN 2
- p/w constipation x2wk  - s/p fecal impaction in ED  - c/w senna and polyethylene glycol - noted to have Cr 1.37 (baseline 0.86 on Jan2022)  - likely 2/2 urinary retention 2/2 bowl impaction vs UTI  - bladder scan >300cc  - f/u urine electrolytes

## 2022-03-28 NOTE — H&P ADULT - PROBLEM SELECTOR PLAN 4
- h/o IDDM, on lantus 34u AM, and metformin  - c/w ISS and lantus 17u  - FS qAs - h/o hypothyroidism, on synthroid  - c/w synthroid  - f/u TSH

## 2022-03-28 NOTE — H&P ADULT - ASSESSMENT
Patient is a 89yoF, from home, AAOx0-1 and non-ambulatory at baseline, w/ PMH of IDDM, alzheimer's dementia, hypothyroidism, present with constipation x2 weeks. Admitted for SHELIA

## 2022-03-28 NOTE — H&P ADULT - PROBLEM SELECTOR PLAN 6
Final Anesthesia Post-op Assessment    Patient: Smiley Robins  Procedure(s) Performed: REMOVAL, STERNAL WIREDEBRIDEMENT, STERNUM AND WOUND VAC PLACEMENT  Anesthesia type: General    Vitals Value Taken Time   Temp 36.5 °C (97.7 °F) 02/10/21 1059   Pulse 49 02/10/21 1059   Resp 41 02/10/21 1059   SpO2 98 % 02/10/21 1059   /56 02/10/21 1059         Patient Location: bedside  Post-op Vital Signs:stable  Level of Consciousness: participates in exam, awake and alert  Respiratory Status: spontaneous ventilation, unassisted and room air  Cardiovascular blood pressure returned to baseline  Hydration: euvolemic  Pain Management: well controlled  Handoff: Handoff to receiving clinician was performed and questions were answered  Vomiting: none   Nausea: None  Airway Patency:patent  Post-op Assessment: awake, alert, appropriately conversant, or baseline, no complications, patient tolerated procedure well with no complications and no evidence of recall  Comments: POD#1 from sternal wound debridement under general anesthesia. She is awake, in a chair is doing well. Denies recall, nausea and vomiting. No paresthesias, no jorge a-pharyngeal trauma. Questions answered. Patient satisfied with care. Please call prn.      No complications documented.    - DVT: lovenox - h/o Alzheimer, on quetiapine, trazodone, risperidone, and fluoxetine  - c/w home meds

## 2022-03-28 NOTE — PROGRESS NOTE ADULT - ASSESSMENT
Patient is a 89yoF, from home, AAOx0-1 and non-ambulatory at baseline, w/ PMH of IDDM, alzheimer's dementia, hypothyroidism, present with constipation x2 week. Abd x-ray significant for Increased stool in the right hemicolon. s/p manual disimpaction. also found to have lauro/ retention 2/2 stool burden. ua +. pending urine culture an blood culture results. Porter was placed for urinary retention however pt manage to remove herself , hence passed TOV with multiple voids. GI following

## 2022-03-28 NOTE — CONSULT NOTE ADULT - ASSESSMENT
1. Constipation  2. Fecal impaction (s/p manual disimpaction)  3. Iron deficiency anemia    Suggestions:    1. Stool softener / Laxative daily  2. High fiber diet  3. Monitor H/H  4. CT-Scan of abdomen and pelvis  5. Protonix daily  6. DVT prophylaxis

## 2022-03-28 NOTE — H&P ADULT - PROBLEM SELECTOR PLAN 1
- noted to have Cr 1.37 (baseline 0.86 on Jan2022)  - likely 2/2 urinary retention 2/2 bowl impaction  - f/u urine electrolytes - noted to have positive UA  - VS wnl  - WBC wnl  - c/w ceftriaxone  - f/u Bcx, Ucx

## 2022-03-28 NOTE — PROGRESS NOTE ADULT - PROBLEM SELECTOR PLAN 1
- p/w constipation x2wk  - x-ray with stol burden  - s/p fecal impaction in ED  - c/w senna and polyethylene glycol  - Gi following

## 2022-03-29 LAB
A1C WITH ESTIMATED AVERAGE GLUCOSE RESULT: 6.3 % — HIGH (ref 4–5.6)
ANION GAP SERPL CALC-SCNC: 6 MMOL/L — SIGNIFICANT CHANGE UP (ref 5–17)
BUN SERPL-MCNC: 15 MG/DL — SIGNIFICANT CHANGE UP (ref 7–18)
CALCIUM SERPL-MCNC: 8.4 MG/DL — SIGNIFICANT CHANGE UP (ref 8.4–10.5)
CHLORIDE SERPL-SCNC: 112 MMOL/L — HIGH (ref 96–108)
CO2 SERPL-SCNC: 25 MMOL/L — SIGNIFICANT CHANGE UP (ref 22–31)
CREAT SERPL-MCNC: 0.76 MG/DL — SIGNIFICANT CHANGE UP (ref 0.5–1.3)
EGFR: 74 ML/MIN/1.73M2 — SIGNIFICANT CHANGE UP
ESTIMATED AVERAGE GLUCOSE: 134 MG/DL — HIGH (ref 68–114)
GLUCOSE BLDC GLUCOMTR-MCNC: 103 MG/DL — HIGH (ref 70–99)
GLUCOSE BLDC GLUCOMTR-MCNC: 131 MG/DL — HIGH (ref 70–99)
GLUCOSE BLDC GLUCOMTR-MCNC: 156 MG/DL — HIGH (ref 70–99)
GLUCOSE BLDC GLUCOMTR-MCNC: 246 MG/DL — HIGH (ref 70–99)
GLUCOSE BLDC GLUCOMTR-MCNC: 93 MG/DL — SIGNIFICANT CHANGE UP (ref 70–99)
GLUCOSE SERPL-MCNC: 69 MG/DL — LOW (ref 70–99)
HCT VFR BLD CALC: 34.7 % — SIGNIFICANT CHANGE UP (ref 34.5–45)
HGB BLD-MCNC: 11.6 G/DL — SIGNIFICANT CHANGE UP (ref 11.5–15.5)
MCHC RBC-ENTMCNC: 30.8 PG — SIGNIFICANT CHANGE UP (ref 27–34)
MCHC RBC-ENTMCNC: 33.4 GM/DL — SIGNIFICANT CHANGE UP (ref 32–36)
MCV RBC AUTO: 92 FL — SIGNIFICANT CHANGE UP (ref 80–100)
NRBC # BLD: 0 /100 WBCS — SIGNIFICANT CHANGE UP (ref 0–0)
PLATELET # BLD AUTO: 224 K/UL — SIGNIFICANT CHANGE UP (ref 150–400)
POTASSIUM SERPL-MCNC: 3.9 MMOL/L — SIGNIFICANT CHANGE UP (ref 3.5–5.3)
POTASSIUM SERPL-SCNC: 3.9 MMOL/L — SIGNIFICANT CHANGE UP (ref 3.5–5.3)
RBC # BLD: 3.77 M/UL — LOW (ref 3.8–5.2)
RBC # FLD: 14.3 % — SIGNIFICANT CHANGE UP (ref 10.3–14.5)
SODIUM SERPL-SCNC: 143 MMOL/L — SIGNIFICANT CHANGE UP (ref 135–145)
T3 SERPL-MCNC: 78 NG/DL — LOW (ref 80–200)
T4 AB SER-ACNC: 6.2 UG/DL — SIGNIFICANT CHANGE UP (ref 4.6–12)
WBC # BLD: 6.02 K/UL — SIGNIFICANT CHANGE UP (ref 3.8–10.5)
WBC # FLD AUTO: 6.02 K/UL — SIGNIFICANT CHANGE UP (ref 3.8–10.5)

## 2022-03-29 PROCEDURE — 74176 CT ABD & PELVIS W/O CONTRAST: CPT | Mod: 26

## 2022-03-29 RX ORDER — SODIUM CHLORIDE 9 MG/ML
1000 INJECTION INTRAMUSCULAR; INTRAVENOUS; SUBCUTANEOUS
Refills: 0 | Status: DISCONTINUED | OUTPATIENT
Start: 2022-03-29 | End: 2022-03-31

## 2022-03-29 RX ORDER — MINERAL OIL
133 OIL (ML) MISCELLANEOUS ONCE
Refills: 0 | Status: COMPLETED | OUTPATIENT
Start: 2022-03-29 | End: 2022-03-29

## 2022-03-29 RX ORDER — PANTOPRAZOLE SODIUM 20 MG/1
40 TABLET, DELAYED RELEASE ORAL
Refills: 0 | Status: DISCONTINUED | OUTPATIENT
Start: 2022-03-29 | End: 2022-03-31

## 2022-03-29 RX ORDER — SOD SULF/SODIUM/NAHCO3/KCL/PEG
4000 SOLUTION, RECONSTITUTED, ORAL ORAL ONCE
Refills: 0 | Status: COMPLETED | OUTPATIENT
Start: 2022-03-29 | End: 2022-03-29

## 2022-03-29 RX ADMIN — SIMETHICONE 80 MILLIGRAM(S): 80 TABLET, CHEWABLE ORAL at 12:10

## 2022-03-29 RX ADMIN — SODIUM CHLORIDE 150 MILLILITER(S): 9 INJECTION INTRAMUSCULAR; INTRAVENOUS; SUBCUTANEOUS at 00:52

## 2022-03-29 RX ADMIN — Medication 12.5 MILLIGRAM(S): at 17:16

## 2022-03-29 RX ADMIN — SIMETHICONE 80 MILLIGRAM(S): 80 TABLET, CHEWABLE ORAL at 06:49

## 2022-03-29 RX ADMIN — QUETIAPINE FUMARATE 150 MILLIGRAM(S): 200 TABLET, FILM COATED ORAL at 22:30

## 2022-03-29 RX ADMIN — Medication 5 MILLIGRAM(S): at 06:48

## 2022-03-29 RX ADMIN — SODIUM CHLORIDE 150 MILLILITER(S): 9 INJECTION INTRAMUSCULAR; INTRAVENOUS; SUBCUTANEOUS at 14:18

## 2022-03-29 RX ADMIN — Medication 12.5 MILLIGRAM(S): at 06:48

## 2022-03-29 RX ADMIN — Medication 133 MILLILITER(S): at 15:59

## 2022-03-29 RX ADMIN — Medication 2: at 17:14

## 2022-03-29 RX ADMIN — SIMETHICONE 80 MILLIGRAM(S): 80 TABLET, CHEWABLE ORAL at 18:09

## 2022-03-29 RX ADMIN — Medication 81 MILLIGRAM(S): at 12:10

## 2022-03-29 RX ADMIN — ENOXAPARIN SODIUM 40 MILLIGRAM(S): 100 INJECTION SUBCUTANEOUS at 12:10

## 2022-03-29 RX ADMIN — Medication 325 MILLIGRAM(S): at 12:10

## 2022-03-29 RX ADMIN — Medication 20 MILLIGRAM(S): at 12:10

## 2022-03-29 RX ADMIN — Medication 25 MICROGRAM(S): at 06:47

## 2022-03-29 RX ADMIN — Medication 4000 MILLILITER(S): at 17:25

## 2022-03-29 RX ADMIN — RISPERIDONE 1 MILLIGRAM(S): 4 TABLET ORAL at 18:09

## 2022-03-29 RX ADMIN — Medication 50 MILLIGRAM(S): at 22:30

## 2022-03-29 RX ADMIN — INSULIN GLARGINE 17 UNIT(S): 100 INJECTION, SOLUTION SUBCUTANEOUS at 08:31

## 2022-03-29 RX ADMIN — CEFTRIAXONE 100 MILLIGRAM(S): 500 INJECTION, POWDER, FOR SOLUTION INTRAMUSCULAR; INTRAVENOUS at 02:00

## 2022-03-29 RX ADMIN — SENNA PLUS 2 TABLET(S): 8.6 TABLET ORAL at 22:30

## 2022-03-29 RX ADMIN — SIMETHICONE 80 MILLIGRAM(S): 80 TABLET, CHEWABLE ORAL at 00:05

## 2022-03-29 RX ADMIN — POLYETHYLENE GLYCOL 3350 17 GRAM(S): 17 POWDER, FOR SOLUTION ORAL at 22:31

## 2022-03-29 RX ADMIN — RISPERIDONE 1 MILLIGRAM(S): 4 TABLET ORAL at 06:49

## 2022-03-29 NOTE — PROGRESS NOTE ADULT - ASSESSMENT
1. Fecal impaction  2. Proctitis  3. Iron deficiency anemia  4. Constipation    Suggestions:    1. Golytely one gallon over 4-6 hours  2. Stool softener / Laxative daily  3. High fiber diet  4. Monitor H/H  5. Protonix daily  6. DVT prophylaxis

## 2022-03-29 NOTE — PROGRESS NOTE ADULT - PROBLEM SELECTOR PLAN 4
on home medication synthroid  -c/w synthroid on home medication synthroid  -c/w synthroid  - TSH 10.9/ T4 0.8

## 2022-03-29 NOTE — CHART NOTE - NSCHARTNOTEFT_GEN_A_CORE
updated daughter Tresa at bedside on patient plan of care, she verbalized understanding and all questions were answered.   she can be reached at [phone]

## 2022-03-29 NOTE — PROGRESS NOTE ADULT - SUBJECTIVE AND OBJECTIVE BOX
Patient is a 89y old  Female who presents with a chief complaint of SHELIA (29 Mar 2022 06:36)      INTERVAL HPI/OVERNIGHT EVENTS: no overnight events    I&O's Summary    28 Mar 2022 07:01  -  29 Mar 2022 07:00  --------------------------------------------------------  IN: 0 mL / OUT: 900 mL / NET: -900 mL      Vital Signs Last 24 Hrs  T(C): 36.3 (29 Mar 2022 05:34), Max: 37 (28 Mar 2022 12:58)  T(F): 97.3 (29 Mar 2022 05:34), Max: 98.6 (28 Mar 2022 12:58)  HR: 85 (29 Mar 2022 05:34) (74 - 85)  BP: 147/70 (29 Mar 2022 05:34) (142/65 - 147/70)  BP(mean): --  RR: 18 (29 Mar 2022 05:34) (18 - 18)  SpO2: 96% (29 Mar 2022 05:34) (96% - 98%)  PAST MEDICAL & SURGICAL HISTORY:  HTN (hypertension)    DM (diabetes mellitus)    Alzheimer disease    MESERET (iron deficiency anemia)    Cardiac pacemaker    S/P TAMMY-BSO    History of hip replacement        SOCIAL HISTORY  Alcohol:  Tobacco:  Illicit substance use:      FAMILY HISTORY:      LABS:                        11.6   6.02  )-----------( 224      ( 29 Mar 2022 07:07 )             34.7     03-29    143  |  112<H>  |  15  ----------------------------<  69<L>  3.9   |  25  |  0.76    Ca    8.4      29 Mar 2022 07:07  Phos  2.5     -28  Mg     2.0     -    TPro  6.9  /  Alb  3.4<L>  /  TBili  0.3  /  DBili  x   /  AST  26  /  ALT  29  /  AlkPhos  87  03-28      Urinalysis Basic - ( 27 Mar 2022 20:49 )    Color: Yellow / Appearance: Hazy / S.010 / pH: x  Gluc: x / Ketone: Negative  / Bili: Negative / Urobili: Negative   Blood: x / Protein: Negative / Nitrite: Negative   Leuk Esterase: Moderate / RBC: 2-5 /HPF / WBC 26-50 /HPF   Sq Epi: x / Non Sq Epi: Moderate /HPF / Bacteria: Moderate /HPF      CAPILLARY BLOOD GLUCOSE      POCT Blood Glucose.: 93 mg/dL (29 Mar 2022 07:37)  POCT Blood Glucose.: 323 mg/dL (28 Mar 2022 21:52)  POCT Blood Glucose.: 116 mg/dL (28 Mar 2022 16:34)  POCT Blood Glucose.: 159 mg/dL (28 Mar 2022 11:26)        Urinalysis Basic - ( 27 Mar 2022 20:49 )    Color: Yellow / Appearance: Hazy / S.010 / pH: x  Gluc: x / Ketone: Negative  / Bili: Negative / Urobili: Negative   Blood: x / Protein: Negative / Nitrite: Negative   Leuk Esterase: Moderate / RBC: 2-5 /HPF / WBC 26-50 /HPF   Sq Epi: x / Non Sq Epi: Moderate /HPF / Bacteria: Moderate /HPF        MEDICATIONS  (STANDING):  aspirin enteric coated 81 milliGRAM(s) Oral daily  cefTRIAXone   IVPB 1000 milliGRAM(s) IV Intermittent every 24 hours  cefTRIAXone   IVPB      dextrose 5%. 1000 milliLiter(s) (100 mL/Hr) IV Continuous <Continuous>  dextrose 5%. 1000 milliLiter(s) (50 mL/Hr) IV Continuous <Continuous>  dextrose 50% Injectable 25 Gram(s) IV Push once  dextrose 50% Injectable 12.5 Gram(s) IV Push once  dextrose 50% Injectable 25 Gram(s) IV Push once  enoxaparin Injectable 40 milliGRAM(s) SubCutaneous every 24 hours  ferrous    sulfate 325 milliGRAM(s) Oral daily  FLUoxetine 20 milliGRAM(s) Oral daily  furosemide Solution 5 milliGRAM(s) Oral daily  glucagon  Injectable 1 milliGRAM(s) IntraMuscular once  insulin glargine Injectable (LANTUS) 17 Unit(s) SubCutaneous every morning  insulin lispro (ADMELOG) corrective regimen sliding scale   SubCutaneous Before meals and at bedtime  levothyroxine 25 MICROGram(s) Oral daily  metoprolol tartrate 12.5 milliGRAM(s) Oral every 12 hours  pantoprazole    Tablet 40 milliGRAM(s) Oral before breakfast  polyethylene glycol 3350 17 Gram(s) Oral at bedtime  QUEtiapine 150 milliGRAM(s) Oral at bedtime  risperiDONE   Tablet 1 milliGRAM(s) Oral two times a day  senna 2 Tablet(s) Oral at bedtime  simethicone 80 milliGRAM(s) Chew every 6 hours  sodium chloride 0.9%. 1000 milliLiter(s) (150 mL/Hr) IV Continuous <Continuous>  traZODone 50 milliGRAM(s) Oral at bedtime    MEDICATIONS  (PRN):  dextrose Oral Gel 15 Gram(s) Oral once PRN Blood Glucose LESS THAN 70 milliGRAM(s)/deciliter        RADIOLOGY & ADDITIONAL TESTS: < from: Xray Abdomen 2 Views (22 @ 18:15) >  ACC: [id number] EXAM:  XR ABDOMEN 2V                          PROCEDURE DATE:  2022          INTERPRETATION:  TIME OF EXAM: 2022 at 6:00 PM.    CLINICAL INFORMATION: Abdominal pain. No bowel movement.    COMPARISON:  None available    TECHNIQUE: Abdominal supine and erect x-rays.    INTERPRETATION:    There is a nonspecific bowel gas pattern with no evidence of free air or   obstruction. There is increased stool in the right hemicolon.  A rounded opacity projecting over the pelvisis likely a fluid-filled   urinary bladder.  There is scoliosis of the spine with osteoarthritic degenerative change.   There is incompletely imaged ORIF of the right hip.  There is a faint 3 x 7 mm calcification to the left of the L2-3 level, of   indeterminate nature. A calcification related to the left urinary tract   is possible.      IMPRESSION:  Nonspecific bowel gas pattern with no evidence of free air   or obstruction.    Increased stool in the right hemicolon.    Faint 3 x 7 mm calcification to the left of the L2-3 level, of   indeterminate nature. A calcification related to the left urinary tract   is possible.    --- End of Report ---            ADAMS BYRNE MD; Attending Radiologist  This document has been electronically signed. Mar 28 2022  2:00PM    < end of copied text >  < from: Xray Chest 1 View AP/PA (22 @ 18:16) >  ACC: [id number] EXAM:  XR CHEST AP OR PA 1V                          PROCEDURE DATE:  2022          INTERPRETATION:  TIME OF EXAM: 2022 at 5:59 PM.    CLINICAL INFORMATION: Abdominal pain.    COMPARISON:  2022.    TECHNIQUE:   AP Portable chest x-ray.    INTERPRETATION:    Heart size and the mediastinum cannot be accurately evaluated on this   projection.  A Micra device projects over the heart.  The lungs are clear.  No pleural effusion or pneumothorax is seen.  There is osteoarthritic degenerative change of the spine and shoulders.      IMPRESSION:  Clear lungs.    --- End of Report ---            ADAMS BYRNE MD; Attending Radiologist  This document has been electronically signed. Mar 28 2022  1:53PM    < end of copied text >      Imaging Personally Reviewed:  [x ] YES  [ ] NO    Consultant(s) Notes Reviewed:  [x ] YES  [ ] NO    Care Collaborated Discussed with Consultants/Other Providers [x ] YES  [ ] NO

## 2022-03-29 NOTE — PROGRESS NOTE ADULT - PROBLEM SELECTOR PLAN 7
- Lovenox - Lovenox      updated daughter Tresa on plan of care at bedside. she can be reached at [phone]

## 2022-03-29 NOTE — PROGRESS NOTE ADULT - PROBLEM SELECTOR PLAN 2
- UA + > 100,000 gram negative rods  - c/w ceftriaxone .  -f/u sensitivities - UA + > 100,000 gram negative rods  - Bcx NTD  - c/w ceftriaxone .  -f/u sensitivities

## 2022-03-29 NOTE — PROGRESS NOTE ADULT - SUBJECTIVE AND OBJECTIVE BOX
[   ] ICU                                          [   ] CCU                                      [ x  ] Medical Floor    Patient is a 89 year old female with constipation and fecal impaction. GI consulted to evaluate.       Patient is a 89 year old female from home, AAOx0-1 and non-ambulatory at baseline, with past medical history significant for IDDM, alzheimer's dementia, hypothyroidism, present with 2 weeks history of constipation without bowel movement. Patient is confused and very poor historian unable to provide history. No abdominal pain, nausea, vomiting, hematemesis, hematochezia, melena, fever, chills, chest pain, SOB, cough, hematuria, dysuria or diarrhea reported.      Patient appears comfortable. No new complaints reported, No abdominal pain, N/V, hematemesis, hematochezia, melena, fever, chills, chest pain, SOB, cough or diarrhea reported.       PAIN MANAGEMENT:  Pain Scale:                0 /10  Pain Location:      Prior Colonoscopy:  Unknown    PAST MEDICAL HISTORY  HTN (hypertension)  DM (diabetes mellitus)  Alzheimer disease  MESERET (iron deficiency anemia)  Cardiac pacemaker        PAST SURGICAL HISTORY   TAMMY-BSO  Hip replacement        Allergies    penicillins (Unknown)    Intolerances  None          SOCIAL HISTORY  Advanced Directives:       [ X ] Full Code       [  ] DNR  Marital Status:         [  ] M      [ X ] S      [  ] D       [  ] W  Children:       [ X ] Yes      [  ] No  Occupation:        [  ] Employed       [ X ] Unemployed       [  ] Retired  Diet:       [ X ] Regular       [  ] PEG feeding          [  ] NG tube feeding  Drug Use:           [ X ] No                [  ] Yes  Alcohol:           [ X ] No             [  ] Yes (socially)         [  ] Yes (chronic)  Tobacco:           [  ] Yes           [ X ] No      FAMILY HISTORY  [ X ] Heart Disease            [ X ] Diabetes             [ X ] HTN             [  ] Colon Cancer             [  ] Stomach Cancer              [  ] Pancreatic Cancer      VITALS  Vital Signs Last 24 Hrs  T(C): 36.3 (29 Mar 2022 13:54), Max: 36.7 (28 Mar 2022 20:25)  T(F): 97.3 (29 Mar 2022 13:54), Max: 98 (28 Mar 2022 20:25)  HR: 76 (29 Mar 2022 17:15) (70 - 85)  BP: 153/73 (29 Mar 2022 17:15) (144/71 - 169/81)  BP(mean): 100 (29 Mar 2022 13:54) (100 - 100)  RR: 17 (29 Mar 2022 13:54) (17 - 18)  SpO2: 100% (29 Mar 2022 13:54) (96% - 100%)       MEDICATIONS  (STANDING):  aspirin enteric coated 81 milliGRAM(s) Oral daily  cefTRIAXone   IVPB 1000 milliGRAM(s) IV Intermittent every 24 hours  cefTRIAXone   IVPB      dextrose 5%. 1000 milliLiter(s) (100 mL/Hr) IV Continuous <Continuous>  dextrose 5%. 1000 milliLiter(s) (50 mL/Hr) IV Continuous <Continuous>  dextrose 50% Injectable 25 Gram(s) IV Push once  dextrose 50% Injectable 12.5 Gram(s) IV Push once  dextrose 50% Injectable 25 Gram(s) IV Push once  enoxaparin Injectable 40 milliGRAM(s) SubCutaneous every 24 hours  ferrous    sulfate 325 milliGRAM(s) Oral daily  FLUoxetine 20 milliGRAM(s) Oral daily  furosemide Solution 5 milliGRAM(s) Oral daily  glucagon  Injectable 1 milliGRAM(s) IntraMuscular once  insulin glargine Injectable (LANTUS) 17 Unit(s) SubCutaneous every morning  insulin lispro (ADMELOG) corrective regimen sliding scale   SubCutaneous Before meals and at bedtime  levothyroxine 25 MICROGram(s) Oral daily  metoprolol tartrate 12.5 milliGRAM(s) Oral every 12 hours  pantoprazole    Tablet 40 milliGRAM(s) Oral before breakfast  polyethylene glycol 3350 17 Gram(s) Oral at bedtime  QUEtiapine 150 milliGRAM(s) Oral at bedtime  risperiDONE   Tablet 1 milliGRAM(s) Oral two times a day  senna 2 Tablet(s) Oral at bedtime  simethicone 80 milliGRAM(s) Chew every 6 hours  sodium chloride 0.9%. 1000 milliLiter(s) (150 mL/Hr) IV Continuous <Continuous>  traZODone 50 milliGRAM(s) Oral at bedtime    MEDICATIONS  (PRN):  dextrose Oral Gel 15 Gram(s) Oral once PRN Blood Glucose LESS THAN 70 milliGRAM(s)/deciliter                            11.6   6.02  )-----------( 224      ( 29 Mar 2022 07:07 )             34.7       03-29    143  |  112<H>  |  15  ----------------------------<  69<L>  3.9   |  25  |  0.76    Ca    8.4      29 Mar 2022 07:07  Phos  2.5     03-28  Mg     2.0     03-28    TPro  6.9  /  Alb  3.4<L>  /  TBili  0.3  /  DBili  x   /  AST  26  /  ALT  29  /  AlkPhos  87  03-28      < from: CT Abdomen and Pelvis No Cont (03.29.22 @ 12:04) >  ACC: [id number] EXAM:  CT ABDOMEN AND PELVIS                          PROCEDURE DATE:  03/29/2022          INTERPRETATION:  CLINICAL INFORMATION: Fecal impaction and constipation    COMPARISON: None.    CONTRAST/COMPLICATIONS:  IV Contrast: NONE  Oral Contrast: NONE  Complications: None reported at time of study completion    PROCEDURE:  CT of the Abdomen and Pelvis was performed.  Sagittal and coronal reformats were performed.    FINDINGS:  LOWER CHEST: Basilar atelectasis. Right ventricular intracardiac device.   Coronary artery and mitral annulus calcifications.    Please note that evaluation of the abdominal organs and vascular   structures is limited by lack of intravenous contrast.    LIVER: Within normal limits.  BILE DUCTS: Normal caliber.  GALLBLADDER: Within normal limits.  SPLEEN: Within normal limits.  PANCREAS: Mildly atrophic.  ADRENALS: Within normal limits.  KIDNEYS/URETERS: Within normal limits.    BLADDER: Within normal limits.  REPRODUCTIVE ORGANS: Hysterectomy.    BOWEL: Large hiatal hernia. No bowel obstruction. Appendix is not   visualized. No evidence of inflammation in the pericecal region. Rectal   wall thickening and inflammation. Stool throughout the colon.  PERITONEUM: No ascites.  VESSELS: Atherosclerotic calcifications.  RETROPERITONEUM/LYMPH NODES: No lymphadenopathy.  ABDOMINAL WALL: Postsurgical changes.  BONES: Degenerative changes. Right femur ORIF. Partially imaged distal   right radius ORIF. Wedge compression deformity of L1.    IMPRESSION:    1. Proctitis.  2. Stool throughout the colon.  3. No bowel obstruction.  4. No hydronephrosis.    < end of copied text >

## 2022-03-29 NOTE — PROGRESS NOTE ADULT - PROBLEM SELECTOR PLAN 1
RESOLVED likely 2/2 urinary retention 2/2 bowel impaction  - s/p izquierdo, passed TOV  - RESOLVED likely post renal : 2/2 urinary retention 2/2 bowel impaction  - s/p izquierdo, passed TOV RESOLVED likely post renal : 2/2 urinary retention 2/2 bowel impaction  - s/p izquierdo, passed TOV]  - c/w IVF  - f/u urine lytes p/w 2 weeks no BM  - xray abdomen showed increased stool right hemicolon  -s/p manual disimpaction in ED  - BM x 3  - CT abd/pelvis shows stool throughout colon, proctitis  - s/p 1 mineral oil enema  - c/w bowel regimen senna/ miralax  - started on protonix daily, golytely and high fiber diet as per GI reccs  - GI following

## 2022-03-29 NOTE — PROGRESS NOTE ADULT - SUBJECTIVE AND OBJECTIVE BOX
Patient is a 89y old  Female who presents with a chief complaint of SHELIA (28 Mar 2022 17:05)    pt seen in icu [  ], reg med floor [   ], bed [  ], chair at bedside [   ], a+o x3 [  ], lethargic [  ],  nad [  ]    izquierdo [  ], ngt [  ], peg [  ], et tube [  ], cent line [  ], picc line [  ]        Allergies    penicillins (Unknown)        Vitals    T(F): 97.3 (03-29-22 @ 05:34), Max: 98.6 (03-28-22 @ 12:58)  HR: 85 (03-29-22 @ 05:34) (74 - 85)  BP: 147/70 (03-29-22 @ 05:34) (142/65 - 147/70)  RR: 18 (03-29-22 @ 05:34) (18 - 18)  SpO2: 96% (03-29-22 @ 05:34) (96% - 98%)  Wt(kg): --  CAPILLARY BLOOD GLUCOSE      POCT Blood Glucose.: 323 mg/dL (28 Mar 2022 21:52)      Labs                          11.3   8.90  )-----------( 240      ( 28 Mar 2022 05:50 )             34.5       03-28    141  |  109<H>  |  26<H>  ----------------------------<  67<L>  3.8   |  27  |  0.99    Ca    8.8      28 Mar 2022 05:50  Phos  2.5     03-28  Mg     2.0     03-28    TPro  6.9  /  Alb  3.4<L>  /  TBili  0.3  /  DBili  x   /  AST  26  /  ALT  29  /  AlkPhos  87  03-28            Clean Catch Clean Catch (Midstream)  01-05 @ 11:36   >100,000 CFU/ml Escherichia coli  --  Escherichia coli          Radiology Results      Meds    MEDICATIONS  (STANDING):  aspirin enteric coated 81 milliGRAM(s) Oral daily  cefTRIAXone   IVPB 1000 milliGRAM(s) IV Intermittent every 24 hours  cefTRIAXone   IVPB      dextrose 5%. 1000 milliLiter(s) (100 mL/Hr) IV Continuous <Continuous>  dextrose 5%. 1000 milliLiter(s) (50 mL/Hr) IV Continuous <Continuous>  dextrose 50% Injectable 25 Gram(s) IV Push once  dextrose 50% Injectable 12.5 Gram(s) IV Push once  dextrose 50% Injectable 25 Gram(s) IV Push once  enoxaparin Injectable 40 milliGRAM(s) SubCutaneous every 24 hours  ferrous    sulfate 325 milliGRAM(s) Oral daily  FLUoxetine 20 milliGRAM(s) Oral daily  furosemide Solution 5 milliGRAM(s) Oral daily  glucagon  Injectable 1 milliGRAM(s) IntraMuscular once  insulin glargine Injectable (LANTUS) 17 Unit(s) SubCutaneous every morning  insulin lispro (ADMELOG) corrective regimen sliding scale   SubCutaneous Before meals and at bedtime  levothyroxine 25 MICROGram(s) Oral daily  metoprolol tartrate 12.5 milliGRAM(s) Oral every 12 hours  polyethylene glycol 3350 17 Gram(s) Oral at bedtime  QUEtiapine 150 milliGRAM(s) Oral at bedtime  risperiDONE   Tablet 1 milliGRAM(s) Oral two times a day  senna 2 Tablet(s) Oral at bedtime  simethicone 80 milliGRAM(s) Chew every 6 hours  sodium chloride 0.9%. 1000 milliLiter(s) (150 mL/Hr) IV Continuous <Continuous>  traZODone 50 milliGRAM(s) Oral at bedtime      MEDICATIONS  (PRN):  dextrose Oral Gel 15 Gram(s) Oral once PRN Blood Glucose LESS THAN 70 milliGRAM(s)/deciliter      Physical Exam    Neuro :  no focal deficits  Respiratory: CTA B/L  CV: RRR, S1S2, no murmurs,   Abdominal: Soft, NT, ND +BS,  Extremities: No edema, + peripheral pulses    ASSESSMENT    Obstructive and reflux uropathy    HTN (hypertension)    DM (diabetes mellitus)    Alzheimer disease    MESERET (iron deficiency anemia)    Cardiac pacemaker    No significant past surgical history    S/P TAMMY-BSO    History of hip replacement        PLAN     Patient is a 89y old  Female who presents with a chief complaint of SHELIA (28 Mar 2022 17:05)    pt seen in icu [  ], reg med floor [ x  ], bed [x  ], chair at bedside [   ], awake and responsive [ x ], lethargic [  ],  nad [ x ]    Allergies    penicillins (Unknown)        Vitals    T(F): 97.3 (03-29-22 @ 05:34), Max: 98.6 (03-28-22 @ 12:58)  HR: 85 (03-29-22 @ 05:34) (74 - 85)  BP: 147/70 (03-29-22 @ 05:34) (142/65 - 147/70)  RR: 18 (03-29-22 @ 05:34) (18 - 18)  SpO2: 96% (03-29-22 @ 05:34) (96% - 98%)  Wt(kg): --  CAPILLARY BLOOD GLUCOSE      POCT Blood Glucose.: 323 mg/dL (28 Mar 2022 21:52)      Labs                          11.3   8.90  )-----------( 240      ( 28 Mar 2022 05:50 )             34.5       03-28    141  |  109<H>  |  26<H>  ----------------------------<  67<L>  3.8   |  27  |  0.99    Ca    8.8      28 Mar 2022 05:50  Phos  2.5     03-28  Mg     2.0     03-28    TPro  6.9  /  Alb  3.4<L>  /  TBili  0.3  /  DBili  x   /  AST  26  /  ALT  29  /  AlkPhos  87  03-28        Radiology Results      Meds    MEDICATIONS  (STANDING):  aspirin enteric coated 81 milliGRAM(s) Oral daily  cefTRIAXone   IVPB 1000 milliGRAM(s) IV Intermittent every 24 hours  cefTRIAXone   IVPB      dextrose 5%. 1000 milliLiter(s) (100 mL/Hr) IV Continuous <Continuous>  dextrose 5%. 1000 milliLiter(s) (50 mL/Hr) IV Continuous <Continuous>  dextrose 50% Injectable 25 Gram(s) IV Push once  dextrose 50% Injectable 12.5 Gram(s) IV Push once  dextrose 50% Injectable 25 Gram(s) IV Push once  enoxaparin Injectable 40 milliGRAM(s) SubCutaneous every 24 hours  ferrous    sulfate 325 milliGRAM(s) Oral daily  FLUoxetine 20 milliGRAM(s) Oral daily  furosemide Solution 5 milliGRAM(s) Oral daily  glucagon  Injectable 1 milliGRAM(s) IntraMuscular once  insulin glargine Injectable (LANTUS) 17 Unit(s) SubCutaneous every morning  insulin lispro (ADMELOG) corrective regimen sliding scale   SubCutaneous Before meals and at bedtime  levothyroxine 25 MICROGram(s) Oral daily  metoprolol tartrate 12.5 milliGRAM(s) Oral every 12 hours  polyethylene glycol 3350 17 Gram(s) Oral at bedtime  QUEtiapine 150 milliGRAM(s) Oral at bedtime  risperiDONE   Tablet 1 milliGRAM(s) Oral two times a day  senna 2 Tablet(s) Oral at bedtime  simethicone 80 milliGRAM(s) Chew every 6 hours  sodium chloride 0.9%. 1000 milliLiter(s) (150 mL/Hr) IV Continuous <Continuous>  traZODone 50 milliGRAM(s) Oral at bedtime      MEDICATIONS  (PRN):  dextrose Oral Gel 15 Gram(s) Oral once PRN Blood Glucose LESS THAN 70 milliGRAM(s)/deciliter      Physical Exam    Neuro :  no focal deficits  Respiratory: CTA B/L  CV: RRR, S1S2, no murmurs,   Abdominal: Soft, NT, ND +BS,  Extremities: No edema, + peripheral pulses      ASSESSMENT    uti,   fecal impaction,   shelia,   h/o alzheimer's dementia,   hypothyroidism  HTN (hypertension)  DM (diabetes mellitus)  MESERET (iron deficiency anemia)  Cardiac pacemaker  S/P TAMMY-BSO  s/p hip replacement        PLAN    cont rocephin,   f/u blood and ucx   pt s/p manual disimpaction   cont senna, miralax,   gi f/u   f/u ctabd pelv   cont lispro ss,   cont lantus   cont current meds

## 2022-03-29 NOTE — PROGRESS NOTE ADULT - PROBLEM SELECTOR PLAN 3
p/w 2 weeks no BM  - xray abdomen showed increased stool right hemicolon  -s/p manual disimpaction in ED  - BM x 3  - c/w bowel regimine senna/ miralax  - GI following p/w 2 weeks no BM  - xray abdomen showed increased stool right hemicolon  -s/p manual disimpaction in ED  - BM x 3  - c/w bowel regimen senna/ miralax  - started on protonix daily and high fiber diet as per GI reccs  -f/u CT abdomen/pelvis  - GI following p/w 2 weeks no BM  - xray abdomen showed increased stool right hemicolon  -s/p manual disimpaction in ED  - BM x 3  - CT abd/pelvis shows stool throughout colon, proctitis  - s/p 1 mineral oil enema  - c/w bowel regimen senna/ miralax  - started on protonix daily, golytely and high fiber diet as per GI reccs  - GI following RESOLVED likely post renal : 2/2 urinary retention 2/2 bowel impaction  - s/p izquierdo, passed TOV]  - c/w IVF  - f/u urine lytes

## 2022-03-29 NOTE — PROGRESS NOTE ADULT - ASSESSMENT
Pt. is a 89y/o female from home non- ambulatory with PMHx HTN, IDDM, alzheimer's PPM, MESERET and hypothyroidism who p/w no BM x 2 weeks admitted to medicine for SHELIA secondary to urinary retention in the setting of fecal burden. Abd xray significant fpr increased stool in the right hemicolon. s/p manual disimpaction in ED; bowel regimen in progress. GI following.  hospital course complicated by + UA with gram negative rods, on ceftriaxone day 2. blood cultures negative to date. pt. s/p izquierdo ( removed by patient) for rentention passed TOV.  Pt. is a 89y/o female from home non- ambulatory with PMHx HTN, IDDM, alzheimer's PPM, MESERET and hypothyroidism who p/w no BM x 2 weeks admitted to medicine for SHELIA secondary to urinary retention in the setting of fecal burden. Abd xray significant fpr increased stool in the right hemicolon. s/p manual disimpaction in ED; bowel regimen in progress. GI following.  hospital course complicated by + UA with gram negative rods, on ceftriaxone day 2. blood cultures negative to date. pt. s/p izquierdo ( removed by patient) for rentention passed TOV. Repeat CT abdomen and pelvis showed stool througout colon, s/p mineral oil enema and started on golytely as per GI reccs.

## 2022-03-30 DIAGNOSIS — Z02.9 ENCOUNTER FOR ADMINISTRATIVE EXAMINATIONS, UNSPECIFIED: ICD-10-CM

## 2022-03-30 LAB
-  AMIKACIN: SIGNIFICANT CHANGE UP
-  AMOXICILLIN/CLAVULANIC ACID: SIGNIFICANT CHANGE UP
-  AMPICILLIN/SULBACTAM: SIGNIFICANT CHANGE UP
-  AMPICILLIN: SIGNIFICANT CHANGE UP
-  AZTREONAM: SIGNIFICANT CHANGE UP
-  CEFAZOLIN: SIGNIFICANT CHANGE UP
-  CEFEPIME: SIGNIFICANT CHANGE UP
-  CEFOXITIN: SIGNIFICANT CHANGE UP
-  CEFTRIAXONE: SIGNIFICANT CHANGE UP
-  CIPROFLOXACIN: SIGNIFICANT CHANGE UP
-  ERTAPENEM: SIGNIFICANT CHANGE UP
-  GENTAMICIN: SIGNIFICANT CHANGE UP
-  IMIPENEM: SIGNIFICANT CHANGE UP
-  LEVOFLOXACIN: SIGNIFICANT CHANGE UP
-  MEROPENEM: SIGNIFICANT CHANGE UP
-  NITROFURANTOIN: SIGNIFICANT CHANGE UP
-  PIPERACILLIN/TAZOBACTAM: SIGNIFICANT CHANGE UP
-  TIGECYCLINE: SIGNIFICANT CHANGE UP
-  TOBRAMYCIN: SIGNIFICANT CHANGE UP
-  TRIMETHOPRIM/SULFAMETHOXAZOLE: SIGNIFICANT CHANGE UP
ANION GAP SERPL CALC-SCNC: 5 MMOL/L — SIGNIFICANT CHANGE UP (ref 5–17)
BUN SERPL-MCNC: 14 MG/DL — SIGNIFICANT CHANGE UP (ref 7–18)
CALCIUM SERPL-MCNC: 8.5 MG/DL — SIGNIFICANT CHANGE UP (ref 8.4–10.5)
CHLORIDE SERPL-SCNC: 111 MMOL/L — HIGH (ref 96–108)
CO2 SERPL-SCNC: 26 MMOL/L — SIGNIFICANT CHANGE UP (ref 22–31)
CREAT SERPL-MCNC: 0.75 MG/DL — SIGNIFICANT CHANGE UP (ref 0.5–1.3)
EGFR: 75 ML/MIN/1.73M2 — SIGNIFICANT CHANGE UP
GLUCOSE BLDC GLUCOMTR-MCNC: 120 MG/DL — HIGH (ref 70–99)
GLUCOSE BLDC GLUCOMTR-MCNC: 181 MG/DL — HIGH (ref 70–99)
GLUCOSE BLDC GLUCOMTR-MCNC: 227 MG/DL — HIGH (ref 70–99)
GLUCOSE BLDC GLUCOMTR-MCNC: 79 MG/DL — SIGNIFICANT CHANGE UP (ref 70–99)
GLUCOSE SERPL-MCNC: 60 MG/DL — LOW (ref 70–99)
METHOD TYPE: SIGNIFICANT CHANGE UP
POTASSIUM SERPL-MCNC: 3.8 MMOL/L — SIGNIFICANT CHANGE UP (ref 3.5–5.3)
POTASSIUM SERPL-SCNC: 3.8 MMOL/L — SIGNIFICANT CHANGE UP (ref 3.5–5.3)
SODIUM SERPL-SCNC: 142 MMOL/L — SIGNIFICANT CHANGE UP (ref 135–145)

## 2022-03-30 RX ORDER — LACTULOSE 10 G/15ML
20 SOLUTION ORAL DAILY
Refills: 0 | Status: DISCONTINUED | OUTPATIENT
Start: 2022-03-30 | End: 2022-03-31

## 2022-03-30 RX ORDER — METRONIDAZOLE 500 MG
500 TABLET ORAL EVERY 8 HOURS
Refills: 0 | Status: DISCONTINUED | OUTPATIENT
Start: 2022-03-30 | End: 2022-03-31

## 2022-03-30 RX ADMIN — Medication 1: at 16:33

## 2022-03-30 RX ADMIN — SIMETHICONE 80 MILLIGRAM(S): 80 TABLET, CHEWABLE ORAL at 00:51

## 2022-03-30 RX ADMIN — Medication 100 MILLIGRAM(S): at 14:21

## 2022-03-30 RX ADMIN — Medication 20 MILLIGRAM(S): at 11:08

## 2022-03-30 RX ADMIN — Medication 50 MILLIGRAM(S): at 22:20

## 2022-03-30 RX ADMIN — Medication 12.5 MILLIGRAM(S): at 18:00

## 2022-03-30 RX ADMIN — SIMETHICONE 80 MILLIGRAM(S): 80 TABLET, CHEWABLE ORAL at 06:43

## 2022-03-30 RX ADMIN — Medication 100 MILLIGRAM(S): at 22:20

## 2022-03-30 RX ADMIN — SIMETHICONE 80 MILLIGRAM(S): 80 TABLET, CHEWABLE ORAL at 11:08

## 2022-03-30 RX ADMIN — INSULIN GLARGINE 17 UNIT(S): 100 INJECTION, SOLUTION SUBCUTANEOUS at 08:14

## 2022-03-30 RX ADMIN — CEFTRIAXONE 100 MILLIGRAM(S): 500 INJECTION, POWDER, FOR SOLUTION INTRAMUSCULAR; INTRAVENOUS at 02:49

## 2022-03-30 RX ADMIN — SIMETHICONE 80 MILLIGRAM(S): 80 TABLET, CHEWABLE ORAL at 18:00

## 2022-03-30 RX ADMIN — QUETIAPINE FUMARATE 150 MILLIGRAM(S): 200 TABLET, FILM COATED ORAL at 22:20

## 2022-03-30 RX ADMIN — PANTOPRAZOLE SODIUM 40 MILLIGRAM(S): 20 TABLET, DELAYED RELEASE ORAL at 06:48

## 2022-03-30 RX ADMIN — Medication 12.5 MILLIGRAM(S): at 06:42

## 2022-03-30 RX ADMIN — ENOXAPARIN SODIUM 40 MILLIGRAM(S): 100 INJECTION SUBCUTANEOUS at 11:07

## 2022-03-30 RX ADMIN — Medication 2: at 11:24

## 2022-03-30 RX ADMIN — SODIUM CHLORIDE 75 MILLILITER(S): 9 INJECTION INTRAMUSCULAR; INTRAVENOUS; SUBCUTANEOUS at 14:21

## 2022-03-30 RX ADMIN — Medication 25 MICROGRAM(S): at 06:42

## 2022-03-30 RX ADMIN — RISPERIDONE 1 MILLIGRAM(S): 4 TABLET ORAL at 18:00

## 2022-03-30 RX ADMIN — RISPERIDONE 1 MILLIGRAM(S): 4 TABLET ORAL at 06:42

## 2022-03-30 RX ADMIN — Medication 325 MILLIGRAM(S): at 11:08

## 2022-03-30 RX ADMIN — Medication 5 MILLIGRAM(S): at 06:41

## 2022-03-30 RX ADMIN — Medication 81 MILLIGRAM(S): at 11:08

## 2022-03-30 NOTE — PROGRESS NOTE ADULT - PROBLEM SELECTOR PLAN 5
- on lantus 34u AM, and metformin  - c/w ISS and lantus 17u  - FS qACHs
on lantus and metformin at home  - A1C 6.3%, CONTROLLED  - c/w ISS and Lantus 17 u  - hold oral DM meds while in hosp
on lantus and metformin at home  - A1C 6.3%, CONTROLLED  - c/w ISS and Lantus 17 u  - hold oral DM meds while in hosp

## 2022-03-30 NOTE — PROGRESS NOTE ADULT - SUBJECTIVE AND OBJECTIVE BOX
[   ] ICU                                          [   ] CCU                                      [ x  ] Medical Floor    Patient is a 89 year old female with constipation and fecal impaction. GI consulted to evaluate.       Patient is a 89 year old female from home, AAOx0-1 and non-ambulatory at baseline, with past medical history significant for IDDM, alzheimer's dementia, hypothyroidism, present with 2 weeks history of constipation without bowel movement. Patient is confused and very poor historian unable to provide history. No abdominal pain, nausea, vomiting, hematemesis, hematochezia, melena, fever, chills, chest pain, SOB, cough, hematuria, dysuria or diarrhea reported.      Patient appears comfortable. No new complaints reported, No abdominal pain, N/V, hematemesis, hematochezia, melena, fever, chills, chest pain, SOB, cough or diarrhea reported.       PAIN MANAGEMENT:  Pain Scale:                0 /10  Pain Location:      Prior Colonoscopy:  Unknown    PAST MEDICAL HISTORY  HTN (hypertension)  DM (diabetes mellitus)  Alzheimer disease  MESERET (iron deficiency anemia)  Cardiac pacemaker        PAST SURGICAL HISTORY   TAMMY-BSO  Hip replacement        Allergies    penicillins (Unknown)    Intolerances  None          SOCIAL HISTORY  Advanced Directives:       [ X ] Full Code       [  ] DNR  Marital Status:         [  ] M      [ X ] S      [  ] D       [  ] W  Children:       [ X ] Yes      [  ] No  Occupation:        [  ] Employed       [ X ] Unemployed       [  ] Retired  Diet:       [ X ] Regular       [  ] PEG feeding          [  ] NG tube feeding  Drug Use:           [ X ] No                [  ] Yes  Alcohol:           [ X ] No             [  ] Yes (socially)         [  ] Yes (chronic)  Tobacco:           [  ] Yes           [ X ] No      FAMILY HISTORY  [ X ] Heart Disease            [ X ] Diabetes             [ X ] HTN             [  ] Colon Cancer             [  ] Stomach Cancer              [  ] Pancreatic Cancer      VITALS  Vital Signs Last 24 Hrs  T(C): 36.7 (30 Mar 2022 13:43), Max: 36.7 (30 Mar 2022 13:43)  T(F): 98.1 (30 Mar 2022 13:43), Max: 98.1 (30 Mar 2022 13:43)  HR: 86 (30 Mar 2022 13:43) (73 - 114)  BP: 132/48 (30 Mar 2022 13:43) (132/48 - 193/81)   RR: 16 (30 Mar 2022 13:43) (16 - 18)  SpO2: 97% (30 Mar 2022 13:43) (96% - 97%)       MEDICATIONS  (STANDING):  aspirin enteric coated 81 milliGRAM(s) Oral daily  cefTRIAXone   IVPB 1000 milliGRAM(s) IV Intermittent every 24 hours  cefTRIAXone   IVPB      dextrose 5%. 1000 milliLiter(s) (100 mL/Hr) IV Continuous <Continuous>  dextrose 5%. 1000 milliLiter(s) (50 mL/Hr) IV Continuous <Continuous>  dextrose 50% Injectable 25 Gram(s) IV Push once  dextrose 50% Injectable 12.5 Gram(s) IV Push once  dextrose 50% Injectable 25 Gram(s) IV Push once  enoxaparin Injectable 40 milliGRAM(s) SubCutaneous every 24 hours  ferrous    sulfate 325 milliGRAM(s) Oral daily  FLUoxetine 20 milliGRAM(s) Oral daily  furosemide Solution 5 milliGRAM(s) Oral daily  glucagon  Injectable 1 milliGRAM(s) IntraMuscular once  insulin glargine Injectable (LANTUS) 17 Unit(s) SubCutaneous every morning  insulin lispro (ADMELOG) corrective regimen sliding scale   SubCutaneous Before meals and at bedtime  lactulose Syrup 20 Gram(s) Oral daily  levothyroxine 25 MICROGram(s) Oral daily  metoprolol tartrate 12.5 milliGRAM(s) Oral every 12 hours  metroNIDAZOLE  IVPB 500 milliGRAM(s) IV Intermittent every 8 hours  pantoprazole    Tablet 40 milliGRAM(s) Oral before breakfast  polyethylene glycol 3350 17 Gram(s) Oral at bedtime  QUEtiapine 150 milliGRAM(s) Oral at bedtime  risperiDONE   Tablet 1 milliGRAM(s) Oral two times a day  senna 2 Tablet(s) Oral at bedtime  simethicone 80 milliGRAM(s) Chew every 6 hours  sodium chloride 0.9%. 1000 milliLiter(s) (75 mL/Hr) IV Continuous <Continuous>  traZODone 50 milliGRAM(s) Oral at bedtime    MEDICATIONS  (PRN):  dextrose Oral Gel 15 Gram(s) Oral once PRN Blood Glucose LESS THAN 70 milliGRAM(s)/deciliter                            11.6   6.02  )-----------( 224      ( 29 Mar 2022 07:07 )             34.7       03-30    142  |  111<H>  |  14  ----------------------------<  60<L>  3.8   |  26  |  0.75    Ca    8.5      30 Mar 2022 06:51

## 2022-03-30 NOTE — PROGRESS NOTE ADULT - PROBLEM SELECTOR PLAN 2
- UA + > 100,000 E.coli  - Bcx NTD  - c/w ceftriaxone .  -f/u  urine culture sensitivities - UA + > 100,000 E.coli  - Bcx NTD  - c/w ceftriaxone .  - flagyl added IVPB 500 mg Q 8, pt can not tolerate PO  -f/u  urine culture sensitivities

## 2022-03-30 NOTE — PROGRESS NOTE ADULT - PROBLEM SELECTOR PLAN 6
- h/o Alzheimer, on quetiapine, trazodone, risperidone, and fluoxetine  - c/w all home meds
on home quetiapine, trazadone, risperidone and fluoxetine  - c/w home medications
on home quetiapine, trazadone, risperidone and fluoxetine  - c/w home medications

## 2022-03-30 NOTE — PROGRESS NOTE ADULT - NEUROLOGICAL DETAILS
at baseline A+ox1/disoriented
responds to pain/responds to verbal commands/sensation intact/cranial nerves intact
disoriented

## 2022-03-30 NOTE — PROGRESS NOTE ADULT - SUBJECTIVE AND OBJECTIVE BOX
Patient is a 89y old  Female who presents with a chief complaint of SHELIA (29 Mar 2022 17:52)    pt seen in icu [  ], reg med floor [ x  ], bed [x  ], chair at bedside [   ], awake and responsive [ x ], lethargic [  ],    nad [ x         Allergies    penicillins (Unknown)        Vitals    T(F): 97.6 (03-30-22 @ 05:25), Max: 97.9 (03-29-22 @ 20:30)  HR: 114 (03-30-22 @ 05:25) (70 - 114)  BP: 193/81 (03-30-22 @ 05:25) (142/65 - 193/81)  RR: 18 (03-30-22 @ 05:25) (17 - 18)  SpO2: 96% (03-30-22 @ 05:25) (96% - 100%)  Wt(kg): --  CAPILLARY BLOOD GLUCOSE      POCT Blood Glucose.: 103 mg/dL (29 Mar 2022 21:21)      Labs                          11.6   6.02  )-----------( 224      ( 29 Mar 2022 07:07 )             34.7       03-29    143  |  112<H>  |  15  ----------------------------<  69<L>  3.9   |  25  |  0.76    Ca    8.4      29 Mar 2022 07:07              .Blood Blood-Peripheral  03-28 @ 10:21   No growth to date.  --  --      Clean Catch Clean Catch (Midstream)  03-28 @ 05:37   >100,000 CFU/ml Escherichia coli  --  --      Clean Catch Clean Catch (Midstream)  01-05 @ 11:36   >100,000 CFU/ml Escherichia coli  --  Escherichia coli          Radiology Results      Meds    MEDICATIONS  (STANDING):  aspirin enteric coated 81 milliGRAM(s) Oral daily  cefTRIAXone   IVPB 1000 milliGRAM(s) IV Intermittent every 24 hours  cefTRIAXone   IVPB      dextrose 5%. 1000 milliLiter(s) (100 mL/Hr) IV Continuous <Continuous>  dextrose 5%. 1000 milliLiter(s) (50 mL/Hr) IV Continuous <Continuous>  dextrose 50% Injectable 25 Gram(s) IV Push once  dextrose 50% Injectable 12.5 Gram(s) IV Push once  dextrose 50% Injectable 25 Gram(s) IV Push once  enoxaparin Injectable 40 milliGRAM(s) SubCutaneous every 24 hours  ferrous    sulfate 325 milliGRAM(s) Oral daily  FLUoxetine 20 milliGRAM(s) Oral daily  furosemide Solution 5 milliGRAM(s) Oral daily  glucagon  Injectable 1 milliGRAM(s) IntraMuscular once  insulin glargine Injectable (LANTUS) 17 Unit(s) SubCutaneous every morning  insulin lispro (ADMELOG) corrective regimen sliding scale   SubCutaneous Before meals and at bedtime  levothyroxine 25 MICROGram(s) Oral daily  metoprolol tartrate 12.5 milliGRAM(s) Oral every 12 hours  pantoprazole    Tablet 40 milliGRAM(s) Oral before breakfast  polyethylene glycol 3350 17 Gram(s) Oral at bedtime  QUEtiapine 150 milliGRAM(s) Oral at bedtime  risperiDONE   Tablet 1 milliGRAM(s) Oral two times a day  senna 2 Tablet(s) Oral at bedtime  simethicone 80 milliGRAM(s) Chew every 6 hours  sodium chloride 0.9%. 1000 milliLiter(s) (75 mL/Hr) IV Continuous <Continuous>  traZODone 50 milliGRAM(s) Oral at bedtime      MEDICATIONS  (PRN):  dextrose Oral Gel 15 Gram(s) Oral once PRN Blood Glucose LESS THAN 70 milliGRAM(s)/deciliter      Physical Exam    Neuro :  no focal deficits  Respiratory: CTA B/L  CV: RRR, S1S2, no murmurs,   Abdominal: Soft, NT, ND +BS,  Extremities: No edema, + peripheral pulses      ASSESSMENT    uti,   fecal impaction,   shelia,   h/o alzheimer's dementia,   hypothyroidism  HTN (hypertension)  DM (diabetes mellitus)  MESERET (iron deficiency anemia)  Cardiac pacemaker  S/P TAMMY-BSO  s/p hip replacement        PLAN    cont rocephin,   f/u blood and ucx   pt s/p manual disimpaction   cont senna, miralax,   gi f/u   f/u ctabd pelv   cont lispro ss,   cont lantus   cont current meds         Patient is a 89y old  Female who presents with a chief complaint of SHELIA (29 Mar 2022 17:52)    pt seen in icu [  ], reg med floor [ x  ], bed [x  ], chair at bedside [   ], awake and responsive [ x ], lethargic [  ],    nad [ x         Allergies    penicillins (Unknown)        Vitals    T(F): 97.6 (03-30-22 @ 05:25), Max: 97.9 (03-29-22 @ 20:30)  HR: 114 (03-30-22 @ 05:25) (70 - 114)  BP: 193/81 (03-30-22 @ 05:25) (142/65 - 193/81)  RR: 18 (03-30-22 @ 05:25) (17 - 18)  SpO2: 96% (03-30-22 @ 05:25) (96% - 100%)  Wt(kg): --  CAPILLARY BLOOD GLUCOSE      POCT Blood Glucose.: 103 mg/dL (29 Mar 2022 21:21)      Labs                          11.6   6.02  )-----------( 224      ( 29 Mar 2022 07:07 )             34.7       03-29    143  |  112<H>  |  15  ----------------------------<  69<L>  3.9   |  25  |  0.76    Ca    8.4      29 Mar 2022 07:07              .Blood Blood-Peripheral  03-28 @ 10:21   No growth to date.  --  --      Clean Catch Clean Catch (Midstream)  03-28 @ 05:37   >100,000 CFU/ml Escherichia coli  --  --          Radiology Results      < from: CT Abdomen and Pelvis No Cont (03.29.22 @ 12:04) >  LOWER CHEST: Basilar atelectasis. Right ventricular intracardiac device.   Coronary artery and mitral annulus calcifications.    Please note that evaluation of the abdominal organs and vascular   structures is limited by lack of intravenous contrast.    LIVER: Within normal limits.  BILE DUCTS: Normal caliber.  GALLBLADDER: Within normal limits.  SPLEEN: Within normal limits.  PANCREAS: Mildly atrophic.  ADRENALS: Within normal limits.  KIDNEYS/URETERS: Within normal limits.    BLADDER: Within normal limits.  REPRODUCTIVE ORGANS: Hysterectomy.    BOWEL: Large hiatal hernia. No bowel obstruction. Appendix is not   visualized. No evidence of inflammation in the pericecal region. Rectal   wall thickening and inflammation. Stool throughout the colon.  PERITONEUM: No ascites.  VESSELS: Atherosclerotic calcifications.  RETROPERITONEUM/LYMPH NODES: No lymphadenopathy.  ABDOMINAL WALL: Postsurgical changes.  BONES: Degenerative changes. Right femur ORIF. Partially imaged distal   right radius ORIF. Wedge compression deformity of L1.    IMPRESSION:    1. Proctitis.  2. Stool throughout the colon.  3. No bowel obstruction.  4. No hydronephrosis.    < end of copied text >      Meds    MEDICATIONS  (STANDING):  aspirin enteric coated 81 milliGRAM(s) Oral daily  cefTRIAXone   IVPB 1000 milliGRAM(s) IV Intermittent every 24 hours  cefTRIAXone   IVPB      dextrose 5%. 1000 milliLiter(s) (100 mL/Hr) IV Continuous <Continuous>  dextrose 5%. 1000 milliLiter(s) (50 mL/Hr) IV Continuous <Continuous>  dextrose 50% Injectable 25 Gram(s) IV Push once  dextrose 50% Injectable 12.5 Gram(s) IV Push once  dextrose 50% Injectable 25 Gram(s) IV Push once  enoxaparin Injectable 40 milliGRAM(s) SubCutaneous every 24 hours  ferrous    sulfate 325 milliGRAM(s) Oral daily  FLUoxetine 20 milliGRAM(s) Oral daily  furosemide Solution 5 milliGRAM(s) Oral daily  glucagon  Injectable 1 milliGRAM(s) IntraMuscular once  insulin glargine Injectable (LANTUS) 17 Unit(s) SubCutaneous every morning  insulin lispro (ADMELOG) corrective regimen sliding scale   SubCutaneous Before meals and at bedtime  levothyroxine 25 MICROGram(s) Oral daily  metoprolol tartrate 12.5 milliGRAM(s) Oral every 12 hours  pantoprazole    Tablet 40 milliGRAM(s) Oral before breakfast  polyethylene glycol 3350 17 Gram(s) Oral at bedtime  QUEtiapine 150 milliGRAM(s) Oral at bedtime  risperiDONE   Tablet 1 milliGRAM(s) Oral two times a day  senna 2 Tablet(s) Oral at bedtime  simethicone 80 milliGRAM(s) Chew every 6 hours  sodium chloride 0.9%. 1000 milliLiter(s) (75 mL/Hr) IV Continuous <Continuous>  traZODone 50 milliGRAM(s) Oral at bedtime      MEDICATIONS  (PRN):  dextrose Oral Gel 15 Gram(s) Oral once PRN Blood Glucose LESS THAN 70 milliGRAM(s)/deciliter      Physical Exam    Neuro :  no focal deficits  Respiratory: CTA B/L  CV: RRR, S1S2, no murmurs,   Abdominal: Soft, NT, ND +BS,  Extremities: No edema, + peripheral pulses      ASSESSMENT    uti,   fecal impaction,   proctitis   shelia,   h/o alzheimer's dementia,   hypothyroidism  HTN (hypertension)  DM (diabetes mellitus)  MESERET (iron deficiency anemia)  Cardiac pacemaker  S/P TAMMY-BSO  s/p hip replacement        PLAN    cont rocephin,   blood neg noted above   ucx with e coli noted above   f/u sens   add flagyl   pt s/p manual disimpaction   ct abd pelv with Proctitis. Stool throughout the colon. No bowel obstruction. No hydronephrosis noted above.  s/p golytely and enema  cont senna, miralax,   gi f/u   serum creat wnl   cont lispro ss,   cont lantus   cont current meds   d/c plan pend sens of e coli on u cx

## 2022-03-30 NOTE — PROGRESS NOTE ADULT - PROBLEM SELECTOR PLAN 1
p/w 2 weeks no BM  - xray abdomen showed increased stool right hemicolon  -s/p manual disimpaction in ED  -  to date BM x 5  - CT abd/pelvis 3/29 shows stool throughout colon, proctitis  - s/p 1 mineral oil enema, golytely  - c/w bowel regimen senna/ miralax/ high fiber diet  - started on lactulose 30 cc daily, Gi reccs  - GI following

## 2022-03-30 NOTE — PROGRESS NOTE ADULT - SUBJECTIVE AND OBJECTIVE BOX
Patient is a 89y old  Female who presents with a chief complaint of SHELIA       INTERVAL HPI/OVERNIGHT EVENTS: Patient had 1 BM over night s/p golytely.    I&O's Summary    Vital Signs Last 24 Hrs  T(C): 36.4 (30 Mar 2022 05:25), Max: 36.6 (29 Mar 2022 20:30)  T(F): 97.6 (30 Mar 2022 05:25), Max: 97.9 (29 Mar 2022 20:30)  HR: 81 (30 Mar 2022 06:47) (70 - 114)  BP: 136/64 (30 Mar 2022 06:47) (136/64 - 193/81)  BP(mean): 100 (29 Mar 2022 13:54) (100 - 100)  RR: 18 (30 Mar 2022 05:25) (17 - 18)  SpO2: 96% (30 Mar 2022 05:25) (96% - 100%)  PAST MEDICAL & SURGICAL HISTORY:  HTN (hypertension)    DM (diabetes mellitus)    Alzheimer disease    MESERET (iron deficiency anemia)    Cardiac pacemaker    S/P TAMMY-BSO    History of hip replacement        SOCIAL HISTORY  Alcohol:  Tobacco:  Illicit substance use:      FAMILY HISTORY:      LABS:                        11.6   6.02  )-----------( 224      ( 29 Mar 2022 07:07 )             34.7     03-30    142  |  111<H>  |  14  ----------------------------<  60<L>  3.8   |  26  |  0.75    Ca    8.5      30 Mar 2022 06:51          CAPILLARY BLOOD GLUCOSE      POCT Blood Glucose.: 227 mg/dL (30 Mar 2022 11:19)  POCT Blood Glucose.: 79 mg/dL (30 Mar 2022 07:36)  POCT Blood Glucose.: 103 mg/dL (29 Mar 2022 21:21)  POCT Blood Glucose.: 246 mg/dL (29 Mar 2022 16:18)            MEDICATIONS  (STANDING):  aspirin enteric coated 81 milliGRAM(s) Oral daily  cefTRIAXone   IVPB 1000 milliGRAM(s) IV Intermittent every 24 hours  cefTRIAXone   IVPB      dextrose 5%. 1000 milliLiter(s) (100 mL/Hr) IV Continuous <Continuous>  dextrose 5%. 1000 milliLiter(s) (50 mL/Hr) IV Continuous <Continuous>  dextrose 50% Injectable 25 Gram(s) IV Push once  dextrose 50% Injectable 12.5 Gram(s) IV Push once  dextrose 50% Injectable 25 Gram(s) IV Push once  enoxaparin Injectable 40 milliGRAM(s) SubCutaneous every 24 hours  ferrous    sulfate 325 milliGRAM(s) Oral daily  FLUoxetine 20 milliGRAM(s) Oral daily  furosemide Solution 5 milliGRAM(s) Oral daily  glucagon  Injectable 1 milliGRAM(s) IntraMuscular once  insulin glargine Injectable (LANTUS) 17 Unit(s) SubCutaneous every morning  insulin lispro (ADMELOG) corrective regimen sliding scale   SubCutaneous Before meals and at bedtime  levothyroxine 25 MICROGram(s) Oral daily  metoprolol tartrate 12.5 milliGRAM(s) Oral every 12 hours  pantoprazole    Tablet 40 milliGRAM(s) Oral before breakfast  polyethylene glycol 3350 17 Gram(s) Oral at bedtime  QUEtiapine 150 milliGRAM(s) Oral at bedtime  risperiDONE   Tablet 1 milliGRAM(s) Oral two times a day  senna 2 Tablet(s) Oral at bedtime  simethicone 80 milliGRAM(s) Chew every 6 hours  sodium chloride 0.9%. 1000 milliLiter(s) (75 mL/Hr) IV Continuous <Continuous>  traZODone 50 milliGRAM(s) Oral at bedtime    MEDICATIONS  (PRN):  dextrose Oral Gel 15 Gram(s) Oral once PRN Blood Glucose LESS THAN 70 milliGRAM(s)/deciliter      RADIOLOGY & ADDITIONAL TESTS: < from: CT Abdomen and Pelvis No Cont (03.29.22 @ 12:04) >  ACC: [id number] EXAM:  CT ABDOMEN AND PELVIS                          PROCEDURE DATE:  03/29/2022          INTERPRETATION:  CLINICAL INFORMATION: Fecal impaction and constipation    COMPARISON: None.    CONTRAST/COMPLICATIONS:  IV Contrast: NONE  Oral Contrast: NONE  Complications: None reported at time of study completion    PROCEDURE:  CT of the Abdomen and Pelvis was performed.  Sagittal and coronal reformats were performed.    FINDINGS:  LOWER CHEST: Basilar atelectasis. Right ventricular intracardiac device.   Coronary artery and mitral annulus calcifications.    Please note that evaluation of the abdominal organs and vascular   structures is limited by lack of intravenous contrast.    LIVER: Within normal limits.  BILE DUCTS: Normal caliber.  GALLBLADDER: Within normal limits.  SPLEEN: Within normal limits.  PANCREAS: Mildly atrophic.  ADRENALS: Within normal limits.  KIDNEYS/URETERS: Within normal limits.    BLADDER: Within normal limits.  REPRODUCTIVE ORGANS: Hysterectomy.    BOWEL: Large hiatal hernia. No bowel obstruction. Appendix is not   visualized. No evidence of inflammation in the pericecal region. Rectal   wall thickening and inflammation. Stool throughout the colon.  PERITONEUM: No ascites.  VESSELS: Atherosclerotic calcifications.  RETROPERITONEUM/LYMPH NODES: No lymphadenopathy.  ABDOMINAL WALL: Postsurgical changes.  BONES: Degenerative changes. Right femur ORIF. Partially imaged distal   right radius ORIF. Wedge compression deformity of L1.    IMPRESSION:    1. Proctitis.  2. Stool throughout the colon.  3. No bowel obstruction.  4. No hydronephrosis.        --- End of Report ---      < end of copied text >  < from: Xray Chest 1 View AP/PA (03.27.22 @ 18:16) >  ACC: [id number] EXAM:  XR CHEST AP OR PA 1V                          PROCEDURE DATE:  03/27/2022          INTERPRETATION:  TIME OF EXAM: March 27, 2022 at 5:59 PM.    CLINICAL INFORMATION: Abdominal pain.    COMPARISON:  January 4, 2022.    TECHNIQUE:   AP Portable chest x-ray.    INTERPRETATION:    Heart size and the mediastinum cannot be accurately evaluated on this   projection.  A Micra device projects over the heart.  The lungs are clear.  No pleural effusion or pneumothorax is seen.  There is osteoarthritic degenerative change of the spine and shoulders.      IMPRESSION:  Clear lungs.      < end of copied text >  < from: Xray Abdomen 2 Views (03.27.22 @ 18:15) >    ACC: [id number] EXAM:  XR ABDOMEN 2V                          PROCEDURE DATE:  03/27/2022          INTERPRETATION:  TIME OF EXAM: March 27, 2022 at 6:00 PM.    CLINICAL INFORMATION: Abdominal pain. No bowel movement.    COMPARISON:  None available    TECHNIQUE: Abdominal supine and erect x-rays.    INTERPRETATION:    There is a nonspecific bowel gas pattern with no evidence of free air or   obstruction. There is increased stool in the right hemicolon.  A rounded opacity projecting over the pelvisis likely a fluid-filled   urinary bladder.  There is scoliosis of the spine with osteoarthritic degenerative change.   There is incompletely imaged ORIF of the right hip.  There is a faint 3 x 7 mm calcification to the left of the L2-3 level, of   indeterminate nature. A calcification related to the left urinary tract   is possible.      IMPRESSION:  Nonspecific bowel gas pattern with no evidence of free air   or obstruction.    Increased stool in the right hemicolon.    Faint 3 x 7 mm calcification to the left of the L2-3 level, of   indeterminate nature. A calcification related to the left urinary tract   is possible.    --- End of Report ---            ADAMS BYRNE MD; Attending Radiologist  This document has been electronically signed. Mar 28 2022  2:00PM    < end of copied text >      Imaging Personally Reviewed:  [ x] YES  [ ] NO    Consultant(s) Notes Reviewed:  [ x] YES  [ ] NO    Care Collaborated Discussed with Consultants/Other Providers [ x] YES  [ ] NO

## 2022-03-30 NOTE — PROGRESS NOTE ADULT - PROBLEM SELECTOR PLAN 3
RESOLVED likely post renal : 2/2 urinary retention 2/2 bowel impaction  - s/p izquierdo, passed TOV  - c/w IVF  - f/u urine lytes

## 2022-03-30 NOTE — PROGRESS NOTE ADULT - ASSESSMENT
Pt. is a 89y/o female from home non- ambulatory with PMHx HTN, IDDM, alzheimer's PPM, MESERET and hypothyroidism who p/w no BM x 2 weeks admitted to medicine for SHELIA secondary to urinary retention in the setting of fecal burden. Abd xray significant for increased stool in the right hemicolon. s/p manual disimpaction in ED with BM; bowel regimen in progress. GI following, Dr. Murillo. Patient found to have uti growing e.coli, on ceftriaxone day 3. blood cultures negative to date. pt. s/p izquierdo (removed by patient) for rentention passed TOV. Repeat CT abdomen and pelvis showed stool throughout colon, s/p mineral oil enema and  golytely as per GI reccs, pt. had 2 additional BM. will start lactulose 30 cc daily, as per GI reccs.  pending urine culture sensitivities to bridge to PO abx. Likely d/c home tomorrow

## 2022-03-31 ENCOUNTER — TRANSCRIPTION ENCOUNTER (OUTPATIENT)
Age: 87
End: 2022-03-31

## 2022-03-31 VITALS
HEART RATE: 73 BPM | OXYGEN SATURATION: 96 % | TEMPERATURE: 97 F | DIASTOLIC BLOOD PRESSURE: 70 MMHG | RESPIRATION RATE: 18 BRPM | SYSTOLIC BLOOD PRESSURE: 153 MMHG

## 2022-03-31 LAB
GLUCOSE BLDC GLUCOMTR-MCNC: 142 MG/DL — HIGH (ref 70–99)
GLUCOSE BLDC GLUCOMTR-MCNC: 243 MG/DL — HIGH (ref 70–99)

## 2022-03-31 PROCEDURE — 84436 ASSAY OF TOTAL THYROXINE: CPT

## 2022-03-31 PROCEDURE — 84443 ASSAY THYROID STIM HORMONE: CPT

## 2022-03-31 PROCEDURE — 93005 ELECTROCARDIOGRAM TRACING: CPT

## 2022-03-31 PROCEDURE — 87186 SC STD MICRODIL/AGAR DIL: CPT

## 2022-03-31 PROCEDURE — 85027 COMPLETE CBC AUTOMATED: CPT

## 2022-03-31 PROCEDURE — 83735 ASSAY OF MAGNESIUM: CPT

## 2022-03-31 PROCEDURE — 87040 BLOOD CULTURE FOR BACTERIA: CPT

## 2022-03-31 PROCEDURE — 87635 SARS-COV-2 COVID-19 AMP PRB: CPT

## 2022-03-31 PROCEDURE — 87077 CULTURE AEROBIC IDENTIFY: CPT

## 2022-03-31 PROCEDURE — 87086 URINE CULTURE/COLONY COUNT: CPT

## 2022-03-31 PROCEDURE — 84480 ASSAY TRIIODOTHYRONINE (T3): CPT

## 2022-03-31 PROCEDURE — 85025 COMPLETE CBC W/AUTO DIFF WBC: CPT

## 2022-03-31 PROCEDURE — 81001 URINALYSIS AUTO W/SCOPE: CPT

## 2022-03-31 PROCEDURE — 83880 ASSAY OF NATRIURETIC PEPTIDE: CPT

## 2022-03-31 PROCEDURE — 74176 CT ABD & PELVIS W/O CONTRAST: CPT

## 2022-03-31 PROCEDURE — 83690 ASSAY OF LIPASE: CPT

## 2022-03-31 PROCEDURE — 84100 ASSAY OF PHOSPHORUS: CPT

## 2022-03-31 PROCEDURE — 82962 GLUCOSE BLOOD TEST: CPT

## 2022-03-31 PROCEDURE — 84439 ASSAY OF FREE THYROXINE: CPT

## 2022-03-31 PROCEDURE — 83036 HEMOGLOBIN GLYCOSYLATED A1C: CPT

## 2022-03-31 PROCEDURE — 82009 KETONE BODYS QUAL: CPT

## 2022-03-31 PROCEDURE — 80048 BASIC METABOLIC PNL TOTAL CA: CPT

## 2022-03-31 PROCEDURE — 99285 EMERGENCY DEPT VISIT HI MDM: CPT | Mod: 25

## 2022-03-31 PROCEDURE — 74019 RADEX ABDOMEN 2 VIEWS: CPT

## 2022-03-31 PROCEDURE — 71045 X-RAY EXAM CHEST 1 VIEW: CPT

## 2022-03-31 PROCEDURE — 80053 COMPREHEN METABOLIC PANEL: CPT

## 2022-03-31 PROCEDURE — 36415 COLL VENOUS BLD VENIPUNCTURE: CPT

## 2022-03-31 RX ORDER — SENNA PLUS 8.6 MG/1
2 TABLET ORAL
Qty: 60 | Refills: 0
Start: 2022-03-31 | End: 2022-04-29

## 2022-03-31 RX ORDER — LACTULOSE 10 G/15ML
30 SOLUTION ORAL
Qty: 900 | Refills: 0
Start: 2022-03-31 | End: 2022-04-29

## 2022-03-31 RX ORDER — PANTOPRAZOLE SODIUM 20 MG/1
1 TABLET, DELAYED RELEASE ORAL
Qty: 30 | Refills: 0
Start: 2022-03-31 | End: 2022-04-29

## 2022-03-31 RX ORDER — CEFUROXIME AXETIL 250 MG
1 TABLET ORAL
Qty: 14 | Refills: 0
Start: 2022-03-31 | End: 2022-04-06

## 2022-03-31 RX ORDER — LACTULOSE 10 G/15ML
30 SOLUTION ORAL
Qty: 0 | Refills: 0 | DISCHARGE
Start: 2022-03-31

## 2022-03-31 RX ORDER — POLYETHYLENE GLYCOL 3350 17 G/17G
17 POWDER, FOR SOLUTION ORAL
Qty: 510 | Refills: 0
Start: 2022-03-31 | End: 2022-04-29

## 2022-03-31 RX ORDER — METRONIDAZOLE 500 MG
500 TABLET ORAL ONCE
Refills: 0 | Status: DISCONTINUED | OUTPATIENT
Start: 2022-03-31 | End: 2022-03-31

## 2022-03-31 RX ORDER — POLYETHYLENE GLYCOL 3350 17 G/17G
17 POWDER, FOR SOLUTION ORAL
Qty: 0 | Refills: 0 | DISCHARGE
Start: 2022-03-31

## 2022-03-31 RX ORDER — METRONIDAZOLE 500 MG
1 TABLET ORAL
Qty: 21 | Refills: 0
Start: 2022-03-31 | End: 2022-04-06

## 2022-03-31 RX ORDER — SENNA PLUS 8.6 MG/1
2 TABLET ORAL
Qty: 0 | Refills: 0 | DISCHARGE
Start: 2022-03-31

## 2022-03-31 RX ORDER — CEFUROXIME AXETIL 250 MG
250 TABLET ORAL EVERY 12 HOURS
Refills: 0 | Status: DISCONTINUED | OUTPATIENT
Start: 2022-03-31 | End: 2022-03-31

## 2022-03-31 RX ORDER — CEFUROXIME AXETIL 250 MG
1 TABLET ORAL
Qty: 0 | Refills: 0 | DISCHARGE
Start: 2022-03-31 | End: 2022-04-07

## 2022-03-31 RX ORDER — PANTOPRAZOLE SODIUM 20 MG/1
1 TABLET, DELAYED RELEASE ORAL
Qty: 0 | Refills: 0 | DISCHARGE
Start: 2022-03-31

## 2022-03-31 RX ADMIN — RISPERIDONE 1 MILLIGRAM(S): 4 TABLET ORAL at 05:37

## 2022-03-31 RX ADMIN — Medication 2: at 11:32

## 2022-03-31 RX ADMIN — Medication 81 MILLIGRAM(S): at 11:34

## 2022-03-31 RX ADMIN — Medication 12.5 MILLIGRAM(S): at 05:35

## 2022-03-31 RX ADMIN — CEFTRIAXONE 100 MILLIGRAM(S): 500 INJECTION, POWDER, FOR SOLUTION INTRAMUSCULAR; INTRAVENOUS at 02:38

## 2022-03-31 RX ADMIN — Medication 325 MILLIGRAM(S): at 11:34

## 2022-03-31 RX ADMIN — LACTULOSE 20 GRAM(S): 10 SOLUTION ORAL at 11:34

## 2022-03-31 RX ADMIN — Medication 5 MILLIGRAM(S): at 05:37

## 2022-03-31 RX ADMIN — Medication 20 MILLIGRAM(S): at 11:33

## 2022-03-31 RX ADMIN — Medication 100 MILLIGRAM(S): at 05:38

## 2022-03-31 RX ADMIN — Medication 25 MICROGRAM(S): at 05:37

## 2022-03-31 RX ADMIN — PANTOPRAZOLE SODIUM 40 MILLIGRAM(S): 20 TABLET, DELAYED RELEASE ORAL at 05:35

## 2022-03-31 RX ADMIN — INSULIN GLARGINE 17 UNIT(S): 100 INJECTION, SOLUTION SUBCUTANEOUS at 08:12

## 2022-03-31 RX ADMIN — ENOXAPARIN SODIUM 40 MILLIGRAM(S): 100 INJECTION SUBCUTANEOUS at 11:33

## 2022-03-31 RX ADMIN — Medication 100 MILLIGRAM(S): at 15:10

## 2022-03-31 NOTE — PROGRESS NOTE ADULT - CARDIOVASCULAR
detailed exam
Regular rate & rhythm, normal S1, S2; no murmurs, gallops or rubs; no S3, S4
detailed exam

## 2022-03-31 NOTE — DISCHARGE NOTE PROVIDER - NSDCCPCAREPLAN_GEN_ALL_CORE_FT
PRINCIPAL DISCHARGE DIAGNOSIS  Diagnosis: SHELIA (acute kidney injury)  Assessment and Plan of Treatment:       SECONDARY DISCHARGE DIAGNOSES  Diagnosis: Acute UTI  Assessment and Plan of Treatment:     Diagnosis: Fecal impaction  Assessment and Plan of Treatment:     Diagnosis: Obstructed, uropathy  Assessment and Plan of Treatment:     Diagnosis: Constipation  Assessment and Plan of Treatment:     Diagnosis: DM (diabetes mellitus)  Assessment and Plan of Treatment:     Diagnosis: Alzheimer disease  Assessment and Plan of Treatment:     Diagnosis: Hypothyroidism  Assessment and Plan of Treatment:      PRINCIPAL DISCHARGE DIAGNOSIS  Diagnosis: SHELIA (acute kidney injury)  Assessment and Plan of Treatment: You were found to have an acute kidney injury on admission to the hospital. Acute kidney injury is when the kidneys suddenly stop working. some causes of kidney injury include: blocking the path urine take to leave the body, medications and infections. some symptoms of acute kidney injury includes confusion, urine that is brown or red, swelling in the legs and feet, and feeling weak. Your kidney injury has RESOLVED, you were treated with IV fluids.   Please follow - up with your PCP  Eat a healthy well balanced diet with high fiber (fruits and vegetables)  Drink adequate amounts of fluids.        SECONDARY DISCHARGE DIAGNOSES  Diagnosis: Acute UTI  Assessment and Plan of Treatment: You were found to have a urinary tract infection. A uti is is an infection either in the bladder. A UTI happens when bacteria gets into the urethra and travel to the bladder.   Some symptoms of a uti or bladder infection include: pain or burning with urination, the need to urinate often and blood in urine. Contact your PCP if these symptoms develop.  You were treated with IV antibiotics.  You were prescribed antibiotics, take them exactly as prescribed. Finish the medication even if you feel better .  Drink enough water and fluids to keep your urine clear or pale yellow.  Avoid caffeine, tea, and carbonated beverages. They tend to irritate your bladder.  Empty your bladder often. Avoid holding urine for long periods of time.  After a bowel movement, women should cleanse from front to back. Use each tissue only once.      Diagnosis: Fecal impaction  Assessment and Plan of Treatment: You were found to have fecal impaction on your admission to the hospital. Fecal impaction happens when hard fecal matter stays in the body and can not be removed by normal body processes. A digital evacuation of the impacted fecal mass was done in the ED, which produced a large bowel movement.  You were seen by Dr. Edmond ( Gastroenterologist).   Please eat and drink well balanced meals with high fiber.  Please take your medications as precribed.   Follw-up with your PCP or Dr. Edmond in 2 weeks.    Diagnosis: Obstructed, uropathy  Assessment and Plan of Treatment: You were found to have obstructive uropathy. Obstructive uropathy is a blockage in the normal flow of urine, which in your case was caused by stool in the colon. A CT scan of your abdomen and pelvis was done and showed stool in your colon. You were treated with laxatives, manual disimpaction and enema to remove the stool from the colon. You had a Izquierdo whcih was in place to help urine pass from the body. The izquierdo was removed and you are passing urine freely.   Please eat a well balanced diet  Prevent constipation by take you medications as prescribed  Drink an adequate amount of fluids  Follow with your PCP    Diagnosis: Constipation  Assessment and Plan of Treatment: Continue with medications as prescribed.  Follow-up with your primary care physician.  Call your physician if you develop nausea/vomiting, abdominal pain not relieved and, constipation not relieved with bowel  regimen.      Diagnosis: DM (diabetes mellitus)  Assessment and Plan of Treatment: You have a history of diabetes mellitus.  Please take your medications as prescribed.  Follow up with your PCP.    Diagnosis: Alzheimer disease  Assessment and Plan of Treatment: You have a history of Alzheimers Disease.  Please take your medications as prescribed.   Follow up with your PCP.      Diagnosis: Hypothyroidism  Assessment and Plan of Treatment: You have a history of hypothyroidism.  Please take your medications as prescribed.  Follow with your PCP.

## 2022-03-31 NOTE — DISCHARGE NOTE PROVIDER - HOSPITAL COURSE
Pt. is a 89y/o female from home non- ambulatory with PMHx HTN, IDDM, alzheimer's PPM, MESERET and hypothyroidism who p/w no BM x 2 weeks admitted to medicine for SHELIA secondary to urinary retention in the setting of fecal burden. Abd xray significant for increased stool in the right hemicolon. s/p manual disimpaction in ED with BM; bowel regimen in progress. GI following, Dr. Murillo. Patient found to have uti growing e.coli, received ceftriaxone IV x 4 days to d/c on ceftin po. blood cultures negative to date. pt. s/p izquierdo (removed by patient) for rentention passed TOV. Repeat CT abdomen and pelvis showed stool throughout colon and proctitis, s/p mineral oil enema and  golytely as per GI reccs, pt. had 2 additional BM. will start lactulose 30 cc daily, as per GI reccs. Also, started on flagyl, indication proctitis, will d/c home on po flagyl.        This is a brief hospital course, please refer to full medical records for further information.

## 2022-03-31 NOTE — PROGRESS NOTE ADULT - GASTROINTESTINAL DETAILS
normal/soft/nontender/no distention
soft/nontender/no distention/no masses palpable/bowel sounds normal/no rebound tenderness/no guarding/no rigidity
soft/nontender/no distention/no masses palpable/bowel sounds normal/no rebound tenderness/no guarding/no rigidity
normal/soft/nontender/no distention/bowel sounds normal
soft/nontender/no distention/no masses palpable/bowel sounds normal/no rebound tenderness/no guarding/no rigidity

## 2022-03-31 NOTE — PROGRESS NOTE ADULT - RESPIRATORY
detailed exam
detailed exam
Breath Sounds equal & clear to percussion & auscultation, no accessory muscle use
detailed exam
detailed exam

## 2022-03-31 NOTE — PROGRESS NOTE ADULT - SUBJECTIVE AND OBJECTIVE BOX
[   ] ICU                                          [   ] CCU                                      [ X  ] Medical Floor    Patient is a 89 year old female with constipation and fecal impaction. GI consulted to evaluate.       Patient is a 89 year old female from home, AAOx0-1 and non-ambulatory at baseline, with past medical history significant for IDDM, alzheimer's dementia, hypothyroidism, present with 2 weeks history of constipation without bowel movement. Patient is confused and very poor historian unable to provide history. No abdominal pain, nausea, vomiting, hematemesis, hematochezia, melena, fever, chills, chest pain, SOB, cough, hematuria, dysuria or diarrhea reported.      Patient appears comfortable. No new complaints reported, No abdominal pain, N/V, hematemesis, hematochezia, melena, fever, chills, chest pain, SOB, cough or diarrhea reported.       PAIN MANAGEMENT:  Pain Scale:                0 /10  Pain Location:      Prior Colonoscopy:  Unknown    PAST MEDICAL HISTORY  HTN (hypertension)  DM (diabetes mellitus)  Alzheimer disease  MESERET (iron deficiency anemia)  Cardiac pacemaker        PAST SURGICAL HISTORY   TAMMY-BSO  Hip replacement        Allergies    penicillins (Unknown)    Intolerances  None          SOCIAL HISTORY  Advanced Directives:       [ X ] Full Code       [  ] DNR  Marital Status:         [  ] M      [ X ] S      [  ] D       [  ] W  Children:       [ X ] Yes      [  ] No  Occupation:        [  ] Employed       [ X ] Unemployed       [  ] Retired  Diet:       [ X ] Regular       [  ] PEG feeding          [  ] NG tube feeding  Drug Use:           [ X ] No                [  ] Yes  Alcohol:           [ X ] No             [  ] Yes (socially)         [  ] Yes (chronic)  Tobacco:           [  ] Yes           [ X ] No      FAMILY HISTORY  [ X ] Heart Disease            [ X ] Diabetes             [ X ] HTN             [  ] Colon Cancer             [  ] Stomach Cancer              [  ] Pancreatic Cancer        VITALS  Vital Signs Last 24 Hrs  T(C): 36.3 (31 Mar 2022 13:10), Max: 36.7 (30 Mar 2022 20:27)  T(F): 97.3 (31 Mar 2022 13:10), Max: 98 (30 Mar 2022 20:27)  HR: 73 (31 Mar 2022 13:10) (73 - 89)  BP: 153/70 (31 Mar 2022 13:10) (150/80 - 169/78)   RR: 18 (31 Mar 2022 13:10) (16 - 18)  SpO2: 96% (31 Mar 2022 13:10) (96% - 98%)       MEDICATIONS  (STANDING):  aspirin enteric coated 81 milliGRAM(s) Oral daily  cefuroxime   Tablet 250 milliGRAM(s) Oral every 12 hours  dextrose 5%. 1000 milliLiter(s) (100 mL/Hr) IV Continuous <Continuous>  dextrose 5%. 1000 milliLiter(s) (50 mL/Hr) IV Continuous <Continuous>  dextrose 50% Injectable 25 Gram(s) IV Push once  dextrose 50% Injectable 12.5 Gram(s) IV Push once  dextrose 50% Injectable 25 Gram(s) IV Push once  enoxaparin Injectable 40 milliGRAM(s) SubCutaneous every 24 hours  ferrous    sulfate 325 milliGRAM(s) Oral daily  FLUoxetine 20 milliGRAM(s) Oral daily  furosemide Solution 5 milliGRAM(s) Oral daily  glucagon  Injectable 1 milliGRAM(s) IntraMuscular once  insulin glargine Injectable (LANTUS) 17 Unit(s) SubCutaneous every morning  insulin lispro (ADMELOG) corrective regimen sliding scale   SubCutaneous Before meals and at bedtime  lactulose Syrup 20 Gram(s) Oral daily  levothyroxine 25 MICROGram(s) Oral daily  metoprolol tartrate 12.5 milliGRAM(s) Oral every 12 hours  metroNIDAZOLE  IVPB 500 milliGRAM(s) IV Intermittent every 8 hours  pantoprazole    Tablet 40 milliGRAM(s) Oral before breakfast  polyethylene glycol 3350 17 Gram(s) Oral at bedtime  QUEtiapine 150 milliGRAM(s) Oral at bedtime  risperiDONE   Tablet 1 milliGRAM(s) Oral two times a day  senna 2 Tablet(s) Oral at bedtime  sodium chloride 0.9%. 1000 milliLiter(s) (75 mL/Hr) IV Continuous <Continuous>  traZODone 50 milliGRAM(s) Oral at bedtime    MEDICATIONS  (PRN):  dextrose Oral Gel 15 Gram(s) Oral once PRN Blood Glucose LESS THAN 70 milliGRAM(s)/deciliter          03-30    142  |  111<H>  |  14  ----------------------------<  60<L>  3.8   |  26  |  0.75    Ca    8.5      30 Mar 2022 06:51

## 2022-03-31 NOTE — PROGRESS NOTE ADULT - ASSESSMENT
1. Fecal impaction  2. Proctitis  3. Iron deficiency anemia  4. Constipation    Suggestions:    1. Golytely one gallon over 4-6 hours  2. Stool softener / Laxative daily  3. High fiber diet  4. Monitor H/H  5. Protonix daily  6. DVT prophylaxis 1. Fecal impaction  2. Proctitis  3. Iron deficiency anemia  4. Constipation    Suggestions:    1. Diet as tolerated  2. Stool softener / Laxative daily  3. High fiber diet  4. Monitor H/H  5. Protonix daily  6. DVT prophylaxis

## 2022-03-31 NOTE — PROGRESS NOTE ADULT - RS GEN PE MLT RESP DETAILS PC
airway patent/breath sounds equal/good air movement/respirations non-labored/no rhonchi/no wheezes
airway patent/breath sounds equal/good air movement/respirations non-labored/no rales/no rhonchi
airway patent/breath sounds equal/good air movement/respirations non-labored/no rales/no wheezes
normal/breath sounds equal

## 2022-03-31 NOTE — PROGRESS NOTE ADULT - PROVIDER SPECIALTY LIST ADULT
Internal Medicine
Gastroenterology
Internal Medicine
Internal Medicine
Gastroenterology
Internal Medicine
Gastroenterology

## 2022-03-31 NOTE — PROGRESS NOTE ADULT - CVS HE PE MLT D E PC
regular rate and rhythm/no rub
regular rate and rhythm

## 2022-03-31 NOTE — PROGRESS NOTE ADULT - CONSTITUTIONAL DETAILS
no distress/cachectic
no distress
no distress/cachectic
no distress/cachectic
well-groomed/no distress

## 2022-03-31 NOTE — DISCHARGE NOTE NURSING/CASE MANAGEMENT/SOCIAL WORK - NSDCPEFALRISK_GEN_ALL_CORE
For information on Fall & Injury Prevention, visit: [email or web address] OR  [email or web address] OR  https://www.cdc.gov/steadi/patient.html

## 2022-03-31 NOTE — DISCHARGE NOTE NURSING/CASE MANAGEMENT/SOCIAL WORK - PATIENT PORTAL LINK FT
You can access the FollowMyHealth Patient Portal offered by [facility] by registering at the following website: [email or web address]. By joining [employer]’s FollowMyHealth portal, you will also be able to view your health information using other applications (apps) compatible with our system.

## 2022-03-31 NOTE — PROGRESS NOTE ADULT - NS NEC GEN PE MLT EXAM PC
No bruits; no thyromegaly or nodules
detailed exam
No bruits; no thyromegaly or nodules

## 2022-03-31 NOTE — PROGRESS NOTE ADULT - COMMENTS
Patient is unable to provide history
unable to assess based on patient mental status.
Patient is unable to provide history
Patient is unable to provide history
unable to obtain ROS due to patient baseline mental status

## 2022-03-31 NOTE — DISCHARGE NOTE PROVIDER - NSDCMRMEDTOKEN_GEN_ALL_CORE_FT
Aspirin Enteric Coated 81 mg oral delayed release tablet: 1 tab(s) orally once a day  Basaglar KwikPen 100 units/mL subcutaneous solution: 34 unit(s) subcutaneous once a day (in the morning)  cefuroxime 250 mg oral tablet: 1 tab(s) orally every 12 hours for 7 days  ferrous sulfate 324 mg (65 mg elemental iron) oral delayed release tablet: 1 tab(s) orally once a day  FLUoxetine 20 mg oral capsule: 1 cap(s) orally once a day  furosemide: 5 milligram(s) orally once a day  lactulose 10 g/15 mL oral syrup: 30 milliliter(s) orally once a day  levothyroxine 25 mcg (0.025 mg) oral tablet: 1 tab(s) orally once a day  metFORMIN 500 mg oral tablet: 1 tab(s) orally once a day  metroNIDAZOLE 500 mg oral tablet: 1 tab(s) orally 3 times a day  pantoprazole 40 mg oral delayed release tablet: 1 tab(s) orally once a day (before a meal)  polyethylene glycol 3350 oral powder for reconstitution: 17 gram(s) orally once a day (at bedtime)  QUEtiapine 150 mg oral tablet, extended release: 1 tab(s) orally once a day (in the evening)  RisperDAL 1 mg oral tablet: 1 tab(s) orally 2 times a day  senna oral tablet: 2 tab(s) orally once a day (at bedtime)  Toprol-XL 25 mg oral tablet, extended release: 1 tab(s) orally 2 times a day  traZODone 50 mg oral tablet: 1 tab(s) orally once a day (at bedtime)   Aspirin Enteric Coated 81 mg oral delayed release tablet: 1 tab(s) orally once a day  Basaglar KwikPen 100 units/mL subcutaneous solution: 34 unit(s) subcutaneous once a day (in the morning)  cefuroxime 250 mg oral tablet: 1 tab(s) orally every 12 hours  ferrous sulfate 324 mg (65 mg elemental iron) oral delayed release tablet: 1 tab(s) orally once a day  FLUoxetine 20 mg oral capsule: 1 cap(s) orally once a day  furosemide: 5 milligram(s) orally once a day  lactulose 10 g/15 mL oral syrup: 30 milliliter(s) orally once a day  levothyroxine 25 mcg (0.025 mg) oral tablet: 1 tab(s) orally once a day  metFORMIN 500 mg oral tablet: 1 tab(s) orally once a day  metroNIDAZOLE 500 mg oral tablet: 1 tab(s) orally 3 times a day  pantoprazole 40 mg oral delayed release tablet: 1 tab(s) orally once a day (before a meal)  polyethylene glycol 3350 oral powder for reconstitution: 17 gram(s) orally once a day (at bedtime)  QUEtiapine 150 mg oral tablet, extended release: 1 tab(s) orally once a day (in the evening)  RisperDAL 1 mg oral tablet: 1 tab(s) orally 2 times a day  senna oral tablet: 2 tab(s) orally once a day (at bedtime)  Toprol-XL 25 mg oral tablet, extended release: 1 tab(s) orally 2 times a day  traZODone 50 mg oral tablet: 1 tab(s) orally once a day (at bedtime)

## 2022-03-31 NOTE — PROGRESS NOTE ADULT - SUBJECTIVE AND OBJECTIVE BOX
Patient is a 89y old  Female who presents with a chief complaint of SHELIA (30 Mar 2022 15:20)    pt seen in icu [  ], reg med floor [ x  ], bed [x  ], chair at bedside [   ], awake and responsive [ x ], lethargic [  ],    nad [ x       Allergies    penicillins (Unknown)        Vitals    T(F): 97.3 (03-31-22 @ 05:35), Max: 98.1 (03-30-22 @ 13:43)  HR: 86 (03-31-22 @ 05:35) (81 - 89)  BP: 150/80 (03-31-22 @ 05:35) (132/48 - 169/78)  RR: 18 (03-31-22 @ 05:35) (16 - 18)  SpO2: 96% (03-31-22 @ 05:35) (96% - 98%)  Wt(kg): --  CAPILLARY BLOOD GLUCOSE      POCT Blood Glucose.: 120 mg/dL (30 Mar 2022 21:16)      Labs                          11.6   6.02  )-----------( 224      ( 29 Mar 2022 07:07 )             34.7       03-30    142  |  111<H>  |  14  ----------------------------<  60<L>  3.8   |  26  |  0.75    Ca    8.5      30 Mar 2022 06:51              .Blood Blood-Peripheral  03-28 @ 10:21   No growth to date.  --  --      Clean Catch Clean Catch (Midstream)  03-28 @ 05:37   >100,000 CFU/ml Escherichia coli  --  Escherichia coli      Clean Catch Clean Catch (Midstream)  01-05 @ 11:36   >100,000 CFU/ml Escherichia coli  --  Escherichia coli          Radiology Results      Meds    MEDICATIONS  (STANDING):  aspirin enteric coated 81 milliGRAM(s) Oral daily  dextrose 5%. 1000 milliLiter(s) (50 mL/Hr) IV Continuous <Continuous>  dextrose 5%. 1000 milliLiter(s) (100 mL/Hr) IV Continuous <Continuous>  dextrose 50% Injectable 25 Gram(s) IV Push once  dextrose 50% Injectable 12.5 Gram(s) IV Push once  dextrose 50% Injectable 25 Gram(s) IV Push once  enoxaparin Injectable 40 milliGRAM(s) SubCutaneous every 24 hours  ferrous    sulfate 325 milliGRAM(s) Oral daily  FLUoxetine 20 milliGRAM(s) Oral daily  furosemide Solution 5 milliGRAM(s) Oral daily  glucagon  Injectable 1 milliGRAM(s) IntraMuscular once  insulin glargine Injectable (LANTUS) 17 Unit(s) SubCutaneous every morning  insulin lispro (ADMELOG) corrective regimen sliding scale   SubCutaneous Before meals and at bedtime  lactulose Syrup 20 Gram(s) Oral daily  levothyroxine 25 MICROGram(s) Oral daily  metoprolol tartrate 12.5 milliGRAM(s) Oral every 12 hours  metroNIDAZOLE  IVPB 500 milliGRAM(s) IV Intermittent every 8 hours  pantoprazole    Tablet 40 milliGRAM(s) Oral before breakfast  polyethylene glycol 3350 17 Gram(s) Oral at bedtime  QUEtiapine 150 milliGRAM(s) Oral at bedtime  risperiDONE   Tablet 1 milliGRAM(s) Oral two times a day  senna 2 Tablet(s) Oral at bedtime  sodium chloride 0.9%. 1000 milliLiter(s) (75 mL/Hr) IV Continuous <Continuous>  traZODone 50 milliGRAM(s) Oral at bedtime      MEDICATIONS  (PRN):  dextrose Oral Gel 15 Gram(s) Oral once PRN Blood Glucose LESS THAN 70 milliGRAM(s)/deciliter      Physical Exam    Neuro :  no focal deficits  Respiratory: CTA B/L  CV: RRR, S1S2, no murmurs,   Abdominal: Soft, NT, ND +BS,  Extremities: No edema, + peripheral pulses      ASSESSMENT    uti,   fecal impaction,   proctitis   shelia,   h/o alzheimer's dementia,   hypothyroidism  HTN (hypertension)  DM (diabetes mellitus)  MESERET (iron deficiency anemia)  Cardiac pacemaker  S/P TAMMY-BSO  s/p hip replacement        PLAN    cont rocephin,   blood neg noted above   ucx with e coli noted above   f/u sens   add flagyl   pt s/p manual disimpaction   ct abd pelv with Proctitis. Stool throughout the colon. No bowel obstruction. No hydronephrosis noted above.  s/p golytely and enema  cont senna, miralax,   gi f/u   serum creat wnl   cont lispro ss,   cont lantus   cont current meds   d/c plan pend sens of e coli on u cx   Patient is a 89y old  Female who presents with a chief complaint of SHELIA (30 Mar 2022 15:20)    pt seen in icu [  ], reg med floor [ x  ], bed [x  ], chair at bedside [   ], awake and responsive [ x ], lethargic [  ],    nad [ x       Allergies    penicillins (Unknown)        Vitals    T(F): 97.3 (03-31-22 @ 05:35), Max: 98.1 (03-30-22 @ 13:43)  HR: 86 (03-31-22 @ 05:35) (81 - 89)  BP: 150/80 (03-31-22 @ 05:35) (132/48 - 169/78)  RR: 18 (03-31-22 @ 05:35) (16 - 18)  SpO2: 96% (03-31-22 @ 05:35) (96% - 98%)  Wt(kg): --  CAPILLARY BLOOD GLUCOSE      POCT Blood Glucose.: 120 mg/dL (30 Mar 2022 21:16)      Labs                          11.6   6.02  )-----------( 224      ( 29 Mar 2022 07:07 )             34.7       03-30    142  |  111<H>  |  14  ----------------------------<  60<L>  3.8   |  26  |  0.75    Ca    8.5      30 Mar 2022 06:51              .Blood Blood-Peripheral  03-28 @ 10:21   No growth to date.  --  --      Clean Catch Clean Catch (Midstream)  03-28 @ 05:37   >100,000 CFU/ml Escherichia coli  --  Escherichia coli  - Trimethoprim/Sulfamethoxazole: R >2/38   - Meropenem: S <=1   - Nitrofurantoin: S <=32 Should not be used to treat pyelonephritis   - Piperacillin/Tazobactam: S <=8   - Tigecycline: S <=2   - Tobramycin: R >8   - Amikacin: S <=16   - Amoxicillin/Clavulanic Acid: S <=8/4   - Ampicillin: R >16 These ampicillin results predict results for amoxicillin   - Ampicillin/Sulbactam: S 8/4 Enterobacter, Klebsiella aerogenes, Citrobacter, and Serratia may develop resistance during prolonged therapy (3-4 days)   - Aztreonam: S <=4   - Cefazolin: S <=2 (MIC_CL_COM_ENTERIC_CEFAZU) For uncomplicated UTI with K. pneumoniae, E. coli, or P. mirablis: PRISCA <=16 is sensitive and PRISCA >=32 is resistant. This also predicts results for oral agents cefaclor, cefdinir, cefpodoxime, cefprozil, cefuroxime axetil, cephalexin and locarbef for uncomplicated UTI. Note that some isolates may be susceptible to these agents while testing resistant to cefazolin.   - Cefepime: S <=2   - Cefoxitin: S <=8   - Ceftriaxone: S <=1 Enterobacter, Klebsiella aerogenes, Citrobacter, and Serratia may develop resistance during prolonged therapy   - Ciprofloxacin: R >2   - Ertapenem: S <=0.5   - Gentamicin: S <=2   - Imipenem: S <=1   - Levofloxacin: R >4         Radiology Results      Meds    MEDICATIONS  (STANDING):  aspirin enteric coated 81 milliGRAM(s) Oral daily  dextrose 5%. 1000 milliLiter(s) (50 mL/Hr) IV Continuous <Continuous>  dextrose 5%. 1000 milliLiter(s) (100 mL/Hr) IV Continuous <Continuous>  dextrose 50% Injectable 25 Gram(s) IV Push once  dextrose 50% Injectable 12.5 Gram(s) IV Push once  dextrose 50% Injectable 25 Gram(s) IV Push once  enoxaparin Injectable 40 milliGRAM(s) SubCutaneous every 24 hours  ferrous    sulfate 325 milliGRAM(s) Oral daily  FLUoxetine 20 milliGRAM(s) Oral daily  furosemide Solution 5 milliGRAM(s) Oral daily  glucagon  Injectable 1 milliGRAM(s) IntraMuscular once  insulin glargine Injectable (LANTUS) 17 Unit(s) SubCutaneous every morning  insulin lispro (ADMELOG) corrective regimen sliding scale   SubCutaneous Before meals and at bedtime  lactulose Syrup 20 Gram(s) Oral daily  levothyroxine 25 MICROGram(s) Oral daily  metoprolol tartrate 12.5 milliGRAM(s) Oral every 12 hours  metroNIDAZOLE  IVPB 500 milliGRAM(s) IV Intermittent every 8 hours  pantoprazole    Tablet 40 milliGRAM(s) Oral before breakfast  polyethylene glycol 3350 17 Gram(s) Oral at bedtime  QUEtiapine 150 milliGRAM(s) Oral at bedtime  risperiDONE   Tablet 1 milliGRAM(s) Oral two times a day  senna 2 Tablet(s) Oral at bedtime  sodium chloride 0.9%. 1000 milliLiter(s) (75 mL/Hr) IV Continuous <Continuous>  traZODone 50 milliGRAM(s) Oral at bedtime      MEDICATIONS  (PRN):  dextrose Oral Gel 15 Gram(s) Oral once PRN Blood Glucose LESS THAN 70 milliGRAM(s)/deciliter      Physical Exam    Neuro :  no focal deficits  Respiratory: CTA B/L  CV: RRR, S1S2, no murmurs,   Abdominal: Soft, NT, ND +BS,  Extremities: No edema, + peripheral pulses      ASSESSMENT    uti,   fecal impaction,   proctitis   shelia,   h/o alzheimer's dementia,   hypothyroidism  HTN (hypertension)  DM (diabetes mellitus)  MESERET (iron deficiency anemia)  Cardiac pacemaker  S/P TAMMY-BSO  s/p hip replacement        PLAN    blood neg noted above   ucx and sens with e coli noted above   cont flagyl and ceftin x 7 more dyas    pt s/p manual disimpaction   ct abd pelv with Proctitis. Stool throughout the colon. No bowel obstruction. No hydronephrosis noted    s/p golytely and enema  cont senna, miralax,   gi f/u   serum creat wnl   cont lispro ss,   cont lantus   cont current meds   d/c planning

## 2022-03-31 NOTE — PROGRESS NOTE ADULT - EXTREMITIES
detailed exam
No cyanosis, clubbing or edema
detailed exam
No cyanosis, clubbing or edema
detailed exam

## 2022-04-01 LAB
CULTURE RESULTS: SIGNIFICANT CHANGE UP
ORGANISM # SPEC MICROSCOPIC CNT: SIGNIFICANT CHANGE UP
ORGANISM # SPEC MICROSCOPIC CNT: SIGNIFICANT CHANGE UP
SPECIMEN SOURCE: SIGNIFICANT CHANGE UP

## 2022-04-02 LAB
CULTURE RESULTS: SIGNIFICANT CHANGE UP
CULTURE RESULTS: SIGNIFICANT CHANGE UP
SPECIMEN SOURCE: SIGNIFICANT CHANGE UP
SPECIMEN SOURCE: SIGNIFICANT CHANGE UP

## 2022-04-13 ENCOUNTER — EMERGENCY (EMERGENCY)
Facility: HOSPITAL | Age: 87
LOS: 1 days | Discharge: HOME | End: 2022-04-13
Attending: EMERGENCY MEDICINE
Payer: COMMERCIAL

## 2022-04-13 VITALS
DIASTOLIC BLOOD PRESSURE: 81 MMHG | RESPIRATION RATE: 18 BRPM | WEIGHT: 147.93 LBS | SYSTOLIC BLOOD PRESSURE: 145 MMHG | HEIGHT: 62 IN | HEART RATE: 68 BPM | TEMPERATURE: 98 F | OXYGEN SATURATION: 98 %

## 2022-04-13 DIAGNOSIS — Z90.710 ACQUIRED ABSENCE OF BOTH CERVIX AND UTERUS: Chronic | ICD-10-CM

## 2022-04-13 DIAGNOSIS — Z96.649 PRESENCE OF UNSPECIFIED ARTIFICIAL HIP JOINT: Chronic | ICD-10-CM

## 2022-04-13 LAB
ALBUMIN SERPL ELPH-MCNC: 3.4 G/DL — LOW (ref 3.5–5)
ALP SERPL-CCNC: 78 U/L — SIGNIFICANT CHANGE UP (ref 40–120)
ALT FLD-CCNC: 37 U/L DA — SIGNIFICANT CHANGE UP (ref 10–60)
ANION GAP SERPL CALC-SCNC: 5 MMOL/L — SIGNIFICANT CHANGE UP (ref 5–17)
AST SERPL-CCNC: 28 U/L — SIGNIFICANT CHANGE UP (ref 10–40)
BASOPHILS # BLD AUTO: 0.05 K/UL — SIGNIFICANT CHANGE UP (ref 0–0.2)
BASOPHILS NFR BLD AUTO: 0.6 % — SIGNIFICANT CHANGE UP (ref 0–2)
BILIRUB SERPL-MCNC: 0.3 MG/DL — SIGNIFICANT CHANGE UP (ref 0.2–1.2)
BUN SERPL-MCNC: 29 MG/DL — HIGH (ref 7–18)
CALCIUM SERPL-MCNC: 9.2 MG/DL — SIGNIFICANT CHANGE UP (ref 8.4–10.5)
CHLORIDE SERPL-SCNC: 104 MMOL/L — SIGNIFICANT CHANGE UP (ref 96–108)
CO2 SERPL-SCNC: 29 MMOL/L — SIGNIFICANT CHANGE UP (ref 22–31)
CREAT SERPL-MCNC: 1.22 MG/DL — SIGNIFICANT CHANGE UP (ref 0.5–1.3)
EGFR: 42 ML/MIN/1.73M2 — LOW
EOSINOPHIL # BLD AUTO: 0.2 K/UL — SIGNIFICANT CHANGE UP (ref 0–0.5)
EOSINOPHIL NFR BLD AUTO: 2.4 % — SIGNIFICANT CHANGE UP (ref 0–6)
GLUCOSE SERPL-MCNC: 146 MG/DL — HIGH (ref 70–99)
HCT VFR BLD CALC: 38.1 % — SIGNIFICANT CHANGE UP (ref 34.5–45)
HGB BLD-MCNC: 12.3 G/DL — SIGNIFICANT CHANGE UP (ref 11.5–15.5)
IMM GRANULOCYTES NFR BLD AUTO: 0.2 % — SIGNIFICANT CHANGE UP (ref 0–1.5)
LYMPHOCYTES # BLD AUTO: 2.05 K/UL — SIGNIFICANT CHANGE UP (ref 1–3.3)
LYMPHOCYTES # BLD AUTO: 24.9 % — SIGNIFICANT CHANGE UP (ref 13–44)
MAGNESIUM SERPL-MCNC: 1.8 MG/DL — SIGNIFICANT CHANGE UP (ref 1.6–2.6)
MCHC RBC-ENTMCNC: 30.2 PG — SIGNIFICANT CHANGE UP (ref 27–34)
MCHC RBC-ENTMCNC: 32.3 GM/DL — SIGNIFICANT CHANGE UP (ref 32–36)
MCV RBC AUTO: 93.6 FL — SIGNIFICANT CHANGE UP (ref 80–100)
MONOCYTES # BLD AUTO: 0.76 K/UL — SIGNIFICANT CHANGE UP (ref 0–0.9)
MONOCYTES NFR BLD AUTO: 9.2 % — SIGNIFICANT CHANGE UP (ref 2–14)
NEUTROPHILS # BLD AUTO: 5.14 K/UL — SIGNIFICANT CHANGE UP (ref 1.8–7.4)
NEUTROPHILS NFR BLD AUTO: 62.7 % — SIGNIFICANT CHANGE UP (ref 43–77)
NRBC # BLD: 0 /100 WBCS — SIGNIFICANT CHANGE UP (ref 0–0)
PLATELET # BLD AUTO: 262 K/UL — SIGNIFICANT CHANGE UP (ref 150–400)
POTASSIUM SERPL-MCNC: 4.4 MMOL/L — SIGNIFICANT CHANGE UP (ref 3.5–5.3)
POTASSIUM SERPL-SCNC: 4.4 MMOL/L — SIGNIFICANT CHANGE UP (ref 3.5–5.3)
PROT SERPL-MCNC: 7 G/DL — SIGNIFICANT CHANGE UP (ref 6–8.3)
RBC # BLD: 4.07 M/UL — SIGNIFICANT CHANGE UP (ref 3.8–5.2)
RBC # FLD: 14.6 % — HIGH (ref 10.3–14.5)
SARS-COV-2 RNA SPEC QL NAA+PROBE: SIGNIFICANT CHANGE UP
SODIUM SERPL-SCNC: 138 MMOL/L — SIGNIFICANT CHANGE UP (ref 135–145)
TROPONIN I, HIGH SENSITIVITY RESULT: 7.4 NG/L — SIGNIFICANT CHANGE UP
WBC # BLD: 8.22 K/UL — SIGNIFICANT CHANGE UP (ref 3.8–10.5)
WBC # FLD AUTO: 8.22 K/UL — SIGNIFICANT CHANGE UP (ref 3.8–10.5)

## 2022-04-13 PROCEDURE — 73030 X-RAY EXAM OF SHOULDER: CPT | Mod: 26,LT

## 2022-04-13 PROCEDURE — 99285 EMERGENCY DEPT VISIT HI MDM: CPT

## 2022-04-13 PROCEDURE — 72170 X-RAY EXAM OF PELVIS: CPT | Mod: 26

## 2022-04-13 PROCEDURE — 71045 X-RAY EXAM CHEST 1 VIEW: CPT | Mod: 26

## 2022-04-13 RX ORDER — SODIUM CHLORIDE 9 MG/ML
1000 INJECTION INTRAMUSCULAR; INTRAVENOUS; SUBCUTANEOUS ONCE
Refills: 0 | Status: COMPLETED | OUTPATIENT
Start: 2022-04-13 | End: 2022-04-13

## 2022-04-13 RX ORDER — METOPROLOL TARTRATE 50 MG
25 TABLET ORAL ONCE
Refills: 0 | Status: COMPLETED | OUTPATIENT
Start: 2022-04-13 | End: 2022-04-13

## 2022-04-13 RX ADMIN — Medication 25 MILLIGRAM(S): at 23:40

## 2022-04-13 RX ADMIN — SODIUM CHLORIDE 1000 MILLILITER(S): 9 INJECTION INTRAMUSCULAR; INTRAVENOUS; SUBCUTANEOUS at 23:40

## 2022-04-13 NOTE — ED PROVIDER NOTE - IV ALTEPLASE ADMIN OUTSIDE HIDDEN
show Complex Repair And Double M Plasty Text: The defect edges were debeveled with a #15 scalpel blade.  The primary defect was closed partially with a complex linear closure.  Given the location of the remaining defect, shape of the defect and the proximity to free margins a double M plasty was deemed most appropriate for complete closure of the defect.  Using a sterile surgical marker, an appropriate advancement flap was drawn incorporating the defect and placing the expected incisions within the relaxed skin tension lines where possible.    The area thus outlined was incised deep to adipose tissue with a #15 scalpel blade.  The skin margins were undermined to an appropriate distance in all directions utilizing iris scissors.

## 2022-04-13 NOTE — ED PROVIDER NOTE - PROGRESS NOTE DETAILS
NAD, nontoxic appearing. Signed off care to Dr. TYRA Crocker who will f/u CT's, urine, and reassess. Obi: cts negative for acute intracranial or cervical pathology. will dc. f/u with PMD. return precautions discussed.

## 2022-04-13 NOTE — ED ADULT TRIAGE NOTE - CHIEF COMPLAINT QUOTE
s/p mechanical fall x today, + hematoma L side of the head + neck pain and c-collar in place, no LOC

## 2022-04-13 NOTE — CHART NOTE - NSCHARTNOTEFT_GEN_A_CORE
Consult "Help at home"  Pt is a 89year old female  with pmhx of HTN, DM, PPM,  Pt was brought to the ED from home post fall. Timmy met with Pt, kristenrTresa () at bedside. Pt appeared alert and orientedx2. Shantel introduced self, role / purpose of visit explained. Daughter reports that Pt fell when getting out of chair and she is requesting 24/7 home care service. Information re home care provided. She was encouraged to follow up with Pt's PCP in the community re 24/7 hc service because the PCP should be able to justify the necessity for 24/7 hc service.  Pt's PCP is Dr Elijah Medina . Shantel plan to refer Pt to [facility] upon d/c but d/c disposition unknown during shantel's shift. D/c summary needs to be faxed to [facility]. Physician aware. Will f/u Consult "Help at home"  Pt is a 89year old female  with pmhx of HTN, DM, PPM,  Pt was brought to the ED from home post fall. Timmy met with Pt, kristenrTresa () at bedside. Pt appeared alert and orientedx2. Shantel introduced self, role / purpose of visit explained. Daughter reports that Pt fell when getting out of chair and she is requesting 24/7 home care service. Emotional support and information re home care provided. She was encouraged to follow up with Pt's PCP in the community re 24/7 hc service because the PCP should be able to justify the necessity for 24/7 hc service.  Pt's PCP is Dr Elijah Medina . Shantel plan to refer Pt to [facility] upon d/c but d/c disposition unknown during shantel's shift. D/c summary needs to be faxed to [facility]. Physician aware. Will f/u

## 2022-04-13 NOTE — ED PROVIDER NOTE - PHYSICAL EXAMINATION
Afebrile, hemodynamically stable, saturating well  NAD, well appearing, sitting comfortably in bed, no WOB, speaking full sentences  Head NCAT other than L forehead contusion  No CTL spine TTP/stepoff/deformity  PERRL, EOMI, anicteric  MMM  No JVD  RRR, nml S1/S2, no m/r/g  Lungs CTAB, no w/r/r  Abd soft, NT, ND, nml BS, no rebound or guarding  Alert, CN's 3-12 intact, motor 5/5 in all extrems,  YOST spontaneously including shoulder, no leg cyanosis or edema, no extrem stepoff/deformity/TTP  Skin warm, well perfused, no rashes or hives

## 2022-04-13 NOTE — ED PROVIDER NOTE - PATIENT PORTAL LINK FT
You can access the FollowMyHealth Patient Portal offered by [facility] by registering at the following website: [email or web address]. By joining [facility]’s FollowMyHealth portal, you will also be able to view your health information using other applications (apps) compatible with our system.

## 2022-04-13 NOTE — ED PROVIDER NOTE - CLINICAL SUMMARY MEDICAL DECISION MAKING FREE TEXT BOX
Character low suspicion for CVA and no focal or localizing findings. No arrhythmia or e/o aortic outflow obstruction. No anemia or bleeding or gross electrolyte abnormalities. No CP/SOB to suggest ACS or e/o DVT to suggest PE. No fever or specific infectious symptoms, and UA _____________. No e/o elevated ICP and CT head __________. No e/o neck/back/chest/abdominal trauma. Exam low suspicion for extrem fx and Xray ___________.     Noted SHELIA c/w appearance of mild dehydration. Given IV fluids. Character low suspicion for CVA and no focal or localizing findings. No arrhythmia or e/o aortic outflow obstruction. No anemia or bleeding or gross electrolyte abnormalities. No CP/SOB to suggest ACS or e/o DVT to suggest PE. No fever or specific infectious symptoms, UA pending. No e/o elevated ICP, CT head pending. No e/o neck/back/chest/abdominal trauma. Exam low suspicion for extrem fx and Xrays unremarkable. Noted SHELIA c/w appearance of mild dehydration. Given IV fluids and told daughter about SHELIA and need for hydration at home and she voices understanding. NAD, nontoxic appearing. Signed off care to Dr. TYRA Crocker who will f/u CT's, urine, and reassess.

## 2022-04-13 NOTE — ED PROVIDER NOTE - OBJECTIVE STATEMENT
Declines , uses daughter at bedside.  89yoF with h/o HTN, DM on basaglar/metformin, PPM, on ASA 81 and no other antiplt/coag agent, presents with fall. Daughter reports she was getting out of  chair at 330AM today when she pitched forward and fell hitting her L forehead against wheelchair, later was reporting CORBIN and L arm pain. Daughter states has been eating, drinking, and urinating well. Has chronic constipation. Denies CP, SOB, cough, vomiting, d/c, hematochezia, and all other symptoms.

## 2022-04-13 NOTE — ED PROVIDER NOTE - NSFOLLOWUPINSTRUCTIONS_ED_ALL_ED_FT
[facility]                                                                                                                  Contusión facial o en el cuero cabelludo    Facial or Scalp Contusion      Francisca contusión facial o en el cuero cabelludo es un hematoma profundo (contusión) en la marija o la leno. Las contusiones son el resultado de francisca lesión que causa sangrado debajo de la piel. La socorro de la contusión puede ponerse peter, morada o amarilla.    Las lesiones menores pueden causar contusiones indoloras; sin embargo, en las contusiones más graves, el dolor y la hinchazón pueden prolongarse adam algunas semanas. Las lesiones de la marija y la leno generalmente causan francisca gran hinchazón, especialmente alrededor de los ojos.      ¿Cuáles son las causas?    La causa de francisca contusión facial o en el cuero cabelludo suele ser consecuencia de un traumatismo, francisca caída o francisca lesión contundente en la socorro del carleen o la leno.      ¿Cuáles son los signos o síntomas?    Los síntomas de esta afección incluyen:  •Hinchazón de la socorro lesionada.      •Cambio de color de la socorro lesionada.      •Dolor a la palpación, sensibilidad o dolor en la socorro lesionada.        ¿Cómo se diagnostica?    Esta afección se diagnostica en función de leah antecedentes médicos y de un examen físico. Podría ser necesario sourav francisca radiografía, realizar francisca exploración por tomografía computarizada (TC) o francisca resonancia magnética (RM) para verificar si hay lesiones adicionales, shalom, por ejemplo, huesos rotos (fracturas).      ¿Cómo se trata?    Frecuentemente, el mejor tratamiento para francisca contusión facial o del cuero cabelludo es la aplicación de compresas frías en la socorro lesionada. Para calmar el dolor también podrán recomendarle medicamentos de venta levy.      Siga estas instrucciones en [facility] casa:      Control del dolor, la rigidez y la hinchazón    •Si se lo indican, aplique hielo sobre la socorro de la lesión. Para hacer esto:  •Ponga el hielo en francisca bolsa plástica.      •Coloque francisca toalla entre la piel y la bolsa.      •Aplique el hielo adam 20 minutos, 2 a 3 veces por día.        •De ser posible, mientras esté acostado, levante (eleve) la leno por encima del nivel del corazón. [place] puede ayudar a reducir la hinchazón.      Instrucciones generales     •Use los medicamentos de venta levy y los recetados solamente shalom se lo haya indicado el médico.      •Retome leah actividades normales según lo indicado por el médico. Pregúntele al médico qué actividades son seguras para usted.      •Belinda reposo shalom se lo haya indicado el médico.      •Concurra a todas las visitas de seguimiento shalom se lo haya indicado el médico. [place] es importante.        Comuníquese con un médico si:    •Tiene problemas para morder o masticar.      •El dolor o la hinchazón empeoran.      •Siente dolor al  los ojos.        Solicite ayuda de inmediato si:    •Siente algún dolor intenso o le duele la leno y no se calma con los medicamentos.      •Observa cambios en la personalidad, somnolencia o confusión que no son habituales.      •Vomita.      •Presenta francisca hemorragia nasal que no se detiene.      •Tiene visión doble o visión borrosa.      •Observa que un líquido иван drena en forma continua por la nariz o el oído.      •Tiene problemas para caminar o para usar los brazos o las piernas.      •Tiene mareos intensos.        Resumen    •Francisca contusión facial o en el cuero cabelludo es un hematoma profundo (contusión) en la marija o la leno.      •Las contusiones son el resultado de francisca lesión que causa sangrado debajo de la piel.      •Las lesiones menores causarán contusiones indoloras; sin embargo, en las contusiones más graves, el dolor y la hinchazón pueden prolongarse adam algunas semanas.      •Frecuentemente, el mejor tratamiento para francisca contusión facial o del cuero cabelludo es la aplicación de compresas frías en la socorro lesionada.      Esta información no tiene shalom fin reemplazar el consejo del médico. Asegúrese de hacerle al médico cualquier pregunta que tenga.      Document Revised: 01/25/2021 Document Reviewed: 01/25/2021    Elsevier Patient Education © 2021 [employer]

## 2022-04-14 PROBLEM — Z95.0 PRESENCE OF CARDIAC PACEMAKER: Chronic | Status: ACTIVE | Noted: 2022-03-27

## 2022-04-14 LAB
APPEARANCE UR: CLEAR — SIGNIFICANT CHANGE UP
BILIRUB UR-MCNC: NEGATIVE — SIGNIFICANT CHANGE UP
COLOR SPEC: YELLOW — SIGNIFICANT CHANGE UP
DIFF PNL FLD: NEGATIVE — SIGNIFICANT CHANGE UP
GLUCOSE UR QL: NEGATIVE — SIGNIFICANT CHANGE UP
KETONES UR-MCNC: NEGATIVE — SIGNIFICANT CHANGE UP
LEUKOCYTE ESTERASE UR-ACNC: ABNORMAL
NITRITE UR-MCNC: POSITIVE
PH UR: 6.5 — SIGNIFICANT CHANGE UP (ref 5–8)
PROT UR-MCNC: NEGATIVE — SIGNIFICANT CHANGE UP
SP GR SPEC: 1.01 — SIGNIFICANT CHANGE UP (ref 1.01–1.02)
UROBILINOGEN FLD QL: NEGATIVE — SIGNIFICANT CHANGE UP

## 2022-04-14 PROCEDURE — 81001 URINALYSIS AUTO W/SCOPE: CPT

## 2022-04-14 PROCEDURE — 84484 ASSAY OF TROPONIN QUANT: CPT

## 2022-04-14 PROCEDURE — 87086 URINE CULTURE/COLONY COUNT: CPT

## 2022-04-14 PROCEDURE — 87635 SARS-COV-2 COVID-19 AMP PRB: CPT

## 2022-04-14 PROCEDURE — 36415 COLL VENOUS BLD VENIPUNCTURE: CPT

## 2022-04-14 PROCEDURE — 72125 CT NECK SPINE W/O DYE: CPT | Mod: 26,MA

## 2022-04-14 PROCEDURE — 87186 SC STD MICRODIL/AGAR DIL: CPT

## 2022-04-14 PROCEDURE — 82962 GLUCOSE BLOOD TEST: CPT

## 2022-04-14 PROCEDURE — 83735 ASSAY OF MAGNESIUM: CPT

## 2022-04-14 PROCEDURE — 72125 CT NECK SPINE W/O DYE: CPT | Mod: MA

## 2022-04-14 PROCEDURE — 80053 COMPREHEN METABOLIC PANEL: CPT

## 2022-04-14 PROCEDURE — 85025 COMPLETE CBC W/AUTO DIFF WBC: CPT

## 2022-04-14 PROCEDURE — 93005 ELECTROCARDIOGRAM TRACING: CPT

## 2022-04-14 PROCEDURE — 72170 X-RAY EXAM OF PELVIS: CPT

## 2022-04-14 PROCEDURE — 99285 EMERGENCY DEPT VISIT HI MDM: CPT | Mod: 25

## 2022-04-14 PROCEDURE — 73030 X-RAY EXAM OF SHOULDER: CPT

## 2022-04-14 PROCEDURE — 70450 CT HEAD/BRAIN W/O DYE: CPT | Mod: 26,MA

## 2022-04-14 PROCEDURE — 71045 X-RAY EXAM CHEST 1 VIEW: CPT

## 2022-04-14 PROCEDURE — 70450 CT HEAD/BRAIN W/O DYE: CPT | Mod: MA

## 2022-04-14 NOTE — ED ADULT NURSE NOTE - NSIMPLEMENTINTERV_GEN_ALL_ED
Implemented All Fall with Harm Risk Interventions:  [place] to call system. Call bell, personal items and telephone within reach. Instruct patient to call for assistance. Room bathroom lighting operational. Non-slip footwear when patient is off stretcher. Physically safe environment: no spills, clutter or unnecessary equipment. Stretcher in lowest position, wheels locked, appropriate side rails in place. Provide visual cue, wrist band, yellow gown, etc. Monitor gait and stability. Monitor for mental status changes and reorient to person, place, and time. Review medications for side effects contributing to fall risk. Reinforce activity limits and safety measures with patient and family. Provide visual clues: red socks.

## 2022-04-16 LAB
-  AMIKACIN: SIGNIFICANT CHANGE UP
-  AMOXICILLIN/CLAVULANIC ACID: SIGNIFICANT CHANGE UP
-  AMPICILLIN/SULBACTAM: SIGNIFICANT CHANGE UP
-  AMPICILLIN: SIGNIFICANT CHANGE UP
-  AZTREONAM: SIGNIFICANT CHANGE UP
-  CEFAZOLIN: SIGNIFICANT CHANGE UP
-  CEFEPIME: SIGNIFICANT CHANGE UP
-  CEFTRIAXONE: SIGNIFICANT CHANGE UP
-  CIPROFLOXACIN: SIGNIFICANT CHANGE UP
-  ERTAPENEM: SIGNIFICANT CHANGE UP
-  GENTAMICIN: SIGNIFICANT CHANGE UP
-  IMIPENEM: SIGNIFICANT CHANGE UP
-  LEVOFLOXACIN: SIGNIFICANT CHANGE UP
-  MEROPENEM: SIGNIFICANT CHANGE UP
-  NITROFURANTOIN: SIGNIFICANT CHANGE UP
-  PIPERACILLIN/TAZOBACTAM: SIGNIFICANT CHANGE UP
-  TIGECYCLINE: SIGNIFICANT CHANGE UP
-  TOBRAMYCIN: SIGNIFICANT CHANGE UP
-  TRIMETHOPRIM/SULFAMETHOXAZOLE: SIGNIFICANT CHANGE UP
METHOD TYPE: SIGNIFICANT CHANGE UP

## 2022-04-19 LAB
-  AMPICILLIN: SIGNIFICANT CHANGE UP
-  CIPROFLOXACIN: SIGNIFICANT CHANGE UP
-  LEVOFLOXACIN: SIGNIFICANT CHANGE UP
-  NITROFURANTOIN: SIGNIFICANT CHANGE UP
-  TETRACYCLINE: SIGNIFICANT CHANGE UP
-  VANCOMYCIN: SIGNIFICANT CHANGE UP
CULTURE RESULTS: SIGNIFICANT CHANGE UP
METHOD TYPE: SIGNIFICANT CHANGE UP
ORGANISM # SPEC MICROSCOPIC CNT: SIGNIFICANT CHANGE UP
SPECIMEN SOURCE: SIGNIFICANT CHANGE UP

## 2022-04-26 RX ORDER — CIPROFLOXACIN LACTATE 400MG/40ML
1 VIAL (ML) INTRAVENOUS
Qty: 14 | Refills: 0
Start: 2022-04-26 | End: 2022-05-02

## 2022-08-10 ENCOUNTER — INPATIENT (INPATIENT)
Facility: HOSPITAL | Age: 87
LOS: 4 days | Discharge: HOSPICE | DRG: 638 | End: 2022-08-15
Attending: INTERNAL MEDICINE | Admitting: INTERNAL MEDICINE
Payer: COMMERCIAL

## 2022-08-10 VITALS
WEIGHT: 149.91 LBS | RESPIRATION RATE: 16 BRPM | HEIGHT: 62 IN | TEMPERATURE: 98 F | DIASTOLIC BLOOD PRESSURE: 79 MMHG | HEART RATE: 63 BPM | OXYGEN SATURATION: 93 % | SYSTOLIC BLOOD PRESSURE: 175 MMHG

## 2022-08-10 DIAGNOSIS — E11.649 TYPE 2 DIABETES MELLITUS WITH HYPOGLYCEMIA WITHOUT COMA: ICD-10-CM

## 2022-08-10 DIAGNOSIS — Z96.649 PRESENCE OF UNSPECIFIED ARTIFICIAL HIP JOINT: Chronic | ICD-10-CM

## 2022-08-10 DIAGNOSIS — Z90.710 ACQUIRED ABSENCE OF BOTH CERVIX AND UTERUS: Chronic | ICD-10-CM

## 2022-08-10 LAB
ALBUMIN SERPL ELPH-MCNC: 2.9 G/DL — LOW (ref 3.5–5)
ALP SERPL-CCNC: 127 U/L — HIGH (ref 40–120)
ALT FLD-CCNC: 25 U/L DA — SIGNIFICANT CHANGE UP (ref 10–60)
ANION GAP SERPL CALC-SCNC: 3 MMOL/L — LOW (ref 5–17)
AST SERPL-CCNC: 21 U/L — SIGNIFICANT CHANGE UP (ref 10–40)
BASOPHILS # BLD AUTO: 0.05 K/UL — SIGNIFICANT CHANGE UP (ref 0–0.2)
BASOPHILS NFR BLD AUTO: 0.8 % — SIGNIFICANT CHANGE UP (ref 0–2)
BILIRUB SERPL-MCNC: 0.2 MG/DL — SIGNIFICANT CHANGE UP (ref 0.2–1.2)
BUN SERPL-MCNC: 24 MG/DL — HIGH (ref 7–18)
CALCIUM SERPL-MCNC: 8.9 MG/DL — SIGNIFICANT CHANGE UP (ref 8.4–10.5)
CHLORIDE SERPL-SCNC: 105 MMOL/L — SIGNIFICANT CHANGE UP (ref 96–108)
CO2 SERPL-SCNC: 31 MMOL/L — SIGNIFICANT CHANGE UP (ref 22–31)
CREAT SERPL-MCNC: 0.81 MG/DL — SIGNIFICANT CHANGE UP (ref 0.5–1.3)
EGFR: 68 ML/MIN/1.73M2 — SIGNIFICANT CHANGE UP
EOSINOPHIL # BLD AUTO: 0.18 K/UL — SIGNIFICANT CHANGE UP (ref 0–0.5)
EOSINOPHIL NFR BLD AUTO: 3 % — SIGNIFICANT CHANGE UP (ref 0–6)
GLUCOSE SERPL-MCNC: 104 MG/DL — HIGH (ref 70–99)
HCT VFR BLD CALC: 38.2 % — SIGNIFICANT CHANGE UP (ref 34.5–45)
HGB BLD-MCNC: 12.3 G/DL — SIGNIFICANT CHANGE UP (ref 11.5–15.5)
IMM GRANULOCYTES NFR BLD AUTO: 0.3 % — SIGNIFICANT CHANGE UP (ref 0–1.5)
LYMPHOCYTES # BLD AUTO: 1.67 K/UL — SIGNIFICANT CHANGE UP (ref 1–3.3)
LYMPHOCYTES # BLD AUTO: 27.8 % — SIGNIFICANT CHANGE UP (ref 13–44)
MCHC RBC-ENTMCNC: 30.4 PG — SIGNIFICANT CHANGE UP (ref 27–34)
MCHC RBC-ENTMCNC: 32.2 GM/DL — SIGNIFICANT CHANGE UP (ref 32–36)
MCV RBC AUTO: 94.3 FL — SIGNIFICANT CHANGE UP (ref 80–100)
MONOCYTES # BLD AUTO: 0.63 K/UL — SIGNIFICANT CHANGE UP (ref 0–0.9)
MONOCYTES NFR BLD AUTO: 10.5 % — SIGNIFICANT CHANGE UP (ref 2–14)
NEUTROPHILS # BLD AUTO: 3.46 K/UL — SIGNIFICANT CHANGE UP (ref 1.8–7.4)
NEUTROPHILS NFR BLD AUTO: 57.6 % — SIGNIFICANT CHANGE UP (ref 43–77)
NRBC # BLD: 0 /100 WBCS — SIGNIFICANT CHANGE UP (ref 0–0)
PLATELET # BLD AUTO: 241 K/UL — SIGNIFICANT CHANGE UP (ref 150–400)
POTASSIUM SERPL-MCNC: 4.1 MMOL/L — SIGNIFICANT CHANGE UP (ref 3.5–5.3)
POTASSIUM SERPL-SCNC: 4.1 MMOL/L — SIGNIFICANT CHANGE UP (ref 3.5–5.3)
PROT SERPL-MCNC: 6.7 G/DL — SIGNIFICANT CHANGE UP (ref 6–8.3)
RBC # BLD: 4.05 M/UL — SIGNIFICANT CHANGE UP (ref 3.8–5.2)
RBC # FLD: 14.3 % — SIGNIFICANT CHANGE UP (ref 10.3–14.5)
SARS-COV-2 RNA SPEC QL NAA+PROBE: SIGNIFICANT CHANGE UP
SODIUM SERPL-SCNC: 139 MMOL/L — SIGNIFICANT CHANGE UP (ref 135–145)
TROPONIN I, HIGH SENSITIVITY RESULT: 5.3 NG/L — SIGNIFICANT CHANGE UP
WBC # BLD: 6.01 K/UL — SIGNIFICANT CHANGE UP (ref 3.8–10.5)
WBC # FLD AUTO: 6.01 K/UL — SIGNIFICANT CHANGE UP (ref 3.8–10.5)

## 2022-08-10 PROCEDURE — 99285 EMERGENCY DEPT VISIT HI MDM: CPT

## 2022-08-10 RX ORDER — ONDANSETRON 8 MG/1
4 TABLET, FILM COATED ORAL EVERY 8 HOURS
Refills: 0 | Status: DISCONTINUED | OUTPATIENT
Start: 2022-08-10 | End: 2022-08-15

## 2022-08-10 RX ORDER — LANOLIN ALCOHOL/MO/W.PET/CERES
3 CREAM (GRAM) TOPICAL AT BEDTIME
Refills: 0 | Status: DISCONTINUED | OUTPATIENT
Start: 2022-08-10 | End: 2022-08-15

## 2022-08-10 RX ORDER — ACETAMINOPHEN 500 MG
650 TABLET ORAL EVERY 6 HOURS
Refills: 0 | Status: DISCONTINUED | OUTPATIENT
Start: 2022-08-10 | End: 2022-08-15

## 2022-08-10 RX ORDER — ENOXAPARIN SODIUM 100 MG/ML
40 INJECTION SUBCUTANEOUS EVERY 24 HOURS
Refills: 0 | Status: DISCONTINUED | OUTPATIENT
Start: 2022-08-10 | End: 2022-08-15

## 2022-08-10 RX ADMIN — Medication 0.5 MILLIGRAM(S): at 19:55

## 2022-08-10 NOTE — ED PROVIDER NOTE - OBJECTIVE STATEMENT
89 y.o female with a history of diabetes, hypertension, Alzheimer's, and dementia is presenting with hypoglycemia. As per patient's daughter, patient is non-communicative and disoriented at baseline, however, this morning, she found the patient to be less responsive than usual and tried to feed her, but patient had difficulty in taking anything. Patient's daughter states she eventually called 911 and EMS found the patient's blood glucose to be in the 60s. According to EMS, 25g of Dextrose was injected. Patient has not reported fever, syncope, injuries, and shortness of breath and patient is currently complaining of vague, mild abdominal discomfort.

## 2022-08-10 NOTE — ED PROVIDER NOTE - CLINICAL SUMMARY MEDICAL DECISION MAKING FREE TEXT BOX
Patient presenting with altered mental status with low blood glucose, now improved after given D50 by EMS. Will check labs, EKG, urine, and reassess.

## 2022-08-10 NOTE — H&P ADULT - NSHPPHYSICALEXAM_GEN_ALL_CORE
Vital Signs Last 24 Hrs  T(C): 37 (10 Aug 2022 23:47), Max: 37.1 (10 Aug 2022 22:15)  T(F): 98.6 (10 Aug 2022 23:47), Max: 98.7 (10 Aug 2022 22:15)  HR: 81 (10 Aug 2022 23:47) (62 - 81)  BP: 190/82 (10 Aug 2022 23:47) (142/76 - 190/82)  BP(mean): --  RR: 17 (10 Aug 2022 23:47) (16 - 18)  SpO2: 98% (10 Aug 2022 23:47) (93% - 99%)    Parameters below as of 10 Aug 2022 23:47  Patient On (Oxygen Delivery Method): room air    PHYSICAL EXAM:  GENERAL: Bedbound, nonverbal.   HEAD:  Atraumatic, Normocephalic  EYES: EOMI, PERRLA, conjunctiva and sclera clear  NECK: Supple, No JVD  CHEST/LUNG: Clear to auscultation bilaterally; No wheeze; No crackles; No accessory muscles used  HEART: Regular rate and rhythm; No murmurs;   ABDOMEN: Soft, Nontender, Nondistended; Bowel sounds present; No guarding  EXTREMITIES:  2+ Peripheral Pulses, No cyanosis or edema  PSYCH: AAOx0  NEUROLOGY: non-focal  SKIN: No rashes or lesions

## 2022-08-10 NOTE — H&P ADULT - PROBLEM SELECTOR PLAN 2
- As per daughter pt declining over the last few weeks.   - Decreased oral intake.   - Only able to swallow liquid/pureed food.   - Palliative consult.

## 2022-08-10 NOTE — ED PROVIDER NOTE - PHYSICAL EXAMINATION
General: Patient is alert, awake, minimally verbal, and disoriented. Patient is following commands.    Neuro: Limited exam. Motor function is symmetric with no apparent deficits.

## 2022-08-10 NOTE — ED PROVIDER NOTE - PROGRESS NOTE DETAILS
Pt has refractory hypoglycemia and taking oral hypoglycemic medication with decreased PO intake, therefore will admit.    Dr. Snyder accepts admission.  Pt agitated (and per daughter has h/o sundowning-type tendencies), so will give Ativan.  She also takes quetiapine 150 mg qhs and trazodone 50 mg qhs.

## 2022-08-10 NOTE — H&P ADULT - PROBLEM SELECTOR PLAN 4
h/o DM   f/u A1c  On 34 lantus at home  c/w sliding scale  Adjust insulin as indicated  FS ACHS q6 while NPO

## 2022-08-10 NOTE — H&P ADULT - ASSESSMENT
This is a 89 year old female with medical history of DM, HTN, Worsening Alzheimer coming in for hypoglycemia.

## 2022-08-10 NOTE — ED ADULT NURSE NOTE - OBJECTIVE STATEMENT
As per pt's daughter c/o hypoglycemia, FS was 56 at home. As per EMS report, dextrose 25mg IVP was given.  Pt denies all other symptoms at this time

## 2022-08-10 NOTE — H&P ADULT - PROBLEM SELECTOR PLAN 1
- Pt presented with hypoglycemia with sugars in the 60's   - possibly due to being given insulin without eating.   - Pts sugars have remained stable here.   - Continuing on D5 along with pureed meals.   - Possibly needs to cut back on her home regiment of insulin.

## 2022-08-10 NOTE — H&P ADULT - PROBLEM SELECTOR PLAN 5
- Pt has history of Alzherimer that's worsening.   - Continue home medication of Prozac, Seroquel, Risperdal, trazodone.

## 2022-08-10 NOTE — ED ADULT TRIAGE NOTE - CHIEF COMPLAINT QUOTE
BIBA from home with c/o unresponsive at home, F/S was 50 at home, on arrival patient awake and at baseline mental status h/o Dementia   Dextrose 25 gm given by EMS

## 2022-08-10 NOTE — H&P ADULT - ATTENDING COMMENTS
89 year old female with medical history of anemia, DM, HTN, Worsening Alzheimers coming in after found to be nonresponsive at home. As per the daughter who is the primary caregiver fro the pt, she went to go wake the pt up in the morning and pt did not wake up. She assumed pt was still sleeping due to having sleep medications at night. The daughter tried again around noon and got concerned. At this time she checked the sugars and found the sugar to be 60's she called EMS. Pt is nonverbal and bedbound at baseline. As per the daughter pt has been less active for the last few days and has been having difficulty swallowing and eating.       assessment  --  unresponsiveness 2nd to hypoglycemia, h/o anemia, DM, HTN, Worsening Alzheimer's    plan  --  admit to med, lispro ss, hold home diabetic meds, cont preadmit home meds, gi and dvt prophylaxis, ivf,   cbc, bmp, mg, phos, lipids, tsh, hgba1c, bld cx, ua, ucx,     swallow eval   endo cons  palliative eval

## 2022-08-10 NOTE — H&P ADULT - HISTORY OF PRESENT ILLNESS
This is a 89 year old female with medical history of DM, HTN, Worsening Alzheimers coming in after found to be nonresponsive at home. As per the daughter who is the primary caregiver fro the pt, she went to go wake the pt up in the morning and pt did not wake up. She assumed pt was still sleeping due to having sleep medications at night. The daughter tried again around noon and got concerend. At this time she checked the sugars and found the sugar to be 60's she called EMS. Pt is nonverbal and bedbound at baseline. As per the daughter pt has been less active for the last few days and has been having difficulty swallowing and eating.

## 2022-08-11 DIAGNOSIS — D50.9 IRON DEFICIENCY ANEMIA, UNSPECIFIED: ICD-10-CM

## 2022-08-11 DIAGNOSIS — G30.9 ALZHEIMER'S DISEASE, UNSPECIFIED: ICD-10-CM

## 2022-08-11 DIAGNOSIS — Z02.9 ENCOUNTER FOR ADMINISTRATIVE EXAMINATIONS, UNSPECIFIED: ICD-10-CM

## 2022-08-11 DIAGNOSIS — J98.11 ATELECTASIS: ICD-10-CM

## 2022-08-11 DIAGNOSIS — Z51.5 ENCOUNTER FOR PALLIATIVE CARE: ICD-10-CM

## 2022-08-11 DIAGNOSIS — E16.2 HYPOGLYCEMIA, UNSPECIFIED: ICD-10-CM

## 2022-08-11 DIAGNOSIS — Z29.9 ENCOUNTER FOR PROPHYLACTIC MEASURES, UNSPECIFIED: ICD-10-CM

## 2022-08-11 DIAGNOSIS — I10 ESSENTIAL (PRIMARY) HYPERTENSION: ICD-10-CM

## 2022-08-11 DIAGNOSIS — R62.7 ADULT FAILURE TO THRIVE: ICD-10-CM

## 2022-08-11 DIAGNOSIS — E11.9 TYPE 2 DIABETES MELLITUS WITHOUT COMPLICATIONS: ICD-10-CM

## 2022-08-11 DIAGNOSIS — R53.81 OTHER MALAISE: ICD-10-CM

## 2022-08-11 DIAGNOSIS — R53.2 FUNCTIONAL QUADRIPLEGIA: ICD-10-CM

## 2022-08-11 DIAGNOSIS — E44.0 MODERATE PROTEIN-CALORIE MALNUTRITION: ICD-10-CM

## 2022-08-11 LAB
A1C WITH ESTIMATED AVERAGE GLUCOSE RESULT: 5.8 % — HIGH (ref 4–5.6)
ANION GAP SERPL CALC-SCNC: 6 MMOL/L — SIGNIFICANT CHANGE UP (ref 5–17)
BUN SERPL-MCNC: 16 MG/DL — SIGNIFICANT CHANGE UP (ref 7–18)
CALCIUM SERPL-MCNC: 9.3 MG/DL — SIGNIFICANT CHANGE UP (ref 8.4–10.5)
CHLORIDE SERPL-SCNC: 105 MMOL/L — SIGNIFICANT CHANGE UP (ref 96–108)
CHOLEST SERPL-MCNC: 174 MG/DL — SIGNIFICANT CHANGE UP
CO2 SERPL-SCNC: 28 MMOL/L — SIGNIFICANT CHANGE UP (ref 22–31)
CREAT SERPL-MCNC: 0.84 MG/DL — SIGNIFICANT CHANGE UP (ref 0.5–1.3)
EGFR: 65 ML/MIN/1.73M2 — SIGNIFICANT CHANGE UP
ESTIMATED AVERAGE GLUCOSE: 120 MG/DL — HIGH (ref 68–114)
GLUCOSE BLDC GLUCOMTR-MCNC: 180 MG/DL — HIGH (ref 70–99)
GLUCOSE BLDC GLUCOMTR-MCNC: 223 MG/DL — HIGH (ref 70–99)
GLUCOSE BLDC GLUCOMTR-MCNC: 254 MG/DL — HIGH (ref 70–99)
GLUCOSE BLDC GLUCOMTR-MCNC: 266 MG/DL — HIGH (ref 70–99)
GLUCOSE SERPL-MCNC: 165 MG/DL — HIGH (ref 70–99)
HCT VFR BLD CALC: 39.4 % — SIGNIFICANT CHANGE UP (ref 34.5–45)
HDLC SERPL-MCNC: 39 MG/DL — LOW
HGB BLD-MCNC: 12.7 G/DL — SIGNIFICANT CHANGE UP (ref 11.5–15.5)
LIPID PNL WITH DIRECT LDL SERPL: 103 MG/DL — HIGH
MAGNESIUM SERPL-MCNC: 2.1 MG/DL — SIGNIFICANT CHANGE UP (ref 1.6–2.6)
MCHC RBC-ENTMCNC: 29.3 PG — SIGNIFICANT CHANGE UP (ref 27–34)
MCHC RBC-ENTMCNC: 32.2 GM/DL — SIGNIFICANT CHANGE UP (ref 32–36)
MCV RBC AUTO: 91 FL — SIGNIFICANT CHANGE UP (ref 80–100)
NON HDL CHOLESTEROL: 135 MG/DL — HIGH
NRBC # BLD: 0 /100 WBCS — SIGNIFICANT CHANGE UP (ref 0–0)
PHOSPHATE SERPL-MCNC: 3.1 MG/DL — SIGNIFICANT CHANGE UP (ref 2.5–4.5)
PLATELET # BLD AUTO: 237 K/UL — SIGNIFICANT CHANGE UP (ref 150–400)
POTASSIUM SERPL-MCNC: 4.5 MMOL/L — SIGNIFICANT CHANGE UP (ref 3.5–5.3)
POTASSIUM SERPL-SCNC: 4.5 MMOL/L — SIGNIFICANT CHANGE UP (ref 3.5–5.3)
RBC # BLD: 4.33 M/UL — SIGNIFICANT CHANGE UP (ref 3.8–5.2)
RBC # FLD: 14 % — SIGNIFICANT CHANGE UP (ref 10.3–14.5)
SODIUM SERPL-SCNC: 139 MMOL/L — SIGNIFICANT CHANGE UP (ref 135–145)
TRIGL SERPL-MCNC: 160 MG/DL — HIGH
TSH SERPL-MCNC: 6.03 UU/ML — HIGH (ref 0.34–4.82)
WBC # BLD: 8.01 K/UL — SIGNIFICANT CHANGE UP (ref 3.8–10.5)
WBC # FLD AUTO: 8.01 K/UL — SIGNIFICANT CHANGE UP (ref 3.8–10.5)

## 2022-08-11 PROCEDURE — 99497 ADVNCD CARE PLAN 30 MIN: CPT | Mod: 25

## 2022-08-11 RX ORDER — ASPIRIN/CALCIUM CARB/MAGNESIUM 324 MG
81 TABLET ORAL DAILY
Refills: 0 | Status: DISCONTINUED | OUTPATIENT
Start: 2022-08-11 | End: 2022-08-15

## 2022-08-11 RX ORDER — INSULIN LISPRO 100/ML
VIAL (ML) SUBCUTANEOUS AT BEDTIME
Refills: 0 | Status: DISCONTINUED | OUTPATIENT
Start: 2022-08-11 | End: 2022-08-15

## 2022-08-11 RX ORDER — FUROSEMIDE 40 MG
5 TABLET ORAL
Qty: 0 | Refills: 0 | DISCHARGE

## 2022-08-11 RX ORDER — INSULIN LISPRO 100/ML
VIAL (ML) SUBCUTANEOUS
Refills: 0 | Status: DISCONTINUED | OUTPATIENT
Start: 2022-08-11 | End: 2022-08-15

## 2022-08-11 RX ORDER — SODIUM CHLORIDE 9 MG/ML
1000 INJECTION, SOLUTION INTRAVENOUS
Refills: 0 | Status: DISCONTINUED | OUTPATIENT
Start: 2022-08-11 | End: 2022-08-11

## 2022-08-11 RX ORDER — PANTOPRAZOLE SODIUM 20 MG/1
40 TABLET, DELAYED RELEASE ORAL
Refills: 0 | Status: DISCONTINUED | OUTPATIENT
Start: 2022-08-11 | End: 2022-08-12

## 2022-08-11 RX ORDER — TRAZODONE HCL 50 MG
50 TABLET ORAL AT BEDTIME
Refills: 0 | Status: DISCONTINUED | OUTPATIENT
Start: 2022-08-11 | End: 2022-08-12

## 2022-08-11 RX ORDER — QUETIAPINE FUMARATE 200 MG/1
150 TABLET, FILM COATED ORAL AT BEDTIME
Refills: 0 | Status: DISCONTINUED | OUTPATIENT
Start: 2022-08-11 | End: 2022-08-12

## 2022-08-11 RX ORDER — FLUOXETINE HCL 10 MG
20 CAPSULE ORAL DAILY
Refills: 0 | Status: DISCONTINUED | OUTPATIENT
Start: 2022-08-11 | End: 2022-08-12

## 2022-08-11 RX ORDER — RISPERIDONE 4 MG/1
1 TABLET ORAL
Refills: 0 | Status: DISCONTINUED | OUTPATIENT
Start: 2022-08-11 | End: 2022-08-12

## 2022-08-11 RX ORDER — METOPROLOL TARTRATE 50 MG
25 TABLET ORAL
Refills: 0 | Status: DISCONTINUED | OUTPATIENT
Start: 2022-08-11 | End: 2022-08-15

## 2022-08-11 RX ORDER — OLANZAPINE 15 MG/1
2.5 TABLET, FILM COATED ORAL ONCE
Refills: 0 | Status: COMPLETED | OUTPATIENT
Start: 2022-08-11 | End: 2022-08-11

## 2022-08-11 RX ORDER — LEVOTHYROXINE SODIUM 125 MCG
25 TABLET ORAL DAILY
Refills: 0 | Status: DISCONTINUED | OUTPATIENT
Start: 2022-08-11 | End: 2022-08-15

## 2022-08-11 RX ADMIN — Medication 50 MILLIGRAM(S): at 22:05

## 2022-08-11 RX ADMIN — PANTOPRAZOLE SODIUM 40 MILLIGRAM(S): 20 TABLET, DELAYED RELEASE ORAL at 06:14

## 2022-08-11 RX ADMIN — Medication 25 MILLIGRAM(S): at 17:05

## 2022-08-11 RX ADMIN — Medication 20 MILLIGRAM(S): at 12:18

## 2022-08-11 RX ADMIN — RISPERIDONE 1 MILLIGRAM(S): 4 TABLET ORAL at 17:13

## 2022-08-11 RX ADMIN — QUETIAPINE FUMARATE 150 MILLIGRAM(S): 200 TABLET, FILM COATED ORAL at 22:05

## 2022-08-11 RX ADMIN — SODIUM CHLORIDE 80 MILLILITER(S): 9 INJECTION, SOLUTION INTRAVENOUS at 12:18

## 2022-08-11 RX ADMIN — SODIUM CHLORIDE 80 MILLILITER(S): 9 INJECTION, SOLUTION INTRAVENOUS at 06:27

## 2022-08-11 RX ADMIN — Medication 3: at 12:19

## 2022-08-11 RX ADMIN — Medication 3: at 17:05

## 2022-08-11 RX ADMIN — Medication 25 MILLIGRAM(S): at 06:15

## 2022-08-11 RX ADMIN — RISPERIDONE 1 MILLIGRAM(S): 4 TABLET ORAL at 06:15

## 2022-08-11 RX ADMIN — Medication 81 MILLIGRAM(S): at 12:21

## 2022-08-11 RX ADMIN — Medication 25 MICROGRAM(S): at 06:14

## 2022-08-11 RX ADMIN — ENOXAPARIN SODIUM 40 MILLIGRAM(S): 100 INJECTION SUBCUTANEOUS at 06:14

## 2022-08-11 NOTE — CONSULT NOTE ADULT - PROBLEM SELECTOR RECOMMENDATION 9
- Pt has hx of DM2, on basaglar 34 u and metformin 500 qd at home   - HbA1c = 5.8  - Pt presented with hypoglycemia with sugars in the 60's yesterday afternoon  - possibly due to being given insulin without eating properly   - Pts sugars have remained stable here, recent POCT in the 200s   - goal sugars 140-200  - d/c D5/NS due to high recent sugars   - consider low dose insulin vs. oral agents, will monitor tonight and reevaluate tomorrow.   - c/w pureed meals  - discussed plan with pt and family - Pt has hx of DM2, on basaglar 34 u and metformin 500 qd at home   - HbA1c = 5.8  - Pt presented with hypoglycemia with sugars in the 60's yesterday afternoon  - possibly due to poor oral intake  - Pts sugars have remained stable here, recent POCT in the 200s   - goal sugars 140-200  - d/c D5/NS due to high recent sugars   - consider low dose insulin vs. oral agents, will monitor tonight and reevaluate tomorrow.   - c/w pureed meals  - discussed plan with pt and family

## 2022-08-11 NOTE — CONSULT NOTE ADULT - CONVERSATION DETAILS
Met with the pt's daughter Chapo at the bedside, discussed her clinical condition and goals of care.  Tresa endorsed pt has been declining both mentally and functionally, now refusing to eat.  She recognizes that her mother is very sick.  Discussed dementia trajectory and explained she is eligible for hospice.  Tresa stated she does not want her mother to suffer, her quality of life is very poor now. She agreed for comfort measures.  She wants to take her mother home with hospice.    Discussed risks vs benefits of LST including CPR, intubation, and artificial nutrition in context of end stage dementia.  She is aware that these measures would not result in any meaning recovery.  DEV drafted: DEV; DNR/DNI/no feeding tube/comfort measures/DNH.  SHAKEEL referral made for home hospice.  All questions answered.  Support provided.  D/w primary team.

## 2022-08-11 NOTE — CONSULT NOTE ADULT - PROBLEM SELECTOR RECOMMENDATION 9
Bedbound.  Total care.  No behavior issues. C/w Seroquel and Prozac.  FAST 7c. Eligible for hospice Bedbound.  Total care.  No behavior issues. C/w Seroquel and Prozac.  FAST 7c. Eligible for hospice  Discussed dementia trajectory and provided anticipatory guidance with pt's daughter  Educated pt's daughter about hospice philosophy, services provided and locations of care.      Tresa agreed to focus on comfort measures only and home hospice.  SW referral made.  DEV drafted DNR/DNI/no feeding tube/comfort measures/DNH

## 2022-08-11 NOTE — PROGRESS NOTE ADULT - SUBJECTIVE AND OBJECTIVE BOX
Patient is a 89y old  Female who presents with a chief complaint of Hypoglycemia (10 Aug 2022 23:12)    pt seen in icu [  ], reg med floor [   ], bed [  ], chair at bedside [   ], a+o x3 [  ], lethargic [  ],  nad [  ]    izquierdo [  ], ngt [  ], peg [  ], et tube [  ], cent line [  ], picc line [  ]        Allergies    penicillins (Hives)        Vitals    T(F): 97.8 (08-11-22 @ 05:12), Max: 98.7 (08-10-22 @ 22:15)  HR: 80 (08-11-22 @ 05:12) (62 - 81)  BP: 152/81 (08-11-22 @ 05:12) (142/76 - 190/82)  RR: 17 (08-11-22 @ 05:12) (16 - 18)  SpO2: 96% (08-11-22 @ 05:12) (93% - 99%)  Wt(kg): --  CAPILLARY BLOOD GLUCOSE      POCT Blood Glucose.: 111 mg/dL (10 Aug 2022 18:32)      Labs                          12.3   6.01  )-----------( 241      ( 10 Aug 2022 17:51 )             38.2       08-10    139  |  105  |  24<H>  ----------------------------<  104<H>  4.1   |  31  |  0.81    Ca    8.9      10 Aug 2022 17:51    TPro  6.7  /  Alb  2.9<L>  /  TBili  0.2  /  DBili  x   /  AST  21  /  ALT  25  /  AlkPhos  127<H>  08-10          Troponin I, High Sensitivity Result: 5.3 ng/L (08-10-22 @ 17:51)        Radiology Results      Meds    MEDICATIONS  (STANDING):  aspirin enteric coated 81 milliGRAM(s) Oral daily  dextrose 5% + sodium chloride 0.9%. 1000 milliLiter(s) (80 mL/Hr) IV Continuous <Continuous>  enoxaparin Injectable 40 milliGRAM(s) SubCutaneous every 24 hours  FLUoxetine 20 milliGRAM(s) Oral daily  insulin lispro (ADMELOG) corrective regimen sliding scale   SubCutaneous three times a day before meals  insulin lispro (ADMELOG) corrective regimen sliding scale   SubCutaneous at bedtime  levothyroxine 25 MICROGram(s) Oral daily  metoprolol tartrate 25 milliGRAM(s) Oral two times a day  pantoprazole    Tablet 40 milliGRAM(s) Oral before breakfast  QUEtiapine 150 milliGRAM(s) Oral at bedtime  risperiDONE   Tablet 1 milliGRAM(s) Oral two times a day  traZODone 50 milliGRAM(s) Oral at bedtime      MEDICATIONS  (PRN):  acetaminophen     Tablet .. 650 milliGRAM(s) Oral every 6 hours PRN Temp greater or equal to 38C (100.4F), Mild Pain (1 - 3)  aluminum hydroxide/magnesium hydroxide/simethicone Suspension 30 milliLiter(s) Oral every 4 hours PRN Dyspepsia  melatonin 3 milliGRAM(s) Oral at bedtime PRN Insomnia  ondansetron Injectable 4 milliGRAM(s) IV Push every 8 hours PRN Nausea and/or Vomiting      Physical Exam    Neuro :  no focal deficits  Respiratory: CTA B/L  CV: RRR, S1S2, no murmurs,   Abdominal: Soft, NT, ND +BS,  Extremities: No edema, + peripheral pulses    ASSESSMENT    Type 2 diabetes mellitus with hypoglycemia without coma    HTN (hypertension)    DM (diabetes mellitus)    Alzheimer disease    MESERET (iron deficiency anemia)    Cardiac pacemaker    No significant past surgical history    S/P TAMMY-BSO    History of hip replacement        PLAN     Patient is a 89y old  Female who presents with a chief complaint of Hypoglycemia (10 Aug 2022 23:12)    pt seen in ed [ x ], reg med floor [   ], bed [ x ], chair at bedside [   ], awake [ x ], lethargic [  ],  nad [ x ]        Allergies    penicillins (Hives)        Vitals    T(F): 97.8 (08-11-22 @ 05:12), Max: 98.7 (08-10-22 @ 22:15)  HR: 80 (08-11-22 @ 05:12) (62 - 81)  BP: 152/81 (08-11-22 @ 05:12) (142/76 - 190/82)  RR: 17 (08-11-22 @ 05:12) (16 - 18)  SpO2: 96% (08-11-22 @ 05:12) (93% - 99%)  Wt(kg): --  CAPILLARY BLOOD GLUCOSE      POCT Blood Glucose.: 111 mg/dL (10 Aug 2022 18:32)      Labs                          12.3   6.01  )-----------( 241      ( 10 Aug 2022 17:51 )             38.2       08-10    139  |  105  |  24<H>  ----------------------------<  104<H>  4.1   |  31  |  0.81    Ca    8.9      10 Aug 2022 17:51    TPro  6.7  /  Alb  2.9<L>  /  TBili  0.2  /  DBili  x   /  AST  21  /  ALT  25  /  AlkPhos  127<H>  08-10          Troponin I, High Sensitivity Result: 5.3 ng/L (08-10-22 @ 17:51)        Radiology Results      Meds    MEDICATIONS  (STANDING):  aspirin enteric coated 81 milliGRAM(s) Oral daily  dextrose 5% + sodium chloride 0.9%. 1000 milliLiter(s) (80 mL/Hr) IV Continuous <Continuous>  enoxaparin Injectable 40 milliGRAM(s) SubCutaneous every 24 hours  FLUoxetine 20 milliGRAM(s) Oral daily  insulin lispro (ADMELOG) corrective regimen sliding scale   SubCutaneous three times a day before meals  insulin lispro (ADMELOG) corrective regimen sliding scale   SubCutaneous at bedtime  levothyroxine 25 MICROGram(s) Oral daily  metoprolol tartrate 25 milliGRAM(s) Oral two times a day  pantoprazole    Tablet 40 milliGRAM(s) Oral before breakfast  QUEtiapine 150 milliGRAM(s) Oral at bedtime  risperiDONE   Tablet 1 milliGRAM(s) Oral two times a day  traZODone 50 milliGRAM(s) Oral at bedtime      MEDICATIONS  (PRN):  acetaminophen     Tablet .. 650 milliGRAM(s) Oral every 6 hours PRN Temp greater or equal to 38C (100.4F), Mild Pain (1 - 3)  aluminum hydroxide/magnesium hydroxide/simethicone Suspension 30 milliLiter(s) Oral every 4 hours PRN Dyspepsia  melatonin 3 milliGRAM(s) Oral at bedtime PRN Insomnia  ondansetron Injectable 4 milliGRAM(s) IV Push every 8 hours PRN Nausea and/or Vomiting      Physical Exam    Neuro :  no focal deficits  Respiratory: CTA B/L  CV: RRR, S1S2, no murmurs,   Abdominal: Soft, NT, ND +BS,  Extremities: No edema, + peripheral pulses        ASSESSMENT    unresponsiveness 2nd to hypoglycemia  h/o alzheimer's dementia,   hypothyroidism  HTN (hypertension)  DM (diabetes mellitus)  MESERET (iron deficiency anemia)  Cardiac pacemaker  S/P TAMMY-BSO  s/p hip replacement        PLAN    hold home diabetic meds,   lispro ss,   f/u [id number]  endo cons  f/u ua   f/u blood and ucx  swallow eval   ivf  palliative eval   cont current meds

## 2022-08-11 NOTE — PROGRESS NOTE ADULT - SUBJECTIVE AND OBJECTIVE BOX
Patient is a 89y old  Female who presents with a chief complaint of Hypoglycemia (11 Aug 2022 11:05)    INTERVAL HPI/OVERNIGHT EVENTS:        REVIEW OF SYSTEMS:  CONSTITUTIONAL: No fever, chills  ENMT:  No difficulty hearing, no change in vision  NECK: No pain or stiffness  RESPIRATORY: No cough, SOB  CARDIOVASCULAR: No chest pain, palpitations  GASTROINTESTINAL: No abdominal pain. No nausea, vomiting, or diarrhea  GENITOURINARY: No dysuria  NEUROLOGICAL: No HA  SKIN: No itching, burning, rashes, or lesions   LYMPH NODES: No enlarged glands  ENDOCRINE: No heat or cold intolerance; No hair loss  MUSCULOSKELETAL: No joint pain or swelling; No muscle, back, or extremity pain  PSYCHIATRIC: No depression, anxiety  HEME/LYMPH: No easy bruising, or bleeding gums    T(C): 37.1 (08-11-22 @ 11:24), Max: 37.1 (08-10-22 @ 22:15)  HR: 73 (08-11-22 @ 11:24) (62 - 81)  BP: 179/83 (08-11-22 @ 11:24) (142/76 - 190/82)  RR: 18 (08-11-22 @ 11:24) (16 - 18)  SpO2: 96% (08-11-22 @ 11:24) (96% - 99%)  Wt(kg): --Vital Signs Last 24 Hrs  T(C): 37.1 (11 Aug 2022 11:24), Max: 37.1 (10 Aug 2022 22:15)  T(F): 98.8 (11 Aug 2022 11:24), Max: 98.8 (11 Aug 2022 11:24)  HR: 73 (11 Aug 2022 11:24) (62 - 81)  BP: 179/83 (11 Aug 2022 11:24) (142/76 - 190/82)  BP(mean): --  RR: 18 (11 Aug 2022 11:24) (16 - 18)  SpO2: 96% (11 Aug 2022 11:24) (96% - 99%)    Parameters below as of 11 Aug 2022 11:24  Patient On (Oxygen Delivery Method): room air        PHYSICAL EXAM:  GENERAL: NAD  EYES: clear conjunctiva; EOMI  ENMT: Moist mucous membranes  NECK: Supple, No JVD, Normal thyroid  CHEST/LUNG: Clear to auscultation bilaterally; No rales, rhonchi, wheezing, or rubs  HEART: S1, S2, Regular rate and rhythm  ABDOMEN: Soft, Nontender, Nondistended; Bowel sounds present  NEURO: Alert & Oriented X3  EXTREMITIES: No LE edema, no calf tenderness  LYMPH: No lymphadenopathy noted  SKIN: No rashes or lesions    Consultant(s) Notes Reviewed:  [x ] YES  [ ] NO  Care Discussed with Consultants/Other Providers [ x] YES  [ ] NO    LABS:                        12.7   8.01  )-----------( 237      ( 11 Aug 2022 06:40 )             39.4     08-11    139  |  105  |  16  ----------------------------<  165<H>  4.5   |  28  |  0.84    Ca    9.3      11 Aug 2022 06:40  Phos  3.1     08-11  Mg     2.1     08-11    TPro  6.7  /  Alb  2.9<L>  /  TBili  0.2  /  DBili  x   /  AST  21  /  ALT  25  /  AlkPhos  127<H>  08-10      CAPILLARY BLOOD GLUCOSE      POCT Blood Glucose.: 266 mg/dL (11 Aug 2022 11:43)  POCT Blood Glucose.: 180 mg/dL (11 Aug 2022 08:09)  POCT Blood Glucose.: 111 mg/dL (10 Aug 2022 18:32)  POCT Blood Glucose.: 66 mg/dL (10 Aug 2022 17:32)  POCT Blood Glucose.: 100 mg/dL (10 Aug 2022 14:57)            RADIOLOGY & ADDITIONAL TESTS:    Imaging Personally Reviewed:  [ ] YES  [ ] NO   Patient is a 89y old  Female who presents with a chief complaint of Hypoglycemia (11 Aug 2022 11:05)    INTERVAL HPI/OVERNIGHT EVENTS: mo acute events overnight    REVIEW OF SYSTEMS: unable to assess due to pt mental status    T(C): 37.1 (08-11-22 @ 11:24), Max: 37.1 (08-10-22 @ 22:15)  HR: 73 (08-11-22 @ 11:24) (62 - 81)  BP: 179/83 (08-11-22 @ 11:24) (142/76 - 190/82)  RR: 18 (08-11-22 @ 11:24) (16 - 18)  SpO2: 96% (08-11-22 @ 11:24) (96% - 99%)  Wt(kg): --Vital Signs Last 24 Hrs  T(C): 37.1 (11 Aug 2022 11:24), Max: 37.1 (10 Aug 2022 22:15)  T(F): 98.8 (11 Aug 2022 11:24), Max: 98.8 (11 Aug 2022 11:24)  HR: 73 (11 Aug 2022 11:24) (62 - 81)  BP: 179/83 (11 Aug 2022 11:24) (142/76 - 190/82)  BP(mean): --  RR: 18 (11 Aug 2022 11:24) (16 - 18)  SpO2: 96% (11 Aug 2022 11:24) (96% - 99%)    Parameters below as of 11 Aug 2022 11:24  Patient On (Oxygen Delivery Method): room air    MEDICATIONS  (STANDING):  aspirin enteric coated 81 milliGRAM(s) Oral daily  enoxaparin Injectable 40 milliGRAM(s) SubCutaneous every 24 hours  FLUoxetine 20 milliGRAM(s) Oral daily  insulin lispro (ADMELOG) corrective regimen sliding scale   SubCutaneous three times a day before meals  insulin lispro (ADMELOG) corrective regimen sliding scale   SubCutaneous at bedtime  levothyroxine 25 MICROGram(s) Oral daily  metoprolol tartrate 25 milliGRAM(s) Oral two times a day  pantoprazole    Tablet 40 milliGRAM(s) Oral before breakfast  QUEtiapine 150 milliGRAM(s) Oral at bedtime  risperiDONE   Tablet 1 milliGRAM(s) Oral two times a day  traZODone 50 milliGRAM(s) Oral at bedtime    MEDICATIONS  (PRN):  acetaminophen     Tablet .. 650 milliGRAM(s) Oral every 6 hours PRN Temp greater or equal to 38C (100.4F), Mild Pain (1 - 3)  aluminum hydroxide/magnesium hydroxide/simethicone Suspension 30 milliLiter(s) Oral every 4 hours PRN Dyspepsia  melatonin 3 milliGRAM(s) Oral at bedtime PRN Insomnia  ondansetron Injectable 4 milliGRAM(s) IV Push every 8 hours PRN Nausea and/or Vomiting      PHYSICAL EXAM:  GENERAL: NAD  EYES: clear conjunctiva; EOMI  ENMT: Moist mucous membranes  NECK: Supple, No JVD, Normal thyroid  CHEST/LUNG: demised breath sounds bilat  HEART: S1, S2, Regular rate and rhythm  ABDOMEN: Soft, Nontender, Nondistended; Bowel sounds present  NEURO: Alert & awake  EXTREMITIES: No LE edema, no calf tenderness  LYMPH: No lymphadenopathy noted  SKIN: No rashes or lesions    Consultant(s) Notes Reviewed:  [x ] YES  [ ] NO  Care Discussed with Consultants/Other Providers [ x] YES  [ ] NO    LABS:                        12.7   8.01  )-----------( 237      ( 11 Aug 2022 06:40 )             39.4     08-11    139  |  105  |  16  ----------------------------<  165<H>  4.5   |  28  |  0.84    Ca    9.3      11 Aug 2022 06:40  Phos  3.1     08-11  Mg     2.1     08-11    TPro  6.7  /  Alb  2.9<L>  /  TBili  0.2  /  DBili  x   /  AST  21  /  ALT  25  /  AlkPhos  127<H>  08-10      CAPILLARY BLOOD GLUCOSE      POCT Blood Glucose.: 266 mg/dL (11 Aug 2022 11:43)  POCT Blood Glucose.: 180 mg/dL (11 Aug 2022 08:09)  POCT Blood Glucose.: 111 mg/dL (10 Aug 2022 18:32)  POCT Blood Glucose.: 66 mg/dL (10 Aug 2022 17:32)  POCT Blood Glucose.: 100 mg/dL (10 Aug 2022 14:57)

## 2022-08-11 NOTE — PROGRESS NOTE ADULT - PROBLEM SELECTOR PLAN 1
- on basaglar 34 u and metformin 500 qd at home   - HbA1c = 5.8  - P/w hypoglycemia with sugars in the 60's likley due to given insulin without eating properly   - sugars have remained stable now  - d/c D5/NS - endo recs  - encourage Po  - c/w low dose S/s  - endo Dr Huff

## 2022-08-11 NOTE — CONSULT NOTE ADULT - PROBLEM SELECTOR RECOMMENDATION 4
Spoke with the pt's daughter briefly via phone.  She expressed being tired, meeting scheduled for later this afternoon. Please see discussion noted above in GOC conversation

## 2022-08-11 NOTE — PATIENT PROFILE ADULT - FUNCTIONAL ASSESSMENT - BASIC MOBILITY 6.
1-calculated by average. Elevator in building./Not able to assess (calculate score using [facility] averaging method)

## 2022-08-11 NOTE — CONSULT NOTE ADULT - PROBLEM SELECTOR RECOMMENDATION 2
- As per daughter pt declining over the last few weeks.   - Decreased oral intake.   - Only able to swallow liquid/pureed food.   - Palliative consult. - As per daughter pt declining over the last few weeks.   - Decreased oral intake.   - Only able to swallow liquid/pureed food.   - Palliative is on board

## 2022-08-11 NOTE — CONSULT NOTE ADULT - PROBLEM SELECTOR RECOMMENDATION 2
Clinical evidence indicates that the patient has Moderate protein calorie malnutrition/ 2nd degree. Albumin 2.9.  Poor po intake prior to admission     In context of Chronic Illness (>1 month)  Energy/Food intake <75% of estimated energy requirement >7 days  Weight loss: Mild  (lbs lost recently)  Body Fat loss: Mild    Muscle mass loss: Mild   Fluid Accumulation: Mild    Strength: weakened Mild     Recommend:   c/w soft diet, aspiration precautions, keep head of bed at least 45 degrees during feeding Clinical evidence indicates that the patient has Moderate protein calorie malnutrition/ 2nd degree. Albumin 2.9.  Poor po intake prior to admission     In context of Chronic Illness (>1 month)  Energy/Food intake <75% of estimated energy requirement >7 days  Weight loss: Mild  (lbs lost recently)  Body Fat loss: Mild    Muscle mass loss: Mild   Fluid Accumulation: Mild    Strength: weakened Mild     Recommend:   Comfort feeds, aspiration precautions, keep head of bed at least 45 degrees during feeding

## 2022-08-11 NOTE — PATIENT PROFILE ADULT - HOW PATIENT ADDRESSED, PROFILE
daughter says she has to scream in her ears and use gestures Ms Hutton. Daughter says she has to scream in her ears and use gestures

## 2022-08-11 NOTE — PATIENT PROFILE ADULT - FALL HARM RISK - HARM RISK INTERVENTIONS
Assistance with ambulation/Assistance OOB with selected safe patient handling equipment/Communicate Risk of Fall with Harm to all staff/Discuss with provider need for PT consult/Monitor gait and stability/Reinforce activity limits and safety measures with patient and family/Tailored Fall Risk Interventions/Visual Cue: Yellow wristband and red socks/Bed in lowest position, wheels locked, appropriate side rails in place/Call bell, personal items and telephone in reach/Instruct patient to call for assistance before getting out of bed or chair/Non-slip footwear when patient is out of bed/[place] to call system/Physically safe environment - no spills, clutter or unnecessary equipment/Purposeful Proactive Rounding/Room/bathroom lighting operational, light cord in reach Assistance with ambulation/Assistance OOB with selected safe patient handling equipment/Communicate Risk of Fall with Harm to all staff/Discuss with provider need for PT consult/Monitor gait and stability/Provide patient with walking aids - walker, cane, crutches/Reinforce activity limits and safety measures with patient and family/Tailored Fall Risk Interventions/Visual Cue: Yellow wristband and red socks/Bed in lowest position, wheels locked, appropriate side rails in place/Call bell, personal items and telephone in reach/Instruct patient to call for assistance before getting out of bed or chair/Non-slip footwear when patient is out of bed/[place] to call system/Physically safe environment - no spills, clutter or unnecessary equipment/Purposeful Proactive Rounding/Room/bathroom lighting operational, light cord in reach

## 2022-08-11 NOTE — CONSULT NOTE ADULT - SUBJECTIVE AND OBJECTIVE BOX
PULMONARY CONSULT NOTE      BELA QUEEN  MRN-[id number]    History of Present Illness:  Reason for Admission: Hypoglycemia  History of Present Illness:   This is a 89 year old female with medical history of DM, HTN, Worsening Alzheimers coming in after found to be nonresponsive at home. As per the daughter who is the primary caregiver fro the pt, she went to go wake the pt up in the morning and pt did not wake up. She assumed pt was still sleeping due to having sleep medications at night. The daughter tried again around noon and got concerend. At this time she checked the sugars and found the sugar to be 60's she called EMS. Pt is nonverbal and bedbound at baseline. As per the daughter pt has been less active for the la    HISTORY OF PRESENT ILLNESS: As Above. Awake, alert, comfortable in bed in NAD    MEDICATIONS  (STANDING):  aspirin enteric coated 81 milliGRAM(s) Oral daily  dextrose 5% + sodium chloride 0.9%. 1000 milliLiter(s) (80 mL/Hr) IV Continuous <Continuous>  enoxaparin Injectable 40 milliGRAM(s) SubCutaneous every 24 hours  FLUoxetine 20 milliGRAM(s) Oral daily  insulin lispro (ADMELOG) corrective regimen sliding scale   SubCutaneous three times a day before meals  insulin lispro (ADMELOG) corrective regimen sliding scale   SubCutaneous at bedtime  levothyroxine 25 MICROGram(s) Oral daily  metoprolol tartrate 25 milliGRAM(s) Oral two times a day  pantoprazole    Tablet 40 milliGRAM(s) Oral before breakfast  QUEtiapine 150 milliGRAM(s) Oral at bedtime  risperiDONE   Tablet 1 milliGRAM(s) Oral two times a day  traZODone 50 milliGRAM(s) Oral at bedtime      MEDICATIONS  (PRN):  acetaminophen     Tablet .. 650 milliGRAM(s) Oral every 6 hours PRN Temp greater or equal to 38C (100.4F), Mild Pain (1 - 3)  aluminum hydroxide/magnesium hydroxide/simethicone Suspension 30 milliLiter(s) Oral every 4 hours PRN Dyspepsia  melatonin 3 milliGRAM(s) Oral at bedtime PRN Insomnia  ondansetron Injectable 4 milliGRAM(s) IV Push every 8 hours PRN Nausea and/or Vomiting      Allergies    penicillins (Hives)    Intolerances        PAST MEDICAL & SURGICAL HISTORY:  HTN (hypertension)      DM (diabetes mellitus)      Alzheimer disease      MESERET (iron deficiency anemia)      Cardiac pacemaker      S/P TAMMY-BSO      History of hip replacement          FAMILY HISTORY:  No pertinent family history in first degree relatives        SOCIAL HISTORY  Smoking History:     REVIEW OF SYSTEMS:    CONSTITUTIONAL:  No fevers, chills, sweats    HEENT:  Eyes:  No diplopia or blurred vision. ENT:  No earache, sore throat or runny nose.    CARDIOVASCULAR:  No pressure, squeezing, tightness, or heaviness about the chest; no palpitations.    RESPIRATORY:  Per HPI    GASTROINTESTINAL:  No abdominal pain, nausea, vomiting or diarrhea.    GENITOURINARY:  No dysuria, frequency or urgency.    NEUROLOGIC:  No paresthesias, fasciculations, seizures or weakness.    PSYCHIATRIC:  No disorder of thought or mood.    Vital Signs Last 24 Hrs  T(C): 36.8 (11 Aug 2022 07:30), Max: 37.1 (10 Aug 2022 22:15)  T(F): 98.2 (11 Aug 2022 07:30), Max: 98.7 (10 Aug 2022 22:15)  HR: 71 (11 Aug 2022 07:30) (62 - 81)  BP: 166/77 (11 Aug 2022 07:30) (142/76 - 190/82)  BP(mean): --  RR: 18 (11 Aug 2022 07:30) (16 - 18)  SpO2: 98% (11 Aug 2022 07:30) (93% - 99%)    Parameters below as of 11 Aug 2022 07:30  Patient On (Oxygen Delivery Method): room air      I&O's Detail      PHYSICAL EXAMINATION:    GENERAL: The patient is a well-developed, well-nourished _____in no apparent distress.     HEENT: Head is normocephalic and atraumatic. Extraocular muscles are intact. Mucous membranes are moist.     NECK: Supple.     LUNGS: Clear to auscultation without wheezing, rales, or rhonchi. Respirations unlabored    HEART: Regular rate and rhythm without murmur.    ABDOMEN: Soft, nontender, and nondistended.  No hepatosplenomegaly is noted.    EXTREMITIES: Without any cyanosis, clubbing, rash, lesions or edema.    NEUROLOGIC: Grossly intact.      LABS:                        12.7   8.01  )-----------( 237      ( 11 Aug 2022 06:40 )             39.4     08-11    139  |  105  |  16  ----------------------------<  165<H>  4.5   |  28  |  0.84    Ca    9.3      11 Aug 2022 06:40  Phos  3.1     08-11  Mg     2.1     08-11    TPro  6.7  /  Alb  2.9<L>  /  TBili  0.2  /  DBili  x   /  AST  21  /  ALT  25  /  AlkPhos  127<H>  08-10                        MICROBIOLOGY:    RADIOLOGY & ADDITIONAL STUDIES:    CXR:    Ct scan chest;    ekg;    echo:

## 2022-08-11 NOTE — CONSULT NOTE ADULT - CONSULT REASON
Discuss complex medical decision making related to goals of care due to advanced dementia
Hypoglycemia
Atelectasis

## 2022-08-11 NOTE — CONSULT NOTE ADULT - SUBJECTIVE AND OBJECTIVE BOX
[facility] Geriatric and Palliative Consult Service:  Dr. Claudia Dominguez: cell ([phone])  Dr. Lyndsey Irizarry: cell ([phone])   Makayla Parker NP: cell ([phone])  Roma Laguerre NP: cell ([phone])   Jace Cohenagus LSW: cell ([phone])     HPI:  This is a 89 year old female with medical history of DM, HTN, Worsening Alzheimers coming in after found to be nonresponsive at home. As per the daughter who is the primary caregiver fro the pt, she went to go wake the pt up in the morning and pt did not wake up. She assumed pt was still sleeping due to having sleep medications at night. The daughter tried again around noon and got concerend. At this time she checked the sugars and found the sugar to be 60's she called EMS. Pt is nonverbal and bedbound at baseline. As per the daughter pt has been less active for the last few days and has been having difficulty swallowing and eating.       Interval hx:  Pt seen and examined at the bedside, AOx1, pleasantly confused.  NAD.        PAST MEDICAL & SURGICAL HISTORY:  HTN (hypertension)      DM (diabetes mellitus)      Alzheimer disease      MESERET (iron deficiency anemia)      Cardiac pacemaker      S/P TAMMY-BSO      History of hip replacement          SOCIAL HISTORY:    Admitted from:  home with family   Substance abuse history:              Tobacco hx:                  Alcohol hx:              Home Opioid hx:  Taoism:    Sikhism                                Preferred Language: Eritrean    FAMILY HISTORY:  No pertinent family history in first degree relatives     unable to obtain from patient due to poor mentation  Baseline ADLs (prior to admission): non ambulatory    Allergies    penicillins (Hives)    Intolerances      Present Symptoms:    Unable to obtain due to poor mentation    MEDICATIONS  (STANDING):  aspirin enteric coated 81 milliGRAM(s) Oral daily  dextrose 5% + sodium chloride 0.9%. 1000 milliLiter(s) (80 mL/Hr) IV Continuous <Continuous>  enoxaparin Injectable 40 milliGRAM(s) SubCutaneous every 24 hours  FLUoxetine 20 milliGRAM(s) Oral daily  insulin lispro (ADMELOG) corrective regimen sliding scale   SubCutaneous three times a day before meals  insulin lispro (ADMELOG) corrective regimen sliding scale   SubCutaneous at bedtime  levothyroxine 25 MICROGram(s) Oral daily  metoprolol tartrate 25 milliGRAM(s) Oral two times a day  pantoprazole    Tablet 40 milliGRAM(s) Oral before breakfast  QUEtiapine 150 milliGRAM(s) Oral at bedtime  risperiDONE   Tablet 1 milliGRAM(s) Oral two times a day  traZODone 50 milliGRAM(s) Oral at bedtime    MEDICATIONS  (PRN):  acetaminophen     Tablet .. 650 milliGRAM(s) Oral every 6 hours PRN Temp greater or equal to 38C (100.4F), Mild Pain (1 - 3)  aluminum hydroxide/magnesium hydroxide/simethicone Suspension 30 milliLiter(s) Oral every 4 hours PRN Dyspepsia  melatonin 3 milliGRAM(s) Oral at bedtime PRN Insomnia  ondansetron Injectable 4 milliGRAM(s) IV Push every 8 hours PRN Nausea and/or Vomiting      PHYSICAL EXAM:  Vital Signs Last 24 Hrs  T(C): 36.8 (11 Aug 2022 07:30), Max: 37.1 (10 Aug 2022 22:15)  T(F): 98.2 (11 Aug 2022 07:30), Max: 98.7 (10 Aug 2022 22:15)  HR: 71 (11 Aug 2022 07:30) (62 - 81)  BP: 166/77 (11 Aug 2022 07:30) (142/76 - 190/82)  BP(mean): --  RR: 18 (11 Aug 2022 07:30) (16 - 18)  SpO2: 98% (11 Aug 2022 07:30) (93% - 99%)    Parameters below as of 11 Aug 2022 07:30  Patient On (Oxygen Delivery Method): room air        General: AOX1, confused.  NAD    Palliative Performance Scale/Karnofsky Score: 40%  ECOG Performance: n/a    HEENT: atraumatic, moist oropharynx,   Lungs: CTA unlabored on RA  CV: RRR, S1S2  GI: soft non distended non tender  incontinent  : incontinent   Musculoskeletal: bedbound, no edema  Skin: no abnormal skin lesions, poor skin turgor  Neuro: unable to follow commands  Oral intake ability: poor po intake    LABS:                        12.7   8.01  )-----------( 237      ( 11 Aug 2022 06:40 )             39.4     08-11    139  |  105  |  16  ----------------------------<  165<H>  4.5   |  28  |  0.84    Ca    9.3      11 Aug 2022 06:40  Phos  3.1     08-11  Mg     2.1     08-11    TPro  6.7  /  Alb  2.9<L>  /  TBili  0.2  /  DBili  x   /  AST  21  /  ALT  25  /  AlkPhos  127<H>  08-10        RADIOLOGY & ADDITIONAL STUDIES: reviewed  ADVANCED DIRECTIVES:  FULL CODE         [facility] Geriatric and Palliative Consult Service:  Dr. Claudia Dominguez: cell ([phone])  Dr. Lyndsey Irizarry: cell ([phone])   Makayla Parker NP: cell ([phone])  Roma Laguerre NP: cell ([phone])   Jace Cohenagus LSW: cell ([phone])     HPI:  This is a 89 year old female with medical history of DM, HTN, Worsening Alzheimers coming in after found to be nonresponsive at home. As per the daughter who is the primary caregiver fro the pt, she went to go wake the pt up in the morning and pt did not wake up. She assumed pt was still sleeping due to having sleep medications at night. The daughter tried again around noon and got concerend. At this time she checked the sugars and found the sugar to be 60's she called EMS. Pt is nonverbal and bedbound at baseline. As per the daughter pt has been less active for the last few days and has been having difficulty swallowing and eating.       Interval hx:  Pt seen and examined at the bedside, AOx1, pleasantly confused.   # [id number] facilitated exam.  NAD.        PAST MEDICAL & SURGICAL HISTORY:  HTN (hypertension)      DM (diabetes mellitus)      Alzheimer disease      MESERET (iron deficiency anemia)      Cardiac pacemaker      S/P TAMMY-BSO      History of hip replacement          SOCIAL HISTORY:    Admitted from:  home with family   Pt's daughter Tresa Hutton makes medical decisions  Substance abuse history:  none            Tobacco hx:   none               Alcohol hx:  none            Home Opioid hx: none  Religious:    Pentecostalism                                Preferred Language: Lithuanian    Tresa  Brennen  (surrogate)  Phone#  [phone]    FAMILY HISTORY:  No pertinent family history in first degree relatives     unable to obtain from patient due to poor mentation  Baseline ADLs (prior to admission): non ambulatory, total care    Allergies    penicillins (Hives)    Intolerances      Present Symptoms:    Unable to obtain due to poor mentation    MEDICATIONS  (STANDING):  aspirin enteric coated 81 milliGRAM(s) Oral daily  dextrose 5% + sodium chloride 0.9%. 1000 milliLiter(s) (80 mL/Hr) IV Continuous <Continuous>  enoxaparin Injectable 40 milliGRAM(s) SubCutaneous every 24 hours  FLUoxetine 20 milliGRAM(s) Oral daily  insulin lispro (ADMELOG) corrective regimen sliding scale   SubCutaneous three times a day before meals  insulin lispro (ADMELOG) corrective regimen sliding scale   SubCutaneous at bedtime  levothyroxine 25 MICROGram(s) Oral daily  metoprolol tartrate 25 milliGRAM(s) Oral two times a day  pantoprazole    Tablet 40 milliGRAM(s) Oral before breakfast  QUEtiapine 150 milliGRAM(s) Oral at bedtime  risperiDONE   Tablet 1 milliGRAM(s) Oral two times a day  traZODone 50 milliGRAM(s) Oral at bedtime    MEDICATIONS  (PRN):  acetaminophen     Tablet .. 650 milliGRAM(s) Oral every 6 hours PRN Temp greater or equal to 38C (100.4F), Mild Pain (1 - 3)  aluminum hydroxide/magnesium hydroxide/simethicone Suspension 30 milliLiter(s) Oral every 4 hours PRN Dyspepsia  melatonin 3 milliGRAM(s) Oral at bedtime PRN Insomnia  ondansetron Injectable 4 milliGRAM(s) IV Push every 8 hours PRN Nausea and/or Vomiting      PHYSICAL EXAM:  Vital Signs Last 24 Hrs  T(C): 36.8 (11 Aug 2022 07:30), Max: 37.1 (10 Aug 2022 22:15)  T(F): 98.2 (11 Aug 2022 07:30), Max: 98.7 (10 Aug 2022 22:15)  HR: 71 (11 Aug 2022 07:30) (62 - 81)  BP: 166/77 (11 Aug 2022 07:30) (142/76 - 190/82)  BP(mean): --  RR: 18 (11 Aug 2022 07:30) (16 - 18)  SpO2: 98% (11 Aug 2022 07:30) (93% - 99%)    Parameters below as of 11 Aug 2022 07:30  Patient On (Oxygen Delivery Method): room air        General: AOX1, confused.  NAD    Palliative Performance Scale/Karnofsky Score: 40%  ECOG Performance: n/a    HEENT: atraumatic, moist oropharynx,   Lungs: CTA unlabored on RA  CV: RRR, S1S2  GI: soft non distended non tender  incontinent  : incontinent   Musculoskeletal: bedbound, no edema  Skin: no abnormal skin lesions, poor skin turgor  Neuro: unable to follow commands  Oral intake ability: poor po intake    LABS:                        12.7   8.01  )-----------( 237      ( 11 Aug 2022 06:40 )             39.4     08-11    139  |  105  |  16  ----------------------------<  165<H>  4.5   |  28  |  0.84    Ca    9.3      11 Aug 2022 06:40  Phos  3.1     08-11  Mg     2.1     08-11    TPro  6.7  /  Alb  2.9<L>  /  TBili  0.2  /  DBili  x   /  AST  21  /  ALT  25  /  AlkPhos  127<H>  08-10        RADIOLOGY & ADDITIONAL STUDIES: reviewed  ADVANCED DIRECTIVES:  FULL CODE         [facility] Geriatric and Palliative Consult Service:  Dr. Claudia Dominguez: cell ([phone])  Dr. Lyndsey Irizarry: cell ([phone])   Makayla Parker NP: cell ([phone])  Roma Laguerre NP: cell ([phone])   Jace Cohenagus LSW: cell ([phone])     HPI:  This is a 89 year old female with medical history of DM, HTN, Worsening Alzheimers coming in after found to be nonresponsive at home. As per the daughter who is the primary caregiver fro the pt, she went to go wake the pt up in the morning and pt did not wake up. She assumed pt was still sleeping due to having sleep medications at night. The daughter tried again around noon and got concerend. At this time she checked the sugars and found the sugar to be 60's she called EMS. Pt is nonverbal and bedbound at baseline. As per the daughter pt has been less active for the last few days and has been having difficulty swallowing and eating.       Interval hx:  Pt seen and examined at the bedside, AOx1, pleasantly confused.   # [id number] facilitated exam.  NAD.        PAST MEDICAL & SURGICAL HISTORY:  HTN (hypertension)      DM (diabetes mellitus)      Alzheimer disease      MESERET (iron deficiency anemia)      Cardiac pacemaker      S/P TAMMY-BSO      History of hip replacement          SOCIAL HISTORY:    Admitted from:  home with family   Pt's daughter Tresa Hutton makes medical decisions  Substance abuse history:  none            Tobacco hx:   none               Alcohol hx:  none            Home Opioid hx: none  Scientologist:    Druze                                Preferred Language: Faroese    Tresa  Brennen  (surrogate)  Phone#  [phone]    FAMILY HISTORY:  No pertinent family history in first degree relatives     unable to obtain from patient due to poor mentation  Baseline ADLs (prior to admission): non ambulatory, total care    Allergies    penicillins (Hives)    Intolerances      Present Symptoms:    Unable to obtain due to poor mentation    MEDICATIONS  (STANDING):  aspirin enteric coated 81 milliGRAM(s) Oral daily  dextrose 5% + sodium chloride 0.9%. 1000 milliLiter(s) (80 mL/Hr) IV Continuous <Continuous>  enoxaparin Injectable 40 milliGRAM(s) SubCutaneous every 24 hours  FLUoxetine 20 milliGRAM(s) Oral daily  insulin lispro (ADMELOG) corrective regimen sliding scale   SubCutaneous three times a day before meals  insulin lispro (ADMELOG) corrective regimen sliding scale   SubCutaneous at bedtime  levothyroxine 25 MICROGram(s) Oral daily  metoprolol tartrate 25 milliGRAM(s) Oral two times a day  pantoprazole    Tablet 40 milliGRAM(s) Oral before breakfast  QUEtiapine 150 milliGRAM(s) Oral at bedtime  risperiDONE   Tablet 1 milliGRAM(s) Oral two times a day  traZODone 50 milliGRAM(s) Oral at bedtime    MEDICATIONS  (PRN):  acetaminophen     Tablet .. 650 milliGRAM(s) Oral every 6 hours PRN Temp greater or equal to 38C (100.4F), Mild Pain (1 - 3)  aluminum hydroxide/magnesium hydroxide/simethicone Suspension 30 milliLiter(s) Oral every 4 hours PRN Dyspepsia  melatonin 3 milliGRAM(s) Oral at bedtime PRN Insomnia  ondansetron Injectable 4 milliGRAM(s) IV Push every 8 hours PRN Nausea and/or Vomiting      PHYSICAL EXAM:  Vital Signs Last 24 Hrs  T(C): 36.8 (11 Aug 2022 07:30), Max: 37.1 (10 Aug 2022 22:15)  T(F): 98.2 (11 Aug 2022 07:30), Max: 98.7 (10 Aug 2022 22:15)  HR: 71 (11 Aug 2022 07:30) (62 - 81)  BP: 166/77 (11 Aug 2022 07:30) (142/76 - 190/82)  BP(mean): --  RR: 18 (11 Aug 2022 07:30) (16 - 18)  SpO2: 98% (11 Aug 2022 07:30) (93% - 99%)    Parameters below as of 11 Aug 2022 07:30  Patient On (Oxygen Delivery Method): room air        General: AOX1, confused.  NAD    Palliative Performance Scale/Karnofsky Score: 40%  ECOG Performance: n/a    HEENT: atraumatic, moist oropharynx,   Lungs: CTA unlabored on RA  CV: RRR, S1S2  GI: soft non distended non tender  incontinent  : incontinent   Musculoskeletal: bedbound, no edema  Skin: no abnormal skin lesions, poor skin turgor  Neuro: unable to follow commands  Oral intake ability: poor po intake    LABS:                        12.7   8.01  )-----------( 237      ( 11 Aug 2022 06:40 )             39.4     08-11    139  |  105  |  16  ----------------------------<  165<H>  4.5   |  28  |  0.84    Ca    9.3      11 Aug 2022 06:40  Phos  3.1     08-11  Mg     2.1     08-11    TPro  6.7  /  Alb  2.9<L>  /  TBili  0.2  /  DBili  x   /  AST  21  /  ALT  25  /  AlkPhos  127<H>  08-10        RADIOLOGY & ADDITIONAL STUDIES: reviewed  ADVANCED DIRECTIVES:  MOLST; DNR/DNI/no feeding tube/comfort measures/DNH

## 2022-08-11 NOTE — PATIENT PROFILE ADULT - FALL HARM RISK - FACTORS
bedbound/Impaired vision/Poor balance/Weakness Mostly bedbound now/Impaired vision/Poor balance/Weakness

## 2022-08-11 NOTE — CONSULT NOTE ADULT - SUBJECTIVE AND OBJECTIVE BOX
Patient is a 89y old  Female who presents with a chief complaint of Hypoglycemia (11 Aug 2022 11:05)      HPI:  This is a 89 year old female with medical history of DM, HTN, Worsening Alzheimers coming in after found to be nonresponsive at home. As per the daughter who is the primary caregiver fro the pt, she went to go wake the pt up in the morning and pt did not wake up. She assumed pt was still sleeping due to having sleep medications at night. The daughter tried again around noon and got concerend. At this time she checked the sugars and found the sugar to be 60's she called EMS. Pt is nonverbal and bedbound at baseline. As per the daughter pt has been less active for the last few days and has been having difficulty swallowing and eating.    (10 Aug 2022 23:12)    Hx was given by pt's daughter. Pt. had hx of DM2 for many years, on Basaglar 34 units and metformin 500 qd. According to the daughter, her mother stopped eating food since yesterday, and could only get down pureed foods since she was in the hospital. Pt's daughter is worried that this might be "the end" for her mother, because she is afraid that she is not eating. Pt. is A&Ox1, not a good historian and unable to answer my questions even with  services. pt. keeps stating that she is going on a flight to [place] tomorrow, and doesn't know where she is right now.     PAST MEDICAL & SURGICAL HISTORY:  HTN (hypertension)      DM (diabetes mellitus)      Alzheimer disease      MESERET (iron deficiency anemia)      Cardiac pacemaker      S/P TAMMY-BSO      History of hip replacement             MEDICATIONS  (STANDING):  aspirin enteric coated 81 milliGRAM(s) Oral daily  enoxaparin Injectable 40 milliGRAM(s) SubCutaneous every 24 hours  FLUoxetine 20 milliGRAM(s) Oral daily  insulin lispro (ADMELOG) corrective regimen sliding scale   SubCutaneous three times a day before meals  insulin lispro (ADMELOG) corrective regimen sliding scale   SubCutaneous at bedtime  levothyroxine 25 MICROGram(s) Oral daily  metoprolol tartrate 25 milliGRAM(s) Oral two times a day  OLANZapine Injectable 2.5 milliGRAM(s) IntraMuscular once  pantoprazole    Tablet 40 milliGRAM(s) Oral before breakfast  QUEtiapine 150 milliGRAM(s) Oral at bedtime  risperiDONE   Tablet 1 milliGRAM(s) Oral two times a day  traZODone 50 milliGRAM(s) Oral at bedtime    MEDICATIONS  (PRN):  acetaminophen     Tablet .. 650 milliGRAM(s) Oral every 6 hours PRN Temp greater or equal to 38C (100.4F), Mild Pain (1 - 3)  aluminum hydroxide/magnesium hydroxide/simethicone Suspension 30 milliLiter(s) Oral every 4 hours PRN Dyspepsia  melatonin 3 milliGRAM(s) Oral at bedtime PRN Insomnia  ondansetron Injectable 4 milliGRAM(s) IV Push every 8 hours PRN Nausea and/or Vomiting      FAMILY HISTORY:  No pertinent family history in first degree relatives        SOCIAL HISTORY:      REVIEW OF SYSTEMS:  CONSTITUTIONAL: No fever, weight loss, or fatigue  EYES: No eye pain, visual disturbances, or discharge  ENT:  No difficulty hearing, tinnitus, vertigo; No sinus or throat pain  NECK: No pain or stiffness  RESPIRATORY: No cough, wheezing, chills or hemoptysis; No Shortness of Breath  CARDIOVASCULAR: No chest pain, palpitations, passing out, dizziness, or leg swelling  GASTROINTESTINAL: No abdominal or epigastric pain. No nausea, vomiting, or hematemesis; No diarrhea or constipation. No melena or hematochezia.  GENITOURINARY: No dysuria, frequency, hematuria, or incontinence  NEUROLOGICAL: No headaches, memory loss, loss of strength, numbness, or tremors  SKIN: No itching, burning, rashes, or lesions   LYMPH Nodes: No enlarged glands  ENDOCRINE: No heat or cold intolerance; No hair loss  MUSCULOSKELETAL: No joint pain or swelling; No muscle, back, or extremity pain  PSYCHIATRIC: No depression, anxiety, mood swings, or difficulty sleeping  HEME/LYMPH: No easy bruising, or bleeding gums  ALLERGY AND IMMUNOLOGIC: No hives or eczema	        Vital Signs Last 24 Hrs  T(C): 37.1 (11 Aug 2022 11:24), Max: 37.1 (10 Aug 2022 22:15)  T(F): 98.8 (11 Aug 2022 11:24), Max: 98.8 (11 Aug 2022 11:24)  HR: 73 (11 Aug 2022 11:24) (62 - 81)  BP: 179/83 (11 Aug 2022 11:24) (142/76 - 190/82)  BP(mean): --  RR: 18 (11 Aug 2022 11:24) (16 - 18)  SpO2: 96% (11 Aug 2022 11:24) (96% - 99%)    Parameters below as of 11 Aug 2022 11:24  Patient On (Oxygen Delivery Method): room air          Constitutional:    NC/AT:    HEENT:    Neck:  No JVD, bruits or thyromegaly    Respiratory:  Clear without rales or rhonchi    Cardiovascular:  RR without murmur, rub or gallop.    Gastrointestinal: Soft without hepatosplenomegaly.    Extremities: without cyanosis, clubbing or edema.    Neurological:  Oriented   x      . No gross sensory or motor defects.        LABS:                        12.7   8.01  )-----------( 237      ( 11 Aug 2022 06:40 )             39.4     08-11    139  |  105  |  16  ----------------------------<  165<H>  4.5   |  28  |  0.84    Ca    9.3      11 Aug 2022 06:40  Phos  3.1     08-11  Mg     2.1     08-11    TPro  6.7  /  Alb  2.9<L>  /  TBili  0.2  /  DBili  x   /  AST  21  /  ALT  25  /  AlkPhos  127<H>  08-10            CAPILLARY BLOOD GLUCOSE      POCT Blood Glucose.: 266 mg/dL (11 Aug 2022 11:43)  POCT Blood Glucose.: 180 mg/dL (11 Aug 2022 08:09)  POCT Blood Glucose.: 111 mg/dL (10 Aug 2022 18:32)  POCT Blood Glucose.: 66 mg/dL (10 Aug 2022 17:32)  POCT Blood Glucose.: 100 mg/dL (10 Aug 2022 14:57)      RADIOLOGY & ADDITIONAL STUDIES:

## 2022-08-11 NOTE — PATIENT PROFILE ADULT - PATIENT REPRESENTATIVE: ( YOU CAN CHOOSE ANY PERSON THAT CAN ASSIST YOU WITH YOUR HEALTH CARE PREFERENCES, DOES NOT HAVE TO BE A SPOUSE, IMMEDIATE FAMILY OR SIGNIFICANT OTHER/PARTNER)
[facility] Consultation Note    ADMISSION DATE:  1/20/2022  ADMITTING PHYSICIAN:  Kandy Damon CNP  ATTENDING PHYSICIAN:  Lucio Bowen MD  PRIMARY CARE PHYSICIAN:  Sharri Pcp  Referring Physician:  Dr. Bowen and Dr. Elizondo    CODE STATUS:  No Order    CHIEF COMPLAINT/REASON FOR ADMISSION: s/p Seizures with abnormal MRI brain    HISTORY OF PRESENT ILLNESS:    David Carroll is a 25 year old male presents to the ED after seizure at home.  PMH significant for Schizophrenia, substance abuse and familial Cavernous malformation disorder. Patient is amnestic to events surrounding this incident. Reports no seizure activity for about a year, but was recent taken off  Maintenance antiepileptics. CT Head wo contrast in ED revealed Findings of  Multiple high density lesions scattered throughout the brain consistent with known vascular malformation, cavernomas which do exhibit recurrent hemorrhages hence this Neurosurgery consult.     Patient reports feeling sleep and drowsy with numbess and tingling in hands, all of which he states has resolved. Patient denies any HA, visual changes, dizziness, seizure like activity since being in ED. No nausea/vomiting, neck pain, chest pain, SOB, fever, chills, weaknesss, numbness/tingling in the extremities. No  previous brain or spine surgery, history of cancer or DVT,  bowel/bladder habit changes, saddle anesthesia, use of any AC/AP products.        MEDICATIONS PRIOR TO ADMISSION:    (Not in a hospital admission)      ALLERGIES:    ALLERGIES:   Allergen Reactions   • Aspirin Other (See Comments)   • Haloperidol ANXIETY and Palpitations     tachycardia   • Ibuprofen Other (See Comments)       PAST MEDICAL HISTORY:    Past Medical History:   Diagnosis Date   • Seizures (CMS/HCC)    • Substance abuse (CMS/HCC)        SURGICAL HISTORY:    Past Surgical History:   Procedure Laterality Date   • No level of service         FAMILY HISTORY:    Family History   Problem Relation Age  of Onset   • Hepatitis C Mother    • Patient is unaware of any medical problems Father        SOCIAL HISTORY:    Social History     Tobacco Use   • Smoking status: Current Every Day Smoker     Packs/day: 0.50   • Smokeless tobacco: Never Used   Substance Use Topics   • Alcohol use: Yes   • Drug use: Yes     Types: Marijuana       REVIEW OF SYSTEMS:    Constitutional:  Patient denies fever, chills, tiredness or malaise.    Eyes:  Denies change in visual acuity, pain, burning or itching.    Immunologic:  Denies hives, seasonal allergies.    HENT:  Denies sinus problems, ear infections, nasal congestion or sore throat.    Respiratory:  Denies cough, shortness of breath.    Cardiovascular:  Denies chest pain, edema.    Gastrointestinal:  Denies abdominal pain, nausea, vomiting, bloody stools or diarrhea.    Genitourinary:  Denies urine retention, painful urination, urinary frequency, blood in urine or nocturia.    Musculoskeletal:  Denies back pain, neck pain, joint pain or leg swelling.    Integument:  Denies rash, itching.    Neurologic:  Denies headache, focal weakness or sensory changes.    Endocrine:  Denies polyuria, polydipsia or temperature intolerance.    Lymphatic:  Denies swollen glands, weight loss.    Psychiatric:  Denies anxiety, depression, hallucinations, irritability or sleeping problems.        OBJECTIVE:    VITAL SIGNS:    Vital Last Value 24 Hour Range   Temperature 98.5 °F (36.9 °C) (01/20/22 0612) Temp  Min: 98.5 °F (36.9 °C)  Max: 98.5 °F (36.9 °C)   Pulse 66 (01/20/22 1028) Pulse  Min: 66  Max: 79   Respiratory (!) 21 (01/20/22 0803) Resp  Min: 19  Max: 30   Non-Invasive  Blood Pressure 133/71 (01/20/22 1028) BP  Min: 131/66  Max: 146/79   Pulse Oximetry 95 % (01/20/22 1028) SpO2  Min: 95 %  Max: 98 %     Vital Today Admitted   Weight 87.5 kg (192 lb 12.7 oz) (01/20/22 0612) Weight: 87.5 kg (192 lb 12.7 oz) (01/20/22 0612)   Height N/A Height: 6' 2\" (188 cm) (01/20/22 0612)   BMI N/A BMI  (Calculated): 24.75 (01/20/22 0612)         PHYSICAL EXAMINATION: Physical Exam   Constitutional:  The patient is alert, oriented and cooperative. No apparent distress.    Integument:  Warm. Dry. No erythema. No rash.    HENT:  Normocephalic. Atraumatic. PERRL.  EOMI.  Neck: Normal range of motion without pain. No tenderness. Supple.   GI:  Soft, NTTP.  Respiratory:  No respiratory distress noted.  Neuro:  GCS 15.  Follows commands.  Eyes open spontaneously. Speech fluent.   CN II-XII grossly intact   Strength bilateral UE 5/5    Strength bilateral LE 5/5   Sensation intact to light touch bilateral V123, UE, LE  No pronator drift, khalil's, ankle clonus noted   Proprioception bilateral 1st toe intact   Reflexes 2+ symmetric  ICH Score: 1 (IVH)        LABORATORY DATA:    Lab Results   Component Value Date    SODIUM 138 01/20/2022    POTASSIUM 3.3 (L) 01/20/2022    CHLORIDE 106 01/20/2022    CO2 23 01/20/2022    BUN 13 01/20/2022    CREATININE 0.81 01/20/2022    GLUCOSE 155 (H) 01/20/2022    WBC 8.0 01/20/2022    HCT 45.3 01/20/2022    HGB 14.6 01/20/2022     01/20/2022    AST 22 12/27/2019    GPT 41 12/27/2019    ALKPT 129 (H) 12/27/2019    BILIRUBIN 0.3 12/27/2019      TSH (mcUnits/mL)   Date Value   12/27/2019 0.778      No results found for: COL, UAPP, USPG, UPH, UPROT, UGLU, UKET, UBILI, URBC, UNITR, UROB, UWBC       Imaging:   MRI BRAIN W WO CONTRAST, MRA HEAD WO CONTRAST    Result Date: 1/20/2022  Narrative: EXAM: MRI BRAIN W WO CONTRAST, MRA HEAD WO CONTRAST CLINICAL INDICATION: Seizure. History of multiple cavernomas. COMPARISON:  Selected images from CT head examination dated January 20, 2022. TECHNIQUE: MRI brain was performed without and with contrast. MRA head was performed without contrast, with maximum intensity projection reconstructions. Approximately 7.5 mL of IV Gadavist administered. FINDINGS: Diffusion-weighted images demonstrate no evidence for acute infarct. There are multiple  approximately 50 scattered foci of susceptibility within the cerebellum, brainstem, and bilateral cerebral hemispheres and deep gray structures. There is additional susceptibility within the occipital horn of the right lateral ventricle and possibly layering within the fourth ventricle as well. There are varying age blood products surrounding the multiple lesions. The most pronounced lesion is in the left medial posterior parietal lobe measuring approximately 1.7 cm. No hydrocephalus. No midline shift. No significant local mass effect or perilesional edema. The basal cisterns are patent. There are low-lying cerebellar tonsils without Chiari I malformation. The corpus callosum demonstrates a signal intensity within normal limits. The pituitary gland is not enlarged. No suspicious areas of abnormal parenchymal or leptomeningeal enhancement identified although the presence of intrinsic T1 hyperintensity in the region of hemorrhages may obscure appreciable areas of postcontrast enhancement. The visualized orbits are intact. Mild scattered paranasal sinus mucosal thickening. Trace opacification of the right mastoid air cells.  Patent signal flow-voids are seen involving the superior sagittal sinus. Bulbous appearance of the basilar tip measuring approximately 4 mm. No large arterial vessel occlusion or flow-limiting stenosis identified in the head.     Impression: 1. Multiple (approximately 50) intracranial cavernomas with varying aged blood products. No significant local mass effect or perilesional edema. 2. Small volume intraventricular hemorrhage. 3. Bulbous appearance of the basilar artery tip measuring approximately 4 mm. 4. No large arterial vessel occlusion or flow-limiting stenosis. 5. Additional findings as described above. Electronically Signed by: BELLO CALABRESE M.D. Signed on: 1/20/2022 10:56 AM     CT HEAD WO CONTRAST    Result Date: 1/20/2022  Narrative: CT brain without contrast: HISTORY: Seizure  COMPARISON: None TECHNIQUE: 2.5 mm axial images obtained brain.  Automated exposure control and/or iterative reconstruction techniques were utilized as radiation dose reduction strategies on this patient. FINDINGS: Multiple hyperdense areas identified bilaterally. Right temporal, left cerebellum, right parietal, left temporal, left parietal, left frontal and left occipital lobes. The largest measuring 16 mm. There are at least 12 lesions identified. Consistent with the patient's known history of cavernomas. Some of these exhibit surrounding edema. The ventricles are normal in size. No acute cortical infarct. No evidence of subdural hemorrhage No fluid levels in the sinuses. Mastoid air cells are well aerated. No depressed calvarial fracture.     Impression: Multiple high density lesions scattered throughout the brain consistent with known vascular malformation, cavernomas which do exhibit recurrent hemorrhages. Electronically Signed by: LORRI LITTLE M.D. 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   MRI/MRA Brain completed for further evaluation; personally reviewed and discussed with hDr. Geno Noted Multiple (approximately 50) intracranial cavernomas with varying aged blood products. No significant local mass effect or perilesional edema;  Small volume intraventricular hemorrhage; Bulbous appearance of the basilar artery tip  measuring approximately 4 mm; No large arterial vessel occlusion or flow-limiting stenosis.  6-hour and 24-hour follow up CT head wo contrast ordered. Complete sooner if patient neurologically declines  Neuro checks every 2 hours; if stable 2nd CT head may change to Q4 hours  Maintain SBP less than 160 mmHg; Hydralazine PRN  Neurology following; Appreciate management of seizures w/ AED management  Pain control  SCDs for DVT prophylaxis  Hold AC/AP medications for at least 2 weeks  Advance diet and activity as tolerated  Discharge planning: patient will need 3-month follow up with repeat MRI Brain wo contrast with Dr. Lora for follow up evaluation and management of cavernomas  Discussed with Dr. Elizondo; Will consider outpatient EEG if seizures recur after restarting medications, otherwise routine follow up planned; Appreciate recommendations  Appreciate Medical Management  Dispo: stepdown; If CT head stable, okay to transfer to general floor tonight      This patient was seen and examined on rounds today by the attending neurosurgeon Dr. Lora, who approved the treatment plan as outlined.  Please [facility] with any questions/concerns.    Kandy Damon CNP  1/20/2022                 same name as above

## 2022-08-12 DIAGNOSIS — E46 UNSPECIFIED PROTEIN-CALORIE MALNUTRITION: ICD-10-CM

## 2022-08-12 DIAGNOSIS — Z71.89 OTHER SPECIFIED COUNSELING: ICD-10-CM

## 2022-08-12 LAB
GLUCOSE BLDC GLUCOMTR-MCNC: 182 MG/DL — HIGH (ref 70–99)
GLUCOSE BLDC GLUCOMTR-MCNC: 257 MG/DL — HIGH (ref 70–99)
GLUCOSE BLDC GLUCOMTR-MCNC: 309 MG/DL — HIGH (ref 70–99)
GLUCOSE BLDC GLUCOMTR-MCNC: 369 MG/DL — HIGH (ref 70–99)

## 2022-08-12 RX ORDER — INSULIN GLARGINE 100 [IU]/ML
15 INJECTION, SOLUTION SUBCUTANEOUS EVERY MORNING
Refills: 0 | Status: DISCONTINUED | OUTPATIENT
Start: 2022-08-13 | End: 2022-08-14

## 2022-08-12 RX ORDER — TRAMADOL HYDROCHLORIDE 50 MG/1
50 TABLET ORAL ONCE
Refills: 0 | Status: DISCONTINUED | OUTPATIENT
Start: 2022-08-12 | End: 2022-08-12

## 2022-08-12 RX ORDER — PANTOPRAZOLE SODIUM 20 MG/1
40 TABLET, DELAYED RELEASE ORAL DAILY
Refills: 0 | Status: DISCONTINUED | OUTPATIENT
Start: 2022-08-12 | End: 2022-08-15

## 2022-08-12 RX ADMIN — Medication 1: at 17:15

## 2022-08-12 RX ADMIN — TRAMADOL HYDROCHLORIDE 50 MILLIGRAM(S): 50 TABLET ORAL at 21:16

## 2022-08-12 RX ADMIN — Medication 25 MICROGRAM(S): at 05:27

## 2022-08-12 RX ADMIN — Medication 650 MILLIGRAM(S): at 21:16

## 2022-08-12 RX ADMIN — Medication 3 MILLIGRAM(S): at 22:47

## 2022-08-12 RX ADMIN — TRAMADOL HYDROCHLORIDE 50 MILLIGRAM(S): 50 TABLET ORAL at 20:56

## 2022-08-12 RX ADMIN — Medication 3: at 21:03

## 2022-08-12 RX ADMIN — PANTOPRAZOLE SODIUM 40 MILLIGRAM(S): 20 TABLET, DELAYED RELEASE ORAL at 06:05

## 2022-08-12 RX ADMIN — Medication 650 MILLIGRAM(S): at 05:26

## 2022-08-12 RX ADMIN — Medication 650 MILLIGRAM(S): at 05:56

## 2022-08-12 RX ADMIN — RISPERIDONE 1 MILLIGRAM(S): 4 TABLET ORAL at 05:27

## 2022-08-12 RX ADMIN — Medication 650 MILLIGRAM(S): at 20:46

## 2022-08-12 RX ADMIN — ENOXAPARIN SODIUM 40 MILLIGRAM(S): 100 INJECTION SUBCUTANEOUS at 05:27

## 2022-08-12 RX ADMIN — Medication 3: at 11:50

## 2022-08-12 RX ADMIN — Medication 4: at 08:01

## 2022-08-12 NOTE — DIETITIAN INITIAL EVALUATION ADULT - PERTINENT LABORATORY DATA
08-11    139  |  105  |  16  ----------------------------<  165<H>  4.5   |  28  |  0.84    Ca    9.3      11 Aug 2022 06:40  Phos  3.1     08-11  Mg     2.1     08-11    TPro  6.7  /  Alb  2.9<L>  /  TBili  0.2  /  DBili  x   /  AST  21  /  ALT  25  /  AlkPhos  127<H>  08-10  POCT Blood Glucose.: 309 mg/dL (08-12-22 @ 07:59)  A1C with Estimated Average Glucose Result: 5.8 % (08-11-22 @ 06:40)  A1C with Estimated Average Glucose Result: 6.3 % (03-29-22 @ 11:06)  A1C with Estimated Average Glucose Result: 7.4 % (01-06-22 @ 09:25)

## 2022-08-12 NOTE — DIETITIAN INITIAL EVALUATION ADULT - OTHER INFO
Patient from home lives with daughter admitted for hypoglycemia. Patient visited, alert, weak & confused, PCA at bedside, reports pt. tolerated breakfast meal today, intake ~40% with feeding assists, needs encouragement, seen by Endo/team & Palliative Care, daughter agreed to comfort care & awaiting home hospice services, d/w RN.

## 2022-08-12 NOTE — DIETITIAN INITIAL EVALUATION ADULT - REASON INDICATOR FOR ASSESSMENT
Decrease oral intake >3days prior to admit  unintentional weight loss L 3 Decrease oral intake >3days prior to admit  unintentional weight loss >3days prior to admit

## 2022-08-12 NOTE — PROGRESS NOTE ADULT - PROBLEM SELECTOR PLAN 1
-on basaglar 34 units and metformin 500 qd at home   -hypoglycemia likely due to Insulin use without adequate oral intake   -remains with poor Po intake   -mon FS   -encourage PO intake if mental status improves  -cont ISSC   -Endo Dr. Huff

## 2022-08-12 NOTE — PROGRESS NOTE ADULT - SUBJECTIVE AND OBJECTIVE BOX
Patient is a 89y old  Female who presents with a chief complaint of Type 2 diabetes mellitus with hypoglycemia without coma  Asleep, comfortable in bed in NAD. Doing well on RA   (12 Aug 2022 10:51)      INTERVAL HPI/OVERNIGHT EVENTS:      VITAL SIGNS:  T(F): 99.1 (08-12-22 @ 05:56)  HR: 96 (08-12-22 @ 05:05)  BP: 95/57 (08-12-22 @ 05:05)  RR: 17 (08-12-22 @ 05:05)  SpO2: 96% (08-12-22 @ 05:05)  Wt(kg): --  I&O's Detail          REVIEW OF SYSTEMS:    CONSTITUTIONAL:  No fevers, chills, sweats    HEENT:  Eyes:  No diplopia or blurred vision. ENT:  No earache, sore throat or runny nose.    CARDIOVASCULAR:  No pressure, squeezing, tightness, or heaviness about the chest; no palpitations.    RESPIRATORY:  Per HPI    GASTROINTESTINAL:  No abdominal pain, nausea, vomiting or diarrhea.    GENITOURINARY:  No dysuria, frequency or urgency.    NEUROLOGIC:  No paresthesias, fasciculations, seizures or weakness.    PSYCHIATRIC:  No disorder of thought or mood.      PHYSICAL EXAM:    Constitutional: Well developed and nourished  Eyes:Perrla  ENMT: normal  Neck:supple  Respiratory: good air entry  Cardiovascular: S1 S2 regular  Gastrointestinal: Soft, Non tender  Extremities: No edema  Vascular:normal  Neurological:Awake, alert,Ox3  Musculoskeletal:Normal      MEDICATIONS  (STANDING):  aspirin enteric coated 81 milliGRAM(s) Oral daily  enoxaparin Injectable 40 milliGRAM(s) SubCutaneous every 24 hours  FLUoxetine 20 milliGRAM(s) Oral daily  insulin lispro (ADMELOG) corrective regimen sliding scale   SubCutaneous three times a day before meals  insulin lispro (ADMELOG) corrective regimen sliding scale   SubCutaneous at bedtime  levothyroxine 25 MICROGram(s) Oral daily  metoprolol tartrate 25 milliGRAM(s) Oral two times a day  pantoprazole   Suspension 40 milliGRAM(s) Oral daily  QUEtiapine 150 milliGRAM(s) Oral at bedtime  risperiDONE   Tablet 1 milliGRAM(s) Oral two times a day  traZODone 50 milliGRAM(s) Oral at bedtime    MEDICATIONS  (PRN):  acetaminophen     Tablet .. 650 milliGRAM(s) Oral every 6 hours PRN Temp greater or equal to 38C (100.4F), Mild Pain (1 - 3)  aluminum hydroxide/magnesium hydroxide/simethicone Suspension 30 milliLiter(s) Oral every 4 hours PRN Dyspepsia  melatonin 3 milliGRAM(s) Oral at bedtime PRN Insomnia  ondansetron Injectable 4 milliGRAM(s) IV Push every 8 hours PRN Nausea and/or Vomiting      Allergies    penicillins (Hives)    Intolerances        LABS:                        12.7   8.01  )-----------( 237      ( 11 Aug 2022 06:40 )             39.4     08-11    139  |  105  |  16  ----------------------------<  165<H>  4.5   |  28  |  0.84    Ca    9.3      11 Aug 2022 06:40  Phos  3.1     08-11  Mg     2.1     08-11    TPro  6.7  /  Alb  2.9<L>  /  TBili  0.2  /  DBili  x   /  AST  21  /  ALT  25  /  AlkPhos  127<H>  08-10              CAPILLARY BLOOD GLUCOSE      POCT Blood Glucose.: 309 mg/dL (12 Aug 2022 07:59)  POCT Blood Glucose.: 223 mg/dL (11 Aug 2022 21:07)  POCT Blood Glucose.: 254 mg/dL (11 Aug 2022 16:47)  POCT Blood Glucose.: 266 mg/dL (11 Aug 2022 11:43)        RADIOLOGY & ADDITIONAL TESTS:    CXR:    Ct scan chest:    ekg;    echo:

## 2022-08-12 NOTE — GOALS OF CARE CONVERSATION - ADVANCED CARE PLANNING - CONVERSATION DETAILS
Palliative care SW met with pt's daughter at bedside to provide emotional support and further discuss hospice services. Pt's daughter reported that she has been having a difficult time adjusting to the pt's poor prognosis. Pt's daughter reflected on her previous hopes for the pt's remaining days and her difficulty accepting the loss of expectations for the pt's future. Palliative care SW provided anticipatory grief counseling. Pt's daughter reported that she would like to take the pt home with hospice services through [facility]. Primary SW made aware. Pt's daughter reported that she had no further psychosocial needs at this time and agreed to reach out as needed.    Dennis Hinojosa  [phone]
Palliative care SW met with pt's daughter at bedside to provide emotional support. Pt's daughter reported that she has been having a difficult time adjusting to the pt's recent decline. Pt's daughter reflected on the pt's current condition and the events leading up to her current admission. Pt's daughter discussed the nature of the pt's gradual decline over the years. She shared that although she is the pt's primary caregiver, the pt's other daughters a very emotionally supportive of herself and the pt as well. Pt's daughter expressed feelings of anticipatory grief, reflected on the pt's prognosis and their plans for home hospice. Pt's daughter became tearful discussing the importance of the pt on herself and their family. Pt's daughter expressed feelings of appreciation around the impact of her mike throughout this challenging time. Palliative care SW provided empathetic listening, strength-based counseling and anticipatory grief counseling. Pt's daughter reported that she had no further psychosocial needs at this time, but agreed to reach out as needed.    Dennis Hinojosa  [phone]

## 2022-08-12 NOTE — DIETITIAN INITIAL EVALUATION ADULT - FACTORS AFF FOOD INTAKE
generalized weakness, hypoglycemia, failure to thrive/change in mental status/difficulty chewing/difficulty swallowing/difficulty with food procurement/preparation/persistent lack of appetite/other (specify)

## 2022-08-12 NOTE — DIETITIAN INITIAL EVALUATION ADULT - PHYSCIAL ASSESSMENT
underweight/debilitated/contracted/other (specify) Last flow sheet weight - 141.5 Lbs on 0830/22 with dosing wt. 131.5 Lbs on 08/12/22

## 2022-08-12 NOTE — PROGRESS NOTE ADULT - PROBLEM SELECTOR PLAN 8
-dvt ppx: Lovenox  -gi ppx; PPI
comfort measures only and home hospice.  SW referral made.  DEV drafted DNR/DNI/no feeding tube/comfort measures/DNH

## 2022-08-12 NOTE — CHART NOTE - NSCHARTNOTEFT_GEN_A_CORE
Spoke to pt's daughter Tresa at bedside, updated on clinical status, treatment plan/options and plans for discharge with home hospice, all questions answered

## 2022-08-12 NOTE — GOALS OF CARE CONVERSATION - ADVANCED CARE PLANNING - WHAT MATTERS MOST
Getting the pt home so that she can be as comfortable as possible.
Supporting the pt and allowing her to be as comfortable as possible.

## 2022-08-12 NOTE — PROGRESS NOTE ADULT - SUBJECTIVE AND OBJECTIVE BOX
Patient is a 89y old  Female who presents with a chief complaint of Hypoglycemia (11 Aug 2022 14:42)    pt seen in ed [ x ], reg med floor [   ], bed [ x ], chair at bedside [   ], awake [ x ], lethargic [  ],    nad [ x ]      Allergies    penicillins (Hives)        Vitals    T(F): 100.6 (08-12-22 @ 05:05), Max: 100.6 (08-12-22 @ 05:05)  HR: 96 (08-12-22 @ 05:05) (70 - 96)  BP: 95/57 (08-12-22 @ 05:05) (95/57 - 188/80)  RR: 17 (08-12-22 @ 05:05) (17 - 18)  SpO2: 96% (08-12-22 @ 05:05) (96% - 99%)  Wt(kg): --  CAPILLARY BLOOD GLUCOSE      POCT Blood Glucose.: 223 mg/dL (11 Aug 2022 21:07)      Labs                          12.7   8.01  )-----------( 237      ( 11 Aug 2022 06:40 )             39.4       08-11    139  |  105  |  16  ----------------------------<  165<H>  4.5   |  28  |  0.84    Ca    9.3      11 Aug 2022 06:40  Phos  3.1     08-11  Mg     2.1     08-11    TPro  6.7  /  Alb  2.9<L>  /  TBili  0.2  /  DBili  x   /  AST  21  /  ALT  25  /  AlkPhos  127<H>  08-10          Troponin I, High Sensitivity Result: 5.3 ng/L (08-10-22 @ 17:51)        Radiology Results      Meds    MEDICATIONS  (STANDING):  aspirin enteric coated 81 milliGRAM(s) Oral daily  enoxaparin Injectable 40 milliGRAM(s) SubCutaneous every 24 hours  FLUoxetine 20 milliGRAM(s) Oral daily  insulin lispro (ADMELOG) corrective regimen sliding scale   SubCutaneous three times a day before meals  insulin lispro (ADMELOG) corrective regimen sliding scale   SubCutaneous at bedtime  levothyroxine 25 MICROGram(s) Oral daily  metoprolol tartrate 25 milliGRAM(s) Oral two times a day  pantoprazole   Suspension 40 milliGRAM(s) Oral daily  QUEtiapine 150 milliGRAM(s) Oral at bedtime  risperiDONE   Tablet 1 milliGRAM(s) Oral two times a day  traZODone 50 milliGRAM(s) Oral at bedtime      MEDICATIONS  (PRN):  acetaminophen     Tablet .. 650 milliGRAM(s) Oral every 6 hours PRN Temp greater or equal to 38C (100.4F), Mild Pain (1 - 3)  aluminum hydroxide/magnesium hydroxide/simethicone Suspension 30 milliLiter(s) Oral every 4 hours PRN Dyspepsia  melatonin 3 milliGRAM(s) Oral at bedtime PRN Insomnia  ondansetron Injectable 4 milliGRAM(s) IV Push every 8 hours PRN Nausea and/or Vomiting      Physical Exam    Neuro :  no focal deficits  Respiratory: CTA B/L  CV: RRR, S1S2, no murmurs,   Abdominal: Soft, NT, ND +BS,  Extremities: No edema, + peripheral pulses        ASSESSMENT    unresponsiveness 2nd to hypoglycemia  h/o alzheimer's dementia,   hypothyroidism  HTN (hypertension)  DM (diabetes mellitus)  MESERET (iron deficiency anemia)  Cardiac pacemaker  S/P TAMMY-BSO  s/p hip replacement        PLAN    hold home diabetic meds,   lispro ss,   f/u [id number]  endo cons  f/u ua   f/u blood and ucx  swallow eval   ivf  palliative eval   cont current meds        Patient is a 89y old  Female who presents with a chief complaint of Hypoglycemia (11 Aug 2022 14:42)    pt seen in ed [  ], reg med floor [ x  ], bed [ x ], chair at bedside [   ], awake [ x ], lethargic [  ],    nad [ x ]      Allergies    penicillins (Hives)        Vitals    T(F): 100.6 (08-12-22 @ 05:05), Max: 100.6 (08-12-22 @ 05:05)  HR: 96 (08-12-22 @ 05:05) (70 - 96)  BP: 95/57 (08-12-22 @ 05:05) (95/57 - 188/80)  RR: 17 (08-12-22 @ 05:05) (17 - 18)  SpO2: 96% (08-12-22 @ 05:05) (96% - 99%)  Wt(kg): --  CAPILLARY BLOOD GLUCOSE      POCT Blood Glucose.: 223 mg/dL (11 Aug 2022 21:07)      Labs                          12.7   8.01  )-----------( 237      ( 11 Aug 2022 06:40 )             39.4       08-11    139  |  105  |  16  ----------------------------<  165<H>  4.5   |  28  |  0.84    Ca    9.3      11 Aug 2022 06:40  Phos  3.1     08-11  Mg     2.1     08-11    TPro  6.7  /  Alb  2.9<L>  /  TBili  0.2  /  DBili  x   /  AST  21  /  ALT  25  /  AlkPhos  127<H>  08-10    A1C with Estimated Average Glucose (08.11.22 @ 06:40)   A1C with Estimated Average Glucose Result: 5.8      Troponin I, High Sensitivity Result: 5.3 ng/L (08-10-22 @ 17:51)        Radiology Results      Meds    MEDICATIONS  (STANDING):  aspirin enteric coated 81 milliGRAM(s) Oral daily  enoxaparin Injectable 40 milliGRAM(s) SubCutaneous every 24 hours  FLUoxetine 20 milliGRAM(s) Oral daily  insulin lispro (ADMELOG) corrective regimen sliding scale   SubCutaneous three times a day before meals  insulin lispro (ADMELOG) corrective regimen sliding scale   SubCutaneous at bedtime  levothyroxine 25 MICROGram(s) Oral daily  metoprolol tartrate 25 milliGRAM(s) Oral two times a day  pantoprazole   Suspension 40 milliGRAM(s) Oral daily  QUEtiapine 150 milliGRAM(s) Oral at bedtime  risperiDONE   Tablet 1 milliGRAM(s) Oral two times a day  traZODone 50 milliGRAM(s) Oral at bedtime      MEDICATIONS  (PRN):  acetaminophen     Tablet .. 650 milliGRAM(s) Oral every 6 hours PRN Temp greater or equal to 38C (100.4F), Mild Pain (1 - 3)  aluminum hydroxide/magnesium hydroxide/simethicone Suspension 30 milliLiter(s) Oral every 4 hours PRN Dyspepsia  melatonin 3 milliGRAM(s) Oral at bedtime PRN Insomnia  ondansetron Injectable 4 milliGRAM(s) IV Push every 8 hours PRN Nausea and/or Vomiting      Physical Exam    Neuro :  no focal deficits  Respiratory: CTA B/L  CV: RRR, S1S2, no murmurs,   Abdominal: Soft, NT, ND +BS,  Extremities: No edema, + peripheral pulses        ASSESSMENT    unresponsiveness 2nd to hypoglycemia   atelectasis  Functional quadriplegia.  Moderate protein-calorie malnutrition  h/o alzheimer's dementia,   hypothyroidism  HTN (hypertension)  DM (diabetes mellitus)  MESERET (iron deficiency anemia)  Cardiac pacemaker  S/P TAMMY-BSO  s/p hip replacement        PLAN    hold home diabetic meds,   pt on basaglar 34 u and metformin 500 qd at home   HbA1c = 5.8 noted above   endo f/u  Pt presented with hypoglycemia with sugars in the 60's possibly due to poor oral intake  goal sugars 140-200  d/c D5/NS due to high recent sugars   consider low dose insulin vs. oral agents, will monitor tonight and reevaluate 8/12/22   c/w pureed meals  pulm f/u   bronchodilators  chest pt  swallow eval   palliative eval noted   palliative Met with the pt's daughter Chapo at the bedside  Tresa endorsed pt has been declining both mentally and functionally, now refusing to eat.    She recognizes that her mother is very sick.    Discussed dementia trajectory and explained she is eligible for hospice.    Tresa stated she does not want her mother to suffer, her quality of life is very poor now.   She agreed for comfort measures.    She wants to take her mother home with hospice.    Discussed risks vs benefits of LST including CPR, intubation, and artificial nutrition in context of end stage dementia.    She is aware that these measures would not result in any meaning recovery.    DEV drafted: DEV; DNR/DNI/no feeding tube/comfort measures/DNH.    [facility] referral made for home hospice.  cont current meds   d/c planning for home hosp

## 2022-08-12 NOTE — PROGRESS NOTE ADULT - SUBJECTIVE AND OBJECTIVE BOX
Interval Events:    Pt. A&Ox0, not able to communicate with me during exam, able to respond to certain verbal stimuli by moving her head and UE, but not speaking with me. Seems to be very lethargic compared to yesterday. According to her nurse, pt. is also not eating her meals, just having a small amount of apple/peach pie puree.     Allergies    penicillins (Hives)      Endocrine/Metabolic Medications:  insulin lispro (ADMELOG) corrective regimen sliding scale   SubCutaneous three times a day before meals  insulin lispro (ADMELOG) corrective regimen sliding scale   SubCutaneous at bedtime  levothyroxine 25 MICROGram(s) Oral daily      Vital Signs Last 24 Hrs  T(C): 37.4 (12 Aug 2022 14:22), Max: 38.1 (12 Aug 2022 05:05)  T(F): 99.3 (12 Aug 2022 14:22), Max: 100.6 (12 Aug 2022 05:05)  HR: 93 (12 Aug 2022 14:22) (70 - 96)  BP: 116/55 (12 Aug 2022 14:22) (95/57 - 188/80)  BP(mean): --  RR: 17 (12 Aug 2022 14:22) (17 - 18)  SpO2: 98% (12 Aug 2022 14:22) (96% - 99%)    Parameters below as of 12 Aug 2022 14:22  Patient On (Oxygen Delivery Method): room air          PHYSICAL EXAM  All physical exam findings normal, except those marked:  General:	NAD, well hydrated  .		[x] Abnormal: A&Ox0, not cooperative with exam, lethargic, and unable to communicate with me   Neck		Normal: supple, no cervical adenopathy, no palpable thyroid  .		[] Abnormal:  Cardiovascular	Normal: regular rate, normal S1, S2, no murmurs  .		[] Abnormal:  Respiratory	Normal: no chest wall deformity, normal respiratory pattern, CTA B/L  .		[] Abnormal:  Abdominal	Normal: soft, ND, NT, bowel sounds present, no masses, no organomegaly  .		[] Abnormal:  		Normal normal genitalia, testes descended, circumcised/uncircumcised  .		Jude stage:			Breast jude:  .		Menstrual history:  .		[] Abnormal:  Extremities	Normal: FROM x4  .		[] Abnormal:  Skin		Normal: intact and not indurated, no rash, no acanthosis nigricans  .		[] Abnormal:  Neurologic	Normal: grossly intact  .		[] Abnormal:    LABS        CAPILLARY BLOOD GLUCOSE      POCT Blood Glucose.: 257 mg/dL (12 Aug 2022 11:23)  POCT Blood Glucose.: 309 mg/dL (12 Aug 2022 07:59)  POCT Blood Glucose.: 223 mg/dL (11 Aug 2022 21:07)  POCT Blood Glucose.: 254 mg/dL (11 Aug 2022 16:47)        Assesment/plan

## 2022-08-12 NOTE — PROGRESS NOTE ADULT - PROBLEM SELECTOR PLAN 1
- Pt has hx of DM2, on basaglar 34 u and metformin 500 qd at home   - HbA1c = 5.8  - Pt presented with hypoglycemia with sugars in the 60's yesterday afternoon  - possibly due to poor oral intake  - Pts sugars have remained stable here, recent POCT in the 200s-300s   - goal sugars 140-200   - started on lantus 15 at bedtime  - c/w pureed meals  - discussed plan with primary team - Pt has hx of DM2, on basaglar 34 u and metformin 500 qd at home   - HbA1c = 5.8  - Pt presented with hypoglycemia with sugars in the 60's yesterday afternoon  - possibly due to poor oral intake  - Pts sugars have remained stable here, recent POCT in the 200s-300s   - goal sugars 140-200   - started on lantus 15 at bedtime  - c/w pureed meals  - discussed plan with primary team.

## 2022-08-12 NOTE — DIETITIAN INITIAL EVALUATION ADULT - ORAL INTAKE PTA/DIET HISTORY
Poor  Per daughter/chart, "pt. with decrease po intake & now refusing to eat >2wks" & "has been having difficulty swallowing and eating" at home

## 2022-08-12 NOTE — DIETITIAN INITIAL EVALUATION ADULT - PERTINENT MEDS FT
MEDICATIONS  (STANDING):  aspirin enteric coated 81 milliGRAM(s) Oral daily  enoxaparin Injectable 40 milliGRAM(s) SubCutaneous every 24 hours  FLUoxetine 20 milliGRAM(s) Oral daily  insulin lispro (ADMELOG) corrective regimen sliding scale   SubCutaneous three times a day before meals  insulin lispro (ADMELOG) corrective regimen sliding scale   SubCutaneous at bedtime  levothyroxine 25 MICROGram(s) Oral daily  metoprolol tartrate 25 milliGRAM(s) Oral two times a day  pantoprazole   Suspension 40 milliGRAM(s) Oral daily  QUEtiapine 150 milliGRAM(s) Oral at bedtime  risperiDONE   Tablet 1 milliGRAM(s) Oral two times a day  traZODone 50 milliGRAM(s) Oral at bedtime    MEDICATIONS  (PRN):  acetaminophen     Tablet .. 650 milliGRAM(s) Oral every 6 hours PRN Temp greater or equal to 38C (100.4F), Mild Pain (1 - 3)  aluminum hydroxide/magnesium hydroxide/simethicone Suspension 30 milliLiter(s) Oral every 4 hours PRN Dyspepsia  melatonin 3 milliGRAM(s) Oral at bedtime PRN Insomnia  ondansetron Injectable 4 milliGRAM(s) IV Push every 8 hours PRN Nausea and/or Vomiting

## 2022-08-12 NOTE — PROGRESS NOTE ADULT - SUBJECTIVE AND OBJECTIVE BOX
Patient is a 89y old  Female who presents with a chief complaint of Hypoglycemia (12 Aug 2022 11:13)    OVERNIGHT EVENTS: remains with poor PO intake, lethargic, does not follow any commands     REVIEW OF SYSTEMS: MARKO due to mental status     T(C): 37.3 (08-12-22 @ 05:56), Max: 38.1 (08-12-22 @ 05:05)  HR: 96 (08-12-22 @ 05:05) (70 - 96)  BP: 95/57 (08-12-22 @ 05:05) (95/57 - 188/80)  RR: 17 (08-12-22 @ 05:05) (17 - 18)  SpO2: 96% (08-12-22 @ 05:05) (96% - 99%)  Wt(kg): --Vital Signs Last 24 Hrs  T(C): 37.3 (12 Aug 2022 05:56), Max: 38.1 (12 Aug 2022 05:05)  T(F): 99.1 (12 Aug 2022 05:56), Max: 100.6 (12 Aug 2022 05:05)  HR: 96 (12 Aug 2022 05:05) (70 - 96)  BP: 95/57 (12 Aug 2022 05:05) (95/57 - 188/80)  BP(mean): --  RR: 17 (12 Aug 2022 05:05) (17 - 18)  SpO2: 96% (12 Aug 2022 05:05) (96% - 99%)    Parameters below as of 12 Aug 2022 05:05  Patient On (Oxygen Delivery Method): room air    MEDICATIONS  (STANDING):  aspirin enteric coated 81 milliGRAM(s) Oral daily  enoxaparin Injectable 40 milliGRAM(s) SubCutaneous every 24 hours  insulin lispro (ADMELOG) corrective regimen sliding scale   SubCutaneous three times a day before meals  insulin lispro (ADMELOG) corrective regimen sliding scale   SubCutaneous at bedtime  levothyroxine 25 MICROGram(s) Oral daily  metoprolol tartrate 25 milliGRAM(s) Oral two times a day  pantoprazole   Suspension 40 milliGRAM(s) Oral daily    MEDICATIONS  (PRN):  acetaminophen     Tablet .. 650 milliGRAM(s) Oral every 6 hours PRN Temp greater or equal to 38C (100.4F), Mild Pain (1 - 3)  aluminum hydroxide/magnesium hydroxide/simethicone Suspension 30 milliLiter(s) Oral every 4 hours PRN Dyspepsia  melatonin 3 milliGRAM(s) Oral at bedtime PRN Insomnia  ondansetron Injectable 4 milliGRAM(s) IV Push every 8 hours PRN Nausea and/or Vomiting          PHYSICAL EXAM:  GENERAL: lethargic   EYES: clear conjunctiva  ENMT: Moist mucous membranes  NECK: Supple, No JVD  CHEST/LUNG: Clear to auscultation bilaterally; No rales, rhonchi, wheezing, or rubs  HEART: S1, S2, Regular rate and rhythm  ABDOMEN: Soft, Nontender, Nondistended; Bowel sounds present  NEURO: Alert & Oriented x0, lethargic, opens eyes at times, does not follow any commands   EXTREMITIES: No LE edema, no calf tenderness  SKIN: No rashes or lesions    Consultant(s) Notes Reviewed:  [x ] YES  [ ] NO  Care Discussed with Consultants/Other Providers [ x] YES  [ ] NO    LABS:                        12.7   8.01  )-----------( 237      ( 11 Aug 2022 06:40 )             39.4     08-11    139  |  105  |  16  ----------------------------<  165<H>  4.5   |  28  |  0.84    Ca    9.3      11 Aug 2022 06:40  Phos  3.1     08-11  Mg     2.1     08-11    TPro  6.7  /  Alb  2.9<L>  /  TBili  0.2  /  DBili  x   /  AST  21  /  ALT  25  /  AlkPhos  127<H>  08-10  CAPILLARY BLOOD GLUCOSE  POCT Blood Glucose.: 257 mg/dL (12 Aug 2022 11:23)  POCT Blood Glucose.: 309 mg/dL (12 Aug 2022 07:59)  POCT Blood Glucose.: 223 mg/dL (11 Aug 2022 21:07)  POCT Blood Glucose.: 254 mg/dL (11 Aug 2022 16:47)    RADIOLOGY & ADDITIONAL TESTS:

## 2022-08-13 LAB
APPEARANCE UR: ABNORMAL
BILIRUB UR-MCNC: NEGATIVE — SIGNIFICANT CHANGE UP
COLOR SPEC: YELLOW — SIGNIFICANT CHANGE UP
DIFF PNL FLD: ABNORMAL
GLUCOSE BLDC GLUCOMTR-MCNC: 230 MG/DL — HIGH (ref 70–99)
GLUCOSE BLDC GLUCOMTR-MCNC: 240 MG/DL — HIGH (ref 70–99)
GLUCOSE BLDC GLUCOMTR-MCNC: 298 MG/DL — HIGH (ref 70–99)
GLUCOSE BLDC GLUCOMTR-MCNC: 349 MG/DL — HIGH (ref 70–99)
GLUCOSE UR QL: 250
KETONES UR-MCNC: ABNORMAL
LEUKOCYTE ESTERASE UR-ACNC: ABNORMAL
NITRITE UR-MCNC: NEGATIVE — SIGNIFICANT CHANGE UP
PH UR: 5 — SIGNIFICANT CHANGE UP (ref 5–8)
PROT UR-MCNC: 30 MG/DL
SP GR SPEC: 1.02 — SIGNIFICANT CHANGE UP (ref 1.01–1.02)
UROBILINOGEN FLD QL: NEGATIVE — SIGNIFICANT CHANGE UP

## 2022-08-13 RX ORDER — SENNA PLUS 8.6 MG/1
2 TABLET ORAL AT BEDTIME
Refills: 0 | Status: DISCONTINUED | OUTPATIENT
Start: 2022-08-13 | End: 2022-08-15

## 2022-08-13 RX ORDER — POLYETHYLENE GLYCOL 3350 17 G/17G
17 POWDER, FOR SOLUTION ORAL DAILY
Refills: 0 | Status: DISCONTINUED | OUTPATIENT
Start: 2022-08-13 | End: 2022-08-15

## 2022-08-13 RX ADMIN — Medication 81 MILLIGRAM(S): at 12:08

## 2022-08-13 RX ADMIN — Medication 25 MICROGRAM(S): at 05:33

## 2022-08-13 RX ADMIN — INSULIN GLARGINE 15 UNIT(S): 100 INJECTION, SOLUTION SUBCUTANEOUS at 08:16

## 2022-08-13 RX ADMIN — Medication 3 MILLIGRAM(S): at 22:12

## 2022-08-13 RX ADMIN — Medication 2: at 12:07

## 2022-08-13 RX ADMIN — Medication 25 MILLIGRAM(S): at 16:54

## 2022-08-13 RX ADMIN — Medication 4: at 16:51

## 2022-08-13 RX ADMIN — ENOXAPARIN SODIUM 40 MILLIGRAM(S): 100 INJECTION SUBCUTANEOUS at 05:33

## 2022-08-13 RX ADMIN — POLYETHYLENE GLYCOL 3350 17 GRAM(S): 17 POWDER, FOR SOLUTION ORAL at 16:52

## 2022-08-13 RX ADMIN — SENNA PLUS 2 TABLET(S): 8.6 TABLET ORAL at 22:12

## 2022-08-13 RX ADMIN — Medication 1: at 22:13

## 2022-08-13 RX ADMIN — PANTOPRAZOLE SODIUM 40 MILLIGRAM(S): 20 TABLET, DELAYED RELEASE ORAL at 12:08

## 2022-08-13 RX ADMIN — Medication 2: at 08:18

## 2022-08-13 NOTE — PROGRESS NOTE ADULT - SUBJECTIVE AND OBJECTIVE BOX
Patient is a 89y old  Female who presents with a chief complaint of Hypoglycemia (12 Aug 2022 15:09)    pt seen in ed [  ], reg med floor [ x  ], bed [ x ], chair at bedside [   ], awake [ x ], lethargic [  ],    nad [ x ]    Allergies    penicillins (Hives)        Vitals    T(F): 98.6 (08-13-22 @ 05:12), Max: 99.3 (08-12-22 @ 14:22)  HR: 76 (08-13-22 @ 05:12) (76 - 108)  BP: 122/60 (08-13-22 @ 05:12) (96/46 - 134/86)  RR: 16 (08-13-22 @ 05:12) (16 - 17)  SpO2: 94% (08-13-22 @ 05:12) (94% - 98%)  Wt(kg): --  CAPILLARY BLOOD GLUCOSE      POCT Blood Glucose.: 369 mg/dL (12 Aug 2022 21:00)      Labs                    Troponin I, High Sensitivity Result: 5.3 ng/L (08-10-22 @ 17:51)        Radiology Results      Meds    MEDICATIONS  (STANDING):  aspirin enteric coated 81 milliGRAM(s) Oral daily  enoxaparin Injectable 40 milliGRAM(s) SubCutaneous every 24 hours  insulin glargine Injectable (LANTUS) 15 Unit(s) SubCutaneous every morning  insulin lispro (ADMELOG) corrective regimen sliding scale   SubCutaneous three times a day before meals  insulin lispro (ADMELOG) corrective regimen sliding scale   SubCutaneous at bedtime  levothyroxine 25 MICROGram(s) Oral daily  metoprolol tartrate 25 milliGRAM(s) Oral two times a day  pantoprazole   Suspension 40 milliGRAM(s) Oral daily      MEDICATIONS  (PRN):  acetaminophen     Tablet .. 650 milliGRAM(s) Oral every 6 hours PRN Temp greater or equal to 38C (100.4F), Mild Pain (1 - 3)  aluminum hydroxide/magnesium hydroxide/simethicone Suspension 30 milliLiter(s) Oral every 4 hours PRN Dyspepsia  melatonin 3 milliGRAM(s) Oral at bedtime PRN Insomnia  ondansetron Injectable 4 milliGRAM(s) IV Push every 8 hours PRN Nausea and/or Vomiting      Physical Exam    Neuro :  no focal deficits  Respiratory: CTA B/L  CV: RRR, S1S2, no murmurs,   Abdominal: Soft, NT, ND +BS,  Extremities: No edema, + peripheral pulses        ASSESSMENT    unresponsiveness 2nd to hypoglycemia   atelectasis  Functional quadriplegia.  Moderate protein-calorie malnutrition  h/o alzheimer's dementia,   hypothyroidism  HTN (hypertension)  DM (diabetes mellitus)  MESERET (iron deficiency anemia)  Cardiac pacemaker  S/P TAMMY-BSO  s/p hip replacement        PLAN    hold home diabetic meds,   pt on basaglar 34 u and metformin 500 qd at home   HbA1c = 5.8 noted above   endo f/u  Pt presented with hypoglycemia with sugars in the 60's possibly due to poor oral intake  goal sugars 140-200  d/c D5/NS due to high recent sugars   consider low dose insulin vs. oral agents, will monitor tonight and reevaluate 8/12/22   c/w pureed meals  pulm f/u   bronchodilators  chest pt  swallow eval   palliative eval noted   palliative Met with the pt's daughter Chapo at the bedside  Tresa endorsed pt has been declining both mentally and functionally, now refusing to eat.    She recognizes that her mother is very sick.    Discussed dementia trajectory and explained she is eligible for hospice.    Tresa stated she does not want her mother to suffer, her quality of life is very poor now.   She agreed for comfort measures.    She wants to take her mother home with hospice.    Discussed risks vs benefits of LST including CPR, intubation, and artificial nutrition in context of end stage dementia.    She is aware that these measures would not result in any meaning recovery.    DEV drafted: DEV; DNR/DNI/no feeding tube/comfort measures/DNH.    [facility] referral made for home hospice.  cont current meds   d/c planning for home hosp

## 2022-08-13 NOTE — PROGRESS NOTE ADULT - NUTRITIONAL ASSESSMENT
This patient has been assessed with a concern for Malnutrition and has been determined to have a diagnosis/diagnoses of Moderate protein-calorie malnutrition and Underweight (BMI < 19).    This patient is being managed with:   Diet Pureed-  Supplement Feeding Modality:  Oral  Glucerna Shake Cans or Servings Per Day:  1       Frequency:  Two Times a day  Entered: Aug 12 2022 12:07PM

## 2022-08-13 NOTE — PROGRESS NOTE ADULT - SUBJECTIVE AND OBJECTIVE BOX
Patient is a 89y old  Female who presents with a chief complaint of Hypoglycemia (13 Aug 2022 06:52)  Asleep, comfortable in bed in NAD. Doing well on RA    INTERVAL HPI/OVERNIGHT EVENTS:      VITAL SIGNS:  T(F): 98.6 (08-13-22 @ 05:12)  HR: 76 (08-13-22 @ 05:12)  BP: 122/60 (08-13-22 @ 05:12)  RR: 16 (08-13-22 @ 05:12)  SpO2: 94% (08-13-22 @ 05:12)  Wt(kg): --  I&O's Detail          REVIEW OF SYSTEMS:    CONSTITUTIONAL:  No fevers, chills, sweats    HEENT:  Eyes:  No diplopia or blurred vision. ENT:  No earache, sore throat or runny nose.    CARDIOVASCULAR:  No pressure, squeezing, tightness, or heaviness about the chest; no palpitations.    RESPIRATORY:  Per HPI    GASTROINTESTINAL:  No abdominal pain, nausea, vomiting or diarrhea.    GENITOURINARY:  No dysuria, frequency or urgency.    NEUROLOGIC:  No paresthesias, fasciculations, seizures or weakness.    PSYCHIATRIC:  No disorder of thought or mood.      PHYSICAL EXAM:    Constitutional: Well developed and nourished  Eyes:Perrla  ENMT: normal  Neck:supple  Respiratory: good air entry  Cardiovascular: S1 S2 regular  Gastrointestinal: Soft, Non tender  Extremities: No edema  Vascular:normal  Neurological:Awake, alert,Ox3  Musculoskeletal:Normal      MEDICATIONS  (STANDING):  aspirin enteric coated 81 milliGRAM(s) Oral daily  enoxaparin Injectable 40 milliGRAM(s) SubCutaneous every 24 hours  insulin glargine Injectable (LANTUS) 15 Unit(s) SubCutaneous every morning  insulin lispro (ADMELOG) corrective regimen sliding scale   SubCutaneous three times a day before meals  insulin lispro (ADMELOG) corrective regimen sliding scale   SubCutaneous at bedtime  levothyroxine 25 MICROGram(s) Oral daily  metoprolol tartrate 25 milliGRAM(s) Oral two times a day  pantoprazole   Suspension 40 milliGRAM(s) Oral daily    MEDICATIONS  (PRN):  acetaminophen     Tablet .. 650 milliGRAM(s) Oral every 6 hours PRN Temp greater or equal to 38C (100.4F), Mild Pain (1 - 3)  aluminum hydroxide/magnesium hydroxide/simethicone Suspension 30 milliLiter(s) Oral every 4 hours PRN Dyspepsia  melatonin 3 milliGRAM(s) Oral at bedtime PRN Insomnia  ondansetron Injectable 4 milliGRAM(s) IV Push every 8 hours PRN Nausea and/or Vomiting      Allergies    penicillins (Hives)    Intolerances        LABS:                    CAPILLARY BLOOD GLUCOSE      POCT Blood Glucose.: 240 mg/dL (13 Aug 2022 07:45)  POCT Blood Glucose.: 369 mg/dL (12 Aug 2022 21:00)  POCT Blood Glucose.: 182 mg/dL (12 Aug 2022 16:27)  POCT Blood Glucose.: 257 mg/dL (12 Aug 2022 11:23)        RADIOLOGY & ADDITIONAL TESTS:    CXR:    Ct scan chest:    ekg;    echo:

## 2022-08-14 DIAGNOSIS — N39.0 URINARY TRACT INFECTION, SITE NOT SPECIFIED: ICD-10-CM

## 2022-08-14 LAB
GLUCOSE BLDC GLUCOMTR-MCNC: 207 MG/DL — HIGH (ref 70–99)
GLUCOSE BLDC GLUCOMTR-MCNC: 257 MG/DL — HIGH (ref 70–99)
GLUCOSE BLDC GLUCOMTR-MCNC: 262 MG/DL — HIGH (ref 70–99)
GLUCOSE BLDC GLUCOMTR-MCNC: 283 MG/DL — HIGH (ref 70–99)

## 2022-08-14 RX ORDER — HYDRALAZINE HCL 50 MG
10 TABLET ORAL ONCE
Refills: 0 | Status: COMPLETED | OUTPATIENT
Start: 2022-08-14 | End: 2022-08-14

## 2022-08-14 RX ORDER — INSULIN GLARGINE 100 [IU]/ML
22 INJECTION, SOLUTION SUBCUTANEOUS EVERY MORNING
Refills: 0 | Status: DISCONTINUED | OUTPATIENT
Start: 2022-08-14 | End: 2022-08-15

## 2022-08-14 RX ORDER — CIPROFLOXACIN LACTATE 400MG/40ML
200 VIAL (ML) INTRAVENOUS ONCE
Refills: 0 | Status: COMPLETED | OUTPATIENT
Start: 2022-08-14 | End: 2022-08-14

## 2022-08-14 RX ORDER — POLYETHYLENE GLYCOL 3350 17 G/17G
17 POWDER, FOR SOLUTION ORAL ONCE
Refills: 0 | Status: COMPLETED | OUTPATIENT
Start: 2022-08-14 | End: 2022-08-14

## 2022-08-14 RX ORDER — CIPROFLOXACIN LACTATE 400MG/40ML
200 VIAL (ML) INTRAVENOUS EVERY 12 HOURS
Refills: 0 | Status: DISCONTINUED | OUTPATIENT
Start: 2022-08-14 | End: 2022-08-15

## 2022-08-14 RX ORDER — SIMETHICONE 80 MG/1
80 TABLET, CHEWABLE ORAL THREE TIMES A DAY
Refills: 0 | Status: DISCONTINUED | OUTPATIENT
Start: 2022-08-14 | End: 2022-08-15

## 2022-08-14 RX ORDER — CIPROFLOXACIN LACTATE 400MG/40ML
VIAL (ML) INTRAVENOUS
Refills: 0 | Status: DISCONTINUED | OUTPATIENT
Start: 2022-08-14 | End: 2022-08-15

## 2022-08-14 RX ADMIN — Medication 3: at 07:56

## 2022-08-14 RX ADMIN — Medication 0.5 MILLIGRAM(S): at 00:09

## 2022-08-14 RX ADMIN — ENOXAPARIN SODIUM 40 MILLIGRAM(S): 100 INJECTION SUBCUTANEOUS at 05:18

## 2022-08-14 RX ADMIN — Medication 100 MILLIGRAM(S): at 23:17

## 2022-08-14 RX ADMIN — Medication 10 MILLIGRAM(S): at 19:11

## 2022-08-14 RX ADMIN — Medication 3: at 16:41

## 2022-08-14 RX ADMIN — Medication 3 MILLIGRAM(S): at 21:06

## 2022-08-14 RX ADMIN — Medication 3: at 12:29

## 2022-08-14 RX ADMIN — Medication 650 MILLIGRAM(S): at 19:50

## 2022-08-14 RX ADMIN — Medication 25 MILLIGRAM(S): at 05:17

## 2022-08-14 RX ADMIN — Medication 650 MILLIGRAM(S): at 19:11

## 2022-08-14 RX ADMIN — Medication 0.5 MILLIGRAM(S): at 23:19

## 2022-08-14 RX ADMIN — SENNA PLUS 2 TABLET(S): 8.6 TABLET ORAL at 21:06

## 2022-08-14 RX ADMIN — SIMETHICONE 80 MILLIGRAM(S): 80 TABLET, CHEWABLE ORAL at 19:10

## 2022-08-14 RX ADMIN — POLYETHYLENE GLYCOL 3350 17 GRAM(S): 17 POWDER, FOR SOLUTION ORAL at 12:30

## 2022-08-14 RX ADMIN — Medication 25 MILLIGRAM(S): at 17:04

## 2022-08-14 RX ADMIN — PANTOPRAZOLE SODIUM 40 MILLIGRAM(S): 20 TABLET, DELAYED RELEASE ORAL at 12:30

## 2022-08-14 RX ADMIN — Medication 81 MILLIGRAM(S): at 12:29

## 2022-08-14 RX ADMIN — POLYETHYLENE GLYCOL 3350 17 GRAM(S): 17 POWDER, FOR SOLUTION ORAL at 19:09

## 2022-08-14 RX ADMIN — Medication 10 MILLIGRAM(S): at 21:03

## 2022-08-14 RX ADMIN — INSULIN GLARGINE 15 UNIT(S): 100 INJECTION, SOLUTION SUBCUTANEOUS at 07:55

## 2022-08-14 RX ADMIN — Medication 100 MILLIGRAM(S): at 12:29

## 2022-08-14 RX ADMIN — Medication 25 MICROGRAM(S): at 05:17

## 2022-08-14 NOTE — PROGRESS NOTE ADULT - SUBJECTIVE AND OBJECTIVE BOX
Patient is a 89y old  Female who presents with a chief complaint of Hypoglycemia (13 Aug 2022 11:02)    pt seen in ed [  ], reg med floor [ x  ], bed [ x ], chair at bedside [   ], awake [ x ], lethargic [  ],    nad [ x ]      Allergies    penicillins (Hives)        Vitals    T(F): 98.9 (08-14-22 @ 05:00), Max: 99.3 (08-13-22 @ 20:46)  HR: 102 (08-14-22 @ 05:00) (63 - 116)  BP: 124/78 (08-14-22 @ 05:00) (102/66 - 153/75)  RR: 18 (08-14-22 @ 05:00) (18 - 18)  SpO2: 95% (08-14-22 @ 05:00) (95% - 99%)  Wt(kg): --  CAPILLARY BLOOD GLUCOSE      POCT Blood Glucose.: 298 mg/dL (13 Aug 2022 21:26)      Labs                          Radiology Results      Meds    MEDICATIONS  (STANDING):  aspirin enteric coated 81 milliGRAM(s) Oral daily  enoxaparin Injectable 40 milliGRAM(s) SubCutaneous every 24 hours  insulin glargine Injectable (LANTUS) 15 Unit(s) SubCutaneous every morning  insulin lispro (ADMELOG) corrective regimen sliding scale   SubCutaneous three times a day before meals  insulin lispro (ADMELOG) corrective regimen sliding scale   SubCutaneous at bedtime  levothyroxine 25 MICROGram(s) Oral daily  metoprolol tartrate 25 milliGRAM(s) Oral two times a day  pantoprazole   Suspension 40 milliGRAM(s) Oral daily  polyethylene glycol 3350 17 Gram(s) Oral daily  senna 2 Tablet(s) Oral at bedtime      MEDICATIONS  (PRN):  acetaminophen     Tablet .. 650 milliGRAM(s) Oral every 6 hours PRN Temp greater or equal to 38C (100.4F), Mild Pain (1 - 3)  aluminum hydroxide/magnesium hydroxide/simethicone Suspension 30 milliLiter(s) Oral every 4 hours PRN Dyspepsia  melatonin 3 milliGRAM(s) Oral at bedtime PRN Insomnia  ondansetron Injectable 4 milliGRAM(s) IV Push every 8 hours PRN Nausea and/or Vomiting      Physical Exam    Neuro :  no focal deficits  Respiratory: CTA B/L  CV: RRR, S1S2, no murmurs,   Abdominal: Soft, NT, ND +BS,  Extremities: No edema, + peripheral pulses        ASSESSMENT    unresponsiveness 2nd to hypoglycemia   atelectasis  Functional quadriplegia.  Moderate protein-calorie malnutrition  h/o alzheimer's dementia,   hypothyroidism  HTN (hypertension)  DM (diabetes mellitus)  MESERET (iron deficiency anemia)  Cardiac pacemaker  S/P TAMMY-BSO  s/p hip replacement        PLAN    hold home diabetic meds,   pt on basaglar 34 u and metformin 500 qd at home   HbA1c = 5.8 noted above   endo f/u  Pt presented with hypoglycemia with sugars in the 60's possibly due to poor oral intake  goal sugars 140-200  d/c D5/NS due to high recent sugars   consider low dose insulin vs. oral agents, will monitor tonight and reevaluate 8/12/22   c/w pureed meals  pulm f/u   bronchodilators  chest pt  swallow eval   palliative eval noted   palliative Met with the pt's daughter Chapo at the bedside  Tresa endorsed pt has been declining both mentally and functionally, now refusing to eat.    She recognizes that her mother is very sick.    Discussed dementia trajectory and explained she is eligible for hospice.    Tresa stated she does not want her mother to suffer, her quality of life is very poor now.   She agreed for comfort measures.    She wants to take her mother home with hospice.    Discussed risks vs benefits of LST including CPR, intubation, and artificial nutrition in context of end stage dementia.    She is aware that these measures would not result in any meaning recovery.    DEV drafted: DEV; DNR/DNI/no feeding tube/comfort measures/DNH.    [facility] referral made for home hospice.  cont current meds   d/c planning for home hosp             Patient is a 89y old  Female who presents with a chief complaint of Hypoglycemia (13 Aug 2022 11:02)    pt seen in ed [  ], reg med floor [ x  ], bed [ x ], chair at bedside [   ], awake [ x ], lethargic [  ],    nad [ x ]      Allergies    penicillins (Hives)        Vitals    T(F): 98.9 (08-14-22 @ 05:00), Max: 99.3 (08-13-22 @ 20:46)  HR: 102 (08-14-22 @ 05:00) (63 - 116)  BP: 124/78 (08-14-22 @ 05:00) (102/66 - 153/75)  RR: 18 (08-14-22 @ 05:00) (18 - 18)  SpO2: 95% (08-14-22 @ 05:00) (95% - 99%)  Wt(kg): --  CAPILLARY BLOOD GLUCOSE      POCT Blood Glucose.: 298 mg/dL (13 Aug 2022 21:26)      Labs                          Radiology Results      Meds    MEDICATIONS  (STANDING):  aspirin enteric coated 81 milliGRAM(s) Oral daily  enoxaparin Injectable 40 milliGRAM(s) SubCutaneous every 24 hours  insulin glargine Injectable (LANTUS) 15 Unit(s) SubCutaneous every morning  insulin lispro (ADMELOG) corrective regimen sliding scale   SubCutaneous three times a day before meals  insulin lispro (ADMELOG) corrective regimen sliding scale   SubCutaneous at bedtime  levothyroxine 25 MICROGram(s) Oral daily  metoprolol tartrate 25 milliGRAM(s) Oral two times a day  pantoprazole   Suspension 40 milliGRAM(s) Oral daily  polyethylene glycol 3350 17 Gram(s) Oral daily  senna 2 Tablet(s) Oral at bedtime      MEDICATIONS  (PRN):  acetaminophen     Tablet .. 650 milliGRAM(s) Oral every 6 hours PRN Temp greater or equal to 38C (100.4F), Mild Pain (1 - 3)  aluminum hydroxide/magnesium hydroxide/simethicone Suspension 30 milliLiter(s) Oral every 4 hours PRN Dyspepsia  melatonin 3 milliGRAM(s) Oral at bedtime PRN Insomnia  ondansetron Injectable 4 milliGRAM(s) IV Push every 8 hours PRN Nausea and/or Vomiting      Physical Exam    Neuro :  no focal deficits  Respiratory: CTA B/L  CV: RRR, S1S2, no murmurs,   Abdominal: Soft, NT, ND +BS,  Extremities: No edema, + peripheral pulses        ASSESSMENT    unresponsiveness 2nd to hypoglycemia   atelectasis  Functional quadriplegia.  Moderate protein-calorie malnutrition   uti   h/o alzheimer's dementia,   hypothyroidism  HTN (hypertension)  DM (diabetes mellitus)  MESERET (iron deficiency anemia)  Cardiac pacemaker  S/P TAMMY-BSO  s/p hip replacement        PLAN    hold home diabetic meds,   pt on basaglar 34 u and metformin 500 qd at home   HbA1c = 5.8 noted above   endo f/u  Pt presented with hypoglycemia with sugars in the 60's possibly due to poor oral intake  goal sugars 140-200  d/c D5/NS due to high recent sugars   consider low dose insulin vs. oral agents, will monitor tonight and reevaluate 8/12/22   c/w pureed meals  pulm f/u   bronchodilators  chest pt  swallow eval   palliative eval noted   palliative Met with the pt's daughter Chapo at the bedside  Tresa endorsed pt has been declining both mentally and functionally, now refusing to eat.    She recognizes that her mother is very sick.    Discussed dementia trajectory and explained she is eligible for hospice.    Tresa stated she does not want her mother to suffer, her quality of life is very poor now.   She agreed for comfort measures.    She wants to take her mother home with hospice.    Discussed risks vs benefits of LST including CPR, intubation, and artificial nutrition in context of end stage dementia.    She is aware that these measures would not result in any meaning recovery.    DEV drafted: DEV; DNR/DNI/no feeding tube/comfort measures/DNH.    [facility] referral made for home hospice.   ua positive for uti   started cipro 200mg iv bid x 5 days  cont current meds   d/c planning for home hosp

## 2022-08-14 NOTE — PROGRESS NOTE ADULT - SUBJECTIVE AND OBJECTIVE BOX
Patient is a 89y old  Female who presents with a chief complaint of Hypoglycemia (14 Aug 2022 06:33)  Awake, alert, comfortable out of bed in NAD. Confused but not agitated  INTERVAL HPI/OVERNIGHT EVENTS:      VITAL SIGNS:  T(F): 98.9 (22 @ 05:00)  HR: 102 (22 @ 05:00)  BP: 124/78 (22 @ 05:00)  RR: 18 (22 @ 05:00)  SpO2: 95% (22 @ 05:00)  Wt(kg): --  I&O's Detail          REVIEW OF SYSTEMS:    CONSTITUTIONAL:  No fevers, chills, sweats    HEENT:  Eyes:  No diplopia or blurred vision. ENT:  No earache, sore throat or runny nose.    CARDIOVASCULAR:  No pressure, squeezing, tightness, or heaviness about the chest; no palpitations.    RESPIRATORY:  Per HPI    GASTROINTESTINAL:  No abdominal pain, nausea, vomiting or diarrhea.    GENITOURINARY:  No dysuria, frequency or urgency.    NEUROLOGIC:  No paresthesias, fasciculations, seizures or weakness.    PSYCHIATRIC:  No disorder of thought or mood.      PHYSICAL EXAM:    Constitutional: Well developed and nourished  Eyes:Perrla  ENMT: normal  Neck:supple  Respiratory: good air entry  Cardiovascular: S1 S2 regular  Gastrointestinal: Soft, Non tender  Extremities: No edema  Vascular:normal  Neurological:Awake, alert  Musculoskeletal:Normal      MEDICATIONS  (STANDING):  aspirin enteric coated 81 milliGRAM(s) Oral daily  ciprofloxacin   IVPB      ciprofloxacin   IVPB 200 milliGRAM(s) IV Intermittent once  ciprofloxacin   IVPB 200 milliGRAM(s) IV Intermittent every 12 hours  enoxaparin Injectable 40 milliGRAM(s) SubCutaneous every 24 hours  insulin glargine Injectable (LANTUS) 15 Unit(s) SubCutaneous every morning  insulin lispro (ADMELOG) corrective regimen sliding scale   SubCutaneous three times a day before meals  insulin lispro (ADMELOG) corrective regimen sliding scale   SubCutaneous at bedtime  levothyroxine 25 MICROGram(s) Oral daily  metoprolol tartrate 25 milliGRAM(s) Oral two times a day  pantoprazole   Suspension 40 milliGRAM(s) Oral daily  polyethylene glycol 3350 17 Gram(s) Oral daily  senna 2 Tablet(s) Oral at bedtime    MEDICATIONS  (PRN):  acetaminophen     Tablet .. 650 milliGRAM(s) Oral every 6 hours PRN Temp greater or equal to 38C (100.4F), Mild Pain (1 - 3)  aluminum hydroxide/magnesium hydroxide/simethicone Suspension 30 milliLiter(s) Oral every 4 hours PRN Dyspepsia  melatonin 3 milliGRAM(s) Oral at bedtime PRN Insomnia  ondansetron Injectable 4 milliGRAM(s) IV Push every 8 hours PRN Nausea and/or Vomiting      Allergies    penicillins (Hives)    Intolerances        LABS:            Urinalysis Basic - ( 13 Aug 2022 15:50 )    Color: Yellow / Appearance: Slightly Turbid / S.020 / pH: x  Gluc: x / Ketone: Trace  / Bili: Negative / Urobili: Negative   Blood: x / Protein: 30 mg/dL / Nitrite: Negative   Leuk Esterase: Moderate / RBC: 2-5 /HPF / WBC 26-50 /HPF   Sq Epi: x / Non Sq Epi: Few /HPF / Bacteria: TNTC /HPF            CAPILLARY BLOOD GLUCOSE      POCT Blood Glucose.: 283 mg/dL (14 Aug 2022 07:48)  POCT Blood Glucose.: 298 mg/dL (13 Aug 2022 21:26)  POCT Blood Glucose.: 349 mg/dL (13 Aug 2022 16:24)  POCT Blood Glucose.: 230 mg/dL (13 Aug 2022 11:28)        RADIOLOGY & ADDITIONAL TESTS:    CXR:    Ct scan chest:    ekg;    echo:

## 2022-08-14 NOTE — PROGRESS NOTE ADULT - SUBJECTIVE AND OBJECTIVE BOX
Interval Events:  pt in nad  poor po intake    Allergies    penicillins (Hives)    Intolerances      Endocrine/Metabolic Medications:  insulin glargine Injectable (LANTUS) 15 Unit(s) SubCutaneous every morning  insulin lispro (ADMELOG) corrective regimen sliding scale   SubCutaneous three times a day before meals  insulin lispro (ADMELOG) corrective regimen sliding scale   SubCutaneous at bedtime  levothyroxine 25 MICROGram(s) Oral daily      Vital Signs Last 24 Hrs  T(C): 37.2 (14 Aug 2022 05:00), Max: 37.4 (13 Aug 2022 20:46)  T(F): 98.9 (14 Aug 2022 05:00), Max: 99.3 (13 Aug 2022 20:46)  HR: 102 (14 Aug 2022 05:00) (63 - 116)  BP: 124/78 (14 Aug 2022 05:00) (102/66 - 153/75)  BP(mean): --  RR: 18 (14 Aug 2022 05:00) (18 - 18)  SpO2: 95% (14 Aug 2022 05:00) (95% - 99%)    Parameters below as of 14 Aug 2022 05:00  Patient On (Oxygen Delivery Method): room air          PHYSICAL EXAM  All physical exam findings normal, except those marked:  General:	Alert, active, cooperative, NAD, well hydrated  .		[] Abnormal:  Neck		Normal: supple, no cervical adenopathy, no palpable thyroid  .		[] Abnormal:  Cardiovascular	Normal: regular rate, normal S1, S2, no murmurs  .		[] Abnormal:  Respiratory	Normal: no chest wall deformity, normal respiratory pattern, CTA B/L  .		[] Abnormal:  Abdominal	Normal: soft, ND, NT, bowel sounds present, no masses, no organomegaly  .		[] Abnormal:  		Normal normal genitalia, testes descended, circumcised/uncircumcised  .		Jude stage:			Breast jude:  .		Menstrual history:  .		[] Abnormal:  Extremities	Normal: FROM x4  .		[] Abnormal:  Skin		Normal: intact and not indurated, no rash, no acanthosis nigricans  .		[] Abnormal:  Neurologic	Normal: grossly intact  .		[] Abnormal:    LABS        CAPILLARY BLOOD GLUCOSE      POCT Blood Glucose.: 283 mg/dL (14 Aug 2022 07:48)  POCT Blood Glucose.: 298 mg/dL (13 Aug 2022 21:26)  POCT Blood Glucose.: 349 mg/dL (13 Aug 2022 16:24)  POCT Blood Glucose.: 230 mg/dL (13 Aug 2022 11:28)        Assesment/plan    This is a 89 year old female with medical history of DM, HTN, Worsening Alzheimers coming in after found to be nonresponsive and with sugars in the 60s at home. Admitted for the treatment of hypoglycemia likely in the setting of decreased oral intake. Endocrinology consulted for management of hypoglycemia.       Problem/Plan - 1:  ·  Problem: Hypoglycemia.   ·  Plan: - Pt has hx of DM2, on basaglar 34 u and metformin 500 qd at home   - HbA1c = 5.8  - Pt presented with hypoglycemia with sugars in the 60's yesterday afternoon  - possibly due to poor oral intake  - Pts sugars have remained stable here, recent POCT in the 200s-300s   - goal sugars 140-200   - change lantus to 22 at bedtime  - c/w pureed meals- cont to have poor intake   - discussed plan with primary team.     Problem/Plan - 2:  ·  Problem: Failure to thrive in adult.   ·  Plan: - As per daughter pt declining over the last few weeks.   - Decreased oral intake.   - Only able to swallow liquid/ some pureed food.   - Palliative is on board.

## 2022-08-15 ENCOUNTER — TRANSCRIPTION ENCOUNTER (OUTPATIENT)
Age: 87
End: 2022-08-15

## 2022-08-15 VITALS
RESPIRATION RATE: 17 BRPM | DIASTOLIC BLOOD PRESSURE: 69 MMHG | HEART RATE: 108 BPM | SYSTOLIC BLOOD PRESSURE: 165 MMHG | OXYGEN SATURATION: 98 % | TEMPERATURE: 99 F

## 2022-08-15 LAB
GLUCOSE BLDC GLUCOMTR-MCNC: 193 MG/DL — HIGH (ref 70–99)
GLUCOSE BLDC GLUCOMTR-MCNC: 287 MG/DL — HIGH (ref 70–99)
GLUCOSE BLDC GLUCOMTR-MCNC: 292 MG/DL — HIGH (ref 70–99)

## 2022-08-15 PROCEDURE — 87635 SARS-COV-2 COVID-19 AMP PRB: CPT

## 2022-08-15 PROCEDURE — 99285 EMERGENCY DEPT VISIT HI MDM: CPT

## 2022-08-15 PROCEDURE — 80061 LIPID PANEL: CPT

## 2022-08-15 PROCEDURE — 84443 ASSAY THYROID STIM HORMONE: CPT

## 2022-08-15 PROCEDURE — 85027 COMPLETE CBC AUTOMATED: CPT

## 2022-08-15 PROCEDURE — 80053 COMPREHEN METABOLIC PANEL: CPT

## 2022-08-15 PROCEDURE — 85025 COMPLETE CBC W/AUTO DIFF WBC: CPT

## 2022-08-15 PROCEDURE — 36415 COLL VENOUS BLD VENIPUNCTURE: CPT

## 2022-08-15 PROCEDURE — 83735 ASSAY OF MAGNESIUM: CPT

## 2022-08-15 PROCEDURE — 84484 ASSAY OF TROPONIN QUANT: CPT

## 2022-08-15 PROCEDURE — 80048 BASIC METABOLIC PNL TOTAL CA: CPT

## 2022-08-15 PROCEDURE — 87186 SC STD MICRODIL/AGAR DIL: CPT

## 2022-08-15 PROCEDURE — 84100 ASSAY OF PHOSPHORUS: CPT

## 2022-08-15 PROCEDURE — 82962 GLUCOSE BLOOD TEST: CPT

## 2022-08-15 PROCEDURE — 83036 HEMOGLOBIN GLYCOSYLATED A1C: CPT

## 2022-08-15 PROCEDURE — 87086 URINE CULTURE/COLONY COUNT: CPT

## 2022-08-15 PROCEDURE — 81001 URINALYSIS AUTO W/SCOPE: CPT

## 2022-08-15 PROCEDURE — 93005 ELECTROCARDIOGRAM TRACING: CPT

## 2022-08-15 RX ORDER — SENNA PLUS 8.6 MG/1
2 TABLET ORAL
Qty: 0 | Refills: 0 | DISCHARGE
Start: 2022-08-15

## 2022-08-15 RX ORDER — ACETAMINOPHEN 500 MG
2 TABLET ORAL
Qty: 0 | Refills: 0 | DISCHARGE
Start: 2022-08-15

## 2022-08-15 RX ORDER — SIMETHICONE 80 MG/1
1 TABLET, CHEWABLE ORAL
Qty: 0 | Refills: 0 | DISCHARGE
Start: 2022-08-15

## 2022-08-15 RX ORDER — ASPIRIN/CALCIUM CARB/MAGNESIUM 324 MG
81 TABLET ORAL DAILY
Refills: 0 | Status: DISCONTINUED | OUTPATIENT
Start: 2022-08-15 | End: 2022-08-15

## 2022-08-15 RX ORDER — POLYETHYLENE GLYCOL 3350 17 G/17G
17 POWDER, FOR SOLUTION ORAL
Qty: 0 | Refills: 0 | DISCHARGE
Start: 2022-08-15

## 2022-08-15 RX ADMIN — Medication 1: at 11:25

## 2022-08-15 RX ADMIN — POLYETHYLENE GLYCOL 3350 17 GRAM(S): 17 POWDER, FOR SOLUTION ORAL at 11:23

## 2022-08-15 RX ADMIN — Medication 3: at 17:10

## 2022-08-15 RX ADMIN — Medication 25 MICROGRAM(S): at 05:23

## 2022-08-15 RX ADMIN — Medication 25 MILLIGRAM(S): at 05:23

## 2022-08-15 RX ADMIN — Medication 650 MILLIGRAM(S): at 16:53

## 2022-08-15 RX ADMIN — INSULIN GLARGINE 22 UNIT(S): 100 INJECTION, SOLUTION SUBCUTANEOUS at 08:02

## 2022-08-15 RX ADMIN — PANTOPRAZOLE SODIUM 40 MILLIGRAM(S): 20 TABLET, DELAYED RELEASE ORAL at 11:23

## 2022-08-15 RX ADMIN — Medication 100 MILLIGRAM(S): at 11:25

## 2022-08-15 RX ADMIN — ENOXAPARIN SODIUM 40 MILLIGRAM(S): 100 INJECTION SUBCUTANEOUS at 05:23

## 2022-08-15 RX ADMIN — Medication 81 MILLIGRAM(S): at 11:29

## 2022-08-15 RX ADMIN — Medication 25 MILLIGRAM(S): at 17:10

## 2022-08-15 RX ADMIN — Medication 3: at 07:59

## 2022-08-15 RX ADMIN — Medication 650 MILLIGRAM(S): at 16:23

## 2022-08-15 NOTE — PROGRESS NOTE ADULT - SUBJECTIVE AND OBJECTIVE BOX
Interval Events: Pt seen and examined at bedside, feels well, voices no acute complaint.     Allergies    penicillins (Hives)    Intolerances      Endocrine/Metabolic Medications:  insulin glargine Injectable (LANTUS) 15 Unit(s) SubCutaneous every morning  insulin lispro (ADMELOG) corrective regimen sliding scale   SubCutaneous three times a day before meals  insulin lispro (ADMELOG) corrective regimen sliding scale   SubCutaneous at bedtime  levothyroxine 25 MICROGram(s) Oral daily      Vital Signs Last 24 Hrs  T(C): 37.2 (14 Aug 2022 05:00), Max: 37.4 (13 Aug 2022 20:46)  T(F): 98.9 (14 Aug 2022 05:00), Max: 99.3 (13 Aug 2022 20:46)  HR: 102 (14 Aug 2022 05:00) (63 - 116)  BP: 124/78 (14 Aug 2022 05:00) (102/66 - 153/75)  BP(mean): --  RR: 18 (14 Aug 2022 05:00) (18 - 18)  SpO2: 95% (14 Aug 2022 05:00) (95% - 99%)    Parameters below as of 14 Aug 2022 05:00  Patient On (Oxygen Delivery Method): room air          PHYSICAL EXAM  All physical exam findings normal, except those marked:  General:	Alert, active, cooperative, NAD, well hydrated  .		[] Abnormal:  Neck		Normal: supple, no cervical adenopathy, no palpable thyroid  .		[] Abnormal:  Cardiovascular	Normal: regular rate, normal S1, S2, no murmurs  .		[] Abnormal:  Respiratory	Normal: no chest wall deformity, normal respiratory pattern, CTA B/L  .		[] Abnormal:  Abdominal	Normal: soft, ND, NT, bowel sounds present, no masses, no organomegaly  .		[] Abnormal:  Extremities	Normal: FROM x4  .		[] Abnormal:  Skin		Normal: intact and not indurated, no rash, no acanthosis nigricans  .		[] Abnormal:  Neurologic	Normal: grossly intact  .		[] Abnormal:    LABS        CAPILLARY BLOOD GLUCOSE      POCT Blood Glucose.: 283 mg/dL (14 Aug 2022 07:48)  POCT Blood Glucose.: 298 mg/dL (13 Aug 2022 21:26)  POCT Blood Glucose.: 349 mg/dL (13 Aug 2022 16:24)  POCT Blood Glucose.: 230 mg/dL (13 Aug 2022 11:28)

## 2022-08-15 NOTE — PROGRESS NOTE ADULT - PROBLEM SELECTOR PLAN 2
- As per daughter pt declining over the last few weeks.   - Decreased oral intake.   - Only able to swallow liquid/ some pureed food.   - Palliative is on board.
incentive spirometry  Bronchodilators  chest pt
incentive spirometry  Bronchodilators  chest pt
-As per daughter pt declining over the last few weeks.   -Decreased oral intake.   -Only able to swallow liquid/ some pureed food.   -Palliative is on board.
incentive spirometry  Bronchodilators  chest pt
incentive spirometry  Bronchodilators  chest pt
plan as above
-plan as above

## 2022-08-15 NOTE — PROGRESS NOTE ADULT - PROBLEM SELECTOR PROBLEM 3
Failure to thrive in adult
Failure to thrive in adult
HTN (hypertension)

## 2022-08-15 NOTE — DISCHARGE NOTE PROVIDER - NSDCMRMEDTOKEN_GEN_ALL_CORE_FT
acetaminophen 325 mg oral tablet: 2 tab(s) orally every 6 hours, As needed, Temp greater or equal to 38C (100.4F), Mild Pain (1 - 3)  aluminum hydroxide-magnesium hydroxide 200 mg-200 mg/5 mL oral suspension: 30 milliliter(s) orally every 4 hours, As needed, Dyspepsia  Aspirin Enteric Coated 81 mg oral delayed release tablet: 1 tab(s) orally once a day  Basaglar KwikPen 100 units/mL subcutaneous solution: 34 unit(s) subcutaneous once a day (in the morning)  ferrous sulfate 324 mg (65 mg elemental iron) oral delayed release tablet: 1 tab(s) orally once a day  FLUoxetine 20 mg oral capsule: 1 cap(s) orally once a day  levothyroxine 25 mcg (0.025 mg) oral tablet: 1 tab(s) orally once a day  metFORMIN 500 mg oral tablet: 1 tab(s) orally once a day  pantoprazole 40 mg oral delayed release tablet: 1 tab(s) orally once a day (before a meal)  polyethylene glycol 3350 oral powder for reconstitution: 17 gram(s) orally once a day  QUEtiapine 150 mg oral tablet, extended release: 1 tab(s) orally once a day (in the evening)  RisperDAL 1 mg oral tablet: 1 tab(s) orally 2 times a day  senna leaf extract oral tablet: 2 tab(s) orally once a day (at bedtime)  simethicone 80 mg oral tablet, chewable: 1 tab(s) orally 3 times a day, As needed, Gas  Toprol-XL 25 mg oral tablet, extended release: 1 tab(s) orally 2 times a day  traZODone 50 mg oral tablet: 1 tab(s) orally once a day (at bedtime)

## 2022-08-15 NOTE — PROGRESS NOTE ADULT - PROBLEM SELECTOR PLAN 4
Accuchecks with reg insulin coverage  HBA1C  Endo follow up
Accuchecks with reg insulin coverage  HBA1C  Endo eval
Accuchecks with reg insulin coverage  HBA1C  Endo eval
Accuchecks with reg insulin coverage  HBA1C
- c/w Metoprolol home dose with parameters.
-improving   -cont BB with parameters   -mon BP

## 2022-08-15 NOTE — DISCHARGE NOTE PROVIDER - DETAILS OF MALNUTRITION DIAGNOSIS/DIAGNOSES
This patient has been assessed with a concern for Malnutrition and was treated during this hospitalization for the following Nutrition diagnosis/diagnoses:     -  08/12/2022: Moderate protein-calorie malnutrition   -  08/12/2022: Underweight (BMI < 19)

## 2022-08-15 NOTE — PROGRESS NOTE ADULT - PROBLEM SELECTOR PLAN 1
-Pt has hx of DM2, on basaglar 34 u and metformin 500 qd at home   - HbA1c = 5.8  - Pt presented with hypoglycemia with sugars in the 60's  - possibly due to poor oral intake  - currently RESOLVED  - Pts sugars have remained stable here, recent POCT in the 200s range  [goal -200]  - c/w Lantus  22U at bedtime  - c/w pureed meals- cont to have poor intake -Pt has hx of DM2, on basaglar 34 u and metformin 500 qd at home   - HbA1c = 5.8  - Pt presented with hypoglycemia with sugars in the 60's  - possibly due to poor oral intake  - currently RESOLVED  - Pts sugars have remained stable here, recent POCT in the 200s range  [goal -200]  - c/w Lantus  22U at bedtime  - c/w pureed meals- cont to have poor intake.

## 2022-08-15 NOTE — PROGRESS NOTE ADULT - SUBJECTIVE AND OBJECTIVE BOX
Patient is a 89y old  Female who presents with a chief complaint of Hypoglycemia (15 Aug 2022 08:36)  Patient is asleep. Laying in bed in NAD. Doing well on RA    INTERVAL HPI/OVERNIGHT EVENTS:      VITAL SIGNS:  T(F): 99.1 (08-15-22 @ 05:05)  HR: 88 (08-15-22 @ 05:05)  BP: 161/90 (08-15-22 @ 05:05)  RR: 18 (08-15-22 @ 05:05)  SpO2: 96% (08-15-22 @ 05:05)  Wt(kg): --  I&O's Detail    14 Aug 2022 07:01  -  15 Aug 2022 07:00  --------------------------------------------------------  IN:  Total IN: 0 mL    OUT:    Stool (mL): 3 mL    Voided (mL): 200 mL  Total OUT: 203 mL    Total NET: -203 mL              REVIEW OF SYSTEMS:    CONSTITUTIONAL:  No fevers, chills, sweats    HEENT:  Eyes:  No diplopia or blurred vision. ENT:  No earache, sore throat or runny nose.    CARDIOVASCULAR:  No pressure, squeezing, tightness, or heaviness about the chest; no palpitations.    RESPIRATORY:  Per HPI    GASTROINTESTINAL:  No abdominal pain, nausea, vomiting or diarrhea.    GENITOURINARY:  No dysuria, frequency or urgency.    NEUROLOGIC:  No paresthesias, fasciculations, seizures or weakness.    PSYCHIATRIC:  No disorder of thought or mood.      PHYSICAL EXAM:    Constitutional: Well developed and nourished  Eyes:Perrla  ENMT: normal  Neck:supple  Respiratory: good air entry  Cardiovascular: S1 S2 regular  Gastrointestinal: Soft, Non tender  Extremities: No edema  Vascular:normal  Neurological:Asleep  Musculoskeletal:Normal      MEDICATIONS  (STANDING):  aspirin  chewable 81 milliGRAM(s) Oral daily  ciprofloxacin   IVPB      ciprofloxacin   IVPB 200 milliGRAM(s) IV Intermittent every 12 hours  enoxaparin Injectable 40 milliGRAM(s) SubCutaneous every 24 hours  insulin glargine Injectable (LANTUS) 22 Unit(s) SubCutaneous every morning  insulin lispro (ADMELOG) corrective regimen sliding scale   SubCutaneous three times a day before meals  insulin lispro (ADMELOG) corrective regimen sliding scale   SubCutaneous at bedtime  levothyroxine 25 MICROGram(s) Oral daily  metoprolol tartrate 25 milliGRAM(s) Oral two times a day  pantoprazole   Suspension 40 milliGRAM(s) Oral daily  polyethylene glycol 3350 17 Gram(s) Oral daily  senna 2 Tablet(s) Oral at bedtime    MEDICATIONS  (PRN):  acetaminophen     Tablet .. 650 milliGRAM(s) Oral every 6 hours PRN Temp greater or equal to 38C (100.4F), Mild Pain (1 - 3)  aluminum hydroxide/magnesium hydroxide/simethicone Suspension 30 milliLiter(s) Oral every 4 hours PRN Dyspepsia  melatonin 3 milliGRAM(s) Oral at bedtime PRN Insomnia  ondansetron Injectable 4 milliGRAM(s) IV Push every 8 hours PRN Nausea and/or Vomiting  simethicone 80 milliGRAM(s) Chew three times a day PRN Gas      Allergies    penicillins (Hives)    Intolerances        LABS:            Urinalysis Basic - ( 13 Aug 2022 15:50 )    Color: Yellow / Appearance: Slightly Turbid / S.020 / pH: x  Gluc: x / Ketone: Trace  / Bili: Negative / Urobili: Negative   Blood: x / Protein: 30 mg/dL / Nitrite: Negative   Leuk Esterase: Moderate / RBC: 2-5 /HPF / WBC 26-50 /HPF   Sq Epi: x / Non Sq Epi: Few /HPF / Bacteria: TNTC /HPF            CAPILLARY BLOOD GLUCOSE      POCT Blood Glucose.: 193 mg/dL (15 Aug 2022 11:13)  POCT Blood Glucose.: 287 mg/dL (15 Aug 2022 07:35)  POCT Blood Glucose.: 207 mg/dL (14 Aug 2022 21:05)  POCT Blood Glucose.: 262 mg/dL (14 Aug 2022 16:31)  POCT Blood Glucose.: 257 mg/dL (14 Aug 2022 11:50)        RADIOLOGY & ADDITIONAL TESTS:    CXR:    Ct scan chest:    ekg;    echo:

## 2022-08-15 NOTE — PROGRESS NOTE ADULT - NUTRITIONAL ASSESSMENT
This patient has been assessed with a concern for Malnutrition and has been determined to have a diagnosis/diagnoses of Moderate protein-calorie malnutrition and Underweight (BMI < 19).    This patient is being managed with:   Diet Pureed-  Supplement Feeding Modality:  Oral  Glucerna Shake Cans or Servings Per Day:  1       Frequency:  Two Times a day  Entered: Aug 12 2022 12:07PM     This patient has been assessed with a concern for Malnutrition and has been determined to have a diagnosis/diagnoses of Moderate protein-calorie malnutrition and Underweight (BMI < 19).    This patient is being managed with:   Diet Pureed-  Supplement Feeding Modality:  Oral  Glucerna Shake Cans or Servings Per Day:  1       Frequency:  Two Times a day  Entered: Aug 12 2022 12:07PM.

## 2022-08-15 NOTE — DISCHARGE NOTE PROVIDER - HOSPITAL COURSE
89 year old female with PMHx of DM, HTN, worsening Alzheimer coming in for hypoglycemia, likely due to decreased PO intake while on insulin. Pt received d50 and with food response in ED. Endo Dr Huff  followed.  Pt was followed by palliative, family decided for DNR/DNI, mews suspended, comfort care only and for discharge home with hospice   Given clinical course decision made to discharge patient   Discharge discussed with attending   This is only a brief summary of patient's hospital stay, for full course please see EMR

## 2022-08-15 NOTE — PROGRESS NOTE ADULT - PROBLEM SELECTOR PROBLEM 2
Failure to thrive in adult
Atelectasis
Failure to thrive in adult
Atelectasis
DM (diabetes mellitus)
DM (diabetes mellitus)

## 2022-08-15 NOTE — PROGRESS NOTE ADULT - PROBLEM SELECTOR PROBLEM 4
DM (diabetes mellitus)
HTN (hypertension)
DM (diabetes mellitus)
HTN (hypertension)

## 2022-08-15 NOTE — DISCHARGE NOTE PROVIDER - PROVIDER TOKENS
PROVIDER:[TOKEN:[[id number]],FOLLOWUP:[1 week]],PROVIDER:[TOKEN:[[id number]],FOLLOWUP:[1 week]],PROVIDER:[TOKEN:[[id number]],FOLLOWUP:[1-3 days]]

## 2022-08-15 NOTE — PROGRESS NOTE ADULT - PROBLEM SELECTOR PLAN 6
nutritional consult
nutritional consult
-needs assistance with all ADLs  -supportive measures
- continue pts home medication Ferous sulfate.
nutritional consult
nutritional consult

## 2022-08-15 NOTE — PROGRESS NOTE ADULT - PROBLEM SELECTOR PLAN 7
check urine culture  antibiotics
- Lovenox - DVT.   - Protonix - GI
check urine culture  antibiotics
-plan as above

## 2022-08-15 NOTE — PROGRESS NOTE ADULT - SUBJECTIVE AND OBJECTIVE BOX
Patient is a 89y old  Female who presents with a chief complaint of Hypoglycemia (14 Aug 2022 10:55)    pt seen in ed [  ], reg med floor [ x  ], bed [ x ], chair at bedside [   ], awake [ x ], lethargic [  ],    nad [ x ]      Allergies    penicillins (Hives)        Vitals    T(F): 99.1 (08-15-22 @ 05:05), Max: 99.1 (08-15-22 @ 05:05)  HR: 88 (08-15-22 @ 05:05) (83 - 101)  BP: 161/90 (08-15-22 @ 05:05) (142/63 - 194/97)  RR: 18 (08-15-22 @ 05:05) (18 - 18)  SpO2: 96% (08-15-22 @ 05:05) (96% - 98%)  Wt(kg): --  CAPILLARY BLOOD GLUCOSE      POCT Blood Glucose.: 287 mg/dL (15 Aug 2022 07:35)      Labs                      Catheterized Catheterized  08-13 @ 15:50   >100,000 CFU/ml Escherichia coli  <10,000 CFU/ml Normal Urogenital ankit present  --  --          Radiology Results      Meds    MEDICATIONS  (STANDING):  aspirin enteric coated 81 milliGRAM(s) Oral daily  ciprofloxacin   IVPB      ciprofloxacin   IVPB 200 milliGRAM(s) IV Intermittent every 12 hours  enoxaparin Injectable 40 milliGRAM(s) SubCutaneous every 24 hours  insulin glargine Injectable (LANTUS) 22 Unit(s) SubCutaneous every morning  insulin lispro (ADMELOG) corrective regimen sliding scale   SubCutaneous three times a day before meals  insulin lispro (ADMELOG) corrective regimen sliding scale   SubCutaneous at bedtime  levothyroxine 25 MICROGram(s) Oral daily  metoprolol tartrate 25 milliGRAM(s) Oral two times a day  pantoprazole   Suspension 40 milliGRAM(s) Oral daily  polyethylene glycol 3350 17 Gram(s) Oral daily  senna 2 Tablet(s) Oral at bedtime      MEDICATIONS  (PRN):  acetaminophen     Tablet .. 650 milliGRAM(s) Oral every 6 hours PRN Temp greater or equal to 38C (100.4F), Mild Pain (1 - 3)  aluminum hydroxide/magnesium hydroxide/simethicone Suspension 30 milliLiter(s) Oral every 4 hours PRN Dyspepsia  melatonin 3 milliGRAM(s) Oral at bedtime PRN Insomnia  ondansetron Injectable 4 milliGRAM(s) IV Push every 8 hours PRN Nausea and/or Vomiting  simethicone 80 milliGRAM(s) Chew three times a day PRN Gas      Physical Exam    Neuro :  no focal deficits  Respiratory: CTA B/L  CV: RRR, S1S2, no murmurs,   Abdominal: Soft, NT, ND +BS,  Extremities: No edema, + peripheral pulses        ASSESSMENT    unresponsiveness 2nd to hypoglycemia   atelectasis  Functional quadriplegia.  Moderate protein-calorie malnutrition   uti   h/o alzheimer's dementia,   hypothyroidism  HTN (hypertension)  DM (diabetes mellitus)  MESERET (iron deficiency anemia)  Cardiac pacemaker  S/P TAMMY-BSO  s/p hip replacement        PLAN    hold home diabetic meds,   pt on basaglar 34 u and metformin 500 qd at home   HbA1c = 5.8 noted above   endo f/u  Pt presented with hypoglycemia with sugars in the 60's possibly due to poor oral intake  goal sugars 140-200  changed lantus to 22 at bedtime  c/w pureed meals- cont to have poor intake   pulm f/u   bronchodilators  chest pt  swallow eval   palliative eval noted   palliative Met with the pt's daughter Chapo at the bedside  Tresa endorsed pt has been declining both mentally and functionally, now refusing to eat.    She recognizes that her mother is very sick.    Discussed dementia trajectory and explained she is eligible for hospice.    Tresa stated she does not want her mother to suffer, her quality of life is very poor now.   She agreed for comfort measures.    She wants to take her mother home with hospice.    Discussed risks vs benefits of LST including CPR, intubation, and artificial nutrition in context of end stage dementia.    She is aware that these measures would not result in any meaning recovery.    DEV drafted: DEV; DNR/DNI/no feeding tube/comfort measures/DNH.    SHAKEEL referral made for home hospice.   ua positive for uti   started cipro 200mg iv bid x 5 days   may change abx on d/c to po to complete 5 days tx  cont current meds   d/c planning for home hosp with [facility]

## 2022-08-15 NOTE — PROGRESS NOTE ADULT - PROBLEM SELECTOR PLAN 3
monitor BP  cont meds
-likely in setting of advanced dementia able to swallow only liquid/pureed food  -aspiration precautions   -supportive measures   -nutrition recs appreciated   -mews suspended, comfort care only
monitor BP  cont meds
- As per daughter pt declining over the last few weeks.   - Decreased oral intake.   - Only able to swallow liquid/pureed food.   - Palliative consult following  - mews suspended, comfort care only

## 2022-08-15 NOTE — PROGRESS NOTE ADULT - PROBLEM SELECTOR PLAN 5
cont meds  supportive care  Psych eval
-  worsening.   - Continue home medication of Prozac, Seroquel, Risperdal, trazodone.
cont meds  supportive care  Psych eval
-worsening mental status and lethargy, hold home dose of Prozac, Seroquel, Risperdal and trazodone  -restart home meds if mental status improves

## 2022-08-15 NOTE — DISCHARGE NOTE PROVIDER - CARE PROVIDER_API CALL
Chilo Snyder)  Internal Medicine  [address] [place] [address]  Phone: [phone]  Fax: [phone]  Follow Up Time: 1 week    Aung Ayala)  Internal Medicine; Pulmonary Disease  [address] [place] [address]  Phone: [phone]  Fax: [phone]  Follow Up Time: 1 week    Denisse Huff)  EndocrinologyMetabDiabetes  [address] [place] [address]  Phone: [phone]  Fax: [phone]  Follow Up Time: 1-3 days

## 2022-08-15 NOTE — PROGRESS NOTE ADULT - PROBLEM SELECTOR PROBLEM 6
Failure to thrive in adult
MESERET (iron deficiency anemia)
Functional quadriplegia

## 2022-08-15 NOTE — PROGRESS NOTE ADULT - ASSESSMENT
89 year old female with PMHx of DM, HTN, worsening Alzheimer coming in for hypoglycemia, likely due to decreased PO intake while on insulin. Pt received d50 and with food response in ED. Endo Dr Huff following.  Pt is followed by palliative and is now DNR/DNI, mews suspended, comfort care only. Pending discharge to home with hospice   
This is a 89 year old female with medical history of DM, HTN, Worsening Alzheimers coming in after found to be nonresponsive and with sugars in the 60s at home. Admitted for the treatment of hypoglycemia likely in the setting of decreased oral intake. Endocrinology consulted for management of hypoglycemia.  
This is a 89 year old female with medical history of DM, HTN, Worsening Alzheimers coming in after found to be nonresponsive and with sugars in the 60s at home. Admitted for the treatment of hypoglycemia likely in the setting of decreased oral intake. Endocrinology consulted for management of hypoglycemia.    
This is a 89 year old female with PMHx of DM, HTN, Worsening Alzheimer coming in for hypoglycemia, likely due to decreased PO intake while on insulin. Pt received d50 and with food response. Endo Dr Huff following.  Pt is followed by palliative and is now dnr/dni, mews suspended, comfort care only.

## 2022-08-15 NOTE — DISCHARGE NOTE PROVIDER - NSDCCPCAREPLAN_GEN_ALL_CORE_FT
PRINCIPAL DISCHARGE DIAGNOSIS  Diagnosis: Diabetic hypoglycemia  Assessment and Plan of Treatment: You presented to the hospital with low blood surgar, this is likely due to your poor PO intake and use of insulin.   It's important not to skip any meals.  Keep a log of your blood glucose results and always take it with you to your doctor appointments.  Keep a list of your current medications including injectables and over the counter medications and bring this medication list with you to all your doctor appointments.  If you have not seen your ophthalmologist this year call for appointment.  Check your feet daily for redness, sores, or openings. Do not self treat. If no improvement in two days call your primary care physician for an appointment.  Low blood sugar (hypoglycemia) is a blood sugar below 70mg/dl. Check your blood sugar if you feel signs/symptoms of hypoglycemia. If your blood sugar is below 70 take 15 grams of carbohydrates (ex 4 oz of apple juice, 3-4 glucose tablets, or 4-6 oz of regular soda) wait 15 minutes and repeat blood sugar to make sure it comes up above 70.  If your blood sugar is above 70 and you are due for a meal, have a meal.  If you are not due for a meal have a snack.  This snack helps keeps your blood sugar at a safe range.        SECONDARY DISCHARGE DIAGNOSES  Diagnosis: Palliative care encounter  Assessment and Plan of Treatment: You and your family have decided to return home with hospice   DNR DNI comfort measures only

## 2022-08-23 NOTE — ED PROVIDER NOTE - CONSTITUTIONAL MENTATION
Patient:  Holly Queen   :   1960    Procedural Summary  ~Consent:   Obtained written and verbal consent      Risks/benefits explained in detail  ~Procedure:    Left Heart Catheterization  ~Medications:    Procedural sedation with minimal conscious sedation  ~Complications:   None  ~Blood Loss:    <10cc  ~Specimens:    None obtained  ~Pre-sedation re-evaluation: Performed immediately prior to procedure. Medication and Procedural Reconciliation:  An independent trained observer pushed medications at my direction. We monitored the patient's level of consciousness and vital signs/physiologic status throughout the procedure duration (see start and stop times below). Sedation: 3.5 mg Versed, 75 mcg Fentanyl  Sedation start: 948  Sedation stop:     Cardiac Cath LVG, PCI:  Anatomy:   LM-nml   LAD-prox 70%, mid 80%, distal 40%  Cx-nml  OM- nml  RCA-dom 10%  RPDA- nml  LVEF- 55%  LVG- nml  LVEDP- 3    Intervention  ~Successful PCI to LAD with 3.5x33 MALVIN. PD with 3.5x12 NC Excellent Result. Contrast: 114  Flouro Time: 6.6  Access: R Radial a    Impression  ~Coronary Angiography w/ severe single vessel CAD  ~LVG with LVEF of 3 and no regional wall motion abnormalities  ~Successful complex angioplasty and stenting of LAD        Recommendation  ~Aggressive medical treatment and risk factor modification  ~1. Post cath IVF. Bedrest.   2. Recommend beta blocker, high potency statin, aspirin and plavix for 6-12 months. No ace/arb required given normal LVEF. 3. Referral to outpatient cardiac rehab phase II will be deferred until patient follow-up in office and then determine patient safety and appropriateness to proceed  4. Patient has been advised on the importance of regular exercise of at least 20-30 minutes daily. 5. Patient counseled about and offered assistance for smoking cessation   6. OK to discharge home today.  Follow up in 2 weeks with cardiology            Jhony Somers MD, MD 2022 10:30 AM follows commands somewhat/awake/alert

## 2022-10-07 NOTE — ED PROVIDER NOTE - CARE PLAN
no headache/no difficulty walking/imbalance/no change in level of consciousness 1 Principal Discharge DX:	Hypoglycemia

## 2022-12-20 NOTE — H&P ADULT - BACK
not examined Oral Minoxidil Pregnancy And Lactation Text: This medication should only be used when clearly needed if you are pregnant, attempting to become pregnant or breast feeding.

## 2022-12-21 NOTE — ED ADULT TRIAGE NOTE - TEMPERATURE IN FAHRENHEIT (DEGREES F)
22  Donnell Hester : 1980 Sex: female  Age 43 y.o. Subjective:  Chief Complaint   Patient presents with    Sinus Problem    Congestion    Drainage         HPI:   Donnell Hester , 43 y.o. female presents to express care for evaluation of sinus congestion, drainage, sinus pressure     HPI  49-year-old female presents to express care for evaluation of sinus congestion, drainage, pressure. The patient's had these symptoms over the last couple of days. The patient has not any fevers. The patient has not had any antibiotics. The patient is using DayQuil, NyQuil. The patient has had COVID-vaccine and booster. The patient has had flu shot. The patient states that she is due to travel out of the country next week. ROS:   Unless otherwise stated in this report the patient's positive and negative responses for review of systems for constitutional, eyes, ENT, cardiovascular, respiratory, gastrointestinal, neurological, , musculoskeletal, and integument systems and related systems to the presenting problem are either stated in the history of present illness or were not pertinent or were negative for the symptoms and/or complaints related to the presenting medical problem. Positives and pertinent negatives as per HPI. All others reviewed and are negative.       PMH:     Past Medical History:   Diagnosis Date    ADHD (attention deficit hyperactivity disorder)     has not been medicated since [facility]    Allergic rhinitis     Asthma     Chest pain 2020    Hypothyroidism     Postpartum depression        Past Surgical History:   Procedure Laterality Date     SECTION      x4    TONSILLECTOMY         Family History   Problem Relation Age of Onset    Lupus Sister     Asthma Mother     Other Mother         fibromyalgia, hypothyroidism    High Blood Pressure Mother     Diabetes Father     High Cholesterol Father     Heart Disease Father         MI at 27 due to muscle spasm    Other Sister muscular dystrophy as a child    Cancer Maternal Grandmother         colon    Diabetes Maternal Grandfather     Cancer Paternal Grandmother         colon    No Known Problems Paternal Grandfather        Medications:     Current Outpatient Medications:     cefdinir (OMNICEF) 300 MG capsule, Take 1 capsule by mouth 2 times daily for 10 days, Disp: 20 capsule, Rfl: 0    predniSONE (DELTASONE) 10 MG tablet, 3 tabs once daily for 3 days, 2 tabs once daily for 3 days, 1 tab once daily for 3 days, Disp: 18 tablet, Rfl: 0    FLUoxetine (PROZAC) 20 MG capsule, Take 1 capsule by mouth daily, Disp: 30 capsule, Rfl: 3    levothyroxine (SYNTHROID) 50 MCG tablet, Take 1 tablet by mouth in the morning., Disp: 30 tablet, Rfl: 5    albuterol sulfate HFA (PROVENTIL HFA) 108 (90 Base) MCG/ACT inhaler, Inhale 2 puffs into the lungs every 4 hours as needed for Wheezing, Disp: 1 each, Rfl: 2    Allergies: Allergies   Allergen Reactions    Pcn [Penicillins]      Family h/o anaphylaxis       Social History:     Social History     Tobacco Use    Smoking status: Never    Smokeless tobacco: Never   Substance Use Topics    Alcohol use: Yes     Comment: occasionally    Drug use: No       Patient lives at home. Physical Exam:     Vitals:    12/21/22 1122   BP: 126/84   Site: Right Upper Arm   Position: Sitting   Cuff Size: Medium Adult   Pulse: (!) 106   Resp: 18   Temp: 98.2 °F (36.8 °C)   TempSrc: Temporal   SpO2: 98%   Weight: 158 lb (71.7 kg)   Height: 5' 7\" (1.702 m)       Exam:  Physical Exam  Nurse's notes and vital signs reviewed. The patient is not hypoxic. ? General: Alert, no acute distress, patient resting comfortably Patient is not toxic or lethargic. Skin: Warm, intact, no pallor noted. There is no evidence of rash at this time. Head: Normocephalic, atraumatic  Eye: Normal conjunctiva  Ears, Nose, Throat: Right tympanic membrane clear, left tympanic membrane clear. No drainage or discharge noted.  No pre- or post-auricular tenderness, erythema, or swelling noted. Nasal congestion, rhinorrhea, no epistaxis  Posterior oropharynx shows erythema and cobblestoning but no evidence of tonsillar hypertrophy, or exudate. the uvula is midline. No trismus or drooling is noted. Moist mucous membranes. Neck: No anterior/posterior lymphadenopathy noted. No erythema, no masses, no fluctuance or induration noted. No meningeal signs. Cardiovascular: Regular Rate and Rhythm  Respiratory: No acute distress, no rhonchi, wheezing or crackles noted. No stridor or retractions are noted. Neurological: A&O x4, normal speech  Psychiatric: Cooperative       Testing:           Medical Decision Making:     Vital signs reviewed    Past medical history reviewed. Allergies reviewed. Medications reviewed. Patient on arrival does not appear to be in any apparent distress or discomfort. The patient has been seen and evaluated. The patient does not appear to be toxic or lethargic. COVID and influenza were negative    We will treat the patient with cefdinir and prednisone. She has tolerated this in the past despite her allergy of penicillin. The patient was educated on the proper dosage of motrin and tylenol and the appropriate intervals of each. The patient is to increase fluid intake over the next several days. The patient is to use OTC decongestant as needed. The patient is to return to express care or go directly to the emergency department should any of the signs or symptoms worsen. The patient is to followup with primary care physician in 2-3 days for repeat evaluation. The patient has no other questions or concerns at this time the patient will be discharged home. Clinical Impression:   Gela was seen today for sinus problem, congestion and drainage.     Diagnoses and all orders for this visit:    Acute non-recurrent sinusitis, unspecified location    Congestion of nasal sinus  -     POCT COVID-19, Antigen  - POCT Influenza A/B Antigen    Sinus headache    Other orders  -     cefdinir (OMNICEF) 300 MG capsule; Take 1 capsule by mouth 2 times daily for 10 days  -     predniSONE (DELTASONE) 10 MG tablet; 3 tabs once daily for 3 days, 2 tabs once daily for 3 days, 1 tab once daily for 3 days      The patient is to call for any concerns or return if any of the signs or symptoms worsen. The patient is to follow-up with PCP in the next 2-3 days for repeat evaluation repeat assessment or go directly to the emergency department.      SIGNATURE: Katha Collet III, PA-C 97.9

## 2023-01-18 NOTE — PATIENT PROFILE ADULT - FALL HARM RISK - DEVICES
Zyclara Counseling:  I discussed with the patient the risks of imiquimod including but not limited to erythema, scaling, itching, weeping, crusting, and pain.  Patient understands that the inflammatory response to imiquimod is variable from person to person and was educated regarded proper titration schedule.  If flu-like symptoms develop, patient knows to discontinue the medication and contact us. bedbound Walker/Wheelchair

## 2023-07-27 NOTE — ED PROVIDER NOTE - WET READ LAUNCH FT
There are no Wet Read(s) to document.
Patient requests all Lab, Cardiology, and Radiology Results on their Discharge Instructions

## 2023-08-14 NOTE — ED PROVIDER NOTE - SEVERITY
MODERATE Bilobed Transposition Flap Text: The defect edges were debeveled with a #15 scalpel blade.  Given the location of the defect and the proximity to free margins a bilobed transposition flap was deemed most appropriate.  Using a sterile surgical marker, an appropriate bilobe flap drawn around the defect.    The area thus outlined was incised deep to adipose tissue with a #15 scalpel blade.  The skin margins were undermined to an appropriate distance in all directions utilizing iris scissors.

## 2024-02-06 NOTE — ED ADULT TRIAGE NOTE - PAIN: PRESENCE, MLM
Medication:   Requested Prescriptions     Pending Prescriptions Disp Refills    cyclobenzaprine (FLEXERIL) 5 MG tablet [Pharmacy Med Name: CYCLOBENZAPRINE HCL 5 MG TA 5 Tablet] 10 tablet 0     Sig: TAKE 1 TABLET BY MOUTH NIGHTLY AS NEEDED FOR MUSCLE SPASMS        Last Filled:      Patient Phone Number: [phone] (home)     Last appt: 1/22/2024   Next appt: 4/22/2024    Last OARRS:        No data to display                   non-verbal indicators of pain/discomfort absent

## 2024-12-13 NOTE — DISCHARGE NOTE NURSING/CASE MANAGEMENT/SOCIAL WORK - NSPROEXTENSIONSOFSELF_GEN_A_NUR
If you have any non-emergent questions or concerns, please call the office at [phone].  If at any point in time you start to experience thoughts of harming yourself or others, if you feel unsafe, experience auditory or visual hallucinations, or if your symptoms severely worsen please call 911 or present to your nearest emergency room.     [place] Warm Line: [phone].  Hours of Operation: Monday through Saturday, 8:00 a.m. - 8:00 p.m. except holidays  [place] Crisis Line: 0-321-222-[place]FF[place] (0620). Hours of Operation: Monday - Friday 9:00 am- 9:00 pm  Suicide Hotline: 796.252.2084,  or 268    Treatment plan:       - Medication Change: Switch from Concerta 36 mg to Adderall Immediate Release 10 mg. Take with food to minimize side effects.  - Dosage and Usage: Use Adderall as needed, particularly mindful of its 4-6 hour effectiveness window. This should help manage the side effects related to appetite and sleep.  - Side Effects to Monitor: Watch for headache, dizziness, jitters, dry mouth, and pulse elevation. Most side effects should subside within two weeks. Immediately report any mood or behavioral changes.  - Follow-Up Appointment: Please schedule a follow-up visit in 30 days to assess how you are responding to Adderall. It’s ideal to have this appointment when you have 5 to 10 tablets left.  - Pharmacy Notification: Your prescription will be sent to [facility] in [place]. Adderall is generally more readily available than Concerta.  - Additional Caution: Using your asthma inhaler with Adderall may increase your pulse rate and cause anxiety. Please monitor this closely.    If you have any questions or if there are any issues, do not hesitate to reach out through the patient portal or call the office. Looking forward to seeing how you adjust to the new medication. Take care and see you in 30 days.      
none

## 2025-05-01 NOTE — ED ADULT TRIAGE NOTE - BRAND OF FIRST COVID-19 BOOSTER
Physical Therapy Visit    Visit Type: Daily Treatment Note  Visit: 2  Referring Provider: HUNTER Rose*  Medical Diagnosis (from order): S42.292A - Closed fracture of head of left humerus, initial encounter  Z98.890 - Status post rotator cuff repair     SUBJECTIVE                                                                                                               Doing much better. My ribs are still sore but not bad. She said the xrays look good. She still wants to see me one more time.   Functional Change: See above.     Pain / Symptoms  - Pain rating (out of 10): Current: 0       OBJECTIVE                                                                                                                     Range of Motion (ROM)   (degrees unless noted; active unless noted; norms in ( ); negative=lacking to 0, positive=beyond 0)  Shoulder:    - Flexion (180):         Left:   Passive: 142    - Scaption:         Left:   Passive: 165                       Treatment     Therapeutic Exercise  Left:  Pulleys - AAROM shoulder elevation x 3 minutes   Supine shoulder AAROM Scaption and flexion with dowel x 20 each   Supine AROM chest press x 15  Supine AROM shoulder flexion x 15  Side lying scapular retraction and depression x 15     + external rotation AROM with towel roll x 15  Semi reclined AROM shoulder scaption x 10, flexion x 15      Manual Therapy   Manual PROM Left shoulder scaption, flexion, external rotation, and internal rotation with gentle end range stretches     Skilled input: verbal instruction/cues, inhibition and tactile instruction/cues    Writer verbally educated and received verbal consent for hand placement, positioning of patient, and techniques to be performed today from patient for hand placement and palpation for techniques as described above and how they are pertinent to the patient's plan of care.  Home Exercise Program  Access Code: [email or web address]  URL:  [email or web address]  Date: 05/02/2025  Prepared by: Hanh Sibley    Exercises  - Seated Shoulder Scaption AAROM with Pulley at Side  - 2 x daily - 7 x weekly - 1 sets - 20 reps  - Supine Shoulder Flexion Extension AAROM with Dowel  - 2 x daily - 7 x weekly - 1 sets - 15 reps  - Supine Shoulder Flexion Extension Full Range AROM  - 2 x daily - 7 x weekly - 1 sets - 15 reps  - Sidelying Scapular Retraction  - 2 x daily - 7 x weekly - 1 sets - 10 reps  - Sidelying Shoulder External Rotation  - 2 x daily - 7 x weekly - 1 sets - 15 reps  - Reclined Shoulder Abduction Adduction with Free Weight  - 1-2 x daily - 7 x weekly - 1 sets - 10-15 reps  - Reclined Shoulder Flexion   - 1-2 x daily - 7 x weekly - 1 sets - 15 reps      ASSESSMENT                                                                                                            Left shoulder flexion and scaption PROM maintained since last visit. Fair-good scapular control in side lying. Quick fatigue and limited AROM with semi reclined scaption.   Pain/symptoms after session (out of 10): 1 and 0  Education:   - Results of above outlined education: Verbalizes understanding and Demonstrates understanding    PLAN                                                                                                                           Suggestions for next session as indicated: Progress per plan of care, shoulder P/AA/AROM, scapular rotator cuff strengthening as able        Therapy procedure time and total treatment time can be found documented on the Time Entry flowsheet     [employer]

## 2025-05-06 NOTE — BH CONSULTATION LIAISON ASSESSMENT NOTE - NSHPLANGTRANSLATORFT_GEN_A_CORE
Detail Level: Detailed Depth Of Biopsy: dermis [employer] video  #[id number]   Was A Bandage Applied: Yes Size Of Lesion In Cm: 0 Biopsy Type: H and E Biopsy Method: Dermablade Anesthesia Type: 1% lidocaine with epinephrine Anesthesia Volume In Cc: 0.5 Hemostasis: Drysol Wound Care: Petrolatum Dressing: bandage Destruction After The Procedure: No Type Of Destruction Used: Curettage Curettage Text: The wound bed was treated with curettage after the biopsy was performed. Cryotherapy Text: The wound bed was treated with cryotherapy after the biopsy was performed. Electrodesiccation Text: The wound bed was treated with electrodesiccation after the biopsy was performed. Electrodesiccation And Curettage Text: The wound bed was treated with electrodesiccation and curettage after the biopsy was performed. Silver Nitrate Text: The wound bed was treated with silver nitrate after the biopsy was performed. Lab: [id number] Medical Necessity Information: It is in your best interest to select a reason for this procedure from the list below. All of these items fulfill various CMS LCD requirements except the new and changing color options. Consent: Written consent was obtained and risks were reviewed including but not limited to scarring, infection, bleeding, scabbing, incomplete removal, nerve damage and allergy to anesthesia. Post-Care Instructions: I reviewed with the patient in detail post-care instructions. Patient is to keep the biopsy site dry overnight, and then apply bacitracin twice daily until healed. Patient may apply hydrogen peroxide soaks to remove any crusting. Notification Instructions: Patient will be notified of biopsy results. However, patient instructed to call the office if not contacted within 2 weeks. Billing Type: Third-Party Bill Information: Selecting Yes will display possible errors in your note based on the variables you have selected. This validation is only offered as a suggestion for you. PLEASE NOTE THAT THE VALIDATION TEXT WILL BE REMOVED WHEN YOU FINALIZE YOUR NOTE. IF YOU WANT TO FAX A PRELIMINARY NOTE YOU WILL NEED TO TOGGLE THIS TO 'NO' IF YOU DO NOT WANT IT IN YOUR FAXED NOTE.

## 2025-05-16 NOTE — PATIENT PROFILE ADULT - STATED REASON FOR ADMISSION
Mom found tick on sons head during a bath she was able to fully remove tick and put it in a plasitc bag.  Advised supported care   to monitor at home from rash and fever along with headache starting day 2 through 14.  Mom requesting appointment to discuss antibiotics, mom believes tick is a wooded tick  Appointment schedule in acute clinic for tomorrow.  Reason for Disposition   Wood tick bite with no complications   Deer tick bite and tick is flat (not swollen) and attached for less than 36 hours before removal    Protocols used: Tick Bite-P-OH     Daughter reports that her blood pressure was high and that sugar were low. And that she was having chest pain.
